# Patient Record
Sex: MALE | Race: WHITE | NOT HISPANIC OR LATINO | Employment: FULL TIME | URBAN - NONMETROPOLITAN AREA
[De-identification: names, ages, dates, MRNs, and addresses within clinical notes are randomized per-mention and may not be internally consistent; named-entity substitution may affect disease eponyms.]

---

## 2015-08-12 LAB
EJECTION FRACTION: 65

## 2017-01-11 ENCOUNTER — HOSPITAL ENCOUNTER (OUTPATIENT)
Dept: RESPIRATORY THERAPY | Facility: OTHER | Age: 57
End: 2017-01-11

## 2017-01-11 ENCOUNTER — AMBULATORY - GICH (OUTPATIENT)
Dept: LAB | Facility: OTHER | Age: 57
End: 2017-01-11

## 2017-01-11 DIAGNOSIS — C81.90 HODGKIN LYMPHOMA (H): ICD-10-CM

## 2017-01-11 LAB
A/G RATIO - HISTORICAL: 1.3 (ref 1–2)
ABSOLUTE BASOPHILS - HISTORICAL: 0.1 THOU/CU MM
ABSOLUTE EOSINOPHILS - HISTORICAL: 0.2 THOU/CU MM
ABSOLUTE LYMPHOCYTES - HISTORICAL: 1.4 THOU/CU MM (ref 0.9–2.9)
ABSOLUTE MONOCYTES - HISTORICAL: 0.4 THOU/CU MM
ABSOLUTE NEUTROPHILS - HISTORICAL: 4.2 THOU/CU MM (ref 1.7–7)
ALBUMIN SERPL-MCNC: 3.8 G/DL (ref 3.5–5.7)
ALP SERPL-CCNC: 46 IU/L (ref 34–104)
ALT (SGPT) - HISTORICAL: 21 IU/L (ref 7–52)
ANION GAP - HISTORICAL: 5 (ref 5–18)
AST SERPL-CCNC: 22 IU/L (ref 13–39)
BASOPHILS # BLD AUTO: 1.9 %
BILIRUB SERPL-MCNC: 0.5 MG/DL (ref 0.3–1)
BUN SERPL-MCNC: 17 MG/DL (ref 7–25)
BUN/CREAT RATIO - HISTORICAL: 18
CALCIUM SERPL-MCNC: 9.3 MG/DL (ref 8.6–10.3)
CHLORIDE SERPLBLD-SCNC: 105 MMOL/L (ref 98–107)
CO2 SERPL-SCNC: 26 MMOL/L (ref 21–31)
CREAT SERPL-MCNC: 0.94 MG/DL (ref 0.7–1.3)
EOSINOPHIL NFR BLD AUTO: 2.6 %
ERYTHROCYTE [DISTWIDTH] IN BLOOD BY AUTOMATED COUNT: 13.2 % (ref 11.5–15.5)
ERYTHROCYTE [SEDIMENTATION RATE] IN BLOOD: 8 MM/HR
GFR IF NOT AFRICAN AMERICAN - HISTORICAL: >60 ML/MIN/1.73M2
GLOBULIN - HISTORICAL: 2.9 G/DL (ref 2–3.7)
GLUCOSE SERPL-MCNC: 99 MG/DL (ref 70–105)
HCT VFR BLD AUTO: 46.3 % (ref 37–53)
HEMOGLOBIN: 15.4 G/DL (ref 13.5–17.5)
LDH SERPL-CCNC: 115 IU/L (ref 140–271)
LYMPHOCYTES NFR BLD AUTO: 21.9 % (ref 20–44)
MCH RBC QN AUTO: 32.6 PG (ref 26–34)
MCHC RBC AUTO-ENTMCNC: 33.3 G/DL (ref 32–36)
MCV RBC AUTO: 98 FL (ref 80–100)
MONOCYTES NFR BLD AUTO: 6.7 %
NEUTROPHILS NFR BLD AUTO: 66.9 % (ref 42–72)
PLATELET # BLD AUTO: 213 THOU/CU MM (ref 140–440)
PMV BLD: 7.1 FL (ref 6.5–11)
POTASSIUM SERPL-SCNC: 4.2 MMOL/L (ref 3.5–5.1)
PROT SERPL-MCNC: 6.7 G/DL (ref 6.4–8.9)
RED BLOOD COUNT - HISTORICAL: 4.73 MIL/CU MM (ref 4.3–5.9)
SODIUM SERPL-SCNC: 136 MMOL/L (ref 133–143)
WHITE BLOOD COUNT - HISTORICAL: 6.2 THOU/CU MM (ref 4.5–11)

## 2017-01-16 ENCOUNTER — COMMUNICATION - GICH (OUTPATIENT)
Dept: INTERNAL MEDICINE | Facility: OTHER | Age: 57
End: 2017-01-16

## 2017-01-16 DIAGNOSIS — F33.0 MAJOR DEPRESSIVE DISORDER, RECURRENT, MILD (H): ICD-10-CM

## 2017-01-16 DIAGNOSIS — C81.90 HODGKIN LYMPHOMA (H): ICD-10-CM

## 2017-01-18 ENCOUNTER — TRANSFERRED RECORDS (OUTPATIENT)
Dept: HEALTH INFORMATION MANAGEMENT | Facility: HOSPITAL | Age: 57
End: 2017-01-18

## 2017-01-18 ENCOUNTER — HISTORY (OUTPATIENT)
Dept: ONCOLOGY | Facility: OTHER | Age: 57
End: 2017-01-18

## 2017-01-18 ENCOUNTER — OFFICE VISIT - GICH (OUTPATIENT)
Dept: ONCOLOGY | Facility: OTHER | Age: 57
End: 2017-01-18

## 2017-01-18 DIAGNOSIS — C81.90 HODGKIN LYMPHOMA (H): ICD-10-CM

## 2017-01-25 ENCOUNTER — AMBULATORY - GICH (OUTPATIENT)
Dept: SCHEDULING | Facility: OTHER | Age: 57
End: 2017-01-25

## 2017-01-25 ENCOUNTER — OFFICE VISIT - GICH (OUTPATIENT)
Dept: PULMONOLOGY | Facility: OTHER | Age: 57
End: 2017-01-25

## 2017-01-25 DIAGNOSIS — R06.09 OTHER FORMS OF DYSPNEA: ICD-10-CM

## 2017-01-25 DIAGNOSIS — J98.4 OTHER DISORDERS OF LUNG (CODE): ICD-10-CM

## 2017-01-25 DIAGNOSIS — T45.1X5A ADVERSE EFFECT OF ANTINEOPLASTIC OR IMMUNOSUPPRESSIVE DRUG: ICD-10-CM

## 2017-01-31 ENCOUNTER — COMMUNICATION - GICH (OUTPATIENT)
Dept: FAMILY MEDICINE | Facility: OTHER | Age: 57
End: 2017-01-31

## 2017-01-31 DIAGNOSIS — E78.00 PURE HYPERCHOLESTEROLEMIA: ICD-10-CM

## 2017-02-15 ENCOUNTER — HISTORY (OUTPATIENT)
Dept: INTERNAL MEDICINE | Facility: OTHER | Age: 57
End: 2017-02-15

## 2017-02-15 ENCOUNTER — OFFICE VISIT - GICH (OUTPATIENT)
Dept: INTERNAL MEDICINE | Facility: OTHER | Age: 57
End: 2017-02-15

## 2017-02-15 DIAGNOSIS — G62.2 POLYNEUROPATHY DUE TO OTHER TOXIC AGENTS (H): ICD-10-CM

## 2017-02-15 DIAGNOSIS — J98.4 OTHER DISORDERS OF LUNG (CODE): ICD-10-CM

## 2017-02-15 DIAGNOSIS — G47.00 INSOMNIA: ICD-10-CM

## 2017-02-15 DIAGNOSIS — F33.0 MAJOR DEPRESSIVE DISORDER, RECURRENT, MILD (H): ICD-10-CM

## 2017-02-15 DIAGNOSIS — E78.5 HYPERLIPIDEMIA: ICD-10-CM

## 2017-02-15 DIAGNOSIS — K29.70 GASTRITIS WITHOUT BLEEDING: ICD-10-CM

## 2017-02-15 DIAGNOSIS — Z00.00 ENCOUNTER FOR GENERAL ADULT MEDICAL EXAMINATION WITHOUT ABNORMAL FINDINGS: ICD-10-CM

## 2017-02-15 DIAGNOSIS — T45.1X5A ADVERSE EFFECT OF ANTINEOPLASTIC OR IMMUNOSUPPRESSIVE DRUG: ICD-10-CM

## 2017-02-15 DIAGNOSIS — Z80.42 FAMILY HISTORY OF MALIGNANT NEOPLASM OF PROSTATE: ICD-10-CM

## 2017-02-15 DIAGNOSIS — C81.90 HODGKIN LYMPHOMA (H): ICD-10-CM

## 2017-02-15 LAB
CHOL/HDL RATIO - HISTORICAL: 6.33
CHOLESTEROL TOTAL: 190 MG/DL
HDLC SERPL-MCNC: 30 MG/DL (ref 23–92)
LDL COMMENT - HISTORICAL: ABNORMAL
NON-HDL CHOLESTEROL - HISTORICAL: 160 MG/DL
PATIENT STATUS - HISTORICAL: ABNORMAL
PSA TOTAL (DIAGNOSTIC) - HISTORICAL: 1.33 NG/ML
T4 FREE SERPL-MCNC: 0.49 NG/DL (ref 0.58–1.64)
TRIGL SERPL-MCNC: 405 MG/DL
TSH - HISTORICAL: 6.53 UIU/ML (ref 0.34–5.6)
VIT B12 SERPL-MCNC: 330 PG/ML (ref 180–914)

## 2017-04-11 ENCOUNTER — HOSPITAL ENCOUNTER (OUTPATIENT)
Dept: LAB | Facility: OTHER | Age: 57
End: 2017-04-11
Attending: INTERNAL MEDICINE | Admitting: INTERNAL MEDICINE

## 2017-04-11 DIAGNOSIS — C81.90 HODGKIN LYMPHOMA (H): ICD-10-CM

## 2017-04-11 LAB
A/G RATIO - HISTORICAL: 1.2 (ref 1–2)
ABSOLUTE BASOPHILS - HISTORICAL: 0.1 THOU/CU MM
ABSOLUTE EOSINOPHILS - HISTORICAL: 0.2 THOU/CU MM
ABSOLUTE LYMPHOCYTES - HISTORICAL: 2 THOU/CU MM (ref 0.9–2.9)
ABSOLUTE MONOCYTES - HISTORICAL: 0.4 THOU/CU MM
ABSOLUTE NEUTROPHILS - HISTORICAL: 3.9 THOU/CU MM (ref 1.7–7)
ALBUMIN SERPL-MCNC: 3.7 G/DL (ref 3.5–5.7)
ALP SERPL-CCNC: 45 IU/L (ref 34–104)
ALT (SGPT) - HISTORICAL: 22 IU/L (ref 7–52)
ANION GAP - HISTORICAL: 9 (ref 5–18)
AST SERPL-CCNC: 23 IU/L (ref 13–39)
BASOPHILS # BLD AUTO: 1.4 %
BILIRUB SERPL-MCNC: 0.6 MG/DL (ref 0.3–1)
BUN SERPL-MCNC: 16 MG/DL (ref 7–25)
BUN/CREAT RATIO - HISTORICAL: 17
CALCIUM SERPL-MCNC: 9 MG/DL (ref 8.6–10.3)
CHLORIDE SERPLBLD-SCNC: 103 MMOL/L (ref 98–107)
CO2 SERPL-SCNC: 26 MMOL/L (ref 21–31)
CREAT SERPL-MCNC: 0.93 MG/DL (ref 0.7–1.3)
EOSINOPHIL NFR BLD AUTO: 3 %
ERYTHROCYTE [DISTWIDTH] IN BLOOD BY AUTOMATED COUNT: 12.5 % (ref 11.5–15.5)
ERYTHROCYTE [SEDIMENTATION RATE] IN BLOOD: 6 MM/HR
GFR IF NOT AFRICAN AMERICAN - HISTORICAL: >60 ML/MIN/1.73M2
GLOBULIN - HISTORICAL: 3.2 G/DL (ref 2–3.7)
GLUCOSE SERPL-MCNC: 80 MG/DL (ref 70–105)
HCT VFR BLD AUTO: 42.7 % (ref 37–53)
HEMOGLOBIN: 14.7 G/DL (ref 13.5–17.5)
LDH SERPL-CCNC: 120 IU/L (ref 140–271)
LYMPHOCYTES NFR BLD AUTO: 29.9 % (ref 20–44)
MCH RBC QN AUTO: 32.3 PG (ref 26–34)
MCHC RBC AUTO-ENTMCNC: 34.3 G/DL (ref 32–36)
MCV RBC AUTO: 94 FL (ref 80–100)
MONOCYTES NFR BLD AUTO: 5.9 %
NEUTROPHILS NFR BLD AUTO: 59.8 % (ref 42–72)
PLATELET # BLD AUTO: 214 THOU/CU MM (ref 140–440)
PMV BLD: 7.9 FL (ref 6.5–11)
POTASSIUM SERPL-SCNC: 3.8 MMOL/L (ref 3.5–5.1)
PROT SERPL-MCNC: 6.9 G/DL (ref 6.4–8.9)
RED BLOOD COUNT - HISTORICAL: 4.55 MIL/CU MM (ref 4.3–5.9)
SODIUM SERPL-SCNC: 138 MMOL/L (ref 133–143)
WHITE BLOOD COUNT - HISTORICAL: 6.5 THOU/CU MM (ref 4.5–11)

## 2017-04-12 ENCOUNTER — AMBULATORY - GICH (OUTPATIENT)
Dept: LAB | Facility: OTHER | Age: 57
End: 2017-04-12

## 2017-04-12 ENCOUNTER — HOSPITAL ENCOUNTER (OUTPATIENT)
Dept: RADIOLOGY | Facility: OTHER | Age: 57
End: 2017-04-12
Attending: INTERNAL MEDICINE

## 2017-04-12 DIAGNOSIS — C81.90 HODGKIN LYMPHOMA (H): ICD-10-CM

## 2017-04-19 ENCOUNTER — OFFICE VISIT - GICH (OUTPATIENT)
Dept: ONCOLOGY | Facility: OTHER | Age: 57
End: 2017-04-19

## 2017-04-19 ENCOUNTER — AMBULATORY - GICH (OUTPATIENT)
Dept: SCHEDULING | Facility: OTHER | Age: 57
End: 2017-04-19

## 2017-04-19 ENCOUNTER — TRANSFERRED RECORDS (OUTPATIENT)
Dept: HEALTH INFORMATION MANAGEMENT | Facility: HOSPITAL | Age: 57
End: 2017-04-19

## 2017-04-19 ENCOUNTER — OFFICE VISIT - GICH (OUTPATIENT)
Dept: PULMONOLOGY | Facility: OTHER | Age: 57
End: 2017-04-19

## 2017-04-19 ENCOUNTER — HISTORY (OUTPATIENT)
Dept: ONCOLOGY | Facility: OTHER | Age: 57
End: 2017-04-19

## 2017-04-19 DIAGNOSIS — R06.09 OTHER FORMS OF DYSPNEA: ICD-10-CM

## 2017-04-19 DIAGNOSIS — T45.1X5A ADVERSE EFFECT OF ANTINEOPLASTIC OR IMMUNOSUPPRESSIVE DRUG: ICD-10-CM

## 2017-04-19 DIAGNOSIS — C81.90 HODGKIN LYMPHOMA (H): ICD-10-CM

## 2017-04-19 DIAGNOSIS — J98.4 OTHER DISORDERS OF LUNG (CODE): ICD-10-CM

## 2017-05-15 ENCOUNTER — COMMUNICATION - GICH (OUTPATIENT)
Dept: INTERNAL MEDICINE | Facility: OTHER | Age: 57
End: 2017-05-15

## 2017-05-15 DIAGNOSIS — F33.0 MAJOR DEPRESSIVE DISORDER, RECURRENT, MILD (H): ICD-10-CM

## 2017-05-22 ENCOUNTER — AMBULATORY - GICH (OUTPATIENT)
Dept: INTERNAL MEDICINE | Facility: OTHER | Age: 57
End: 2017-05-22

## 2017-05-22 DIAGNOSIS — E78.00 PURE HYPERCHOLESTEROLEMIA: ICD-10-CM

## 2017-05-23 ENCOUNTER — COMMUNICATION - GICH (OUTPATIENT)
Dept: INTERNAL MEDICINE | Facility: OTHER | Age: 57
End: 2017-05-23

## 2017-06-20 ENCOUNTER — COMMUNICATION - GICH (OUTPATIENT)
Dept: INTERNAL MEDICINE | Facility: OTHER | Age: 57
End: 2017-06-20

## 2017-06-20 DIAGNOSIS — F33.0 MAJOR DEPRESSIVE DISORDER, RECURRENT, MILD (H): ICD-10-CM

## 2017-06-20 DIAGNOSIS — C81.90 HODGKIN LYMPHOMA (H): ICD-10-CM

## 2017-07-06 ENCOUNTER — HOSPITAL ENCOUNTER (OUTPATIENT)
Dept: RESPIRATORY THERAPY | Facility: OTHER | Age: 57
End: 2017-07-06
Attending: INTERNAL MEDICINE

## 2017-07-06 ENCOUNTER — AMBULATORY - GICH (OUTPATIENT)
Dept: LAB | Facility: OTHER | Age: 57
End: 2017-07-06

## 2017-07-06 ENCOUNTER — TRANSFERRED RECORDS (OUTPATIENT)
Dept: HEALTH INFORMATION MANAGEMENT | Facility: CLINIC | Age: 57
End: 2017-07-06

## 2017-07-06 ENCOUNTER — MEDICAL CORRESPONDENCE (OUTPATIENT)
Facility: CLINIC | Age: 57
End: 2017-07-06
Payer: COMMERCIAL

## 2017-07-06 DIAGNOSIS — C81.90 HODGKIN LYMPHOMA (H): ICD-10-CM

## 2017-07-06 DIAGNOSIS — J98.4 OTHER DISORDERS OF LUNG (CODE): ICD-10-CM

## 2017-07-06 DIAGNOSIS — T45.1X5A ADVERSE EFFECT OF ANTINEOPLASTIC OR IMMUNOSUPPRESSIVE DRUG: ICD-10-CM

## 2017-07-06 LAB
A/G RATIO - HISTORICAL: 1.3 (ref 1–2)
ABSOLUTE BASOPHILS - HISTORICAL: 0 THOU/CU MM
ABSOLUTE EOSINOPHILS - HISTORICAL: 0.3 THOU/CU MM
ABSOLUTE IMMATURE GRANULOCYTES(METAS,MYELOS,PROS) - HISTORICAL: 0 THOU/CU MM
ABSOLUTE LYMPHOCYTES - HISTORICAL: 1.6 THOU/CU MM (ref 0.9–2.9)
ABSOLUTE MONOCYTES - HISTORICAL: 0.5 THOU/CU MM
ABSOLUTE NEUTROPHILS - HISTORICAL: 4.5 THOU/CU MM (ref 1.7–7)
ALBUMIN SERPL-MCNC: 4 G/DL (ref 3.5–5.7)
ALP SERPL-CCNC: 56 IU/L (ref 34–104)
ALT (SGPT) - HISTORICAL: 21 IU/L (ref 7–52)
ANION GAP - HISTORICAL: 9 (ref 5–18)
AST SERPL-CCNC: 22 IU/L (ref 13–39)
BASOPHILS # BLD AUTO: 0.6 %
BILIRUB SERPL-MCNC: 0.7 MG/DL (ref 0.3–1)
BUN SERPL-MCNC: 17 MG/DL (ref 7–25)
BUN/CREAT RATIO - HISTORICAL: 17
CALCIUM SERPL-MCNC: 9.7 MG/DL (ref 8.6–10.3)
CHLORIDE SERPLBLD-SCNC: 103 MMOL/L (ref 98–107)
CO2 SERPL-SCNC: 27 MMOL/L (ref 21–31)
CREAT SERPL-MCNC: 1.03 MG/DL (ref 0.7–1.3)
EOSINOPHIL NFR BLD AUTO: 3.9 %
ERYTHROCYTE [DISTWIDTH] IN BLOOD BY AUTOMATED COUNT: 13.1 % (ref 11.5–15.5)
ERYTHROCYTE [SEDIMENTATION RATE] IN BLOOD: 9 MM/HR
GFR IF NOT AFRICAN AMERICAN - HISTORICAL: >60 ML/MIN/1.73M2
GLOBULIN - HISTORICAL: 3.2 G/DL (ref 2–3.7)
GLUCOSE SERPL-MCNC: 81 MG/DL (ref 70–105)
HCT VFR BLD AUTO: 43.4 % (ref 37–53)
HEMOGLOBIN: 15.1 G/DL (ref 13.5–17.5)
IMMATURE GRANULOCYTES(METAS,MYELOS,PROS) - HISTORICAL: 0.3 %
LDH SERPL-CCNC: 157 IU/L (ref 140–271)
LYMPHOCYTES NFR BLD AUTO: 23.2 % (ref 20–44)
MCH RBC QN AUTO: 32.4 PG (ref 26–34)
MCHC RBC AUTO-ENTMCNC: 34.8 G/DL (ref 32–36)
MCV RBC AUTO: 93 FL (ref 80–100)
MONOCYTES NFR BLD AUTO: 6.7 %
NEUTROPHILS NFR BLD AUTO: 65.3 % (ref 42–72)
PLATELET # BLD AUTO: 213 THOU/CU MM (ref 140–440)
PMV BLD: 10 FL (ref 6.5–11)
POTASSIUM SERPL-SCNC: 4 MMOL/L (ref 3.5–5.1)
PROT SERPL-MCNC: 7.2 G/DL (ref 6.4–8.9)
RED BLOOD COUNT - HISTORICAL: 4.66 MIL/CU MM (ref 4.3–5.9)
SODIUM SERPL-SCNC: 139 MMOL/L (ref 133–143)
WHITE BLOOD COUNT - HISTORICAL: 6.9 THOU/CU MM (ref 4.5–11)

## 2017-07-06 PROCEDURE — 93306 TTE W/DOPPLER COMPLETE: CPT | Mod: 26 | Performed by: INTERNAL MEDICINE

## 2017-07-13 ENCOUNTER — HOSPITAL ENCOUNTER (OUTPATIENT)
Dept: RADIOLOGY | Facility: OTHER | Age: 57
End: 2017-07-13
Attending: INTERNAL MEDICINE

## 2017-07-13 DIAGNOSIS — C81.90 HODGKIN LYMPHOMA (H): ICD-10-CM

## 2017-07-20 ENCOUNTER — OFFICE VISIT - GICH (OUTPATIENT)
Dept: ONCOLOGY | Facility: OTHER | Age: 57
End: 2017-07-20

## 2017-07-20 ENCOUNTER — TRANSFERRED RECORDS (OUTPATIENT)
Dept: HEALTH INFORMATION MANAGEMENT | Facility: HOSPITAL | Age: 57
End: 2017-07-20

## 2017-07-20 ENCOUNTER — HISTORY (OUTPATIENT)
Dept: ONCOLOGY | Facility: OTHER | Age: 57
End: 2017-07-20

## 2017-07-20 DIAGNOSIS — R93.1 ABNORMAL FINDINGS ON DIAGNOSTIC IMAGING OF HEART AND CORONARY CIRCULATION: ICD-10-CM

## 2017-07-20 DIAGNOSIS — C81.90 HODGKIN LYMPHOMA (H): ICD-10-CM

## 2017-08-03 ENCOUNTER — OFFICE VISIT - GICH (OUTPATIENT)
Dept: CARDIOLOGY | Facility: OTHER | Age: 57
End: 2017-08-03

## 2017-08-03 ENCOUNTER — HISTORY (OUTPATIENT)
Dept: CARDIOLOGY | Facility: OTHER | Age: 57
End: 2017-08-03

## 2017-08-03 DIAGNOSIS — E78.2 MIXED HYPERLIPIDEMIA: ICD-10-CM

## 2017-08-03 DIAGNOSIS — C81.90 HODGKIN LYMPHOMA (H): ICD-10-CM

## 2017-08-03 DIAGNOSIS — T45.1X5A ADVERSE EFFECT OF ANTINEOPLASTIC OR IMMUNOSUPPRESSIVE DRUG: ICD-10-CM

## 2017-08-03 DIAGNOSIS — R93.1 ABNORMAL FINDINGS ON DIAGNOSTIC IMAGING OF HEART AND CORONARY CIRCULATION: ICD-10-CM

## 2017-08-03 DIAGNOSIS — I51.9 HEART DISEASE: ICD-10-CM

## 2017-08-03 DIAGNOSIS — J98.4 OTHER DISORDERS OF LUNG (CODE): ICD-10-CM

## 2017-08-03 DIAGNOSIS — Z82.49 FAMILY HISTORY OF ISCHEMIC HEART DISEASE AND OTHER DISEASES OF THE CIRCULATORY SYSTEM: ICD-10-CM

## 2017-08-03 ASSESSMENT — PATIENT HEALTH QUESTIONNAIRE - PHQ9: SUM OF ALL RESPONSES TO PHQ QUESTIONS 1-9: 0

## 2017-08-09 ENCOUNTER — COMMUNICATION - GICH (OUTPATIENT)
Dept: INTERNAL MEDICINE | Facility: OTHER | Age: 57
End: 2017-08-09

## 2017-08-09 DIAGNOSIS — F33.0 MAJOR DEPRESSIVE DISORDER, RECURRENT, MILD (H): ICD-10-CM

## 2017-08-09 DIAGNOSIS — C81.90 HODGKIN LYMPHOMA (H): ICD-10-CM

## 2017-08-25 ENCOUNTER — COMMUNICATION - GICH (OUTPATIENT)
Dept: RADIOLOGY | Facility: OTHER | Age: 57
End: 2017-08-25

## 2017-08-28 ENCOUNTER — HOSPITAL ENCOUNTER (OUTPATIENT)
Dept: RADIOLOGY | Facility: OTHER | Age: 57
End: 2017-08-28
Attending: NURSE PRACTITIONER

## 2017-08-28 DIAGNOSIS — Z82.49 FAMILY HISTORY OF ISCHEMIC HEART DISEASE AND OTHER DISEASES OF THE CIRCULATORY SYSTEM: ICD-10-CM

## 2017-08-28 DIAGNOSIS — I51.9 HEART DISEASE: ICD-10-CM

## 2017-08-28 DIAGNOSIS — R93.1 ABNORMAL FINDINGS ON DIAGNOSTIC IMAGING OF HEART AND CORONARY CIRCULATION: ICD-10-CM

## 2017-08-31 ENCOUNTER — AMBULATORY - GICH (OUTPATIENT)
Dept: LAB | Facility: OTHER | Age: 57
End: 2017-08-31

## 2017-08-31 DIAGNOSIS — C81.90 HODGKIN LYMPHOMA (H): ICD-10-CM

## 2017-08-31 LAB
A/G RATIO - HISTORICAL: 1.4 (ref 1–2)
ABSOLUTE BASOPHILS - HISTORICAL: 0 THOU/CU MM
ABSOLUTE EOSINOPHILS - HISTORICAL: 0.1 THOU/CU MM
ABSOLUTE IMMATURE GRANULOCYTES(METAS,MYELOS,PROS) - HISTORICAL: 0 THOU/CU MM
ABSOLUTE LYMPHOCYTES - HISTORICAL: 1.4 THOU/CU MM (ref 0.9–2.9)
ABSOLUTE MONOCYTES - HISTORICAL: 0.5 THOU/CU MM
ABSOLUTE NEUTROPHILS - HISTORICAL: 3.7 THOU/CU MM (ref 1.7–7)
ALBUMIN SERPL-MCNC: 3.9 G/DL (ref 3.5–5.7)
ALP SERPL-CCNC: 59 IU/L (ref 34–104)
ALT (SGPT) - HISTORICAL: 21 IU/L (ref 7–52)
ANION GAP - HISTORICAL: 7 (ref 5–18)
AST SERPL-CCNC: 22 IU/L (ref 13–39)
BASOPHILS # BLD AUTO: 0.7 %
BILIRUB SERPL-MCNC: 0.7 MG/DL (ref 0.3–1)
BUN SERPL-MCNC: 15 MG/DL (ref 7–25)
BUN/CREAT RATIO - HISTORICAL: 15
CALCIUM SERPL-MCNC: 9.1 MG/DL (ref 8.6–10.3)
CHLORIDE SERPLBLD-SCNC: 100 MMOL/L (ref 98–107)
CO2 SERPL-SCNC: 26 MMOL/L (ref 21–31)
CREAT SERPL-MCNC: 0.97 MG/DL (ref 0.7–1.3)
EOSINOPHIL NFR BLD AUTO: 2.4 %
ERYTHROCYTE [DISTWIDTH] IN BLOOD BY AUTOMATED COUNT: 13.1 % (ref 11.5–15.5)
ERYTHROCYTE [SEDIMENTATION RATE] IN BLOOD: 6 MM/HR
GFR IF NOT AFRICAN AMERICAN - HISTORICAL: >60 ML/MIN/1.73M2
GLOBULIN - HISTORICAL: 2.7 G/DL (ref 2–3.7)
GLUCOSE SERPL-MCNC: 93 MG/DL (ref 70–105)
HCT VFR BLD AUTO: 43.5 % (ref 37–53)
HEMOGLOBIN: 14.9 G/DL (ref 13.5–17.5)
IMMATURE GRANULOCYTES(METAS,MYELOS,PROS) - HISTORICAL: 0.2 %
LDH SERPL-CCNC: 129 IU/L (ref 140–271)
LYMPHOCYTES NFR BLD AUTO: 24.8 % (ref 20–44)
MCH RBC QN AUTO: 32 PG (ref 26–34)
MCHC RBC AUTO-ENTMCNC: 34.3 G/DL (ref 32–36)
MCV RBC AUTO: 93 FL (ref 80–100)
MONOCYTES NFR BLD AUTO: 8.3 %
NEUTROPHILS NFR BLD AUTO: 63.6 % (ref 42–72)
PLATELET # BLD AUTO: 177 THOU/CU MM (ref 140–440)
PMV BLD: 9.8 FL (ref 6.5–11)
POTASSIUM SERPL-SCNC: 4.1 MMOL/L (ref 3.5–5.1)
PROT SERPL-MCNC: 6.6 G/DL (ref 6.4–8.9)
RED BLOOD COUNT - HISTORICAL: 4.66 MIL/CU MM (ref 4.3–5.9)
SODIUM SERPL-SCNC: 133 MMOL/L (ref 133–143)
WHITE BLOOD COUNT - HISTORICAL: 5.8 THOU/CU MM (ref 4.5–11)

## 2017-09-06 ENCOUNTER — AMBULATORY - GICH (OUTPATIENT)
Dept: CARDIOLOGY | Facility: OTHER | Age: 57
End: 2017-09-06

## 2017-09-06 DIAGNOSIS — E78.00 PURE HYPERCHOLESTEROLEMIA: ICD-10-CM

## 2017-09-06 DIAGNOSIS — I51.9 HEART DISEASE: ICD-10-CM

## 2017-10-02 ENCOUNTER — COMMUNICATION - GICH (OUTPATIENT)
Dept: ONCOLOGY | Facility: OTHER | Age: 57
End: 2017-10-02

## 2017-10-02 DIAGNOSIS — C81.90 HODGKIN LYMPHOMA (H): ICD-10-CM

## 2017-10-12 ENCOUNTER — AMBULATORY - GICH (OUTPATIENT)
Dept: LAB | Facility: OTHER | Age: 57
End: 2017-10-12

## 2017-10-12 DIAGNOSIS — C81.90 HODGKIN LYMPHOMA (H): ICD-10-CM

## 2017-10-12 LAB
A/G RATIO - HISTORICAL: 1.3 (ref 1–2)
ABSOLUTE BASOPHILS - HISTORICAL: 0 THOU/CU MM
ABSOLUTE EOSINOPHILS - HISTORICAL: 0.2 THOU/CU MM
ABSOLUTE IMMATURE GRANULOCYTES(METAS,MYELOS,PROS) - HISTORICAL: 0 THOU/CU MM
ABSOLUTE LYMPHOCYTES - HISTORICAL: 1.5 THOU/CU MM (ref 0.9–2.9)
ABSOLUTE MONOCYTES - HISTORICAL: 0.5 THOU/CU MM
ABSOLUTE NEUTROPHILS - HISTORICAL: 3.8 THOU/CU MM (ref 1.7–7)
ALBUMIN SERPL-MCNC: 3.9 G/DL (ref 3.5–5.7)
ALP SERPL-CCNC: 49 IU/L (ref 34–104)
ALT (SGPT) - HISTORICAL: 20 IU/L (ref 7–52)
ANION GAP - HISTORICAL: 6 (ref 5–18)
AST SERPL-CCNC: 25 IU/L (ref 13–39)
BASOPHILS # BLD AUTO: 0.7 %
BILIRUB SERPL-MCNC: 0.7 MG/DL (ref 0.3–1)
BUN SERPL-MCNC: 20 MG/DL (ref 7–25)
BUN/CREAT RATIO - HISTORICAL: 22
CALCIUM SERPL-MCNC: 9.5 MG/DL (ref 8.6–10.3)
CHLORIDE SERPLBLD-SCNC: 102 MMOL/L (ref 98–107)
CO2 SERPL-SCNC: 26 MMOL/L (ref 21–31)
CREAT SERPL-MCNC: 0.92 MG/DL (ref 0.7–1.3)
EOSINOPHIL NFR BLD AUTO: 2.5 %
ERYTHROCYTE [DISTWIDTH] IN BLOOD BY AUTOMATED COUNT: 13.2 % (ref 11.5–15.5)
ERYTHROCYTE [SEDIMENTATION RATE] IN BLOOD: 6 MM/HR
GFR IF NOT AFRICAN AMERICAN - HISTORICAL: >60 ML/MIN/1.73M2
GLOBULIN - HISTORICAL: 3 G/DL (ref 2–3.7)
GLUCOSE SERPL-MCNC: 108 MG/DL (ref 70–105)
HCT VFR BLD AUTO: 43.6 % (ref 37–53)
HEMOGLOBIN: 14.8 G/DL (ref 13.5–17.5)
IMMATURE GRANULOCYTES(METAS,MYELOS,PROS) - HISTORICAL: 0.3 %
LDH SERPL-CCNC: 187 IU/L (ref 140–271)
LYMPHOCYTES NFR BLD AUTO: 24.6 % (ref 20–44)
MCH RBC QN AUTO: 31.6 PG (ref 26–34)
MCHC RBC AUTO-ENTMCNC: 33.9 G/DL (ref 32–36)
MCV RBC AUTO: 93 FL (ref 80–100)
MONOCYTES NFR BLD AUTO: 7.7 %
NEUTROPHILS NFR BLD AUTO: 64.2 % (ref 42–72)
PLATELET # BLD AUTO: 174 THOU/CU MM (ref 140–440)
PMV BLD: 9.8 FL (ref 6.5–11)
POTASSIUM SERPL-SCNC: 4.4 MMOL/L (ref 3.5–5.1)
PROT SERPL-MCNC: 6.9 G/DL (ref 6.4–8.9)
RED BLOOD COUNT - HISTORICAL: 4.69 MIL/CU MM (ref 4.3–5.9)
SODIUM SERPL-SCNC: 134 MMOL/L (ref 133–143)
WHITE BLOOD COUNT - HISTORICAL: 5.9 THOU/CU MM (ref 4.5–11)

## 2017-10-18 ENCOUNTER — AMBULATORY - GICH (OUTPATIENT)
Dept: SCHEDULING | Facility: OTHER | Age: 57
End: 2017-10-18

## 2017-10-18 ENCOUNTER — HISTORY (OUTPATIENT)
Dept: ONCOLOGY | Facility: OTHER | Age: 57
End: 2017-10-18

## 2017-10-18 ENCOUNTER — OFFICE VISIT - GICH (OUTPATIENT)
Dept: ONCOLOGY | Facility: OTHER | Age: 57
End: 2017-10-18

## 2017-10-18 ENCOUNTER — TRANSFERRED RECORDS (OUTPATIENT)
Dept: HEALTH INFORMATION MANAGEMENT | Facility: HOSPITAL | Age: 57
End: 2017-10-18

## 2017-10-18 DIAGNOSIS — C81.90 HODGKIN LYMPHOMA (H): ICD-10-CM

## 2017-10-25 ENCOUNTER — COMMUNICATION - GICH (OUTPATIENT)
Dept: INTERNAL MEDICINE | Facility: OTHER | Age: 57
End: 2017-10-25

## 2017-10-25 DIAGNOSIS — C81.90 HODGKIN LYMPHOMA (H): ICD-10-CM

## 2017-10-25 DIAGNOSIS — F33.0 MAJOR DEPRESSIVE DISORDER, RECURRENT, MILD (H): ICD-10-CM

## 2017-10-27 ENCOUNTER — COMMUNICATION - GICH (OUTPATIENT)
Dept: INTERNAL MEDICINE | Facility: OTHER | Age: 57
End: 2017-10-27

## 2017-11-09 ENCOUNTER — AMBULATORY - GICH (OUTPATIENT)
Dept: FAMILY MEDICINE | Facility: OTHER | Age: 57
End: 2017-11-09

## 2017-11-09 DIAGNOSIS — Z23 ENCOUNTER FOR IMMUNIZATION: ICD-10-CM

## 2017-11-15 ENCOUNTER — OFFICE VISIT - GICH (OUTPATIENT)
Dept: PULMONOLOGY | Facility: OTHER | Age: 57
End: 2017-11-15

## 2017-11-15 DIAGNOSIS — T45.1X5A ADVERSE EFFECT OF ANTINEOPLASTIC OR IMMUNOSUPPRESSIVE DRUG: ICD-10-CM

## 2017-11-15 DIAGNOSIS — R06.09 OTHER FORMS OF DYSPNEA: ICD-10-CM

## 2017-11-15 DIAGNOSIS — J98.4 OTHER DISORDERS OF LUNG (CODE): ICD-10-CM

## 2017-12-27 NOTE — PROGRESS NOTES
Patient Information     Patient Name MRN Sex Daniel Veras [id number] Male 1960      Progress Notes signed by Kia Leung MD at 2017  1:16 PM      Author:  Kia Leung MD Service:  (none) Author Type:  Physician     Filed:  2017  1:16 PM Encounter Date:  2017 Status:  Signed     :  Kia Leung MD (Physician)            -  DATE OF SERVICE:  2017    HEMATOLOGY-ONCOLOGY CLINIC NOTE    Mr. Boyd returns for followup of stage IV Hodgkin's lymphoma, classical type. He originally presented with a right neck mass over a period of two months. This worsened and he was subsequently seen by Dr. Radha Kim of general surgery, who noted that patient had right supraclavicular adenopathy. Biopsy of the lymph node came back consistent with classical Hodgkin's lymphoma. He was subsequently seen by Dr. Jarad Mosqueda on 2015, who recommended a PET scan for accurate staging. This was performed on 2015. The findings were that there was hypermetabolic lymphadenopathy both above and below the diaphragm suspicious for lymphoma. There was hypermetabolic activity in the spleen suspicious for metastatic disease. There was multiple foci of abnormal uptake in the spine and pelvis, all suspicious for lymphoma involvement. There was a right middle lobe lung nodule that was nonspecific. Dr. Mosqueda felt the patient had stage IV Hodgkin's lymphoma and recommended ABVD x6 cycles. An echocardiogram was performed to assess his cardiac function on 2015. The findings were that the patient had normal left ventricular size. Left ventricular ejection fraction was 65%. The patient was started on ABVD, and after three cycles he had a repeat PET scan on 2015. The findings were there was marked improvement in the patient's lymphadenopathy in the lower neck, axillary chest, abdomen, pelvis and groin area. The focal areas of activity were markedly improved in the  cervicolumbar spine and pelvic area. The patient subsequently saw Dr. Mosqueda on 02/10/2016. His assessment was the patient had completed six cycles of ABVD. A repeat PET scan was performed on 02/24/2016. The findings were there was no abnormal uptake to suggest metastatic disease and no evidence of metastatic disease. The previously seen lymphadenopathy, splenic activity and bone lesions had all resolved. The patient had problems with dysphagia as well as being admitted to the hospital with neutropenic fever and a right lower lobe pneumonia, and was treated with IV antibiotics. He did see Dr. Molina for an EGD, which revealed a hiatal hernia and a Schatzki's ring. Biopsies were all negative. The patient's dysphagia subsequently improved. When we saw the patient on 06/08/2016, he had no evidence of B symptoms and wanted to assess disease status by obtaining a sed rate, which was normal. We also obtained an echocardiogram as well as pulmonary function tests. The echocardiogram revealed no change. His ejection fraction was 56%. Pulmonary functions did reveal decreased DLCO, 52% of predicted being reported. The patient did have some shortness of breath. When we saw him on 07/14/2016, we elected to restage him with a PET scan. This was done on 10/05/2016, which revealed no suspicious hypermetabolic osseous lesions identified, no evidence of residual or recurrent lymphoma. He also had been seen by Dr. Cm London in pulmonary for evaluation of possible bleomycin lung toxicity. His impression was that the patient had dyspnea on exertion. He felt his symptoms had been stable, but not progressive. He did have prechemotherapy pulmonary function tests for comparison. He felt that patient certainly could have pulmonary fibrosis from bleomycin as well as other forms of bleomycin toxicity, including hypersensitivity pneumonitis. The plan was to obtain a CT of the chest with high-resolution cuts and also repeat his pulmonary  function tests. The patient had a high-resolution CT chest performed on 10/03/2016. The findings were there was new mild subpleural fibrotic change within the anterior right upper lobe which may be related to chemotherapy. The patient did see Dr. Cm London on 10/12/2016. His impression was that review of pulmonary function tests revealed stability in his lung volumes. DLCO revealed improved spirometry. He felt his dyspnea on exertion was stable, not progressive. He reviewed his CT scan of the chest, which revealed the patient had subpleural interstitial changes on the right consistent with bleomycin lung toxicity. The plan was to follow him with serial pulmonary function tests to ensure stability otherwise. When we saw him on 01/18/2017, the plan was to repeat a PET/CT in three months. This was performed on 04/12/2017. The findings were that the patient had a stable CT of the chest with tiny right lung nodules. There was unchanged fibrosis anteriorly in the right lung. There was no lymphadenopathy. The patient had also seen Dr. Cm London. His assessment was the patient had bleomycin lung toxicity. The patient had dyspnea on exertion felt to be secondary to bleomycin lung toxicity. He felt his pulmonary functions that were done in January did show mild negative variability in the test and plan was to see him and review the CT of the chest. The patient did see Dr. Cm London on 04/19/2017, and his assessment was that patient had bleomycin lung toxicity. He felt his CT chest had shown overall stability with subpleural interstitial changes on the right consistent with bleomycin lung toxicity. The plan was to see him six months. When we saw him on 04/19/2017, we felt that we wanted to restage him, given the fact he had stage IV disease, with a PET scan. This was obtained on 07/14/2017. The findings were that there was no evidence of residual or recurrent lymphoma. There were minimal subpleural fibrotic changes  seen. We also obtained an echocardiogram, which was read as borderline left ventricular reduced function with a calculated left ventricular ejection fraction of 49%. His prior echocardiogram showed an ejection fraction of 56%.     The patient states he gets occasional dyspnea on exertion and may have noted some occasional ankle edema, but denies any orthopnea, chest pain, abdominal pain, fevers, night sweats or weight loss. Overall, he is doing well. He plans to followup with Dr. 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     PHYSICAL EXAMINATION  GENERAL: He is a middle-aged white male in no acute distress.   VITAL SIGNS: Blood pressure 102/72, pulse 64, temperature 97.9.   HEENT: Significant for some temporal wasting.   NECK: Supple.   LUNGS: A few crackles bilaterally.   HEART: Regular rate and rhythm with questionable 2/6 systolic murmur heard best at the left sternal border versus S3.   ABDOMEN: Soft, nontender.   LYMPHATICS: No cervical, supraclavicular, axillary or inguinal nodes.   EXTREMITIES: No edema.   NEUROLOGIC: Nonfocal.     LABORATORIES  CBC: White count 6.9, hemoglobin 15.1, hematocrit 43.4, platelet count 213,000. Sed rate is 9. . LFTs are normal.     IMPRESSION  Stage classical type Hodgkin's lymphoma presenting with right supraclavicular lymphadenopathy and biopsy consistent with classical Hodgkin's lymphoma that was involving the spine and pelvis, as well as lymphadenopathy below the diaphragm as well as above the diaphragm. This was responsive to six cycles of ABVD. Course was complicated by neutropenic fever. Echocardiogram revealed normal cardiac function. Pulmonary function tests revealed depressed DLCO. PET scan was negative for metastatic disease. CT chest revealed stable fibrosis in the right lung. The patient has been followed by Dr. Cm London. The patient has had a normal sed rate, normal LDH, normal PET scan with no evidence of progression, but now with  decreased left ventricular ejection fraction, question secondary to Adriamycin-induced cardiotoxicity.    We would like to have this evaluated by a cardiologist. We referred the patient to Dr. Collier of cardiology for evaluation. Otherwise, will see the patient in three months and obtain CBC, CMP, LDH and sed rate, and perform an exam.     40 minutes were spent, greater than half the time spent in counseling and coordination of care.        MD ROLAND RUANO/ramirez   D:  2017 17:52:37  T:  2017 11:25:34  Voice Job ID:  [id number]  Text Job ID:  [id number]  cc:ANAT VENEGAS MD, PRIMARY PHYSICIAN  MD MERRITT CONTRERAS, [facility] [place], [place]NAME:  SAM AGUIRRE  MR#:  [id number]  :  1960  DATE:  2017  LOCATION:  [facility]  ROOM:    TYPE:  [facility] NOTEPage 1 of 1

## 2017-12-27 NOTE — PROGRESS NOTES
Patient Information     Patient Name MRN Sex Daniel Veras [id number] Male 1960      Progress Notes signed by Kia Leung MD at 10/19/2017  7:09 PM      Author:  Kia Leung MD Service:  (none) Author Type:  Physician     Filed:  10/19/2017  7:09 PM Encounter Date:  10/18/2017 Status:  Signed     :  Kia Leung MD (Physician)            DATE OF SERVICE:  10/18/2017    HEMATOLOGY/ONCOLOGY CLINIC NOTE     Mr. Boyd returns for followup of stage IV Hodgkin's lymphoma, classical type.  He originally presented with right neck mass over a period of 2 months.  This was subsequently seen by Dr. Radha Kim of general surgery, who noted that patient had right-sided supraclavicular adenopathy.  The biopsy of the lymph node came back consistent with classical Hodgkin's lymphoma.  He was subsequently seen by Dr. Jarad Mosqueda on 2015, who recommended PET scan for accurate staging.  This was performed on 2015.  The findings were that there was hypermetabolic lymphadenopathy, both above and below the diaphragm, suspicious for lymphoma.  There was hypermetabolic activity in the spleen suspicious for metastatic disease.  There was multiple foci with abnormal uptake in the spine, pelvis, all suspicious for lymphoma as well and there was a right middle lobe lung nodule that was nonspecific.  Dr. Mosqueda felt the patient had stage IV Hodgkin's lymphoma and recommended ABVD x6 cycles.  An echocardiogram was performed to assess his cardiac function on 2015.  The findings were that patient had normal left ventricular size.  Left ventricular ejection fraction was 65%.  Patient was started on ABVD and after 3 cycles, he had a repeat PET scan on 2015.  Findings were that there was marked improvement in the patient's lymphadenopathy in the lower neck, axilla, chest, abdomen, pelvis and groin area.  The focal areas of activity were markedly improved over the  cervicolumbar spine and pelvic area.  Patient subsequently saw Dr. Mosqueda on 02/10/2016.  The assessment was that the patient had completed 6 cycles of ABVD.  Repeat PET scan was performed 02/24/2016.  The findings were there was no abnormal update to suggest metastatic disease, no evidence of metastatic disease.  The previously seen lymphadenopathy  bone lesions had all resolved.  Patient had problems with dysphagia as well.  Was being admitted to the hospital to be treated for right lower lobe pneumonia.  He was treated with IV antibiotics.  He did see Dr. Molina for an EGD, which revealed hiatal hernia, Schatzki's ring.  Biopsies were all negative.  Patient's dysphagia subsequently improved.  When we saw the patient on 06/08/2016, he had no evidence of disease clinically and wanted to assess disease status.  We obtained a sed rate, which was normal.  We also obtained an echocardiogram as well as pulmonary function test.  Echocardiogram revealed no changes.  Ejection fraction was 56%.  Pulmonary functions did reveal decreased DLCO, 52% of predicted being reported.  Patient did have some shortness of breath when we saw him on 07/14/2016.  We elected to restage him with a PET scan.  This was done on 10/05/2016, which revealed no suspicious hypermetabolic osseous lesions identified.  No evidence of residual recurrent lymphoma.  He had also been seen by Dr. Cm London in pulmonary for evaluation of possible bleomycin lung toxicity.  Unfortunately, the patient had dyspnea on exertion.  He felt his symptoms had been stable, but not progressing.  He did have pre-chemotherapy pulmonary function tests for comparison.  He felt the patient certainly could have pulmonary fibrosis of bleomycin as well as other forms of bleomycin toxicity including hypersensitivity pneumonitis.  Plan was to obtain a CT chest with high resolution cuts and also repeat his pulmonary function test.  The patient had a high resolution CT chest  performed on 10/03/2016 and findings were there was new mild subpleural fibrotic change within the anterior right upper lobe, which may be related to chemotherapy.  The patient saw Dr. Cm London 10/12/2016.  His impression was that the pulmonary function tests revealed stability in his lung volumes.  DLCO revealed improved spirometry.  He felt his dyspnea on exertion was stable, not progressive.  He reviewed his CT scan of the chest, which revealed the patient had some pleural interstitial changes on the right consistent with bleomycin lung toxicity.  The plan was to follow him with serial pulmonary function tests to ensure stability otherwise.  When we saw him on 01/18/2017, the plan was to repeat a PET CT in 3 months.   This was performed on 04/12/2017.  The findings were that patient had a stable CT of the chest with tiny right lung nodules.  There was unchanged fibrosis anteriorly on the right lung.  There was no lymphadenopathy.  Patient had also seen Dr. Cm London.  His assessment was that patient had bleomycin lung toxicity.  Patient had dyspnea on exertion felt to be secondary to bleomycin lung toxicity.  He felt his pulmonary functions that were done in January, did show mild negative variability of the test.  The plan was to see him and review CT chest.  The patient did see Dr. Cm London on 04/19/2017.  His assessment was that patient had bleomycin lung toxicity.  He felt his CT chest had shown overall stability.  There were still pleural interstitial changes on the right consistent with bleomycin lung toxicity.  Plan was to see him in 6 months.  When we saw him 04/19/2017, we felt we wanted to restage him and given the fact he had stage IV disease with the PET scan, this was obtained on 07/14/2017.  The findings were there was no evidence of residual recurrent lymphoma.  There was minimal subpleural fibrotic changes.  We also obtained an echocardiogram, which was read as borderline left  ventricular reduced function with a calculated left ventricular ejection fraction of 49%.  His prior echocardiogram showed an ejection fraction of 56%.  Patient had been seen, per the patient, with Dr. Collier of cardiology and the patient was seen by Ann Arreaga, his nurse practitioner on 08/03/2017.  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  It was recommended the patient start lisinopril 2.5 mg daily as ACE inhibitor therapy.  She also ordered CT of the cardiac coronary arteries, which was done 08/28 and the findings were that there was total calcium score 126.1, which is in the 78th percentile for subjects of the same age, gender, race and ethnicity.  There was prominent ramus intermediate artery with moderate focal proximal stenosis, multifocal mild narrowing of the proximal and mid LAD.  There was focal scarring in the anterolateral right lobe, small hiatal hernia.  Patient is scheduled to have an echo in November.  Otherwise, he is doing reasonably well.  He still gets dyspnea on exertion.  He plans to see Dr. Cm London in November for followup.  Otherwise, there were no complaints of shortness of breath, chest pain, abdominal pain, fevers, night sweats, weight loss.  He has had occasional sweat over the past 2-3 days he says, but this is not on a daily basis.  No weight loss.     PHYSICAL EXAMINATION:  GENERAL:  He is a middle-age white male in no acute distress.    VITAL SIGNS:  Blood pressure 104/78, pulse 64, respirations 16, temperature 96.9.   HEENT:  Atraumatic, normocephalic.  Oropharynx nonerythematous.    NECK:  Supple.    LUNGS:  Clear to auscultation and percussion.   HEART:  Regular rhythm.  S1, S2 is normal.    ABDOMEN:  Soft.  Normoactive bowel sounds.  No masses or tenderness.  LYMPHATICS:  No cervical, supraclavicular, axillary or inguinal nodes.   EXTREMITIES:  Without edema.   NEUROLOGICAL:   Nonfocal.      LABORATORY DATA:  Laboratories reveal normal sed rate of 6.  LDH is normal at 187.  BUN 20, creatinine 0.92.  LFTs are normal.      IMPRESSION:  Stage IV classical type Hodgkin's lymphoma presenting with right cervical radicular lymphadenopathy and biopsy consistent with classical Hodgkin's lymphoma that was involved the spine and pelvis as well as lymphadenopathy of the diaphragm as well as above the diaphragm.  This was responsive to 6 cycles of ABVD.  Course complicated by neutropenic fever.  Echocardiogram revealed normal cardiac function.  Pulmonary function test revealed depressed DLCO.  PET scan was negative for metastatic disease.  CT chest revealed stable fibrosis in the right lung.  Patient had been followed by Dr. Cm Lau.  Patient had a normal sed rate, normal LDH, normal PET scan with no evidence of progression, but now was found to have decreased left ventricular ejection fraction, likely Adriamycin induced.  Patient is currently on an ACE inhibitor as per Zion Dozier.  The plan is to continue surveillance.  We will see the patient in 3 months and repeat staging studies including CT neck, chest, abdomen and pelvis.  Otherwise, follow up with Dr. Cm Lau and Zion Dozier, nurse practitioner with echo and depressed ejection fraction as well as with Dr. Lau regarding bleomycin fibrosis.      40 minutes was spent with the patient, greater than half the time spent in counseling and coordination of care.         BRENDA WILD MD ACP/nn  D:10/18/2017 16:51:46  T:10/18/2017 21:33:12  VJID: [id number]  TJID: [id number]    cc:CM LAU MD, ZION DOZIER NP, ANAT VENEGAS MD

## 2017-12-27 NOTE — PROGRESS NOTES
Patient Information     Patient Name MRN Sex Daniel Veras [id number] Male 1960      Progress Notes by Kia Leung MD at 2017  2:30 PM     Author:  Kia Leung MD Service:  (none) Author Type:  Physician     Filed:  2017  7:00 PM Encounter Date:  2017 Status:  Signed     :  Kia Leung MD (Physician)            This note has been dictated. The encounter number is [phone].

## 2017-12-27 NOTE — PROGRESS NOTES
Patient Information     Patient Name MRN Sex Daniel Veras [id number] Male 1960      Progress Notes by Kia Leung MD at 10/18/2017  3:30 PM     Author:  Kia Leung MD Service:  (none) Author Type:  Physician     Filed:  10/18/2017  5:28 PM Encounter Date:  10/18/2017 Status:  Signed     :  Kia Leung MD (Physician)            This note has been dictated. The encounter number is [phone].

## 2017-12-27 NOTE — PROGRESS NOTES
Patient Information     Patient Name MRN Sex Daniel Veras [id number] Male 1960      Progress Notes by Cm London MD at 11/15/2017  9:30 AM     Author:  Cm London MD Service:  (none) Author Type:  Physician     Filed:  11/15/2017  2:56 PM Encounter Date:  11/15/2017 Status:  Signed     :  Cm London MD (Physician)            HPI:   This is a [facility] Pulmonary Medicine outreach note. The patient is a 57-year-old male who returned alone today in follow-up for bleomycin lung toxicity. He was last seen on 2017. He has a history of Hodgkin's lymphoma treated with ABVD therapy which includes bleomycin. He returns today without any complaints. He denies any respiratory limitations. He denies any coughing or wheezing. He does admit that he does not exercise on a regular basis but plans to start doing so soon. He does have some occasional ankle edema.  Testing obtain since his last visit includes:  2017 pulmonary function test that showed an FVC of 4.73 L or 77% predicted, FEV1/FVC of 72%, FEV1 of 3.39 L or 73% predicted. FEF 25-75% was 58% predicted. Lung volumes showed a TLC of 7.40 L or 88% predicted, RV was 2.73 L or 105% predicted, RV/TLC was 115% predicted. DLCO uncorrected for hemoglobin was 50 2% predicted and dL/VA was 65% predicted. Flow volume loop was normal appearing.  10/12/2017 ESR was 6  Previous tests include:  2017 chest CT that was compared with his previous chest CT done on 10/3/2016. There is no change in his right lung anterior area of fibrosis. Areas of scarring are present in the right lower lobe and are unchanged as well. 2-3 mm pulmonary nodule in the right upper lobe is unchanged. No new findings are seen. Radiology reports multiple calcified granulomas in the spleen as well as granulomas seen in the lungs.  2017 pulmonary function test that showed a reduced FEF 25-75% consistent with obstruction in small peripheral airways. It also  showed reduced DLCO. His FVC was 4.55 L or 74% predicted, FEV1/FVC was 72%, FEV1 was 3.26 L or 70% predicted. There was a 2% improvement in the FVC in response to bronchodilators increasing up to 4.68 L or 76% predicted. Lung volumes showed a TLC of 6.86 L or 81% predicted, RV of 2.23 L or 86% predicted, and RV/TLC of 101% predicted. Diffusion capacity was 63% and dL/VA was 63%.  10/3/2016 chest CT which is personally reviewed and shows some right anterior upper lobe subpleural fibrotic changes as well as some bronchiectatic changes in the right mid lung. No infiltrates, nodules, masses, or adenopathy is seen.  10/3/2016 pulmonary function test done at [facility] showed an FVC of 4.76 L or 78% predicted, FEV1/FVC of 73%, FEV1 of 3.48 L or 75% predicted. There were no changes with bronchodilators. FEF 25-75% was 64% predicted. Lung volumes showed a TLC of 7.16 L or 85% predicted, RV of 2.47 L or 96% predicted, and RV/TLC of 107% predicted. Diffusion capacity uncorrected for hemoglobin was 24.68 mL/minute/mmHg or 59% predicted. DL/VA was 74% predicted. Flow volume loop appeared normal.  10/3/2016 6 minutes walk study showed a beginning saturation 96% but desaturations on a 91% with exertion. Recovery saturation was 97%. Although there was significant desaturations with exertion, he did not require supplemental oxygen with exertion.  In review, the patient was referred by Dr. Hayden for diminished DLCO in the setting of bleomycin therapy for stage IV Hodgkin's lymphoma. He underwent 6 cycles of ABVD therapy with marked improvement. His treatment was complicated by dysphagia and a right lower lobe pneumonia. As part of his follow-up, a pulmonary function test was obtained which showed a reduced DLCO. With questioning at that time, he did report some shortness of breath when climbing fields.  Patient's past pulmonary history includes recurrent pneumonias requiring antibiotics on a nearly yearly basis. Is a workup for  this, he actually had a chest CT by his primary care physician, Dr. Morrison, which did show some scarring in the right mid lung. He is a lifetime nonsmoker but has had exposure to secondhand smoke from his father. He denies any childhood asthma or allergies. He does think that dust bothersome occasionally. He has had no industrial exposures. He works on a golf course. He does added [place] but otherwise denies any travel to the [facility]. He has a cat at home that does sleep in bed with him but is had no exposures to birds or hot tubs. 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 Family history is negative for any lung disorders.  Outside tests include:  6/30/2016 pulmonary function test which showed a reduced DLCO. Spirometry showed an FVC of 4.30 L or 70% predicted, FEV1/FVC was 72%, FEV1 was 3.12 L or 66% predicted. There was a 6% improvement in the FEV1 in response to bronchodilators increasing up to 3.33 L or 71% predicted. FEF 25-75% was 54% predicted. Lung volumes showed a TLC of 7.22 L or 85% predicted, RV of 2.73 L or 106% predicted, RV/TLC of 118% predicted. DLCO corrected for hemoglobin was 22.55 mL/minute/mmHg or 53% predicted. DL/VA was 73% predicted. Flow volume loop showed evidence of airflow obstruction and lung restriction. No prior test are available for comparison.  6/30/2016 echocardiogram showed an LVEF of 56% with a mild increase in left ventricular size. RV size was normal with normal function. Trace mitral regurgitation was seen.  2/16/2016 chest x-ray reports right lower lobe infiltrate unchanged from previous chest x-ray on 2/12/2016.  2/24/2016 PET scan documented airspace opacities in the right lower lobe with a small right pleural effusion. They reported low-grade associated hypermetabolic activity. The lungs were otherwise clear. Calcified mediastinal lymph nodes were present. No other uptake was seen to suggest metastatic disease.  7/20/2015 chest CT reports multiple enlarged lymph  nodes in the neck, axillary region, hilum, mediastinum, upper abdomen and the retroperitoneum and portal region. Multiple masses in the spleen were noted.  7/7/2016 CMP showed sodium of 133 otherwise unremarkable, ESR was 8. CBC was normal with a hemoglobin of 14.6 and a normal differential.    Current Medications   Taking    Sertraline HCl 100 MG Tablet 1 tablet Orally Once a day    Simvastatin 10 MG Tablet 1 tablet in the evening Orally Once a day    Lorazepam 1 MG Tablet 1 tablet at bedtime as needed Orally Once a day    Aspirin 81 MG Tablet Chewable 1 tablet Orally Once a day    Multivitamins Capsule Orally    Prilosec(Omeprazole) 20 MG Capsule Delayed Release 2 capsules Orally Once a day    Fish Oil 1000 MG Capsule 1 capsule Orally Once a day    Tylenol Extra Strength(APAP) 500 MG Tablet 2 tablets as needed Orally every 6 hrs    Lisinopril 2.5 MG Tablet 1 tablet Orally Once a day    Medication List reviewed and reconciled with the patient      Past Medical History   Depression, mild.     Insomnia, chronic.     Bak pain.     Prostate cancer.     Hodgkin lymphoma.     Tremor.     Peripheral neuropathy.     Dysphagia.     Gastritis.     Schatzki's ring, non-obstructing.       Family History   Father: prostate cancer, diagnosed with Heart Disease, Cancer   Mother: breast cancer, diagnosed with Cancer     Social History   Tobacco Use:   Smoking History: never smoker. Smokeless Tobacco Does Not Chew . Second Hand Smoking Exposure : No.      Allergies   N.K.D.A.     Review of Systems   Pulmonary:   GENERAL Normal. SKIN Normal. HEENT Normal. NECK .. CV Normal. PULM Normal. GI Normal.  Normal. EXT Normal. ENDO Normal. HEME Normal. LYMPH Normal. NEURO Normal. PSYCH Normal.             Vital Signs   Ht 77, Ht cm 195.58, Wt 167, Wt-kg 75.75, BMI 19.80, BSA 2.07, /60, BP Location right arm, HR 58, Pain scale 0, Oxygen sat % 1005.     Examination   General Examination:  GENERAL APPEARANCE: alert and oriented,  comfortable.   HEAD: Normocephalic and atraumatic.   EYES: pupils, equal, round, reactive to light and accomodation (PERRLA), sclera clear.   FACE: Facial muscles symmetric.   ORAL CAVITY: No erythema or exudate. Mallampati 1.   NECK/THYROID: Supple, without adenopathy.   RESPIRATORY: Clear to percussion bilaterally. No wheezes, rhonchi, or crackles.   EXTREMITIES: No cyanosis, clubbing, but trace edema noted.   CARDIOVASCULAR: regular rate and rhythm, normal S1S2, no murmurs, gallops, or rubs..   LYMPH NODES: No cervical, supraclavicular adenopathy.   SKIN: normal, no rash or skin lesions.   NEUROLOGICAL: Moves all extremities and can ambulate without difficulty..        Assessments     1. Bleomycin toxicity - T45.1X1A (Primary)     2. Dyspnea on exertion - R06.09     Treatment   1. Bleomycin toxicity   Notes: Bleomycin lung toxicity. The patient has had dyspnea with exertion felt to be secondary to bleomycin lung toxicity. He has shown improvement. He has a history of Hodgkin's lymphoma and was treated with ABVD therapy which includes bleomycin. His 4/12/2017 chest CT that has shown overall stability in his subpleural interstitial changes on the right consistent with bleomycin lung toxicity. Given the time since he has completed chemotherapy combined with his CT changes, bleomycin associated subacute progressive pulmonary fibrosis is most likely. I am following him with pulmonary function test every 6 months. It is reassuring that he has shown stability in his total lung capacity and DLCO. He does have a chest CT scheduled for December. I will plan to see him back in 6 months with repeat pulmonary function testing.      2. Dyspnea on exertion   Notes: Resolved.      3. Others   Notes: CC: Dr. Hayden, Dr. Rudy Morrison.      Follow Up   6 Months

## 2017-12-27 NOTE — PROGRESS NOTES
Patient Information     Patient Name MRN Sex Daniel Veras [id number] Male 1960      Progress Notes by Siomara Ruvalcaba NP at 8/3/2017  2:45 PM     Author:  Siomara Ruvalcaba NP Service:  (none) Author Type:  PHYS- Nurse Practitioner     Filed:  2017  2:05 PM Encounter Date:  8/3/2017 Status:  Signed     :  Siomara Ruvalcaba NP (PHYS- Nurse Practitioner)            [facility] HEART CARE   CARDIOLOGY CONSULT    Daniel Boyd  [id number] [facility] [id number]    Rudy Morrison MD    Chief Complaint     Patient presents with       Consult      decreased cardiac ejection fraction         HPI:  Daniel Boyd is a 56 year old male who presents for cardiology consult decreased cardiac ejection fraction noted on most recent echocardiogram on .     Mr. Boyd has a history of stage IV Hodgkin's lymphoma, classical type. He originally presented with a rapid growing right neck mass. Additionally noted right supraclavicular adenopathy lead to a biopsy of the lymph node which came back consistent with classical Hodgkin's lymphoma. He was initially followed by Dr. Jarad Mosqueda and as of recent Dr. Leung. For chemotherapy he has had ABVD x6 cycles. Denies any radiation treatment.     Prior to starting ABVD, an echocardiogram was performed to assess his cardiac function on 2015. The findings were that the patient had normal left ventricular size. Left ventricular ejection fraction was 65%. A second echocardiogram was obtained on 16 which revealed no significant change. His ejection fraction was 56%. While on ABVD he did develop shortness of breath and has since been following Dr. 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     Most recent echocardiogram was completed on 17, which was read as borderline left ventricular reduced function with a calculated left ventricular  ejection fraction of 49%. His prior echocardiogram showed an ejection fraction of 56%, concern for adriamycin toxicity.     He admits that he has been short of breath with bleomycin pulmonary toxicity and has been following Pulmonology for this. Some PINO. He did participate in a challenging bike ride for Lymphoma and did this without much complication. Denies any chest pain or pressure, no chest discomfort with activity. Has had some mild edema in his lower extremities at times. No orthopnea or diaphoresis. Overall, states that he has been doing good and feeling well.     He has no past history of heart disease or CAD. His risk factors for CAD include hyperlipidemia and positive family history. His father and brother both had CAD and resulting 4V CABG. Mr. Aguirre denies any personal history of HTN, diabetes or PAD. He has never used tobacco and only occasional consumes ETOH.     He is currently on a daily 81 mg ASA, Fish Oil Supp, Prilosec and low dose Zocor, Zoloft and Ativan.       IMAGING RESULTS:   Echocardiogram 17:  This shows a reduced left ventricular function with a calculated LVEF of 49%. Left ventricular size and wall thickness is normal.  Global right ventricular function is normal.  Pulmonary artery systolic pressure is normal.  No pericardial effusion present.    ECHOCARDIOGRAM         SAM AGUIRRE  MRN:    [id number]         Accession#:   [id number]  :    1960 55 years Study Date:   2016 2:14:09 PM  Gender: M                  BP:           104/70 mmHg  Height: 182.88 cm          BSA:          1.98 m   Weight: 76.66 kg           Tech:         [facility]                             Referring MD: BRENDA WILD  Site:             [facility]  Reading Location: [facility]         Procedure: 2D, Color Doppler and Spectral Doppler.      Indication for study: HODGKIN LYMPHOMA, EDEMA  Cardiac Rhythm: Sinus bradycardia.Study quality: Good.      Final Impressions:   1. Mildly  increased left ventricular size, normal wall thickness, normal global systolic function, calculated EF of 56 %.   2. Right ventricular cavity size is normal, global systolic RV function is normal.   3. The aortic valve is trileaflet, no stenosis and trivial regurgitation.   4. The mitral valve is normal, trace mitral regurgitation.     Comparison  Compared to prior exam of 11/20/2015, there has been no significant change.     Chamber Sizes and Function  Mildly increased left ventricular size, normal wall thickness, normal global systolic function, calculated EF of 56 %. Left atrial size is normal. Right ventricular cavity size is normal, global systolic RV function is normal. RV wall thickness is normal. The right atrium is normal. Right atrial volume index is 23 ml/m . Right atrial area is 15 cm . The pulmonary artery is of normal size and origin. The sinus of Valsalva is normal sized. The ascending aorta is normal sized.     Valves, RV Pressures and Diastolic Function     The aortic valve is trileaflet, no stenosis and trivial regurgitation. The mitral valve is normal in structure, trace mitral regurgitation. Normal diastolic function. The tricuspid valve is normal in structure. Tricuspid regurgitation is trace. The tricuspid regurgitant velocity is 2.5 m/s, the estimated right ventricular systolic pressure is 25 mmHg plus right atrial pressure. The pulmonic valve is normal. Trace pulmonic regurgitation is present on color flow.      Masses, Effusion, Shunts  There is no pericardial effusion. The inferior vena cava is normal sized, respiratory size variation greater than 50%. No left to right shunting was detected by limited color flow Doppler interrogation of the interatrial septum.     MEASUREMENTS AND CALCULATIONS      2-D Measurements and LV Function:                  Normal                             Normal  LVID (d) 6.0 cm (<5.8)    Planimetered EF 56 %     (>50%)  LVID (s) 4.8 cm           LV FS% (2D)      19 %     (>25%)  IVS (d)  0.9 cm (0.7-1.1) LVOT diameter   2.5 cm   (1.5-2.6)  LVPW (d) 0.9 cm (0.7-1.1) HR              57 bpm  Ao Sinus 3.2 cm (2.4-3.7) LA Vol index    24 ml/m2 (<34)  Asc Ao   3.2 cm           RA Vol index    23 ml/m2  LA       3.6 cm (1.9-4.0) RA area         15 cm    (<22)                            RV Max 4C (d)   3.3 cm   (3.5-4.2)     Diastology:  Mitral          Tissue Doppler          Pulmonary veins  E Peak 0.6 m/s  e', Septum     0.06 m/s Pulm s          0.6 m/s  A Peak 0.7 m/s  e', Lateral    0.10 m/s Pulm d          0.5 m/s  E/A    0.9      E/e' Average   7.6      Pulm s/d ratio  1.3  DT     388 msec  IVRT   102 msec     Aortic Valve:                      Normal                    Severe  Vmax       1.1 m/s  (<2.0) LIV (V)   4.32 cm  (<1.0)  VTI        0.25 m          LIV (I)   4.08 cm  (<1.0)  LVOT V max 1.0 m/s         Max PG    5 mmHg  LVOT VTI   0.22 m          Mean PG   3 mmHg   (>40)  SV         103 ml          Dim Index 0.86     (<0.25)  SV index   52 ml/m      Mitral Valve:                    Severe  MVA      2.0 cm   (<1.0)  MV P 1/2 112 msec (>220)     Tricuspid Valve and estimated PA pressures:  TR Vmax 2.5 m/s TAPSE 2.5 cm  TR maxG 25 mmHg   Pulmonic Valve:  PV Vmax  0.8 m/s  PV meanG 2 mmHg  PV VTI   0.17 m  PV AT    76 msec         Electronically signed by Natanael Gomez MD on 2016 3:24:00 PM.    ECHOCARDIOGRAM         SAM AGUIRRE  MRN:    [id number]         Accession#:   [id number]  :    1960 55 years Study Date:   2015 10:23:24 AM  Gender: M                  BP:           117/66 mmHg  Height: 195.58 cm          BSA:          2.08 m   Weight: 76.20 kg           Tech:         [facility]                             Referring MD: VENITA COTE  Site:             [facility]  Reading Location: [facility]         Procedure: 2D, Color Doppler and Spectral Doppler.      Indication for study: HODGKIN LYMPHOMA  Cardiac Rhythm: Irregular.Study  quality: Good.      Final Impressions:   1. Upper limit of normal left ventricular size, normal wall thickness, normal global systolic function, calculated EF of 57 %.   2. Right ventricular cavity size is normal, global systolic RV function is normal.   3. Mildly enlarged left atrium.   4. No significant valve disease detected.   5. Compared to prior exam images and report of 8-12-15, there has been no significant change.     Chamber Sizes and Function  Mildly increased left ventricular size, normal wall thickness, normal global systolic function, calculated EF of 57 %. Left atrial size is mildly enlarged. Left atrial pressure is normal. Right ventricular cavity size is normal, global systolic RV function is normal. RV wall thickness is normal. The right atrium is normal. Right atrial volume index is 18 ml/m . Right atrial area is 14 cm . The pulmonary artery is of normal size and origin. The sinus of Valsalva is normal sized. The ascending aorta is normal sized. The aortic arch is normal.     Valves, RV Pressures and Diastolic Function     The aortic valve is tricuspid, no stenosis and trivial regurgitation. The mitral valve is normal in structure, no mitral regurgitation. No mitral annular calcification seen. Indeterminate pattern of LV diastolic filling. The tricuspid valve is normal in structure. Tricuspid regurgitation is trace. The tricuspid regurgitant velocity is 1.9 m/s, the estimated right ventricular systolic pressure is 14 mmHg plus right atrial pressure. There is normal estimated pulmonary pressure by tricuspid regurgitation velocity and right atrial pressure. The pulmonic valve is normal. Trace pulmonic regurgitation is present on color flow.      Masses, Effusion, Shunts  There is no pericardial effusion. The inferior vena cava is normal sized, respiratory size variation greater than 50%. No left to right shunting was detected by limited color flow Doppler interrogation of the interatrial  septum.     Comparison  Compared to prior exam images and report of 8-12-15, there has been no significant change.     MEASUREMENTS AND CALCULATIONS      2-D Measurements and LV Function:                  Normal                             Normal  LVID (d) 5.8 cm (4.2-5.8) Planimetered EF 57 %     (>50%)  LVID (s) 4.8 cm           LV FS% (2D)     16 %     (>25%)  IVS (d)  0.9 cm (0.7-1.1) LVOT diameter   2.4 cm   (1.5-2.6)  LVPW (d) 0.8 cm (0.7-1.1) HR              78 bpm  Ao Sinus 3.2 cm (2.4-3.7) LA Vol index    22 ml/m2 (<34)  Asc Ao   3.0 cm           RA Vol index    18 ml/m2  LA       3.2 cm (1.9-4.0) RA area         14 cm    (<22)                            RV Max 4C (d)   3.0 cm   (3.5-4.2)     Diastology:  Mitral          Tissue Doppler          Pulmonary veins  E Peak 0.7 m/s  e', Septum     0.11 m/s Pulm s          0.8 m/s  DT     277 msec e', Lateral    0.14 m/s Pulm d          0.6 m/s  IVRT   86 msec  E/e' Average   5.4      Pulm s/d ratio  1.5     Aortic Valve:                      Normal                    Severe  Vmax       1.1 m/s  (<2.0) LIV (V)   3.78 cm  (<1.0)  VTI        0.19 m          LIV (I)   4.25 cm  (<1.0)  LVOT V max 0.9 m/s         Max PG    5 mmHg  LVOT VTI   0.18 m          Mean PG   2 mmHg   (>40)  SV         81 ml           Dim Index 0.97     (<0.25)  SV index   39 ml/m      Mitral Valve:                   Severe  MVA      2.7 cm  (<1.0)  MV P 1/2 80 msec (>220)     Tricuspid Valve and estimated PA pressures:  TR Vmax 1.9 m/s TAPSE 2.1 cm  TR maxG 14 mmHg   Pulmonic Valve:  PV Vmax  0.8 m/s  PV meanG 1 mmHg  PV VTI   0.16 m  PV AT    123 msec         Electronically signed by Ollie Wiseman MD on 11/20/2015 4:54:55 PM.    PAST MEDICAL HISTORY:  Past Medical History:     Diagnosis  Date     Bleomycin lung toxicity      Chronic arthralgias of knees and hips     myalgias, fatigue, unknown etiology      Hodgkin lymphoma (HC) 8/25/2015    Stage 4, s/p 6 cycles ABVD      Hyperlipidemia       Tremor        FAMILY HISTORY:  Family History       Problem   Relation Age of Onset     Cancer-prostate  Father      Also had a CABG and  during open heart surgery, .         Cancer-breast  Mother      Good Health  Sister      Good Health  Sister      Cancer-prostate  Brother      Age 47.        Heart Disease  Brother        PAST SURGICAL HISTORY:  Past Surgical History:      Procedure  Laterality Date     APPENDECTOMY       Bilateral inguinal herniorrhaphy  3/21/02    w/ Surgipro mesh       COLONOSCOPY SCREENING  12/30/10    Normal.  No polyps seen.  Next due .       CYSTOSCOPY       ESOPHAGOGASTRODUODENOSCOPY  16    EGD       WI BIOPSY/EXCISION LYMPH NODE(S)  2015            WI INSRT KATERYNA CV ACS DEVW PORT OVER 5 YRS  2015            WI RMV KATERYNA CV ACCESS DEV W PORT OR PUMP  3/18/2016            WI SUBTALAR ATHROEREISIS  2003     left side, Dr. Schwartz.       S/P RIGHT HAND SURGERY Right 10/12/2015       SOCIAL HISTORY:  Social History     Social History        Marital status:       Spouse name: N/A     Number of children:  N/A     Years of education:  N/A     Social History Main Topics         Smoking status:   Never Smoker     Smokeless tobacco:   Never Used     Alcohol use   4.2 - 7.2 oz/week     7 - 12 Standard drinks or equivalent per week        Comment: occasional beer      Drug use:   No     Sexual activity:   Not Asked     Other Topics   Concern      Service  No     Blood Transfusions  No     Caffeine Concern  No     Occupational Exposure  No     Hobby Hazards  No     Sleep Concern  Yes     insomnia; takes ativan      Stress Concern  No     Weight Concern  Yes     30# since nov; chemo related?      Special Diet  No     Back Care  No     Exercise  No     Bike Helmet  No     Seat Belt  Yes     Self-Exams  No     Social History Narrative     Daughter Velvet BD 89.    Son Nick BD 92    Spouse Candis BD 59     .  He is a golf pro, also drives  "school bus.  Wife is Candis, nurse at [employer].                         CURRENT MEDICATIONS:  Current Outpatient Prescriptions on File Prior to Visit       Medication  Sig Dispense Refill     acetaminophen (TYLENOL EXTRA STRGTH) 500 mg tablet Take 1,000 mg by mouth every 6 hours if needed. Max acetaminophen dose: 4000mg in 24 hrs.   Indications: FEVER       aspirin (ADULT LOW DOSE ASPIRIN) 81 mg enteric coated tablet Take 1 tablet by mouth once daily with a meal.  0     LORazepam (ATIVAN) 1 mg tablet TAKE 1 TABLET BY MOUTH ONCE DAILY AT BEDTIME 30 tablet 1     MULTIVIT &MINERALS/FERROUS FUM (MULTI VITAMIN ORAL) Take 1 tablet by mouth every morning.       omega-3 fatty acids-vitamin E (FISH OIL) 1,000 mg cap Take 1 capsule by mouth every morning.       omeprazole (PRILOSEC) 20 mg Delayed-Release capsule Take 1 capsule by mouth once daily before a meal.  0     sertraline (ZOLOFT) 100 mg tablet Take 1 tablet by mouth once daily. 90 tablet 2     No current facility-administered medications on file prior to visit.        ALLERGIES:  No Known Allergies      ROS:  CONSTITUTIONAL:  No weight loss, fever, chills, weakness or fatigue.  CARDIOVASCULAR:  No chest pain, chest pressure or chest discomfort. No palpitations and no regular lower extremity edema.  RESPIRATORY: Has had shortness of breath and some mild dyspnea upon exertion, cough or sputum production. 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  NEUROLOGICAL:  No headache, lightheadedness, dizziness, syncope, ataxia or weakness.  ENDOCRINOLOGIC:  No reports of sweating, cold or heat intolerance. No polyuria or polydipsia.      PHYSICAL EXAM:  /78  Pulse 64  Ht 1.94 m (6' 4.38\")  Wt 74.8 kg (165 lb)  BMI 19.89 kg/m2  GENERAL: The patient is a well-developed, well-nourished, in no apparent distress. Alert and oriented x3.  HEENT: Head is normocephalic and atraumatic. Eyes are symmetrical with normal visual tracking. Nares appeared normal " without nasal drainage. Mucous membranes are moist.   NECK: Supple.   HEART: Regular rate and rhythm, S1S2 present without murmur, rub or gallop.  LUNGS: Respirations regular and unlabored. Clear to auscultation.  GI: Abdomen is flat.  EXTREMITIES: No peripheral edema present.   NEUROLOGIC: Alert and oriented X3. No focal neurologic deficits.   SKIN: No jaundice. No rashes or visible skin lesions present.      LAB RESULTS:  Orders Only on 07/06/2017        Component  Date Value Ref Range Status     SODIUM 07/06/2017 139  133 - 143 mmol/L Final     POTASSIUM 07/06/2017 4.0  3.5 - 5.1 mmol/L Final     CHLORIDE 07/06/2017 103  98 - 107 mmol/L Final     CO2,TOTAL 07/06/2017 27  21 - 31 mmol/L Final     ANION GAP 07/06/2017 9  5 - 18                 Final     GLUCOSE 07/06/2017 81  70 - 105 mg/dL Final     CALCIUM 07/06/2017 9.7  8.6 - 10.3 mg/dL Final     BUN 07/06/2017 17  7 - 25 mg/dL Final     CREATININE 07/06/2017 1.03  0.70 - 1.30 mg/dL Final     BUN/CREAT RATIO           07/06/2017 17                  Final     GFR if  07/06/2017 >60  >60 ml/min/1.73m2 Final     GFR if not  07/06/2017 >60  >60 ml/min/1.73m2 Final     ALBUMIN 07/06/2017 4.0  3.5 - 5.7 g/dL Final     PROTEIN,TOTAL 07/06/2017 7.2  6.4 - 8.9 g/dL Final     GLOBULIN                  07/06/2017 3.2  2.0 - 3.7 g/dL Final     A/G RATIO 07/06/2017 1.3  1.0 - 2.0                 Final     BILIRUBIN,TOTAL 07/06/2017 0.7  0.3 - 1.0 mg/dL Final     ALK PHOSPHATASE 07/06/2017 56  34 - 104 IU/L Final     ALT (SGPT) 07/06/2017 21  7 - 52 IU/L Final     AST (SGOT) 07/06/2017 22  13 - 39 IU/L Final     LD,TOTAL 07/06/2017 157  140 - 271 IU/L Final     SEDIMENTATION RATE        07/06/2017 9  <21 mm/hr Final     WHITE BLOOD COUNT         07/06/2017 6.9  4.5 - 11.0 thou/cu mm Final     RED BLOOD COUNT           07/06/2017 4.66  4.30 - 5.90 mil/cu mm Final     HEMOGLOBIN                07/06/2017 15.1  13.5 - 17.5 g/dL Final      HEMATOCRIT                07/06/2017 43.4  37.0 - 53.0 % Final     MCV                       07/06/2017 93  80 - 100 fL Final     MCH                       07/06/2017 32.4  26.0 - 34.0 pg Final     MCHC                      07/06/2017 34.8  32.0 - 36.0 g/dL Final     RDW                       07/06/2017 13.1  11.5 - 15.5 % Final     PLATELET COUNT            07/06/2017 213  140 - 440 thou/cu mm Final     MPV                       07/06/2017 10.0  6.5 - 11.0 fL Final     NEUTROPHILS               07/06/2017 65.3  42.0 - 72.0 % Final     LYMPHOCYTES               07/06/2017 23.2  20.0 - 44.0 % Final     MONOCYTES                 07/06/2017 6.7  <12.0 % Final     EOSINOPHILS               07/06/2017 3.9  <8.0 % Final     BASOPHILS                 07/06/2017 0.6  <3.0 % Final     IMMATURE GRANULOCYTES(METAS,MYELOS* 07/06/2017 0.3  % Final     ABSOLUTE NEUTROPHILS      07/06/2017 4.5  1.7 - 7.0 thou/cu mm Final     ABSOLUTE LYMPHOCYTES      07/06/2017 1.6  0.9 - 2.9 thou/cu mm Final     ABSOLUTE MONOCYTES        07/06/2017 0.5  <0.9 thou/cu mm Final     ABSOLUTE EOSINOPHILS      07/06/2017 0.3  <0.5 thou/cu mm Final     ABSOLUTE BASOPHILS        07/06/2017 0.0  <0.3 thou/cu mm Final     ABSOLUTE IMMATURE GRANULOCYTES(MET* 07/06/2017 0.0  <=0.3 thou/cu mm Final   Hospital Outpatient Visit on 04/11/2017        Component  Date Value Ref Range Status     SODIUM 04/11/2017 138  133 - 143 mmol/L Final     POTASSIUM 04/11/2017 3.8  3.5 - 5.1 mmol/L Final     CHLORIDE 04/11/2017 103  98 - 107 mmol/L Final     CO2,TOTAL 04/11/2017 26  21 - 31 mmol/L Final     ANION GAP 04/11/2017 9  5 - 18                 Final     GLUCOSE 04/11/2017 80  70 - 105 mg/dL Final     CALCIUM 04/11/2017 9.0  8.6 - 10.3 mg/dL Final     BUN 04/11/2017 16  7 - 25 mg/dL Final     CREATININE 04/11/2017 0.93  0.70 - 1.30 mg/dL Final     BUN/CREAT RATIO           04/11/2017 17                  Final     GFR if  04/11/2017 >60  >60  ml/min/1.73m2 Final     GFR if not  04/11/2017 >60  >60 ml/min/1.73m2 Final     ALBUMIN 04/11/2017 3.7  3.5 - 5.7 g/dL Final     PROTEIN,TOTAL 04/11/2017 6.9  6.4 - 8.9 g/dL Final     GLOBULIN                  04/11/2017 3.2  2.0 - 3.7 g/dL Final     A/G RATIO 04/11/2017 1.2  1.0 - 2.0                 Final     BILIRUBIN,TOTAL 04/11/2017 0.6  0.3 - 1.0 mg/dL Final     ALK PHOSPHATASE 04/11/2017 45  34 - 104 IU/L Final     ALT (SGPT) 04/11/2017 22  7 - 52 IU/L Final     AST (SGOT) 04/11/2017 23  13 - 39 IU/L Final     LD,TOTAL 04/11/2017 120* 140 - 271 IU/L Final     SEDIMENTATION RATE        04/11/2017 6  <21 mm/hr Final     WHITE BLOOD COUNT         04/11/2017 6.5  4.5 - 11.0 thou/cu mm Final     RED BLOOD COUNT           04/11/2017 4.55  4.30 - 5.90 mil/cu mm Final     HEMOGLOBIN                04/11/2017 14.7  13.5 - 17.5 g/dL Final     HEMATOCRIT                04/11/2017 42.7  37.0 - 53.0 % Final     MCV                       04/11/2017 94  80 - 100 fL Final     MCH                       04/11/2017 32.3  26.0 - 34.0 pg Final     MCHC                      04/11/2017 34.3  32.0 - 36.0 g/dL Final     RDW                       04/11/2017 12.5  11.5 - 15.5 % Final     PLATELET COUNT            04/11/2017 214  140 - 440 thou/cu mm Final     MPV                       04/11/2017 7.9  6.5 - 11.0 fL Final     NEUTROPHILS               04/11/2017 59.8  42.0 - 72.0 % Final     LYMPHOCYTES               04/11/2017 29.9  20.0 - 44.0 % Final     MONOCYTES                 04/11/2017 5.9  <12.0 % Final     EOSINOPHILS               04/11/2017 3.0  <8.0 % Final     BASOPHILS                 04/11/2017 1.4  <3.0 % Final     ABSOLUTE NEUTROPHILS      04/11/2017 3.9  1.7 - 7.0 thou/cu mm Final     ABSOLUTE LYMPHOCYTES      04/11/2017 2.0  0.9 - 2.9 thou/cu mm Final     ABSOLUTE MONOCYTES        04/11/2017 0.4  <0.9 thou/cu mm Final     ABSOLUTE EOSINOPHILS      04/11/2017 0.2  <0.5 thou/cu mm Final     ABSOLUTE  BASOPHILS        04/11/2017 0.1  <0.3 thou/cu mm Final   Office Visit on 02/15/2017        Component  Date Value Ref Range Status     CHOLESTEROL,TOTAL 02/15/2017 190  <200 mg/dL Final     TRIGLYCERIDES 02/15/2017 405* <150 mg/dL Final     HDL CHOLESTEROL 02/15/2017 30  23 - 92 mg/dL Final     NON-HDL CHOLESTEROL 02/15/2017 160* <145 mg/dl Final     CHOL/HDL RATIO            02/15/2017 6.33* <4.50                 Final     LDL CHOLESTEROL 02/15/2017    Final     PATIENT STATUS            02/15/2017 NON-FASTING                  Final     PSA TOTAL (DIAGNOSTIC) 02/15/2017 1.327  <=3.100 ng/mL Final     VITAMIN B12 02/15/2017 330  180 - 914 pg/mL Final     TSH 02/15/2017 6.53* 0.34 - 5.60 uIU/mL Final     T4,FREE 02/15/2017 0.49* 0.58 - 1.64 ng/dL Final         ASSESSMENT:  Daniel Boyd presents cariology consult due to concern for Adriamycin cardiotoxicity with noted reduced LVEF at 49% seen on echocardiogram performed on 7/14/17. Previously normal left ventricular function at 56% one year prior. He has a history of classical Hodgkin's lymphoma that was involving the spine and pelvis, as well as lymphadenopathy below the diaphragm as well as above the diaphragm. This was responsive to six cycles of ABVD. He has no cardiac history of CAD. His risk factors for CAD include hyperlipidemia and family history of early CAD. He has been short of breath with increased PINO, which has also been complicated by possible Bleomycin lung toxicity for which he has followed with pulmonology. Has not had any chest pain, tightness or pressure.     PLAN:  1. Decreased cardiac ejection fraction  Left ventricular function reduced to 49% with left ventricular size and wall thickness is normal. This is a change from one year ago, with reported EF of 56%. He has had some mild intermittent edema and shortness of breath with some PINO. Suspect likely related to Adriamycin as it has known potential for increased risk cardiotoxic effects. Given his  family history of early CAD and personal history of hyperlipidemia we must rule out any obstructing CAD which may also result in reduced LVEF. There was no wall motion abnormalities or LVH seen on most recent echo. Patient has had SOB and some PINO, which is complicated by pulmonary effects to Bleomycin, although could also represent anginal equivalent. Therefore, patient will have coronary CTA for further evaluation. With given reduced EF, it is recommended he start Lisinopril 2.5 mg daily, as ACE inhibitor therapy has proven to promote symptomatic improvement, reduced hospitalization, and enhanced survival in patients with HFrEF and those with depressed LV systolic function. A repeat echocardiogram has also been ordered to reassess LV function in one year.     - lisinopril (PRINIVIL; ZESTRIL) 2.5 mg tablet; Take 1 tablet by mouth once daily.  Dispense: 90 tablet; Refill: 3  - CT CARDIAC CORONARY ARTERIES DUAL READ; Future  - ECHO COMPLETE WO CONTRAST; Future    2. Left ventricular dysfunction with reduced left ventricular function  See above.     - lisinopril (PRINIVIL; ZESTRIL) 2.5 mg tablet; Take 1 tablet by mouth once daily.  Dispense: 90 tablet; Refill: 3  - CT CARDIAC CORONARY ARTERIES DUAL READ; Future  - ECHO COMPLETE WO CONTRAST; Future  - BASIC METABOLIC PANEL; Future    3. Family history of heart disease  - CT CARDIAC CORONARY ARTERIES DUAL READ; Future    4. Mixed hyperlipidemia  Reviewed recent lipid panel with total cholesterol 190, elevated triglycerides and low HDL. With given family history CAD and hyperlipidemia, it is recommended Mr. Boyd begin high intesity statin- Liptor 10 mg daily and discontinue Zocor.     - atorvastatin (LIPITOR) 10 mg tablet; Take 1 tablet by mouth at bedtime.  Dispense: 90 tablet; Refill: 3    5. Hodgkin lymphoma, unspecified Hodgkin lymphoma type, unspecified body region (HC)  Continued oncology management by Dr. Leung    6. Bleomycin lung toxicity  Continued  management by pulmonologist Dr. DAVID London.      Plan of care reviewed by cardiologist Dr. Collier. We would like to see Mr. Boyd for follow-up in 6 months, certainly sooner if needed. We will keep him informed of his cardiac evaluation results.       Thank you for allowing me to participate in the care of your patient. Please do not hesitate to contact me if you have any questions.     WILLIE Gilbert, LASHAY  Cardiology

## 2017-12-28 NOTE — TELEPHONE ENCOUNTER
Patient Information     Patient Name MRN Sex Daniel Veras [id number] Male 1960      Telephone Encounter by Neena Camargo LPN at 2017  9:46 AM     Author:  Neena Camargo LPN Service:  (none) Author Type:  NURS- Licensed Practical Nurse     Filed:  2017  9:46 AM Encounter Date:  2017 Status:  Signed     :  Neena Camargo LPN (NURS- Licensed Practical Nurse)            Rx faxed to pharmacy.  Neena Camargo LPN.........2017   9:46 AM

## 2017-12-28 NOTE — PROGRESS NOTES
Patient Information     Patient Name MRN Sex Daniel Veras [id number] Male 1960      Progress Notes by Cecily Radford at 2017  1:20 PM     Author:  Cecily Radford Service:  (none) Author Type:  Other Clinical Staff     Filed:  2017  1:20 PM Date of Service:  2017  1:20 PM Status:  Signed     :  Cecily Radford (Other Clinical Staff)            1.  Has the patient had a previous reaction to IV contrast? No    2.  Does the patient have kidney disease? No    3.  Is the patient on dialysis? No    If YES to any of these questions, exam will be reviewed with a Radiologist before administering contrast.

## 2017-12-28 NOTE — PROGRESS NOTES
Patient Information     Patient Name MRN Sex Daniel Veras [id number] Male 1960      Progress Notes by Cecily Radford at 2017  1:20 PM     Author:  Cecily Radford Service:  (none) Author Type:  Other Clinical Staff     Filed:  2017  1:20 PM Date of Service:  2017  1:20 PM Status:  Signed     :  Cecily Radford (Other Clinical Staff)            IV Contrast- Discharge Instructions After Your CT Scan      The IV contrast you received today will be filtered from your bloodstream by your kidneys during the next 24 hours and pass from the body in urine.  You will not be aware of this process and your urine will not change in color.  To help this process you should drink at least 4 additional glasses of water or juice today.  This reduces stress on your kidneys.    Most contrast reactions are immediate.  Should you develop symptoms of concern after discharge, contact the department at the number below.  After hours you should contact your personal physician.  If you develop breathing distress or wheezing, call 911.

## 2017-12-28 NOTE — PROGRESS NOTES
Patient Information     Patient Name MRN Sex Daniel Veras [id number] Male 1960      Progress Notes by Dejah Fatima RN at 2017  1:03 PM     Author:  Dejah Fatima RN Service:  (none) Author Type:  NURS- Registered Nurse     Filed:  2017  1:03 PM Date of Service:  2017  1:03 PM Status:  Signed     :  Dejah Fatima RN (NURS- Registered Nurse)            Falls Risk Criteria:    Age 65 and older or under age 4        Sensory deficits    Poor vision    Use of ambulatory aides    Impaired judgment    Unable to walk independently    Meets High Risk criteria for falls:  no

## 2017-12-28 NOTE — TELEPHONE ENCOUNTER
Patient Information     Patient Name MRN Sex Daniel Veras [id number] Male 1960      Telephone Encounter by Cecily Hernandez at 10/2/2017  1:23 PM     Author:  Cecily Hernandez Service:  (none) Author Type:  (none)     Filed:  10/2/2017  1:23 PM Encounter Date:  10/2/2017 Status:  Signed     :  Cecily Hernandez            This patient is coming for [facility] lab on 10/12, please place order  Thank you

## 2017-12-28 NOTE — TELEPHONE ENCOUNTER
Patient Information     Patient Name MRN Sex Daniel Veras [id number] Male 1960      Telephone Encounter by Lupe Lau RN at 8/10/2017 10:12 AM     Author:  Lupe Lau RN Service:  (none) Author Type:  NURS- Registered Nurse     Filed:  8/10/2017 10:20 AM Encounter Date:  2017 Status:  Signed     :  Lupe Lau RN (NURS- Registered Nurse)            LORazepam (ATIVAN) 1 mg tablet  TAKE 1 TABLET BY MOUTH ONCE DAILY AT BEDTIME       Disp: 30 tablet Refills:     Class: eRx Start: 2017    For: Hodgkin lymphoma (HC), Major depressive disorder, recurrent episode, mild (HC)  Documented:4 years ago  Last refill:2017  [facility] [place] [facility] [address] [phone]  Last visit with KWASI MCCAULEY was on: No past appointments listed with this provider  PCP:  Rudy Morrison MD      Unable to complete prescription refill per RN Medication Refill Policy.................... LUPE LAU RN ....................  8/10/2017   10:16 AM

## 2017-12-28 NOTE — TELEPHONE ENCOUNTER
Patient Information     Patient Name MRN Sex Daniel Veras [id number] Male 1960      Telephone Encounter by Delilah Benoit RN at 10/27/2017  1:50 PM     Author:  Delilah Benoit RN Service:  (none) Author Type:  NURS- Registered Nurse     Filed:  10/27/2017  2:03 PM Encounter Date:  10/25/2017 Status:  Signed     :  Delilah Benoit RN (NURS- Registered Nurse)            This is a Refill request from: [facility]  Medication: LORazepam (ATIVAN) 1 mg tablet  Prescription #:[id number]    Qty:30 tablet   Ref:2  Start:8/10/2017   End:              Route:Oral                  ADOLFO:No   Class:Print    Sig:TAKE 1 TABLET BY MOUTH ONCE DAILY AT BEDTIME    Quantity requested: 30  Last fill date: 8-10-17 for #30 X 2 refills  Last visit with RUDY VENEGAS was on: 02/15/2017 in [facility]  PCP:  Rudy Venegas MD  Controlled Substance Agreement:  13  Diagnosis r/t this medication request: Hodgkins Lymphoma/ Depression, mild, recurrent     Unable to complete prescription refill per RN Medication Refill Policy.................... Delilah Benoit RN ....................  10/27/2017   1:58 PM

## 2017-12-28 NOTE — TELEPHONE ENCOUNTER
Patient Information     Patient Name MRN Sex Daniel Veras [id number] Male 1960      Telephone Encounter by Kerri Michelle at 10/2/2017  1:55 PM     Author:  Kerri Michelle Service:  (none) Author Type:  NURS- Registered Nurse     Filed:  10/2/2017  1:55 PM Encounter Date:  10/2/2017 Status:  Signed     :  Kerri Michelle (NURS- Registered Nurse)            Labs ordered per provider and sent to be co-signed by provider. Kerri Michelle RN 10/2/2017  1:55 PM

## 2017-12-28 NOTE — TELEPHONE ENCOUNTER
Patient Information     Patient Name MRN Sex Daniel Veras [id number] Male 1960      Telephone Encounter by Suzie Nolen RN at 2017 11:11 AM     Author:  Suzie Nolen RN Service:  (none) Author Type:  NURS- Registered Nurse     Filed:  2017 11:12 AM Encounter Date:  2017 Status:  Signed     :  Suzie Nolen RN (NURS- Registered Nurse)            Left message re: CCTA on 2017 with instructions and call back number for questions and concerns

## 2017-12-28 NOTE — TELEPHONE ENCOUNTER
Patient Information     Patient Name MRN Sex Daniel Veras [id number] Male 1960      Telephone Encounter by Cindy Nixon LPN at 2017  9:37 AM     Author:  Cindy Nixon LPN Service:  (none) Author Type:  NURS- Licensed Practical Nurse     Filed:  2017  9:39 AM Encounter Date:  2017 Status:  Signed     :  Cindy Nixon LPN (NURS- Licensed Practical Nurse)            This was for a refill request on his simvastatin.   Cindy Nixon LPN......2017  9:39 AM

## 2017-12-28 NOTE — PROGRESS NOTES
Patient Information     Patient Name MRN Sex Daniel Veras [id number] Male 1960      Progress Notes by Dejah Fatima RN at 2017  2:00 PM     Author:  Dejah Fatima RN Service:  (none) Author Type:  NURS- Registered Nurse     Filed:  2017  2:25 PM Date of Service:  2017  2:00 PM Status:  Signed     :  Dejah Fatima RN (NURS- Registered Nurse)            Discharge Note    Data:  Daniel Boyd has been discharged home at 1400 via ambulatory accompanied by Family.      Action:  Written discharge/follow-up instructions were provided to patient. Prescriptions : None.  Belongings sent with patient. Medications from home sent with patient/family: Not Applicable  Equipment none .     Response:  Patient verbalized understanding of discharge instructions, reason for discharge, and necessary follow-up appointments.

## 2017-12-28 NOTE — TELEPHONE ENCOUNTER
Patient Information     Patient Name MRN Sex Daniel Veras [id number] Male 1960      Telephone Encounter by Lupe Lau RN at 2017  7:41 AM     Author:  Lupe Lau RN Service:  (none) Author Type:  NURS- Registered Nurse     Filed:  2017  7:43 AM Encounter Date:  2017 Status:  Signed     :  Lupe Lau RN (NURS- Registered Nurse)            LORazepam (ATIVAN) 1 mg tablet  TAKE 1 TABLET BY MOUTH ONCE DAILY AT BEDTIME       Disp: 30 tablet Refills:     Class: eRx Start: 2017    For: Hodgkin lymphoma (HC), Major depressive disorder, recurrent episode, mild (HC)  Documented:4 years ago  Last refill:2017  [facility] [place] [place] - [address] [phone]    Last visit with RUDY VENEGAS was on: 02/15/2017 in [facility]  PCP:  Rudy Venegas MD     Unable to complete prescription refill per RN Medication Refill Policy.................... LUPE LAU RN ....................  2017   7:42 AM

## 2017-12-29 ENCOUNTER — HOSPITAL ENCOUNTER (OUTPATIENT)
Dept: RADIOLOGY | Facility: OTHER | Age: 57
End: 2017-12-29
Attending: INTERNAL MEDICINE

## 2017-12-29 ENCOUNTER — AMBULATORY - GICH (OUTPATIENT)
Dept: LAB | Facility: OTHER | Age: 57
End: 2017-12-29

## 2017-12-29 DIAGNOSIS — C81.90 HODGKIN LYMPHOMA (H): ICD-10-CM

## 2017-12-29 LAB
A/G RATIO - HISTORICAL: 1.6 (ref 1–2)
ABSOLUTE BASOPHILS - HISTORICAL: 0 THOU/CU MM
ABSOLUTE EOSINOPHILS - HISTORICAL: 0.1 THOU/CU MM
ABSOLUTE IMMATURE GRANULOCYTES(METAS,MYELOS,PROS) - HISTORICAL: 0 THOU/CU MM
ABSOLUTE LYMPHOCYTES - HISTORICAL: 1.5 THOU/CU MM (ref 0.9–2.9)
ABSOLUTE MONOCYTES - HISTORICAL: 0.4 THOU/CU MM
ABSOLUTE NEUTROPHILS - HISTORICAL: 4.7 THOU/CU MM (ref 1.7–7)
ALBUMIN SERPL-MCNC: 4.6 G/DL (ref 3.5–5.7)
ALP SERPL-CCNC: 47 IU/L (ref 34–104)
ALT (SGPT) - HISTORICAL: 24 IU/L (ref 7–52)
ANION GAP - HISTORICAL: 9 (ref 5–18)
AST SERPL-CCNC: 25 IU/L (ref 13–39)
BASOPHILS # BLD AUTO: 0.4 %
BILIRUB SERPL-MCNC: 1 MG/DL (ref 0.3–1)
BUN SERPL-MCNC: 14 MG/DL (ref 7–25)
BUN/CREAT RATIO - HISTORICAL: 15
CALCIUM SERPL-MCNC: 9.6 MG/DL (ref 8.6–10.3)
CHLORIDE SERPLBLD-SCNC: 102 MMOL/L (ref 98–107)
CO2 SERPL-SCNC: 26 MMOL/L (ref 21–31)
CREAT SERPL-MCNC: 0.95 MG/DL (ref 0.7–1.3)
EOSINOPHIL NFR BLD AUTO: 1.5 %
ERYTHROCYTE [DISTWIDTH] IN BLOOD BY AUTOMATED COUNT: 12.9 % (ref 11.5–15.5)
GFR IF NOT AFRICAN AMERICAN - HISTORICAL: >60 ML/MIN/1.73M2
GLOBULIN - HISTORICAL: 2.8 G/DL (ref 2–3.7)
GLUCOSE SERPL-MCNC: 99 MG/DL (ref 70–105)
HCT VFR BLD AUTO: 42 % (ref 37–53)
HEMOGLOBIN: 14.3 G/DL (ref 13.5–17.5)
IMMATURE GRANULOCYTES(METAS,MYELOS,PROS) - HISTORICAL: 0.1 %
LDH SERPL-CCNC: 137 IU/L (ref 140–271)
LYMPHOCYTES NFR BLD AUTO: 21.9 % (ref 20–44)
MCH RBC QN AUTO: 32.4 PG (ref 26–34)
MCHC RBC AUTO-ENTMCNC: 34 G/DL (ref 32–36)
MCV RBC AUTO: 95 FL (ref 80–100)
MONOCYTES NFR BLD AUTO: 6 %
NEUTROPHILS NFR BLD AUTO: 70.1 % (ref 42–72)
PLATELET # BLD AUTO: 201 THOU/CU MM (ref 140–440)
PMV BLD: 10.2 FL (ref 6.5–11)
POTASSIUM SERPL-SCNC: 3.7 MMOL/L (ref 3.5–5.1)
PROT SERPL-MCNC: 7.4 G/DL (ref 6.4–8.9)
RED BLOOD COUNT - HISTORICAL: 4.42 MIL/CU MM (ref 4.3–5.9)
SODIUM SERPL-SCNC: 137 MMOL/L (ref 133–143)
WHITE BLOOD COUNT - HISTORICAL: 6.7 THOU/CU MM (ref 4.5–11)

## 2017-12-29 NOTE — PATIENT INSTRUCTIONS
Patient Information     Patient Name MRN Sex Daniel Veras [id number] Male 1960      Patient Instructions by Ivana Simmons at 8/3/2017  2:45 PM     Author:  Ivana Simmons Service:  (none) Author Type:  (none)     Filed:  8/3/2017  3:44 PM Encounter Date:  8/3/2017 Status:  Signed     :  Ivana Simmons            1. A Coronary CTA and a yearly echo has been ordered.  Radiology scheduling will contact you to schedule this appointment and explain the process.    2. We will change Zocor to Lipitor.    3. We will start Lisinopril 2.5 mg every day.    4. .Laboratory blood work has been ordered for one month.  You will be notified by phone call when the results are available.    5. Please follow-up with cardiology in 6 months

## 2017-12-30 NOTE — NURSING NOTE
Patient Information     Patient Name MRN Sex Daniel Veras [id number] Male 1960      Nursing Note by Lorri Desir at 10/18/2017  3:30 PM     Author:  Lorri Desir Service:  (none) Author Type:  (none)     Filed:  10/18/2017  5:28 PM Encounter Date:  10/18/2017 Status:  Signed     :  Lorri Desir            Patient  Is here for a follow up  Lorri Desir LPN...................10/18/2017   3:39 PM

## 2017-12-30 NOTE — NURSING NOTE
Patient Information     Patient Name MRN Sex Daniel Veras [id number] Male 1960      Nursing Note by Kerri Michelle at 2017  2:30 PM     Author:  Kerri Michelle Service:  (none) Author Type:  NURS- Registered Nurse     Filed:  2017  3:20 PM Encounter Date:  2017 Status:  Signed     :  Kerri Michelle (NURS- Registered Nurse)            Labs ordered per written order sheet and sent to provider for co-sign. Patient referred to Dr. Collier for decreased ejection fraction. Kerri Michelle RN 2017  3:19 PM

## 2017-12-30 NOTE — NURSING NOTE
Patient Information     Patient Name MRN Sex Daniel Veras [id number] Male 1960      Nursing Note by Ivana Simmons at 8/3/2017  2:45 PM     Author:  Ivana Simmons Service:  (none) Author Type:  (none)     Filed:  8/3/2017  2:57 PM Encounter Date:  8/3/2017 Status:  Signed     :  Ivana Simmons            Patient comes in for a consult on decreased cardiac ejection fraction.  Ivana Simmons LPN ....................  8/3/2017   2:57 PM

## 2017-12-30 NOTE — NURSING NOTE
Patient Information     Patient Name MRN Sex Daniel Veras [id number] Male 1960      Nursing Note by Kerri Michelle at 10/18/2017  3:30 PM     Author:  Kerri Michelle Service:  (none) Author Type:  NURS- Registered Nurse     Filed:  10/18/2017  3:50 PM Encounter Date:  10/18/2017 Status:  Signed     :  Kerri Michelle (NURS- Registered Nurse)            Labs and Ct scans ordered per provider and sent to be co-signed by provider. Kerri Michelle RN 10/18/2017  3:47 PM

## 2017-12-30 NOTE — NURSING NOTE
Patient Information     Patient Name MRN Sex Daniel Veras [id number] Male 1960      Nursing Note by Didi Schaffer at 2017  2:30 PM     Author:  Didi Schaffer Service:  (none) Author Type:  (none)     Filed:  2017  2:36 PM Encounter Date:  2017 Status:  Signed     :  Didi Schaffer            Patient is here today for a follow up from PET, LAB, and ECHO.  Didi Schaffer LPN.......................... 2017  2:34 PM

## 2018-01-02 NOTE — PROGRESS NOTES
Patient Information     Patient Name MRN Sex Daniel Veras [id number] Male 1960      Progress Notes by Rahel Irving RT at 2017  2:33 PM     Author:  Rahel Irving RT Service:  (none) Author Type:  RT- Respiratory Therapist     Filed:  2017  2:34 PM Date of Service:  2017  2:33 PM Status:  Signed     :  Rahel Irving RT (RT- Respiratory Therapist)            Patient set up with CPAP per MD order.  Patient chose AirFit N10.

## 2018-01-03 ENCOUNTER — TRANSFERRED RECORDS (OUTPATIENT)
Dept: HEALTH INFORMATION MANAGEMENT | Facility: HOSPITAL | Age: 58
End: 2018-01-03

## 2018-01-03 ENCOUNTER — OFFICE VISIT - GICH (OUTPATIENT)
Dept: ONCOLOGY | Facility: OTHER | Age: 58
End: 2018-01-03

## 2018-01-03 ENCOUNTER — HISTORY (OUTPATIENT)
Dept: ONCOLOGY | Facility: OTHER | Age: 58
End: 2018-01-03

## 2018-01-03 DIAGNOSIS — C81.90 HODGKIN LYMPHOMA (H): ICD-10-CM

## 2018-01-03 ASSESSMENT — PATIENT HEALTH QUESTIONNAIRE - PHQ9: SUM OF ALL RESPONSES TO PHQ QUESTIONS 1-9: 0

## 2018-01-03 NOTE — PROGRESS NOTES
Patient Information     Patient Name MRN Sex Daniel Veras [id number] Male 1960      Progress Notes by Kia Leung MD at 2017  9:30 AM     Author:  Kia Leung MD Service:  (none) Author Type:  Physician     Filed:  2017  5:29 PM Encounter Date:  2017 Status:  Signed     :  Kia Leung MD (Physician)            This note has been dictated. The encounter number is [phone].

## 2018-01-03 NOTE — TELEPHONE ENCOUNTER
Patient Information     Patient Name MRN Sex Daniel Veras [id number] Male 1960      Telephone Encounter by Lupe Lau RN at 2017 11:39 AM     Author:  Lupe Lau RN Service:  (none) Author Type:  NURS- Registered Nurse     Filed:  2017 11:41 AM Encounter Date:  2017 Status:  Signed     :  Lupe Lau RN (NURS- Registered Nurse)            LORazepam (ATIVAN) 1 mg tablet  TAKE ONE TABLET BY MOUTH ONCE DAILY AT BEDTIME       Disp: 30 tablet Refills:     Class: eRx Start: 2017    For: Hodgkin lymphoma (HC), Major depressive disorder, recurrent episode, mild (HC)  Documented:4 years ago  Last refill: 2017  [facility] [place] [place] - [address]: [phone]    Last visit with RUDY VENEGAS was on: 2016 in [facility]  PCP:  Rudy Venegas MD     Unable to complete prescription refill per RN Medication Refill Policy.................... LUPE LAU RN ....................  2017   11:39 AM

## 2018-01-03 NOTE — PROGRESS NOTES
Patient Information     Patient Name MRN Sex Daniel Veras [id number] Male 1960      Progress Notes signed by Kia Leung MD at 2017  5:31 PM      Author:  Kia Leung MD Service:  (none) Author Type:  Physician     Filed:  2017  5:31 PM Encounter Date:  2017 Status:  Signed     :  Kia Leung MD (Physician)            -  DATE OF SERVICE:  2017    HEMATOLOGY/ONCOLOGY CLINIC NOTE     Mr. Boyd returns for followup of stage IV Hodgkin lymphoma, classical type.     He originally presented with a right neck mass over a period of a few months. This worsened, and subsequently he was seen by Dr. Radha Kim of general surgery, who noted the patient had right supraclavicular adenopathy. Biopsy of the lymph node came back consistent with classical Hodgkin's lymphoma.     The patient was subsequently seen by Dr. Jarad Mosqueda on 2015. He recommended a PET scan for accurate staging. This was performed on 2015. The findings were there was hypermetabolic lymphadenopathy both above and below the diaphragm, suspicious for lymphoma. There was hypermetabolic activity in the spleen, suspicious for metastatic disease. There were multiple foci of abnormal uptake in the spine and pelvis, all suspicious for lymphoma involvement. There was a right middle lobe lung nodule that was nonspecific. Dr. Mosqueda felt the patient had stage IV Hodgkin's lymphoma and recommended ABVD x 6 cycles.     Echocardiogram was performed to assess cardiac function on 2015. The findings were that the patient had normal left ventricular size. Left ventricular ejection fraction was 65%.     The patient started ABVD, and after three cycles had a repeat PET scan on 2015. The findings were that there was marked improvement in the patient's lymphadenopathy in the low neck, axillary chest, abdomen, pelvis and groin area. The focal areas of activity  were markedly improved in the thoracolumbar spine and pelvic area.     The patient subsequently saw Dr. Mosqueda on February 10, 2016. His assessment was the patient had completed 6 cycles of ABVD. A repeat PET scan was performed on February 24, 2016. The findings were that there was no abnormal uptake to suggest metastatic disease, no evidence of metastatic disease. The previously seen lymphadenopathy, splenic activity and bone lesions had all resolved.     The patient had problems with dysphagia as well as being admitted to the hospital with neutropenic fever and right lower lobe pneumonia, was treated with IV antibiotics. He did see Dr. Molina, who performed EGD, which revealed a hiatal hernia and Schatzki ring. Biopsies were all negative. The patient's dysphagia subsequently improved.     When we saw the patient on June 8, he had no evidence of B symptoms, and we wanted to assess disease status by obtaining a sedimentation rate, which was normal. We also obtained an echocardiogram as well as pulmonary function tests. The echocardiogram revealed no change. His ejection fraction was 56%. Pulmonary function tests did reveal there was a decreased DLCO, 52% of predicted being reported. The patient did have some shortness of breath.     When we saw him on July 4, 2016, we elected to restage him with a PET scan. This was done on October 5, 2016, which revealed no suspicious hypermetabolic osseous lesions identified. No evidence of residual or recurrent lymphoma.     He also had seen Dr. Cm London of pulmonary for evaluation of possible bleomycin lung toxicity. His impression was that the patient had dyspnea on exertion. He felt his symptoms had been stable, not progressive. He did have a prechemotherapy PFT for comparison. He felt the patient certainly could have pulmonary fibrosis from bleomycin, also other forms of bleomycin toxicity, including hypersensitivity pneumonitis. Organizing pneumonia was less likely.  Other possibilities would include deconditioning due to his cancer. The plan was to obtain a CT chest with high-resolution cuts and also repeat his pulmonary function tests. The patient had a high resolution CT chest performed on October 3. The findings were that there was new mild subpleural fibrotic change within the anterior right upper lobe, which may be related to chemotherapy    The patient did see Dr. Cm London on October 12, 2016, and his impression was that review of his PFTs revealed stability in his lung volumes. DLCO with improvement in his spirometry. He felt his dyspnea on exertion was stable, but not progressive. He reviewed his CT scan of the chest, and he felt that the patient had subpleural interstitial changes on the right consistent with bleomycin and lung toxicity. His plan was to follow him with serial pulmonary function tests to ensure stability. Otherwise, the patient has not had a recent CT chest since his last CT chest done in October of last year. Otherwise, he says he is doing well. He still has some dyspnea on exertion, but it is improved. He said he is recovering from a cold. He has gained some weight. He denies any fevers, night sweats, weight loss, abdominal pain, chest pain with lymphadenopathy. Overall, he is doing well.     PHYSICAL EXAMINATION  GENERAL: He is a middle-aged white male in no acute distress.   VITAL SIGNS: Blood pressure 112/78. Pulse 68. Temperature 97.8.   HEENT: Atraumatic. Normocephalic. Oropharynx is nonerythematous.   NECK: Reveals some shy lymphadenopathy, but no palpable lymph nodes.  LUNGS: Clear to auscultation and percussion.   HEART: Regular rhythm. S1, S2 normal.   ABDOMEN: Soft. Normoactive bowel sounds. No masses or tenderness. No organomegaly  LYMPHATICS: No cervical, supraclavicular, axillary or inguinal nodes.   EXTREMITIES: Trace ankle edema.   NEUROLOGIC: Nonfocal.     LABORATORY DATA  Laboratories reveal:   1. Sedimentation rate of 8.   2.  CBC within normal limits.   3. LFTs are normal.     IMPRESSION  Stage 4 classical type Hodgkin lymphoma presenting with right supraclavicular lymphadenopathy, biopsy consistent with classical Hodgkin's lymphoma. There was involvement of the spine and pelvis, as well as lymphadenopathy below the diaphragm as well as above the diaphragm, with positive response after 6 cycles of ABVD, course complicated by neutropenic fever. Echocardiogram revealed normal cardiac function. Pulmonary function tests revealed depressed DLCO. PET scan in October was negative for metastatic disease. CT of the chest in October revealed possible fibrosis in the right lung. This was confirmed by Dr. 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 He plans to monitor him with PFTs. Otherwise, there is no evidence of disease. Normal sed rate, normal LDH. Normal CBC.      The plan is to assess his fibrosis by obtaining a CT chest in 3 months. Otherwise, we will see the patient in 3 months to obtain a CBC, CMP, LDH, sedimentation rate.     Forty minutes was spent with the patient with greater than half the time spent in counseling and coordination of care.       BRENDA WILD MD    AP/ts   D:  2017 11:16:00  T:  2017 13:04:29  Voice Job ID:  [id number]  Text Job ID:  [id number]  cc:MD ZIA CONTRERAS MD MARC FOWLER, MD, PRIMARY PHYSICIAN         [facility] [place], [place]:  SAM AGUIRRE  MR#:  [id number]  :  1960  DATE:  2017  LOCATION:  [facility]  ROOM:    TYPE:  [facility] NOTEPage 1 of 1

## 2018-01-03 NOTE — NURSING NOTE
Patient Information     Patient Name MRN Sex Daniel Veras [id number] Male 1960      Nursing Note by Jonas Rizo at 2017  9:30 AM     Author:  Jonas Rizo Service:  (none) Author Type:  (none)     Filed:  2017 10:01 AM Encounter Date:  2017 Status:  Signed     :  Jonas Rizo            Patient presents today for a follow up with lab results.He states that he has been doing good.  Jonas Rizo LPN ....................  2017   9:55 AM

## 2018-01-03 NOTE — PROGRESS NOTES
Patient Information     Patient Name MRN Sex Daniel Veras [id number] Male 1960      Progress Notes by Rudy Morrison MD at 2/15/2017  9:30 AM     Author:  Rudy Morrison MD Service:  (none) Author Type:  Physician     Filed:  2/15/2017 10:07 AM Encounter Date:  2/15/2017 Status:  Signed     :  Rudy Morrison MD (Physician)            SUBJECTIVE:    Daniel Boyd is a 56 y.o. male who presents for comprehensive review of their multiple medical problems and review of medications, renewal of medications and update on necessary health maintenance issues.      HPI Comments: This patient is here today for complete evaluation. In most respects he is feeling well. 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 This is being followed regularly with CT scanning as well as spirometry through pulmonary medicine. He has a little bit of a cold currently with a cough but other than that his breathing has been relatively stable. He admits that he has not tried exerting himself too much but he does plan to start an exercise program.    He also has some mild depression as well as chronic insomnia. That is treated and seems to be stable. There is a family history of prostate cancer, he is going to need a recheck on that. He has treated hyperlipidemia and is on statin therapy, he is due for recheck on that. He tolerates the medication without difficulty or concern. He does have chronic reflux disease, he continues on omeprazole on a daily basis for that.    He is due for a tetanus shot. Other health maintenance issues are up-to-date including colonoscopy. I spent time today updating his past medical history, past surgical history, family history and social history.      No Known Allergies,   Current Outpatient Prescriptions     Medication  Sig     acetaminophen (TYLENOL EXTRA STRGTH) 500 mg tablet Take 1,000 mg by mouth every 6 hours if needed. Max acetaminophen dose: 4000mg in 24 hrs.   Indications: FEVER     aspirin  (ADULT LOW DOSE ASPIRIN) 81 mg enteric coated tablet Take 1 tablet by mouth once daily with a meal.     LORazepam (ATIVAN) 1 mg tablet TAKE ONE TABLET BY MOUTH ONCE DAILY AT BEDTIME     MULTIVIT &MINERALS/FERROUS FUM (MULTI VITAMIN ORAL) Take 1 tablet by mouth every morning.     omega-3 fatty acids-vitamin E (FISH OIL) 1,000 mg cap Take 1 capsule by mouth every morning.     omeprazole (PRILOSEC) 20 mg Delayed-Release capsule Take 1 capsule by mouth once daily before a meal.     sertraline (ZOLOFT) 100 mg tablet TAKE ONE TABLET BY MOUTH ONCE DAILY     simvastatin (ZOCOR) 10 mg tablet TAKE ONE TABLET BY MOUTH ONCE DAILY.     No current facility-administered medications for this visit.      Medications have been reviewed by me and are current to the best of my knowledge and ability. ,   Past Medical History      Diagnosis   Date     Bleomycin lung toxicity       Chronic arthralgias of knees and hips       myalgias, fatigue, unknown etiology      Hodgkin lymphoma (HC)  8/25/2015     Stage 4, s/p 6 cycles ABVD      Hyperlipidemia       Tremor     ,   Patient Active Problem List       Diagnosis  Date Noted     Hyperlipidemia  02/15/2017     Bleomycin lung toxicity  02/15/2017     Gastritis  05/27/2016     Schatzki's ring, non-obstructing  05/27/2016     Dysphagia  05/19/2016     Tremor  05/03/2016     Peripheral neuropathy (HCC)  05/03/2016     Hodgkin lymphoma (HC)  08/25/2015     FHx: prostate cancer  09/30/2014     Brother at 40   Father at 60        BACK PAIN  02/15/2012     INSOMNIA, CHRONIC  12/29/2011     DEPRESSION, MILD, RECURRENT  12/06/2010   ,   Past Surgical History       Procedure   Laterality Date     Bilateral inguinal herniorrhaphy   3/21/02     w/ Surgipro mesh       Appendectomy        Pr subtalar athroereisis   Fall 2003      left side, Dr. Schwartz.       Cystoscopy        Colonoscopy screening   12/30/10     Normal.  No polyps seen.  Next due 2020.       Pr biopsy/excision lymph node(s)   7/29/2015              Pr insrt gisele cv acs devw port over 5 yrs   2015             S/p right hand surgery  Right 10/12/2015     Pr rmv gisele cv access dev w port or pump   3/18/2016             Esophagogastroduodenoscopy   16     EGD      and   Social History       Substance Use Topics         Smoking status:   Never Smoker     Smokeless tobacco:   Never Used     Alcohol use   4.2 - 7.2 oz/week     7 - 12 Standard drinks or equivalent per week        Comment: occasional beer      Family Status     Relation  Status     Father  at age 76    Following CABG, prostate cancer      Mother  at age 58    Breast cancer      Sister Alive     Sister Alive     Brother Alive     Social History     Social History        Marital status:       Spouse name: N/A     Number of children:  N/A     Years of education:  N/A     Social History Main Topics         Smoking status:   Never Smoker     Smokeless tobacco:   Never Used     Alcohol use   4.2 - 7.2 oz/week     7 - 12 Standard drinks or equivalent per week        Comment: occasional beer      Drug use:   No     Sexual activity:   Not Asked     Other Topics   Concern      Service  No     Blood Transfusions  No     Caffeine Concern  No     Occupational Exposure  No     Hobby Hazards  No     Sleep Concern  Yes     insomnia; takes ativan      Stress Concern  No     Weight Concern  Yes     30# since nov; chemo related?      Special Diet  No     Back Care  No     Exercise  No     Bike Helmet  No     Seat Belt  Yes     Self-Exams  No     Social History Narrative     Daughter Velvet BD 89.    Son Nick BD 92    Spouse Candis, PEPE 59     .  He is a golf pro, also drives school bus.  Wife is Candis, nurse at [employer].                         REVIEW OF SYSTEMS:  Review of Systems   Constitutional: Negative for chills, diaphoresis, fever, malaise/fatigue and weight loss.   HENT: Negative for congestion, ear pain, nosebleeds, sore throat  "and tinnitus.    Eyes: Negative for blurred vision, double vision, photophobia, pain, discharge and redness.   Respiratory: Negative for cough, hemoptysis, sputum production, shortness of breath and wheezing.    Cardiovascular: Negative for chest pain, palpitations, orthopnea, claudication, leg swelling and PND.   Gastrointestinal: Negative for abdominal pain, blood in stool, constipation, diarrhea, heartburn, nausea and vomiting.   Genitourinary: Negative for dysuria, flank pain and hematuria.   Musculoskeletal: Negative for back pain, joint pain, myalgias and neck pain.   Skin: Negative for itching and rash.   Neurological: Negative for dizziness, tingling, tremors, speech change, loss of consciousness, weakness and headaches.   Psychiatric/Behavioral: Negative for depression, hallucinations, memory loss, substance abuse and suicidal ideas. The patient is not nervous/anxious.        OBJECTIVE:  /70  Pulse 60  Ht 1.943 m (6' 4.5\")  Wt 78.2 kg (172 lb 6.4 oz)  BMI 20.71 kg/m2    EXAM:   Physical Exam   Constitutional: He is oriented to person, place, and time and well-developed, well-nourished, and in no distress. No distress.   HENT:   Head: Normocephalic and atraumatic.   Right Ear: Tympanic membrane and external ear normal.   Left Ear: Tympanic membrane and external ear normal.   Nose: Nose normal.   Mouth/Throat: Oropharynx is clear and moist and mucous membranes are normal. No oropharyngeal exudate.   Eyes: Conjunctivae are normal. Pupils are equal, round, and reactive to light. No scleral icterus.   Neck: Normal range of motion. Neck supple. Normal carotid pulses, no hepatojugular reflux and no JVD present. Carotid bruit is not present. No tracheal deviation and no edema present. No thyroid mass and no thyromegaly present.   Cardiovascular: Normal rate, regular rhythm, normal heart sounds and intact distal pulses.  Exam reveals no gallop and no friction rub.    No murmur heard.  Pulmonary/Chest: Effort " normal and breath sounds normal. No respiratory distress. He has no decreased breath sounds. He has no wheezes. He has no rhonchi. He has no rales. He exhibits no tenderness.   Abdominal: Soft. Bowel sounds are normal. He exhibits no distension and no mass. There is no hepatosplenomegaly. There is no tenderness. There is no rebound and no guarding. Hernia confirmed negative in the right inguinal area and confirmed negative in the left inguinal area.   Genitourinary: Rectum normal, prostate normal, testes/scrotum normal and penis normal.   Musculoskeletal: Normal range of motion. He exhibits no edema or tenderness.   Lymphadenopathy:     He has no cervical adenopathy.   Neurological: He is alert and oriented to person, place, and time. He has normal motor skills. He displays normal reflexes. No cranial nerve deficit. He exhibits normal muscle tone. Gait normal. Coordination normal.   Skin: Skin is warm and dry. No rash noted. He is not diaphoretic. No cyanosis or erythema. No pallor. Nails show no clubbing.   Psychiatric: Mood, memory, affect and judgment normal.   Nursing note and vitals reviewed.      ASSESSMENT/PLAN:    ICD-10-CM    1. DEPRESSION, MILD, RECURRENT F33.0    2. Hyperlipidemia, unspecified hyperlipidemia type E78.5    3. INSOMNIA, CHRONIC G47.00    4. FHx: prostate cancer Z80.42    5. Hodgkin lymphoma, unspecified Hodgkin lymphoma type, unspecified body region C81.90    6. 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 Gastritis, presence of bleeding unspecified, unspecified chronicity, unspecified gastritis type K29.70    8. Bleomycin lung toxicity J98.4      T45.1X5A    9. Health care maintenance Z00.00 TDAP VACCINE IM        Plan:  His exam today is normal. He will continue with his same medications. Lipid panel is pending, I will send him a letter with the results of that and I may want to increase his simvastatin. There is a strong family history of heart disease and he has had some  calcification seen on his coronary arteries on CT scanning. Continue aspirin. Other lab today includes a PSA, recheck on thyroid and B12 levels. Medications will remain the same. Boostrix given today. Assuming all goes well, I will follow-up with him on an annual basis. He will continue with follow-ups through pulmonary and oncology as scheduled and recommended.

## 2018-01-03 NOTE — NURSING NOTE
Patient Information     Patient Name MRN Sex Daniel Vears [id number] Male 1960      Nursing Note by Cindy Nixon LPN at 2/15/2017  9:30 AM     Author:  Cindy Nixon LPN Service:  (none) Author Type:  NURS- Licensed Practical Nurse     Filed:  2/15/2017  9:25 AM Encounter Date:  2/15/2017 Status:  Signed     :  Cindy Nixon LPN (NURS- Licensed Practical Nurse)            The patient is here today to have a yearly check up done.  Cindy Nixon LPN......2/15/2017  9:21 AM

## 2018-01-03 NOTE — TELEPHONE ENCOUNTER
Patient Information     Patient Name MRN Sex Daniel Veras [id number] Male 1960      Telephone Encounter by Otilia Erickson RN at 2017  1:07 PM     Author:  Otilia Erickson RN Service:  (none) Author Type:  NURS- Registered Nurse     Filed:  2017  1:10 PM Encounter Date:  2017 Status:  Signed     :  Otilia Erickson RN (NURS- Registered Nurse)            Statins    Office visit in the past 12 months.    Last visit with ZIA DUMONT was on: 2016 in [facility]  Next visit with ZIA DUMONT is on: No future appointment listed with this provider  Next visit with Family Practice is on: No future appointment listed in this department    Last Lipids:  Chol: 175    5/3/2016  T    5/3/2016  HDL:   33    5/3/2016  LDL:  94    5/3/2016  LDL DIRECT:  No results found in past 5 years    .    Max refills 12 months from last office visit.      Patient is due for medication management appointment. Limited refill provided at this time. [employer] message and letter sent for reminder to patient. Prescription refilled per RN Medication Refill Policy.................... Otilia Erickson RN ....................  2017   1:08 PM

## 2018-01-03 NOTE — NURSING NOTE
Patient Information     Patient Name MRN Sex Daniel Veras [id number] Male 1960      Nursing Note by Jonas Rizo at 2017  9:30 AM     Author:  Jonas Rizo Service:  (none) Author Type:  (none)     Filed:  2017 10:42 AM Encounter Date:  2017 Status:  Signed     :  Jonas Rizo            Labs and CT scan were entered per written order sheet and sent to provider for cosign.  Jonas Rizo LPN ....................  2015   10:04 AM

## 2018-01-03 NOTE — PROGRESS NOTES
Patient Information     Patient Name MRN Sex Daniel Veras [id number] Male 1960      Progress Notes by Cm London MD at 2017  4:00 PM     Author:  Cm London MD Service:  (none) Author Type:  Physician     Filed:  2017  5:47 PM Encounter Date:  2017 Status:  Signed     :  Cm London MD (Physician)            History of Present Illness   HPI:   The patient is a 56-year-old male who returned alone today in follow-up for bleomycin lung toxicity and dyspnea with exertion. He was last seen on 10/12/2016. He has a history of Hodgkin's lymphoma treated with ABVD therapy which includes bleomycin. He returns today without any respiratory complaints. He denies any coughing, wheezing, or shortness of Breath. Although he does admit that he has not been very active this winter season. Nevertheless, he is not limited in any of his activities. He is on no inhalers.  The patient had a pulmonary function test on 2017 that showed a reduced FEF 25-75% consistent with obstruction in small peripheral airways. It also showed reduced DLCO. His FVC was 4.55 L or 74% predicted, FEV1/FVC was 72%, FEV1 was 3.26 L or 70% predicted. There was a 2% improvement in the FVC in response to bronchodilators increasing up to 4.68 L or 76% predicted. Lung volumes showed a TLC of 6.86 L or 81% predicted, RV of 2.23 L or 86% predicted, and RV/TLC of 101% predicted. Diffusion capacity was 63% and dL/VA was 63%.  Previous tests include:  10/3/2016 chest CT which is personally reviewed and shows some right anterior upper lobe subpleural fibrotic changes as well as some bronchiectatic changes in the right mid lung. No infiltrates, nodules, masses, or adenopathy is seen.  10/3/2016 pulmonary function test done at [facility] showed an FVC of 4.76 L or 78% predicted, FEV1/FVC of 73%, FEV1 of 3.48 L or 75% predicted. There were no changes with bronchodilators. FEF 25-75% was 64% predicted. Lung volumes  showed a TLC of 7.16 L or 85% predicted, RV of 2.47 L or 96% predicted, and RV/TLC of 107% predicted. Diffusion capacity uncorrected for hemoglobin was 24.68 mL/minute/mmHg or 59% predicted. DL/VA was 74% predicted. Flow volume loop appeared normal.  10/3/2016 6 minutes walk study showed a beginning saturation 96% but desaturations on a 91% with exertion. Recovery saturation was 97%. Although there was significant desaturations with exertion, he did not require supplemental oxygen with exertion.  In review, the patient was referred by Dr. Hayden for diminished DLCO in the setting of bleomycin therapy for stage IV Hodgkin's lymphoma. He underwent 6 cycles of ABVD therapy with marked improvement. His treatment was complicated by dysphagia and a right lower lobe pneumonia. As part of his follow-up, a pulmonary function test was obtained which showed a reduced DLCO. With questioning at that time, he did report some shortness of breath when climbing fields.  Patient's past pulmonary history includes recurrent pneumonias requiring antibiotics on a nearly yearly basis. Is a workup for this, he actually had a chest CT by his primary care physician, Dr. Morrison, which did show some scarring in the right mid lung. He is a lifetime nonsmoker but has had exposure to secondhand smoke from his father. He denies any childhood asthma or allergies. He does think that dust bothersome occasionally. He has had no industrial exposures. He works on a golf course. He does added [place] but otherwise denies any travel to [place] [facility]. He has a cat at home that does sleep in bed with him but is had no exposures to birds or hot tubs. 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 Family history is negative for any lung disorders.  Outside tests include:  6/30/2016 pulmonary function test which showed a reduced DLCO. Spirometry showed an FVC of 4.30 L or 70% predicted, FEV1/FVC was 72%, FEV1 was 3.12 L or 66% predicted. There was a 6%  improvement in the FEV1 in response to bronchodilators increasing up to 3.33 L or 71% predicted. FEF 25-75% was 54% predicted. Lung volumes showed a TLC of 7.22 L or 85% predicted, RV of 2.73 L or 106% predicted, RV/TLC of 118% predicted. DLCO corrected for hemoglobin was 22.55 mL/minute/mmHg or 53% predicted. DL/VA was 73% predicted. Flow volume loop showed evidence of airflow obstruction and lung restriction. No prior test are available for comparison.  6/30/2016 echocardiogram showed an LVEF of 56% with a mild increase in left ventricular size. RV size was normal with normal function. Trace mitral regurgitation was seen.  2/16/2016 chest x-ray reports right lower lobe infiltrate unchanged from previous chest x-ray on 2/12/2016.  2/24/2016 PET scan documented airspace opacities in the right lower lobe with a small right pleural effusion. They reported low-grade associated hypermetabolic activity. The lungs were otherwise clear. Calcified mediastinal lymph nodes were present. No other uptake was seen to suggest metastatic disease.  7/20/2015 chest CT reports multiple enlarged lymph nodes in the neck, axillary region, hilum, mediastinum, upper abdomen and the retroperitoneum and portal region. Multiple masses in the spleen were noted.  7/7/2016 CMP showed sodium of 133 otherwise unremarkable, ESR was 8. CBC was normal with a hemoglobin of 14.6 and a normal differential.    Current Medications   Taking    Sertraline HCl 100 MG Tablet 1 tablet Once a day    Simvastatin 10 MG Tablet 1 tablet in the evening Once a day    Lorazepam 1 MG Tablet 1 tablet at bedtime as needed Once a day    Aspirin 81 MG Tablet Chewable 1 tablet Once a day    Multivitamins Capsule    Prilosec(Omeprazole) 20 MG Capsule Delayed Release 2 capsules Once a day    Fish Oil 1000 MG Capsule 1 capsule Once a day    Medication List reviewed and reconciled with the patient      Past Medical History   Depression, mild   Insomnia, chronic   Bak pain    Prostate cancer   Hodgkin lymphoma   Tremor   Peripheral neuropathy   Dysphagia   Gastritis   Schatzki's ring, non-obstructing     Family History   Father: prostate cancer, diagnosed with Heart Disease, Cancer   Mother: breast cancer, diagnosed with Cancer     Social History   Tobacco Use:   Smoking History: never smoker. Smokeless Tobacco Does Not Chew . Second Hand Smoking Exposure : No.      Allergies   N.K.D.A.     Review of Systems   General:   General feeling well, denies fevers or chills. CV: Denies chest pain or palpitations. Lungs: See HPI. Abdomen: No changes in bowels.. LE: No edema. Neuro: Denies headaches or syncope. MS: No new joint pains.. Skin: No rashes. : No changes with bladder.             Vital Signs   Ht 77, Ht cm 195.58, Wt 172, Wt-kg 78.02, BMI 20.39, BSA 2.10, BP 94/62, BP Location right arm, HR 58, Pain scale 0, Oxygen sat % 98%.     Examination   General Examination:  GENERAL APPEARANCE: alert and oriented, comfortable.   HEAD: Normocephalic and atraumatic.   EYES: pupils, equal, round, reactive to light and accomodation (PERRLA), sclera clear.   FACE: Facial muscles symmetric.   ORAL CAVITY: No erythema or exudate. Mallampati 1.   NECK/THYROID: Supple, without adenopathy.   RESPIRATORY: Clear to percussion bilaterally. Right basilar and inspiratory crackles. No wheezes or rhonchi.   EXTREMITIES: No cyanosis, clubbing, or edema noted..   CARDIOVASCULAR: regular rate and rhythm, normal S1S2, no murmurs, gallops, or rubs..   LYMPH NODES: No cervical, supraclavicular adenopathy.   SKIN: normal, no rash or skin lesions.   NEUROLOGICAL: Moves all extremities and can ambulate without difficulty..        Assessments     1. Bleomycin toxicity - T45.1X1A (Primary)     2. Dyspnea on exertion - R06.09     Treatment   1. Bleomycin toxicity   Notes: Bleomycin lung toxicity. The patient has had dyspnea with exertion felt to be secondary to bleomycin lung toxicity. He has a history of Hodgkin's  lymphoma and was treated with ABVD therapy which includes bleomycin. He has had a chest CT that has shown subpleural interstitial changes on the right consistent with bleomycin lung toxicity. Given the time since he has completed chemotherapy combined with his CT changes, bleomycin associated subacute progressive pulmonary fibrosis is most likely. I am following him with pulmonary function test every 3 months. His current pulmonary function test does show a mild negative trend but this may be variability in the test. I will plan to repeat his pulmonary function test in 3 months. His oncologist as ordered a chest CT at that time as well which we can correlate with his PFT. He will return to see me at that time. It is reassuring that overall his symptoms have improved.      2. Others   Notes: CC: Dr. Hayden, Dr. Rudy Morrison.      Follow Up   3 Months

## 2018-01-04 ENCOUNTER — COMMUNICATION - GICH (OUTPATIENT)
Dept: CARDIOLOGY | Facility: OTHER | Age: 58
End: 2018-01-04

## 2018-01-04 NOTE — PROGRESS NOTES
Patient Information     Patient Name MRN Sex Daniel Veras [id number] Male 1960      Progress Notes by Cecily Radford at 2017  9:21 AM     Author:  Cecily Radford Service:  (none) Author Type:  Other Clinical Staff     Filed:  2017  9:21 AM Date of Service:  2017  9:21 AM Status:  Signed     :  Cecily Radford (Other Clinical Staff)            Falls Risk Criteria:    Age 65 and older or under age 4        Sensory deficits    Poor vision    Use of ambulatory aides    Impaired judgment    Unable to walk independently    Meets High Risk criteria for falls:  no

## 2018-01-04 NOTE — NURSING NOTE
Patient Information     Patient Name MRN Sex Daniel Veras [id number] Male 1960      Nursing Note by Mariam Moncada at 2017 11:00 AM     Author:  Mariam Moncada Service:  (none) Author Type:  (none)     Filed:  2017 10:56 AM Encounter Date:  2017 Status:  Signed     :  Mariam Moncada            Patient is here to follow up on his Hodgkin Lymphoma.  Mariam Moncada LPN .......2017 10:55 AM

## 2018-01-04 NOTE — PROGRESS NOTES
Patient Information     Patient Name MRN Sex Daniel Veras [id number] Male 1960      Progress Notes by Cm London MD at 2017  4:00 PM     Author:  Cm London MD Service:  (none) Author Type:  Physician     Filed:  2017  4:49 PM Encounter Date:  2017 Status:  Signed     :  Cm London MD (Physician)            HPI:   This is a [facility] Pulmonary Medicine outreach note. The patient is a 56-year-old male who returns with his wife today in follow-up for bleomycin lung toxicity. He was last seen on 2017. He has a history of Hodgkin's lymphoma treated with ABVD therapy which includes bleomycin. He returns today without complaints. He is doing well from a respiratory perspective. He has become active again and is not limited in any of his activities by his breathing. He denies any coughing, wheezing, or shortness of breath.  The patient had a chest CT done on 2017 that is personally reviewed and compared with his previous chest CT done on 10/3/2016. There is no change in his right lung anterior area of fibrosis. Areas of scarring are present in the right lower lobe and are unchanged as well. 2-3 mm pulmonary nodule in the right upper lobe is unchanged. No new findings are seen. Radiology reports multiple calcified granulomas in the spleen as well as granulomas seen in the lungs.  Previous tests include:  2017 pulmonary function test that showed a reduced FEF 25-75% consistent with obstruction in small peripheral airways. It also showed reduced DLCO. His FVC was 4.55 L or 74% predicted, FEV1/FVC was 72%, FEV1 was 3.26 L or 70% predicted. There was a 2% improvement in the FVC in response to bronchodilators increasing up to 4.68 L or 76% predicted. Lung volumes showed a TLC of 6.86 L or 81% predicted, RV of 2.23 L or 86% predicted, and RV/TLC of 101% predicted. Diffusion capacity was 63% and dL/VA was 63%.  10/3/2016 chest CT which is personally reviewed  and shows some right anterior upper lobe subpleural fibrotic changes as well as some bronchiectatic changes in the right mid lung. No infiltrates, nodules, masses, or adenopathy is seen.  10/3/2016 pulmonary function test done at [facility] showed an FVC of 4.76 L or 78% predicted, FEV1/FVC of 73%, FEV1 of 3.48 L or 75% predicted. There were no changes with bronchodilators. FEF 25-75% was 64% predicted. Lung volumes showed a TLC of 7.16 L or 85% predicted, RV of 2.47 L or 96% predicted, and RV/TLC of 107% predicted. Diffusion capacity uncorrected for hemoglobin was 24.68 mL/minute/mmHg or 59% predicted. DL/VA was 74% predicted. Flow volume loop appeared normal.  10/3/2016 6 minutes walk study showed a beginning saturation 96% but desaturations on a 91% with exertion. Recovery saturation was 97%. Although there was significant desaturations with exertion, he did not require supplemental oxygen with exertion.  In review, the patient was referred by Dr. Hayden for diminished DLCO in the setting of bleomycin therapy for stage IV Hodgkin's lymphoma. He underwent 6 cycles of ABVD therapy with marked improvement. His treatment was complicated by dysphagia and a right lower lobe pneumonia. As part of his follow-up, a pulmonary function test was obtained which showed a reduced DLCO. With questioning at that time, he did report some shortness of breath when climbing fields.  Patient's past pulmonary history includes recurrent pneumonias requiring antibiotics on a nearly yearly basis. Is a workup for this, he actually had a chest CT by his primary care physician, Dr. Morrison, which did show some scarring in the right mid lung. He is a lifetime nonsmoker but has had exposure to secondhand smoke from his father. He denies any childhood asthma or allergies. He does think that dust bothersome occasionally. He has had no industrial exposures. He works on a golf course. He does added [place] but otherwise denies any travel to the  [facility]. He has a cat at home that does sleep in bed with him but is had no exposures to birds or hot tubs. 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 Family history is negative for any lung disorders.  Outside tests include:  6/30/2016 pulmonary function test which showed a reduced DLCO. Spirometry showed an FVC of 4.30 L or 70% predicted, FEV1/FVC was 72%, FEV1 was 3.12 L or 66% predicted. There was a 6% improvement in the FEV1 in response to bronchodilators increasing up to 3.33 L or 71% predicted. FEF 25-75% was 54% predicted. Lung volumes showed a TLC of 7.22 L or 85% predicted, RV of 2.73 L or 106% predicted, RV/TLC of 118% predicted. DLCO corrected for hemoglobin was 22.55 mL/minute/mmHg or 53% predicted. DL/VA was 73% predicted. Flow volume loop showed evidence of airflow obstruction and lung restriction. No prior test are available for comparison.  6/30/2016 echocardiogram showed an LVEF of 56% with a mild increase in left ventricular size. RV size was normal with normal function. Trace mitral regurgitation was seen.  2/16/2016 chest x-ray reports right lower lobe infiltrate unchanged from previous chest x-ray on 2/12/2016.  2/24/2016 PET scan documented airspace opacities in the right lower lobe with a small right pleural effusion. They reported low-grade associated hypermetabolic activity. The lungs were otherwise clear. Calcified mediastinal lymph nodes were present. No other uptake was seen to suggest metastatic disease.  7/20/2015 chest CT reports multiple enlarged lymph nodes in the neck, axillary region, hilum, mediastinum, upper abdomen and the retroperitoneum and portal region. Multiple masses in the spleen were noted.  7/7/2016 CMP showed sodium of 133 otherwise unremarkable, ESR was 8. CBC was normal with a hemoglobin of 14.6 and a normal differential.    Current Medications   Taking    Sertraline HCl 100 MG Tablet 1 tablet Once a day    Simvastatin 10 MG Tablet 1 tablet in the evening  Once a day    Lorazepam 1 MG Tablet 1 tablet at bedtime as needed Once a day    Aspirin 81 MG Tablet Chewable 1 tablet Once a day    Multivitamins Capsule    Prilosec(Omeprazole) 20 MG Capsule Delayed Release 2 capsules Once a day    Fish Oil 1000 MG Capsule 1 capsule Once a day    Medication List reviewed and reconciled with the patient      Past Medical History   Depression, mild   Insomnia, chronic   Bak pain   Prostate cancer   Hodgkin lymphoma   Tremor   Peripheral neuropathy   Dysphagia   Gastritis   Schatzki's ring, non-obstructing     Family History   Father: prostate cancer, diagnosed with Heart Disease, Cancer   Mother: breast cancer, diagnosed with Cancer     Social History   Tobacco Use:   Smoking History: never smoker. Smokeless Tobacco Does Not Chew . Second Hand Smoking Exposure : No.      Allergies   N.K.D.A.     Review of Systems   Pulmonary:   GENERAL Normal. SKIN Normal. HEENT Normal. NECK .. CV Normal. PULM Normal. GI Complains of , abdominal pain.  Normal. EXT Complains of , joint or muscle pain, cold extremities. ENDO Normal. HEME Normal. LYMPH Normal. NEURO Complains of , tremor. PSYCH Normal.             Vital Signs   Ht 77, Ht cm 195.58, Wt 168, Wt-kg 76.2, BMI 19.92, BSA 2.08, /72, BP Location right arm, HR 68, Pain scale 0, Oxygen sat % 98%.     Examination   General Examination:  GENERAL APPEARANCE: alert and oriented, comfortable.   HEAD: Normocephalic and atraumatic.   EYES: pupils, equal, round, reactive to light and accomodation (PERRLA), sclera clear.   FACE: Facial muscles symmetric.   ORAL CAVITY: No erythema or exudate. Mallampati 1.   NECK/THYROID: Supple, without adenopathy.   RESPIRATORY: Clear to percussion bilaterally. No wheezes, rhonchi, or crackles.   EXTREMITIES: No cyanosis, clubbing, or edema noted..   CARDIOVASCULAR: regular rate and rhythm, normal S1S2, no murmurs, gallops, or rubs..   LYMPH NODES: No cervical, supraclavicular adenopathy.   SKIN: normal, no  rash or skin lesions.   NEUROLOGICAL: Moves all extremities and can ambulate without difficulty..        Assessments     1. Bleomycin toxicity - T45.1X1A (Primary)     2. Dyspnea on exertion - R06.09     Treatment   1. Bleomycin toxicity   Notes: Bleomycin lung toxicity. The patient has had dyspnea with exertion felt to be secondary to bleomycin lung toxicity. He has a history of Hodgkin's lymphoma and was treated with ABVD therapy which includes bleomycin. His 4/12/2017 chest CT that has shown overall stability in his subpleural interstitial changes on the right consistent with bleomycin lung toxicity. Given the time since he has completed chemotherapy combined with his CT changes, bleomycin associated subacute progressive pulmonary fibrosis is most likely. I am following him with pulmonary function test every 3 months. He was supposed to have a pulmonary function test prior to the visit which was not done. However, given the stability in his CT findings, I will delay that for an additional 3 months as a 6 month follow-up. His most recent pulmonary function test did show a mild negative trend but this may be variability in the test. I will plan to repeat his pulmonary function test in 3 months as a 6 month follow-up. His oncologist as ordered a PET scan at that time as well. He will return to see me at that time. It is reassuring that overall he is asymptomatic.      2. Dyspnea on exertion   Notes: Resolved.      3. Others   Notes: CC: Dr. Hayden, Dr. Rudy Morrison.      Follow Up   3 Months

## 2018-01-04 NOTE — PROGRESS NOTES
Patient Information     Patient Name MRN Sex Daniel Veras [id number] Male 1960      Progress Notes by Cecily Radford at 2017  9:20 AM     Author:  Cecily Radford Service:  (none) Author Type:  Other Clinical Staff     Filed:  2017  9:20 AM Date of Service:  2017  9:20 AM Status:  Signed     :  Cecily Radford (Other Clinical Staff)            IV Contrast- Discharge Instructions After Your CT Scan      The IV contrast you received today will be filtered from your bloodstream by your kidneys during the next 24 hours and pass from the body in urine.  You will not be aware of this process and your urine will not change in color.  To help this process you should drink at least 4 additional glasses of water or juice today.  This reduces stress on your kidneys.    Most contrast reactions are immediate.  Should you develop symptoms of concern after discharge, contact the department at the number below.  After hours you should contact your personal physician.  If you develop breathing distress or wheezing, call [phone].

## 2018-01-04 NOTE — PROGRESS NOTES
Patient Information     Patient Name MRN Sex Daniel Veras [id number] Male 1960      Progress Notes signed by Kia Leung MD at 2017  4:25 PM      Author:  Kia Leung MD Service:  (none) Author Type:  Physician     Filed:  2017  4:25 PM Encounter Date:  2017 Status:  Signed     :  Kia Leung MD (Physician)            -  DATE OF SERVICE:  2017    HEMATOLOGY/ONCOLOGY CLINIC NOTE     Mr. Boyd returns for followup of stage IV Hodgkin lymphoma, classical type. He originally presented with a right neck mass over a period of a few months. This worsened, and was subsequently seen by Dr. Radha Kim of general surgery noted, who noted the patient had a right supraclavicular adenopathy, biopsy of the lymph node came back consistent with classical Hodgkin's lymphoma. He was subsequently seen by Dr. Jarad Mosqueda on 2015 who recommended a PET scan for accurate staging. This was performed on 2015. The findings were there was hypermetabolic lymphadenopathy both above and below the diaphragms, suspicious for lymphoma. There was hypermetabolic activity in the spleen suspicious for metastatic disease. There were multiple foci of abnormal uptake in the spine and pelvis, all suspicious for lymphoma involvement. There was a right middle lobe lung nodule that was nonspecific. Dr. Mosqueda felt the patient had stage IV Hodgkin's lymphoma and recommended ABVD x6 cycles. An echocardiogram was performed to assess his cardiac function on 2015. The findings were that the patient had a normal left ventricular size. Left ventricular ejection fraction was 65%. The patient started ABVD and after 3 cycles, he had a repeat PET scan on 2015. The findings were that there was marked improvement in the patient's lymphadenopathy in the lower neck, axillary chest, abdomen, pelvis, and groin area. The focal areas of activity were  markedly improved in his thoracolumbar spine and pelvic area. The patient subsequently saw Dr. Mosqueda on February 10, 2016. His assessment was the patient had completed 6 cycles of ABVD. A repeat PET scan was performed on February 24, 2016. The findings were that there was no abnormal uptake to suggest metastatic disease. No evidence of metastatic disease. The perviously seen lymphadenopathy,  splenic activity and bone lesions had all resolved. The patient had problems with dysphagia as well as being admitted to the hospital with neutropenic fever and a right lower lobe pneumonia, and was treated with IV antibiotics. He did see Dr. Molina for an EGD, which revealed a hiatal hernia and Schatzki ring. Biopsies were all negative. The patient's dysphagia subsequently improved. When we saw the patient on June 8, 2016 he had no evidence of B symptoms and wanted to assess disease status by obtaining a sedimentation rate, which was normal. We also obtained an echocardiogram as well as pulmonary function tests. The echocardiogram revealed no change and his ejection fraction was 56%. Pulmonary function tests did reveal a decreased DLCO, 52% of predicted being reported. The patient did have some shortness of breath. When we saw him on July 4, 2016 we elected to restage him with a PET scan. This was done on October 5, 2016, which revealed no suspicious hypermetabolic osseous lesions identified and no evidence of residual or recurrent lymphoma. He also had seen Dr. Cm London of pulmonary for evaluation of possible bleomycin lung toxicity. His impression was that the patient had dyspnea on exertion. He felt his symptoms had stable, not progressive. He did have prechemotherapy PFT for comparison. He felt the patient certainly could have pulmonary fibrosis from bleomycin, as well as other forms of bleomycin toxicity including hypersensitivity pneumonitis. Organizing pneumonia was less likely. Other possibilities included  deconditioning due to his cancer. The plan was to obtain a CT of the chest with high-resolution cuts and also repeat his pulmonary function tests. The patient had a high resolution CT chest performed on October 3, 2016. The findings were there was new mild subpleural fibrotic change within the anterior right upper lobe, which may be related to chemotherapy. Patient did see Dr. Cm London October 12, 2016. His impression was that review of PFTs revealed stability in his lung volumes. DLCO was improved spirometry. He felt his dyspnea on exertion was stable but not progressive. He reviewed his CT scan of the chest which revealed the patient had subpleural interstitial changes on the right consistent with bleomycin lung toxicity. The plan is to follow him with serial pulmonary function tests to ensure stability otherwise, the patient had not had a recent CT of the chest since his last CT chest done in October of last year. When we saw him last on January 18, 2017, the plan was to repeat a PET CT scan in 3 months. This was performed on April 12, 2017. The findings were stable CT of the chest with a tiny right lung nodule that was unchanged. Fibrosis anteriorly in the right lung was unchanged. There  was no lymphadenopathy. The patient also had seen Dr. Cm London in January and his assessment was the patient had bleomycin lung toxicity. The patient had dyspnea on exertion felt to be secondary to bleomycin lung toxicity. He felt his pulmonary function tests that were done in January did show mild negative  variability in the test, and plans were for him to see him today and review CT of the chest. Also, the plan was to repeat his pulmonary function tests. Otherwise, the patient continues to state he is doing fairly well. The patient still continues to run. He does not have any significant increase in dyspnea on exertion, no abdominal pain, chest pain, shortness of breath, fevers, night sweats, weight loss. Although  he is doing well, he gets occasional ankle edema he says, but otherwise no new complaints.     PHYSICAL EXAMINATION  GENERAL: Middle-aged, white male in no acute distress.   VITAL SIGNS: Blood pressure 110/68, pulse 60, temperature 96.1.   HEENT: Atraumatic, normocephalic. Oropharynx nonerythematous.   NECK: Supple.   LUNGS: Faint inspiratory crackles.   HEART: Regular rhythm. S1 is normal.   ABDOMEN: Soft. Normoactive bowel sounds. No masses.   LYMPHATICS: No cervical, supraclavicular or axillary or inguinal nodes.   EXTREMITIES: Trace ankle edema.   NEUROLOGIC: Nonfocal.     LABORATORY DATA  Laboratories reveal a CBC that is within normal limits. . Sed Rate is 6. LFTs are normal. BUN and creatinine is normal.      IMPRESSION  Stage IV classical type Hodgkin lymphoma presenting with right supraclavicular lymphadenopathy and biopsy consistent with classical Hodgkin lymphoma that was involving the spine and pelvis as well as lymphadenopathy below the diaphragm as well as above the diaphragm that was  responsive to 6 cycles of ABVD. Course was complicated by neutropenic fever. Echocardiogram revealed normal cardiac function and PFTs revealed depressed DLCO. PET scan in October was negative for metastatic disease. CT chest revealed stable fibrosis in the right lung. The patient follows up with Dr. Cm London concerning this. The patient otherwise has a normal sedimentation rate, normal LDH, and no evidence of progression, but nonetheless, given the fact that he has stage IV disease, we elected to restage him in 3 months with a PET scan. Obtain CBC, CMP, LDH, sedimentation rate and echocardiogram to assess cardiac function post Adriamycin.     40 minutes spent with the patient, greater than half that time spent in counseling and coordination of cares.      MD ROLAND RUANO/iggy   D:  04/19/2017 11:35:05  T:  04/19/2017 15:48:24  Voice Job ID:  [id number]  Text Job ID:  [id number]  cc:ANAT VENEGAS MD,  PRIMARY PHYSICIAN  CARLOS LAU MD         [facility] [place], [place]:  SAM AGUIRRE  MR#:  [id number]  :  1960  DATE:  2017  LOCATION:  [facility]  ROOM:    TYPE:  [facility] NOTEPage 1 of 1

## 2018-01-04 NOTE — PROGRESS NOTES
Patient Information     Patient Name MRN Sex Daniel Veras [id number] Male 1960      Progress Notes by Kia Leung MD at 2017 11:00 AM     Author:  Kia Leung MD Service:  (none) Author Type:  Physician     Filed:  2017  5:53 PM Encounter Date:  2017 Status:  Signed     :  Kia Leung MD (Physician)            This note has been dictated. The encounter number is [phone].

## 2018-01-04 NOTE — NURSING NOTE
Patient Information     Patient Name MRN Sex Daniel Veras [id number] Male 1960      Nursing Note by Kerri Michelle at 2017 11:00 AM     Author:  Kerri Michelle Service:  (none) Author Type:  NURS- Registered Nurse     Filed:  2017 11:26 AM Encounter Date:  2017 Status:  Signed     :  Kerri Michelle (NURS- Registered Nurse)            Labs, echo, and PET scan ordered per written order sheet and sent to provider for co-sign. Kerri Michelle RN 2017  11:26 AM

## 2018-01-04 NOTE — PROGRESS NOTES
Patient Information     Patient Name MRN Sex Daniel Veras [id number] Male 1960      Progress Notes by Cecily Radford at 2017  9:20 AM     Author:  Cecily Radford Service:  (none) Author Type:  Other Clinical Staff     Filed:  2017  9:21 AM Date of Service:  2017  9:20 AM Status:  Signed     :  Cecily Radford (Other Clinical Staff)            1.  Has the patient had a previous reaction to IV contrast? No    2.  Does the patient have kidney disease? No    3.  Is the patient on dialysis? No    If YES to any of these questions, exam will be reviewed with a Radiologist before administering contrast.

## 2018-01-05 NOTE — TELEPHONE ENCOUNTER
Patient Information     Patient Name MRN Sex Daniel Veras [id number] Male 1960      Telephone Encounter by Otilia Erickson RN at 5/15/2017  3:15 PM     Author:  Otilia Erickson RN Service:  (none) Author Type:  NURS- Registered Nurse     Filed:  5/15/2017  3:16 PM Encounter Date:  5/15/2017 Status:  Signed     :  Otilia Erickson RN (NURS- Registered Nurse)            Depression-in adults 18 and over  [facility]    Office visit in the past 12 months or as indicated in chart.  Should have clinic visit 1-2 months after initial prescription.    Last visit with ANAT VENEGAS was on: 02/15/2017 in [facility]  Next visit with ANAT VENEGAS is on: No future appointment listed with this provider  Next visit with Internal Medicine is on: No future appointment listed in this department    Max refills 12 months from last office visit or per providers notes.    PHQ Depression Screening 2017 2/15/2017   Date of PHQ exam (doc flow) 2017 2/15/2017   1. Lack of interest/pleasure 0 - Not at all 0 - Not at all   2. Feeling down/depressed 0 - Not at all 0 - Not at all   PHQ-2 TOTAL SCORE 0 0   3. Trouble sleeping - -   4. Decreased energy - -   5. Appetite change - -   6. Feelings of failure - -   7. Trouble concentrating - -   8. Activity level - -   9. Hurting yourself - -   PHQ-9 TOTAL SCORE - -   PHQ-9 Severity Level - -   Functional Impairment - -     Prescription refilled per RN Medication Refill Policy.................... Otilia Erickson RN ....................  5/15/2017   3:16 PM

## 2018-01-05 NOTE — TELEPHONE ENCOUNTER
Patient Information     Patient Name MRN Sex Daniel Veras [id number] Male 1960      Telephone Encounter by Cindy Nixon LPN at 2017  8:27 AM     Author:  Cindy Nixon LPN Service:  (none) Author Type:  NURS- Licensed Practical Nurse     Filed:  2017  8:27 AM Encounter Date:  2017 Status:  Signed     :  Cindy Nixon LPN (NURS- Licensed Practical Nurse)            Left a message for the patient to call back.  Cindy Nixon LPN......2017  8:27 AM

## 2018-01-05 NOTE — TELEPHONE ENCOUNTER
Patient Information     Patient Name MRN Sex Daniel Veras [id number] Male 1960      Telephone Encounter by Cindy Nixon LPN at 2017  3:54 PM     Author:  Cindy Nixon LPN Service:  (none) Author Type:  NURS- Licensed Practical Nurse     Filed:  2017  3:56 PM Encounter Date:  2017 Status:  Signed     :  Cindy Nixon LPN (NURS- Licensed Practical Nurse)            Contacted the patient and he states he needs a refill on his simvastatin 20mg once daily at .    Cindy Nixon LPN......2017  3:56 PM

## 2018-01-22 ENCOUNTER — COMMUNICATION - GICH (OUTPATIENT)
Dept: INTERNAL MEDICINE | Facility: OTHER | Age: 58
End: 2018-01-22

## 2018-01-22 DIAGNOSIS — F33.0 MAJOR DEPRESSIVE DISORDER, RECURRENT, MILD (H): ICD-10-CM

## 2018-01-26 VITALS
HEART RATE: 68 BPM | HEART RATE: 58 BPM | SYSTOLIC BLOOD PRESSURE: 94 MMHG | DIASTOLIC BLOOD PRESSURE: 78 MMHG | WEIGHT: 167 LBS | BODY MASS INDEX: 20.07 KG/M2 | SYSTOLIC BLOOD PRESSURE: 110 MMHG | BODY MASS INDEX: 20.58 KG/M2 | HEIGHT: 76 IN | DIASTOLIC BLOOD PRESSURE: 60 MMHG | OXYGEN SATURATION: 100 % | DIASTOLIC BLOOD PRESSURE: 68 MMHG | SYSTOLIC BLOOD PRESSURE: 112 MMHG | OXYGEN SATURATION: 98 % | TEMPERATURE: 97.8 F | WEIGHT: 173.9 LBS | WEIGHT: 169 LBS | DIASTOLIC BLOOD PRESSURE: 62 MMHG | WEIGHT: 172 LBS | HEART RATE: 60 BPM | HEIGHT: 76 IN | HEART RATE: 58 BPM | BODY MASS INDEX: 20.67 KG/M2 | TEMPERATURE: 96.1 F | BODY MASS INDEX: 21.18 KG/M2 | SYSTOLIC BLOOD PRESSURE: 112 MMHG

## 2018-01-26 VITALS
BODY MASS INDEX: 20.36 KG/M2 | HEIGHT: 77 IN | SYSTOLIC BLOOD PRESSURE: 104 MMHG | HEART RATE: 60 BPM | WEIGHT: 172.4 LBS | DIASTOLIC BLOOD PRESSURE: 70 MMHG

## 2018-01-26 VITALS
BODY MASS INDEX: 20.19 KG/M2 | WEIGHT: 165.2 LBS | DIASTOLIC BLOOD PRESSURE: 72 MMHG | TEMPERATURE: 97.9 F | WEIGHT: 168 LBS | DIASTOLIC BLOOD PRESSURE: 72 MMHG | TEMPERATURE: 96.9 F | DIASTOLIC BLOOD PRESSURE: 78 MMHG | SYSTOLIC BLOOD PRESSURE: 104 MMHG | BODY MASS INDEX: 19.97 KG/M2 | WEIGHT: 164 LBS | HEART RATE: 64 BPM | HEIGHT: 76 IN | HEART RATE: 68 BPM | RESPIRATION RATE: 16 BRPM | SYSTOLIC BLOOD PRESSURE: 102 MMHG | HEIGHT: 76 IN | BODY MASS INDEX: 20.12 KG/M2 | HEART RATE: 64 BPM | OXYGEN SATURATION: 98 % | SYSTOLIC BLOOD PRESSURE: 112 MMHG

## 2018-01-26 VITALS
HEIGHT: 76 IN | HEART RATE: 64 BPM | SYSTOLIC BLOOD PRESSURE: 110 MMHG | BODY MASS INDEX: 20.09 KG/M2 | DIASTOLIC BLOOD PRESSURE: 78 MMHG | WEIGHT: 165 LBS

## 2018-01-29 ASSESSMENT — PATIENT HEALTH QUESTIONNAIRE - PHQ9: SUM OF ALL RESPONSES TO PHQ QUESTIONS 1-9: 0

## 2018-02-09 VITALS
BODY MASS INDEX: 20.53 KG/M2 | DIASTOLIC BLOOD PRESSURE: 72 MMHG | TEMPERATURE: 96.3 F | WEIGHT: 168.6 LBS | SYSTOLIC BLOOD PRESSURE: 98 MMHG | HEART RATE: 60 BPM | HEIGHT: 76 IN

## 2018-02-09 DIAGNOSIS — C85.90 NHL (NON-HODGKIN'S LYMPHOMA) (H): Primary | ICD-10-CM

## 2018-02-10 ASSESSMENT — PATIENT HEALTH QUESTIONNAIRE - PHQ9: SUM OF ALL RESPONSES TO PHQ QUESTIONS 1-9: 0

## 2018-02-12 NOTE — PROGRESS NOTES
Patient Information     Patient Name MRN Sex Daniel Veras [id number] Male 1960      Progress Notes by Cecily Radford at 2017 11:22 AM     Author:  Cecily Radford Service:  (none) Author Type:  Other Clinical Staff     Filed:  2017 11:22 AM Date of Service:  2017 11:22 AM Status:  Signed     :  Cecily Radford (Other Clinical Staff)            Falls Risk Criteria:    Age 65 and older or under age 4        Sensory deficits    Poor vision    Use of ambulatory aides    Impaired judgment    Unable to walk independently    Meets High Risk criteria for falls:  no

## 2018-02-12 NOTE — PROGRESS NOTES
Patient Information     Patient Name MRN Sex Daniel Veras [id number] Male 1960      Progress Notes by Cecily Radford at 2017 11:22 AM     Author:  Cecily Radford Service:  (none) Author Type:  Other Clinical Staff     Filed:  2017 11:22 AM Date of Service:  2017 11:22 AM Status:  Signed     :  Cecily Radford (Other Clinical Staff)            IV Contrast- Discharge Instructions After Your CT Scan      The IV contrast you received today will be filtered from your bloodstream by your kidneys during the next 24 hours and pass from the body in urine.  You will not be aware of this process and your urine will not change in color.  To help this process you should drink at least 4 additional glasses of water or juice today.  This reduces stress on your kidneys.    Most contrast reactions are immediate.  Should you develop symptoms of concern after discharge, contact the department at the number below.  After hours you should contact your personal physician.  If you develop breathing distress or wheezing, call 911.

## 2018-02-13 NOTE — PROGRESS NOTES
Patient Information     Patient Name MRN Sex Daniel Veras [id number] Male 1960      Progress Notes by Cecily Radford at 2017 11:22 AM     Author:  Cecily Radford Service:  (none) Author Type:  Other Clinical Staff     Filed:  2017 11:23 AM Date of Service:  2017 11:22 AM Status:  Signed     :  Cecily Radford (Other Clinical Staff)                       1.  Do you have dizziness or vertigo?    no                    2.  Do you need help standing or walking?   no                 3.  Have you fallen within the last 6 months?    no           4.  Has the patient been fasting?      yes       If any risks are marked Yes, the following interventions are utilized:    Do not leave patient unattended     Assist patient in the dressing room and bathroom    Have ambulatory aides available throughout procedure    Involve patient s family if available

## 2018-02-13 NOTE — NURSING NOTE
Patient Information     Patient Name MRN Daniel Pereyra [id number] Male 1960      Nursing Note by Pratibha Vanessa at 1/3/2018  9:00 AM     Author:  Pratibha Vanessa Service:  (none) Author Type:  (none)     Filed:  1/3/2018 10:42 AM Encounter Date:  1/3/2018 Status:  Signed     :  Pratibha Vanessa            Patient presents for a follow up of Hodgkins lymphoma.  No current complaints or side effects.   Pratibha Vanessa CMA (AAMA).....................1/3/2018  9:45 AM

## 2018-02-13 NOTE — TELEPHONE ENCOUNTER
Patient Information     Patient Name MRN Sex Daniel Veras [id number] Male 1960      Telephone Encounter by Karishma Sanchez RN at 2018  1:30 PM     Author:  Karishma Sanchez RN Service:  (none) Author Type:  NURS- Registered Nurse     Filed:  2018  1:31 PM Encounter Date:  2018 Status:  Signed     :  Karishma Sanchez RN (NURS- Registered Nurse)            Depression-in adults 18 and over  SSRI    Office visit in the past 12 months or as indicated in chart.  Should have clinic visit 1-2 months after initial prescription.    Last visit with ANAT VENEGAS was on: 02/15/2017 in [facility]  Next visit with ANAT VENEGAS is on: No future appointment listed with this provider  Next visit with Internal Medicine is on: No future appointment listed in this department    Max refills 12 months from last office visit or per providers notes.    Due for exam in Feb.  Limited refill per protocol and letter mailed.  KARISHMA SANCHEZ RN ........   2018    1:30 PM

## 2018-02-13 NOTE — PROGRESS NOTES
Patient Information     Patient Name MRN Sex Daniel Veras [id number] Male 1960      Progress Notes signed by Kia Leung MD at 1/3/2018  5:44 PM      Author:  Kia Leung MD Service:  (none) Author Type:  Physician     Filed:  1/3/2018  5:44 PM Encounter Date:  1/3/2018 Status:  Signed     :  Kia Leung MD (Physician)            DATE OF SERVICE:  2018    Mr. Boyd returns for followup of stage IV Hodgkin's lymphoma, classical type.  He originally presented with right neck mass over a period of two months.  This was subsequently seen by Dr. Radha Kim of general surgery who noted that the patient had a right-sided supraclavicular adenopathy.  The biopsy of the lymph node came back consistent with classical Hodgkin's lymphoma.  He was subsequently seen by Dr. Jarad Mosqueda on 2015 who recommended PET scan for accurate staging; this was performed on 2015.  The findings were that there was a hypermetabolic lymphadenopathy both above and below the diaphragm suspicious for lymphoma.  There was hypermetabolic activity in the spleen suspicious for metastatic disease.  There were multiple foci with abnormal uptake in the spine suspicious for metastatic disease.  There were also right middle lobe lung nodules, nonspecific; Dr. Mosqueda felt that the patient had stage IV Hodgkin's lymphoma and recommended ABVD x6 cycles.    An echocardiogram was performed to assess his cardiac function on 2015.  The findings were that the patient had normal left ventricular size and left ventricular ejection fraction was 65%.  The patient was started on ABVD and after 3 cycles he had a repeat PET scan on 2015.  The findings were that there was marked improvement in the patient's lymphadenopathy of the lower neck, axilla, chest, abdomen, pelvis and groin area.  Focal areas of activity were markedly improved over the cervical, lumbar spine and pelvic area.    The  patient subsequently saw Dr. Mosqueda on 02/10/2016 and the assessment was that the patient had completed 6 cycles of ABVD.  Repeat PET scan was performed on 02/24/2016.  The findings were that there was no abnormal uptake to suggest metastatic disease.  No evidence of metastatic disease, previously seen lymphadenopathy.  Bone lesions had all resolved.  The patient had problems with dysphagia as well.  He was admitted to the hospital for a right lower lobe pneumonia and was treated with IV antibiotics.  He did see Dr. Cook for an EGD which revealed hiatal hernia, Schatzki's rings.  Biopsies were all negative.  The patient's dysphagia was subsequently improved.      We saw the patient on 06/08/2016.  He had no evidence of disease clinically.  We wanted to assess disease status.  We obtained a sedimentation rate, which was normal.  We also obtained an echocardiogram as well as a pulmonary function test.  Echocardiogram revealed no change.  Ejection fraction was 56%.  Pulmonary functions did reveal decreased DLCO at 52% of predicted being reported.  The patient did have some shortness of breath when we saw him on 07/14/2016.  We elected to restage him with a PET scan and this was done on 10/05/2016, which revealed no suspicious hypermetabolic osseous lesions identified.  No evidence of recurrent lymphoma.    He also had been seen by Dr. Cm London in pulmonary for evaluation of possible bleomycin lung toxicity.  Unfortunately, the patient had dyspnea on exertion.  He felt his symptoms had been stable, but not progressive.  He did have prechemotherapy pulmonary function test for comparison.  He felt the patient certainly could have pulmonary fibrosis of bleomycin as well as other forms of bleomycin toxicity, including hypersensitivity pneumonitis.  He also obtained CT chest with high resolution cuts and also repeated his pulmonary function test.  The patient had a high resolution CT chest performed on 10/03/2016.   The findings were that there were new mild subpleural fibrotic changes within the anterior right upper lobe, which may be related to chemotherapy.      The patient saw Dr. Cm London on 10/12/2016.  His impression was that the pulmonary function test reveals stability.  His lung volumes and DLCO revealed improved spirometry.  He felt his dyspnea on exertion was stable, but not progressive.  He reviewed the CT scan of the chest which revealed that the patient had subpleural interstitial changes on the right consistent with bleomycin lung toxicity.  The plan was to follow him with serial pulmonary function tests to ensure stability.  Otherwise, when we saw him on 01/18/2017, the plan was to repeat a PET/CT in 3 months; this was performed on 04/12/2017.  The findings were that the patient has a stable CT chest with tiny right lung nodules.  There was unchanged fibrosis in the right lung.  There was no lymphadenopathy.    The patient has also seen Dr. Cm London whose assessment is that the patient had bleomycin lung toxicity.  The patient had dyspnea on exertion felt to be secondary to bleomycin lung toxicity.  It was felt his pulmonary functions that were done in January did show negative variability of the test.  The plan was to see him and review CT chest.  The patient did see Dr. Cm London on 04/19/2017.  His assessment was that the patient had bleomycin lung toxicity.  He felt his CT chest had shown overall stability, though there was still pleural interstitial change on the right consistent with bleomycin lung toxicity.    When we saw him on 04/19/2017, we wanted to restage him given the fact that he had stage IV disease with a PET scan.  This was obtained on 07/14/2017.  The findings were that there was no evidence of residual recurrent lymphoma.  There were minimal subpleural fibrotic changes.  We also obtained an echocardiogram, which was read as borderline left ventricular reduced function with  calculated left ventricular ejection fraction of 49%.  His prior echocardiogram revealed an ejection fraction of 56%.  The patient had been seen per the patient by Dr. Collier of cardiology and the patient was seen by Siomara Ruvalcaba, his nurse practitioner, on 08/03/2017.  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      It was recommended the patient start lisinopril 2.5 mg daily and ACE inhibitor therapy.  She also ordered CT of the coronary arteries, which was done on 08/28/2017, and the findings were that there was a total calcium score of more than 26.1, which is in the 78th percentile for subjects of the same age, gender, race and ethnicity.  There is a prominent ramus intermediate artery with moderate focal proximal stenosis, multifocal mild narrowing of the proximal mid LAD, focal scarring anterolateral right lobe, small hiatal hernia.  The patient was scheduled to have an echo in November.  Otherwise, he was doing reasonably well.    He did see Dr. Cm London in November followup and his assessment was that the patient had stable bleomycin lung with stable DLCO and he would continue to monitor every 6 months.  The patient had staging studies as well on 12/29/2017.  CT chest was negative.  There was no new lymph node enlargement.  CT abdomen and pelvis was essentially negative.  There was no lymph node enlargement or acute inflammatory change.  There was a tiny lesion in the dome of the liver felt to be benign, but too small to accurately characterize, but probably benign.  The patient otherwise was doing well.  He denies any fevers, night sweats, weight loss.  He says the shortness of breath is improved overall.  The plan was to follow up with Siomara Ruvalcaba of cardiology with a repeat echo.  Otherwise, no other complaints of shortness of breath, chest pain, abdominal pain, fevers, night sweats or weight  loss.    PHYSICAL EXAMINATION:  GENERAL:  He is a middle-aged, white male in no acute distress.  VITAL SIGNS:  Blood pressure 98/72, pulse 60, temperature 96.3.  HEENT:  Atraumatic, normocephalic.  Oropharynx is not erythematous.  NECK:  Supple.  LUNGS:  Clear to auscultation and percussion.  HEART:  Regular rhythm.  S1, S2 normal.  ABDOMEN:  Soft.  Normoactive bowel sounds.  No masses, tenderness.  LYMPHATICS:  No cervical, supraclavicular or axillary nodes.  EXTREMITIES:  No edema.    NEUROLOGIC:  Nonfocal.    LABORATORY DATA:  Reveal CBC is within normal limits.  , BUN 14, creatinine 0.95.  LFTs are normal.    IMPRESSION:  Stage IV classical type Hodgkin's lymphoma presenting with right cervical and supraclavicular lymphadenopathy with biopsy consistent with classical Hodgkin's lymphoma, which involved the spine, pelvis as well as lymphadenopathy above and below the diaphragm.  He responded to 6 cycles of ABVD, course complicated with neutropenic fever.  Echocardiogram revealed normal cardiac function.  Pulmonary function test revealed depressed DLCO.  PET scan was negative for metastatic disease.  CT chest reveals stable fibrosis in the right lung.  The patient has been followed by Dr. Cm London.  The patient had a normal sed rate, normal LDH, normal PET scan, no evidence of progression, but most recently had been found to have a decreased left ventricular ejection fraction, currently on ACE inhibitors per Siomara Da Silva, nurse practitioner.  Repeat staging studies are essentially negative.  There is a questionable liver lesion.  Otherwise, no evidence of disease.  The plan is to continue surveillance.  We will see the patient back in four months to repeat CT chest, abdomen and pelvis.  The patient is clinically without evidence of disease.    Forty minutes were spent in patient care with half that time spent on counseling and coordination of care.      BRENDA WILD,  MD ESPINO/samantha  D:01/03/2018 12:31:52  T:01/03/2018 13:25:55  VJID: [id number]  TJID: [id number]    cc:CARLOS LAU MD, ANAT VENEGAS MD, COY ALCANTAR NP

## 2018-02-23 DIAGNOSIS — F51.04 CHRONIC INSOMNIA: Primary | ICD-10-CM

## 2018-02-23 PROBLEM — E78.5 HYPERLIPIDEMIA: Status: ACTIVE | Noted: 2017-02-15

## 2018-02-23 PROBLEM — J98.4 BLEOMYCIN LUNG TOXICITY: Status: ACTIVE | Noted: 2017-02-15

## 2018-02-23 PROBLEM — T45.1X5A BLEOMYCIN LUNG TOXICITY: Status: ACTIVE | Noted: 2017-02-15

## 2018-02-23 RX ORDER — LISINOPRIL 2.5 MG/1
2.5 TABLET ORAL
COMMUNITY
Start: 2017-08-03 | End: 2018-05-10

## 2018-02-23 RX ORDER — ATORVASTATIN CALCIUM 10 MG/1
10 TABLET, FILM COATED ORAL
COMMUNITY
Start: 2017-08-03 | End: 2018-05-10

## 2018-02-23 RX ORDER — SERTRALINE HYDROCHLORIDE 100 MG/1
100 TABLET, FILM COATED ORAL
COMMUNITY
Start: 2017-05-15 | End: 2018-04-17

## 2018-02-23 RX ORDER — ACETAMINOPHEN 500 MG
1000 TABLET ORAL
COMMUNITY
End: 2018-05-10

## 2018-02-23 RX ORDER — LORAZEPAM 1 MG/1
1 TABLET ORAL AT BEDTIME
Qty: 30 TABLET | Refills: 3 | Status: SHIPPED | OUTPATIENT
Start: 2018-02-23 | End: 2018-04-16

## 2018-02-23 RX ORDER — LORAZEPAM 1 MG/1
1 TABLET ORAL
COMMUNITY
Start: 2017-11-06 | End: 2018-02-23

## 2018-02-23 RX ORDER — ASPIRIN 81 MG/1
81 TABLET ORAL
COMMUNITY
Start: 2016-07-14 | End: 2018-05-10

## 2018-02-23 NOTE — TELEPHONE ENCOUNTER
Routing refill request to provider for review/approval because:  Drug not on the [facility] refill protocol   Lupe London RN on 2/23/2018 at 8:36 AM

## 2018-03-13 ENCOUNTER — DOCUMENTATION ONLY (OUTPATIENT)
Dept: FAMILY MEDICINE | Facility: OTHER | Age: 58
End: 2018-03-13

## 2018-03-13 RX ORDER — CHLORAL HYDRATE 500 MG
1 CAPSULE ORAL
COMMUNITY
End: 2018-05-10

## 2018-04-16 DIAGNOSIS — F51.04 CHRONIC INSOMNIA: ICD-10-CM

## 2018-04-16 NOTE — LETTER
April 20, 2018      Daniel Boyd  [address]  [facility] [address]        Dear Daniel,       This letter is to remind you that you are due for your annual exam and medication management appointment with Rudy Morrison. Your last comprehensive visit was more than 12 months ago.    Additional prescription refills require you to complete this appointment.    Please call the clinic at [phone] to schedule your appointment.    If you should require additional refills before your scheduled appointment, please contact your pharmacy and we will refill your medication until the date of your appointment.    If you are no longer seeing Rudy Morrison for primary care, please call to let us know. Doing so will remove you from our call/contact list.      Thank you for choosing [facility] and [facility] for your health care needs.    Sincerely,    Refill JADE  [facility]

## 2018-04-17 DIAGNOSIS — F33.0 MAJOR DEPRESSIVE DISORDER, RECURRENT EPISODE, MILD (H): Primary | ICD-10-CM

## 2018-04-20 ENCOUNTER — TELEPHONE (OUTPATIENT)
Dept: ONCOLOGY | Facility: OTHER | Age: 58
End: 2018-04-20

## 2018-04-20 DIAGNOSIS — C81.90 HODGKIN LYMPHOMA, UNSPECIFIED HODGKIN LYMPHOMA TYPE, UNSPECIFIED BODY REGION (H): Primary | ICD-10-CM

## 2018-04-20 NOTE — TELEPHONE ENCOUNTER
zoloft 100 mg daily      Last Written Prescription Date:  1/22/18  Last Fill Quantity: 90,   # refills: 0  Last Office Visit: 2/15/17  Future Office visit:    Next 5 appointments (look out 90 days)     May 02, 2018 11:30 AM CDT   Return Visit with Kia Leung MD   [facility]    [address]  [facility] [address]   [phone]                   Routing refill request to provider for review/approval because:  Patient failed protocol as he has not been seen in over 1 year by PCP, has been seen in clinic. To Rudy Morrison for consideration Karishma Sanchez RN on 4/20/2018 at 3:22 PM

## 2018-04-20 NOTE — TELEPHONE ENCOUNTER
[facility] pharmacy and pt request a Rx refill for lorazepam.  No protocol completed.  Pt's last exam with PCP Dr. SUBHASH Morrison was on 2-5-17.  Patient is due for medication management appointment. Letter sent for reminder to patient. Will route to PCP for review and consideration of refill.  Unable to complete prescription refill per RN Medication Refill Policy.................... Delilah Pérez ....................  4/20/2018   7:33 AM

## 2018-04-22 RX ORDER — LORAZEPAM 1 MG/1
TABLET ORAL
Qty: 30 TABLET | Refills: 3 | Status: SHIPPED | OUTPATIENT
Start: 2018-04-22 | End: 2018-05-10

## 2018-04-22 RX ORDER — SERTRALINE HYDROCHLORIDE 100 MG/1
TABLET, FILM COATED ORAL
Qty: 90 TABLET | Refills: 1 | Status: SHIPPED | OUTPATIENT
Start: 2018-04-22 | End: 2018-12-10

## 2018-04-26 ENCOUNTER — HOSPITAL ENCOUNTER (OUTPATIENT)
Dept: CT IMAGING | Facility: OTHER | Age: 58
End: 2018-04-26
Attending: INTERNAL MEDICINE
Payer: COMMERCIAL

## 2018-04-26 ENCOUNTER — HOSPITAL ENCOUNTER (OUTPATIENT)
Dept: RESPIRATORY THERAPY | Facility: OTHER | Age: 58
End: 2018-04-26
Attending: INTERNAL MEDICINE
Payer: COMMERCIAL

## 2018-04-26 ENCOUNTER — HOSPITAL ENCOUNTER (OUTPATIENT)
Dept: CT IMAGING | Facility: OTHER | Age: 58
Discharge: HOME | End: 2018-04-26
Attending: INTERNAL MEDICINE | Admitting: INTERNAL MEDICINE
Payer: COMMERCIAL

## 2018-04-26 DIAGNOSIS — C81.90 HODGKIN LYMPHOMA, UNSPECIFIED HODGKIN LYMPHOMA TYPE, UNSPECIFIED BODY REGION (H): ICD-10-CM

## 2018-04-26 DIAGNOSIS — T45.1X5A BLEOMYCIN LUNG TOXICITY: Primary | ICD-10-CM

## 2018-04-26 DIAGNOSIS — J98.4 BLEOMYCIN LUNG TOXICITY: Primary | ICD-10-CM

## 2018-04-26 LAB
ALBUMIN SERPL-MCNC: 4.2 G/DL (ref 3.5–5.7)
ALP SERPL-CCNC: 57 U/L (ref 34–104)
ALT SERPL W P-5'-P-CCNC: 22 U/L (ref 7–52)
ANION GAP SERPL CALCULATED.3IONS-SCNC: 8 MMOL/L (ref 3–14)
AST SERPL W P-5'-P-CCNC: 22 U/L (ref 13–39)
BASOPHILS # BLD AUTO: 0 10E9/L (ref 0–0.2)
BASOPHILS NFR BLD AUTO: 0.4 %
BILIRUB SERPL-MCNC: 0.9 MG/DL (ref 0.3–1)
BUN SERPL-MCNC: 16 MG/DL (ref 7–25)
CALCIUM SERPL-MCNC: 9.8 MG/DL (ref 8.6–10.3)
CHLORIDE SERPL-SCNC: 105 MMOL/L (ref 98–107)
CO2 SERPL-SCNC: 23 MMOL/L (ref 21–31)
CREAT SERPL-MCNC: 0.91 MG/DL (ref 0.7–1.3)
DIFFERENTIAL METHOD BLD: NORMAL
DLCOCOR-%PRED-PRE: 74 %
DLCOCOR-PRE: 23.69 ML/MIN/MMHG
DLCOUNC-%PRED-PRE: 75 %
DLCOUNC-PRE: 23.95 ML/MIN/MMHG
DLCOUNC-PRED: 31.68 ML/MIN/MMHG
EOSINOPHIL # BLD AUTO: 0.2 10E9/L (ref 0–0.7)
EOSINOPHIL NFR BLD AUTO: 3.3 %
ERV-%PRED-PRE: 84 %
ERV-PRE: 1.82 L
ERV-PRED: 2.14 L
ERYTHROCYTE [DISTWIDTH] IN BLOOD BY AUTOMATED COUNT: 12.9 % (ref 10–15)
EXPTIME-PRE: 7.63 SEC
FEF2575-%PRED-PRE: 71 %
FEF2575-PRE: 2.58 L/SEC
FEF2575-PRED: 3.61 L/SEC
FEFMAX-%PRED-PRE: 72 %
FEFMAX-PRE: 7.96 L/SEC
FEFMAX-PRED: 10.98 L/SEC
FEV1-%PRED-PRE: 74 %
FEV1-PRE: 3.34 L
FEV1FEV6-PRE: 77 %
FEV1FEV6-PRED: 79 %
FEV1FVC-PRE: 75 %
FEV1FVC-PRED: 76 %
FEV1SVC-PRE: 74 %
FEV1SVC-PRED: 72 %
FIFMAX-PRE: 6.38 L/SEC
FRCPLETH-%PRED-PRE: 99 %
FRCPLETH-PRE: 3.97 L
FRCPLETH-PRED: 4.01 L
FVC-%PRED-PRE: 75 %
FVC-PRE: 4.48 L
FVC-PRED: 5.91 L
GAW-%PRED-PRE: 104 %
GAW-PRE: 1.08 L/S/CMH2O
GAW-PRED: 1.03 L/S/CMH2O
GFR SERPL CREATININE-BSD FRML MDRD: 86 ML/MIN/1.7M2
GLUCOSE SERPL-MCNC: 111 MG/DL (ref 70–105)
HCT VFR BLD AUTO: 43.1 % (ref 40–53)
HGB BLD-MCNC: 15 G/DL (ref 13.3–17.7)
IC-%PRED-PRE: 65 %
IC-PRE: 2.7 L
IC-PRED: 4.11 L
IMM GRANULOCYTES # BLD: 0 10E9/L (ref 0–0.4)
IMM GRANULOCYTES NFR BLD: 0.3 %
LDH SERPL L TO P-CCNC: 125 U/L (ref 140–271)
LYMPHOCYTES # BLD AUTO: 1.5 10E9/L (ref 0.8–5.3)
LYMPHOCYTES NFR BLD AUTO: 22.2 %
MCH RBC QN AUTO: 31.9 PG (ref 26.5–33)
MCHC RBC AUTO-ENTMCNC: 34.8 G/DL (ref 31.5–36.5)
MCV RBC AUTO: 92 FL (ref 78–100)
MONOCYTES # BLD AUTO: 0.4 10E9/L (ref 0–1.3)
MONOCYTES NFR BLD AUTO: 5.8 %
MVV-%PRED-PRE: 58 %
MVV-PRE: 98 L/MIN
MVV-PRED: 166 L/MIN
NEUTROPHILS # BLD AUTO: 4.6 10E9/L (ref 1.6–8.3)
NEUTROPHILS NFR BLD AUTO: 68 %
PLATELET # BLD AUTO: 223 10E9/L (ref 150–450)
POTASSIUM SERPL-SCNC: 4.2 MMOL/L (ref 3.5–5.1)
PROT SERPL-MCNC: 7.5 G/DL (ref 6.4–8.9)
RBC # BLD AUTO: 4.7 10E12/L (ref 4.4–5.9)
RVPLETH-%PRED-PRE: 82 %
RVPLETH-PRE: 2.16 L
RVPLETH-PRED: 2.62 L
SGAW-%PRED-PRE: 329 %
SGAW-PRE: 0.26 1/CMH2O*S
SGAW-PRED: 0.08 1/CMH2O*S
SODIUM SERPL-SCNC: 136 MMOL/L (ref 134–144)
SRAW-%PRED-PRE: 85 %
SRAW-PRE: 4.06 CMH2O*S
SRAW-PRED: 4.76 CMH2O*S
TLCPLETH-%PRED-PRE: 78 %
TLCPLETH-PRE: 6.67 L
TLCPLETH-PRED: 8.55 L
VA-%PRED-PRE: 76 %
VA-PRE: 6.29 L
VC-%PRED-PRE: 72 %
VC-PRE: 4.51 L
VC-PRED: 6.25 L
WBC # BLD AUTO: 6.7 10E9/L (ref 4–11)

## 2018-04-26 PROCEDURE — 40000809 ZZH STATISTIC NO DOCUMENTATION TO SUPPORT CHARGE

## 2018-04-26 PROCEDURE — 70491 CT SOFT TISSUE NECK W/DYE: CPT

## 2018-04-26 PROCEDURE — 94729 DIFFUSING CAPACITY: CPT | Mod: 26 | Performed by: INTERNAL MEDICINE

## 2018-04-26 PROCEDURE — 74177 CT ABD & PELVIS W/CONTRAST: CPT

## 2018-04-26 PROCEDURE — 85025 COMPLETE CBC W/AUTO DIFF WBC: CPT | Performed by: INTERNAL MEDICINE

## 2018-04-26 PROCEDURE — 25000125 ZZHC RX 250: Performed by: INTERNAL MEDICINE

## 2018-04-26 PROCEDURE — 71260 CT THORAX DX C+: CPT

## 2018-04-26 PROCEDURE — 94726 PLETHYSMOGRAPHY LUNG VOLUMES: CPT | Mod: 26 | Performed by: INTERNAL MEDICINE

## 2018-04-26 PROCEDURE — 94010 BREATHING CAPACITY TEST: CPT | Mod: 26 | Performed by: INTERNAL MEDICINE

## 2018-04-26 PROCEDURE — 94729 DIFFUSING CAPACITY: CPT

## 2018-04-26 PROCEDURE — 40000275 ZZH STATISTIC RCP TIME EA 10 MIN

## 2018-04-26 PROCEDURE — 36415 COLL VENOUS BLD VENIPUNCTURE: CPT | Performed by: INTERNAL MEDICINE

## 2018-04-26 PROCEDURE — 94726 PLETHYSMOGRAPHY LUNG VOLUMES: CPT

## 2018-04-26 PROCEDURE — 94200 LUNG FUNCTION TEST (MBC/MVV): CPT

## 2018-04-26 PROCEDURE — 94010 BREATHING CAPACITY TEST: CPT

## 2018-04-26 PROCEDURE — 80053 COMPREHEN METABOLIC PANEL: CPT | Performed by: INTERNAL MEDICINE

## 2018-04-26 PROCEDURE — 83615 LACTATE (LD) (LDH) ENZYME: CPT | Performed by: INTERNAL MEDICINE

## 2018-04-26 RX ADMIN — IOHEXOL 100 ML: 350 INJECTION, SOLUTION INTRAVENOUS at 10:31

## 2018-05-02 ENCOUNTER — ONCOLOGY VISIT (OUTPATIENT)
Dept: ONCOLOGY | Facility: OTHER | Age: 58
End: 2018-05-02
Attending: INTERNAL MEDICINE
Payer: COMMERCIAL

## 2018-05-02 VITALS
HEIGHT: 76 IN | BODY MASS INDEX: 21.16 KG/M2 | SYSTOLIC BLOOD PRESSURE: 120 MMHG | DIASTOLIC BLOOD PRESSURE: 82 MMHG | TEMPERATURE: 95.3 F | HEART RATE: 55 BPM | WEIGHT: 173.8 LBS | OXYGEN SATURATION: 96 %

## 2018-05-02 DIAGNOSIS — C81.90 HODGKIN LYMPHOMA, UNSPECIFIED HODGKIN LYMPHOMA TYPE, UNSPECIFIED BODY REGION (H): Primary | ICD-10-CM

## 2018-05-02 PROCEDURE — 99215 OFFICE O/P EST HI 40 MIN: CPT | Performed by: INTERNAL MEDICINE

## 2018-05-02 ASSESSMENT — PAIN SCALES - GENERAL: PAINLEVEL: NO PAIN (0)

## 2018-05-02 NOTE — NURSING NOTE
Lab and Ct scans ordered per verbal read back order from provider and sent to provider to co-sign.

## 2018-05-02 NOTE — PROGRESS NOTES
Visit Date:   05/02/2018      HISTORY OF PRESENT ILLNESS:  Mr. Boyd returns for followup of stage IV Hodgkin lymphoma, classical type.  He originally presented with right neck mass over a period of 2 months.  He was subsequently seen by Dr. Radha Kim of General Surgery who noted the patient had right-sided supraclavicular adenopathy.  The biopsy of the lymph node came back consistent with classical Hodgkin lymphoma.  He was subsequently seen by Dr. Jarad Mosqueda on 08/05/2015, who recommended a PET scan for accurate staging.  This was performed on 08/13/2015.  The findings were that there was a hypermetabolic lymphadenopathy both above and below the diaphragm, suspicious for lymphoma.  There was hypermetabolic activity in the spleen suspicious for metastatic disease.  There were multiple foci with abnormal uptake in the spine suspicious for metastatic disease.  There was also a right middle lobe lung nodule that was nonspecific.  Dr. Mosqueda felt the patient had stage IV Hodgkin lymphoma and recommended ABVD x 6 cycles.  An echocardiogram was performed to assess his cardiac function on 08/12/2015.  The findings were that the patient had normal left ventricular size and uptake, and left ventricular ejection fraction was 65%.  The patient was started on ABVD and after 3 cycles he had a repeat PET scan on 11/28/2015.  The findings were that there was marked improvement in the patient's lymphadenopathy over the lower neck, axilla, chest, abdomen, pelvis and groin area.  Focal areas of activity were markedly improved over the cervical, lumbar spine and pelvic area.  The patient subsequently was seen by Dr. Mosqueda on 02/10/2016 and assessment was the patient had completed 6 cycles of ABVD.  Repeat PET scan was performed on 02/24/2016.  The findings were that there was no abnormal uptake to suggest metastatic disease and no evidence of metastatic disease.  Previously seen lymphadenopathy and bone lesions had  all resolved.        The patient has had problems with dysphagia as well.  He was admitted to the hospital for right lower lobe pneumonia, was treated with IV antibiotics, did see Dr. Cook for an EGD which revealed hiatal hernia and Schatzki's rings.  Biopsies were all negative.  The patient's dysphagia subsequently improved.  I saw the patient on 06/08/2016, and he had no evidence of disease clinically.  We wanted to assess disease status.  We obtained a sed rate which was normal.  We also obtained an echocardiogram as well as pulmonary function tests.  Echocardiogram revealed no change.  Ejection fraction was 56%.  Pulmonary functions did reveal decreased DLCO of 50% predicted being reported.  The patient did have some shortness of breath when we saw him on 07/14/2016, and we elected to restage him with a PET scan.  This was done on 10/05/2016, which revealed no suspicious hypermetabolic osseous lesions identified, no evidence of recurrent lymphoma.        He also had been seen by Dr. 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  He felt his symptoms had been stable, but not progressive.  He did have a prechemotherapy pulmonary function test for comparison and he felt the patient certainly could have pulmonary fibrosis due to bleomycin as well as other forms of bleomycin toxicity or hypersensitivity pneumonitis.  He also obtained CT chest with high resolution cuts and also repeated his pulmonary function test.  The patient had a high-resolution CT chest performed 10/03/2016, and findings were there was new mild subpleural fibrotic changes within the anterior right upper lobe which may be related to chemotherapy.  The patient saw Dr. Cm London on 10/12/2016.  His impression of the pulmonary function tests revealed stability of his lung volumes and DLCO revealed improved spirometry.  He felt his dyspnea on exertion was stable, not  progressive.  He reviewed the CT scan of the chest which revealed that the patient had subpleural interstitial changes on the right consistent with bleomycin lung toxicity.  The plan was to follow him with serial pulmonary function tests to ensure stability.        Otherwise, when we saw him on 01/18/2017, the plan was to repeat PET CT in 3 months.  This was performed on 04/12/2017, and the findings were the patient had stable CT chest with tiny right lung nodules that were unchanged fibrosis in the right lung.  There was no lymphadenopathy.  The patient also had seen Dr. Cm London of Pulmonary whose assessment was the patient had bleomycin lung toxicity.  The patient had dyspnea on exertion, felt to be secondary to bleomycin lung toxicity.  The patient did see Dr. Cm London on 04/09/2017.  His assessment was the patient had bleomycin lung toxicity and felt his CT chest had shown overall stability, although there were still pleural interstitial changes on the right consistent bleomycin lung toxicity.        When we saw him on 04/19/2017, we wanted to restage him given the fact he has stage IV disease with a PET scan.  This was obtained on 07/14/2017, and the findings were there was no evidence of residual recurrent lymphoma.  There were minimal subpleural fibrotic changes.  We also did an echocardiogram which was read as borderline left ventricular reduced fraction which the calculated left ventricular ejection fraction of 49%.  His prior echocardiogram revealed ejection fraction of 56%.  The patient was seen by Dr. Collier from Cardiology and the patient was also seen by Siomara Ruvalcaba, his nurse practitioner, on 08/03/2017.  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  They recommended the patient start lisinopril 2.5 mg daily and ACE inhibitor therapy.  She also ordered CT of the coronary arteries which was done on  08/28/2017.  The findings were there was a total calcium score of 26.1, which is the 98th percentile for subjects of the same age, gender, race and ethnicity.  There was prominent ramus intermediate artery with prominent moderate focal proximal stenosis, multifocal mild narrowing, proximal mid-LAD focal scarring anterolateral, right small hiatal hernia.  The patient was scheduled to have an echo in November.  Otherwise, he was doing reasonably well.        He did see Dr. Cm London in November for followup.  Her assessment was the patient had stable bleomycin lung, stable DLCO and would continue to monitor every 6 months.  The patient had staging studies as well on 12/29/2017.  CT chest was negative.  There was no new lymph node enlargement.  CT of the abdomen and pelvis was essentially negative.  The patient otherwise had another set of scans done on 04/26/2018, including CT neck, chest, abdomen and pelvis.  These were all negative.  His LDH has remained normal.  He said recently he has had a pulmonary function test which will be evaluated by Dr. Cm London.  He says he has been having a cough over the past 2 weeks which was mildly productive of yellowish sputum which has improved over the recent few days.  He says he has been somewhat fatigued due to his daughter's wedding, which has taken a lot of energy out of him in terms of preparation and otherwise no complaints of fevers, night sweats, weight loss, abdominal pain, chest pain, headaches.      PHYSICAL EXAMINATION:   GENERAL:  He is a middle-aged white male in no acute distress.   VITAL SIGNS:  Reveal blood pressure 120/82, pulse 55, temp 95.3.   HEENT:  Atraumatic, normocephalic.  Oropharynx nonerythematous.   NECK:  Supple.   LUNGS:  Clear to auscultation and percussion.   HEART:  Regular rate and rhythm, S1, S2 normal.   ABDOMEN:  Soft, normoactive bowel sounds.  No mass.   LYMPHATICS:  No cervical, supraclavicular, axillary or inguinal nodes.    EXTREMITIES:  Without edema.   NEUROLOGIC:  Nonfocal.      LABORATORY DATA:  Reveal CBC is within normal limits.  .  LFTs are normal.      IMPRESSION:  Stage IV classical Hodgkin lymphoma presenting with right cervical, supraclavicular lymphadenopathy, biopsy consistent with classical Hodgkin lymphoma involving the spine, pelvis, as well as lymphadenopathy above and below the diaphragm.  He responded to 6 cycles of ABVD, course complicated with neutropenic fever.  Echocardiogram revealed normal cardiac function.  Pulmonary function test revealed a depressed DLCO.  PET scan was negative for metastatic disease.  Recent echo did reveal decreased left ventricular ejection fraction.  The patient was placed on ACE inhibitor by Siomara Ruvalcaba, nurse practitioner.  Otherwise, the patient has no evidence of disease on recent imaging.  He remains in remission.  Normal LDH.        PLAN:  Is to continue surveillance.  We will see the patient in 4 months.  Obtain CBC, CMP, LDH, sed rate and also obtain CT neck, chest, abdomen, pelvis.      40 minutes were spent with the patient, greater than half the time spent in counseling and coordinating care.         BRENDA WILD MD             D: 2018   T: 2018   MT: KENNEDY      Name:     SAM AGUIRRE   MRN:      [id number]        Account:      [id number]   :      1960           Visit Date:   2018      Document: [id number]       cc: Cm RUVALCABA APRN, CNP       Rudy Morrison MD

## 2018-05-02 NOTE — NURSING NOTE
Patient presents for a follow up regarding lymphoma.  Complains of a lingering cough for the past 2 weeks.  No other concerns.  Pratibha Vanessa CMA ([facility])................ 5/2/2018 11:53 AM

## 2018-05-02 NOTE — MR AVS SNAPSHOT
"              After Visit Summary   2018    Daniel Boyd    MRN: [id number]           Patient Information     Date Of Birth          1960        Visit Information        Provider Department      2018 11:30 AM Kia Leung MD [facility]        Today's Diagnoses     Hodgkin lymphoma, unspecified Hodgkin lymphoma type, unspecified body region (H)    -  1       Follow-ups after your visit        Who to contact     If you have questions or need follow up information about today's clinic visit or your schedule please contact [facility] directly at [phone].  Normal or non-critical lab and imaging results will be communicated to you by [employer], letter or phone within 4 business days after the clinic has received the results. If you do not hear from us within 7 days, please contact the clinic through [employer] or phone. If you have a critical or abnormal lab result, we will notify you by phone as soon as possible.  Submit refill requests through [employer] or call your pharmacy and they will forward the refill request to us. Please allow 3 business days for your refill to be completed.          Additional Information About Your Visit        MyChart Information     [employer] lets you send messages to your doctor, view your test results, renew your prescriptions, schedule appointments and more. To sign up, go to [email or web address] . Click on \"Log in\" on the left side of the screen, which will take you to the Welcome page. Then click on \"Sign up Now\" on the right side of the page.     You will be asked to enter the access code listed below, as well as some personal information. Please follow the directions to create your username and password.     Your access code is: [email or web address]  Expires: 2018 10:12 AM     Your access code will  in 90 days. If you need help or a new code, please call your [facility] clinic or [phone].        Care EveryWhere ID  " "   This is your Care EveryWhere ID. This could be used by other organizations to access your [place] medical records  [id number]        Your Vitals Were     Pulse Temperature Height Pulse Oximetry BMI (Body Mass Index)       55 95.3  F (35.2  C) (Tympanic) 1.94 m (6' 4.38\") 96% 20.95 kg/m2        Blood Pressure from Last 3 Encounters:   05/15/18 102/68   05/10/18 102/70   05/02/18 120/82    Weight from Last 3 Encounters:   05/15/18 76.7 kg (169 lb)   05/10/18 78.7 kg (173 lb 6.4 oz)   05/02/18 78.8 kg (173 lb 12.8 oz)               Primary Care Provider Office Phone # Fax #    Rudy Morrison [phone] [address] [facility] [address]        Equal Access to Services     [facility] : Hadii linda gonzalez hadasho Soraleigh, waaxda luqadaha, qaybta kaalmada adeegyada, fransisco david . So [facility] [phone].    ATENCIÓN: Si eduardola espbal, tiene a deal disposición servicios gratuitos de asistencia lingüística. Neal al [phone].    We comply with applicable federal civil rights laws and [place] laws. We do not discriminate on the basis of race, color, national origin, age, disability, sex, sexual orientation, or gender identity.            Thank you!     Thank you for choosing [facility] AND [facility]  for your care. Our goal is always to provide you with excellent care. Hearing back from our patients is one way we can continue to improve our services. Please take a few minutes to complete the written survey that you may receive in the mail after your visit with us. Thank you!             Your Updated Medication List - Protect others around you: Learn how to safely use, store and throw away your medicines at www.disposemymeds.org.          This list is accurate as of 5/2/18 11:59 PM.  Always use your most recent med list.                   Brand Name Dispense Instructions for use Diagnosis    acetaminophen 500 MG tablet    TYLENOL     Take 1,000 mg by " mouth        MULTIVITAMIN & MINERAL PO      Take 1 tablet by mouth every morning        sertraline 100 MG tablet    ZOLOFT    90 tablet    Take 1 tablet by mouth once daily.    Major depressive disorder, recurrent episode, mild (H)

## 2018-05-10 ENCOUNTER — OFFICE VISIT (OUTPATIENT)
Dept: INTERNAL MEDICINE | Facility: OTHER | Age: 58
End: 2018-05-10
Attending: INTERNAL MEDICINE
Payer: COMMERCIAL

## 2018-05-10 VITALS
HEART RATE: 68 BPM | BODY MASS INDEX: 20.47 KG/M2 | WEIGHT: 173.4 LBS | DIASTOLIC BLOOD PRESSURE: 70 MMHG | HEIGHT: 77 IN | SYSTOLIC BLOOD PRESSURE: 102 MMHG

## 2018-05-10 DIAGNOSIS — F51.04 CHRONIC INSOMNIA: ICD-10-CM

## 2018-05-10 DIAGNOSIS — Z80.42 FHX: PROSTATE CANCER: ICD-10-CM

## 2018-05-10 DIAGNOSIS — I42.7 CARDIOMYOPATHY DUE TO CHEMOTHERAPY (H): ICD-10-CM

## 2018-05-10 DIAGNOSIS — G62.2 POLYNEUROPATHY DUE TO OTHER TOXIC AGENTS (H): ICD-10-CM

## 2018-05-10 DIAGNOSIS — C81.90 HODGKIN LYMPHOMA, UNSPECIFIED HODGKIN LYMPHOMA TYPE, UNSPECIFIED BODY REGION (H): ICD-10-CM

## 2018-05-10 DIAGNOSIS — T45.1X5A CARDIOMYOPATHY DUE TO CHEMOTHERAPY (H): ICD-10-CM

## 2018-05-10 DIAGNOSIS — K22.2 SCHATZKI'S RING: ICD-10-CM

## 2018-05-10 DIAGNOSIS — R25.1 TREMOR: ICD-10-CM

## 2018-05-10 DIAGNOSIS — I25.10 CORONARY ARTERY DISEASE INVOLVING NATIVE CORONARY ARTERY OF NATIVE HEART WITHOUT ANGINA PECTORIS: ICD-10-CM

## 2018-05-10 DIAGNOSIS — E78.5 HYPERLIPIDEMIA, UNSPECIFIED HYPERLIPIDEMIA TYPE: Primary | ICD-10-CM

## 2018-05-10 DIAGNOSIS — T45.1X5A BLEOMYCIN LUNG TOXICITY: ICD-10-CM

## 2018-05-10 DIAGNOSIS — J98.4 BLEOMYCIN LUNG TOXICITY: ICD-10-CM

## 2018-05-10 DIAGNOSIS — F33.0 MAJOR DEPRESSIVE DISORDER, RECURRENT EPISODE, MILD (H): ICD-10-CM

## 2018-05-10 PROCEDURE — 99396 PREV VISIT EST AGE 40-64: CPT | Performed by: INTERNAL MEDICINE

## 2018-05-10 RX ORDER — LISINOPRIL 2.5 MG/1
2.5 TABLET ORAL DAILY
Qty: 30 TABLET | COMMUNITY
Start: 2018-05-10 | End: 2018-07-02

## 2018-05-10 RX ORDER — ATORVASTATIN CALCIUM 40 MG/1
40 TABLET, FILM COATED ORAL DAILY
Qty: 90 TABLET | Refills: 3 | Status: SHIPPED | OUTPATIENT
Start: 2018-05-10 | End: 2019-04-08

## 2018-05-10 RX ORDER — LORAZEPAM 1 MG/1
1 TABLET ORAL AT BEDTIME
Qty: 90 TABLET | Refills: 1 | Status: SHIPPED | OUTPATIENT
Start: 2018-05-10 | End: 2018-11-06

## 2018-05-10 RX ORDER — CHLORAL HYDRATE 500 MG
1 CAPSULE ORAL DAILY
COMMUNITY
Start: 2018-05-10

## 2018-05-10 RX ORDER — ATORVASTATIN CALCIUM 10 MG/1
10 TABLET, FILM COATED ORAL DAILY
Qty: 30 TABLET | COMMUNITY
Start: 2018-05-10 | End: 2018-05-10

## 2018-05-10 RX ORDER — ASPIRIN 81 MG/1
81 TABLET ORAL DAILY
COMMUNITY
Start: 2018-05-10

## 2018-05-10 ASSESSMENT — ENCOUNTER SYMPTOMS
BRUISES/BLEEDS EASILY: 0
FREQUENCY: 0
HEMATURIA: 0
DYSURIA: 0
SINUS PAIN: 0
SINUS PRESSURE: 0
BACK PAIN: 0
AGITATION: 0
WHEEZING: 0
WOUND: 0
VOMITING: 0
SPEECH DIFFICULTY: 0
JOINT SWELLING: 0
DIAPHORESIS: 0
FLANK PAIN: 0
NAUSEA: 0
UNEXPECTED WEIGHT CHANGE: 0
NECK STIFFNESS: 0
CHEST TIGHTNESS: 0
ADENOPATHY: 0
CONFUSION: 0
ABDOMINAL DISTENTION: 0
NECK PAIN: 0
SEIZURES: 0
SORE THROAT: 0
FATIGUE: 0
DIARRHEA: 0
RHINORRHEA: 0
CONSTIPATION: 0
TREMORS: 0
HEADACHES: 0
WEAKNESS: 0
HALLUCINATIONS: 0
TROUBLE SWALLOWING: 0
BLOOD IN STOOL: 0
FEVER: 0
PALPITATIONS: 0
NERVOUS/ANXIOUS: 0
VOICE CHANGE: 0
COUGH: 0
EYE REDNESS: 0
CHILLS: 0
APPETITE CHANGE: 0
DIZZINESS: 0
DIFFICULTY URINATING: 0
SLEEP DISTURBANCE: 0
NUMBNESS: 0
SHORTNESS OF BREATH: 0
LIGHT-HEADEDNESS: 0
ABDOMINAL PAIN: 0
EYE PAIN: 0

## 2018-05-10 NOTE — PROGRESS NOTES
Chief Complaint:  This patient is here for a comprehensive review of their multiplemedical problems and review of medications, renewal of medications and update on necessary health maintenance issues.      HPI: This patient is here today for complete evaluation.  He has a history of non-Hodgkin's lymphoma and is status post chemotherapy with ABVD in 2015.  This resulted in remission of the lymphoma but also gave him some bleomycin lung toxicity, Adriamycin induced cardiomyopathy and peripheral neuropathy.  He does see cardiology and pulmonary on an occasional basis.  His DLCO is minimally reduced.  His ejection fraction is minimally reduced.  He was found to have nonobstructive coronary artery disease on a recent CTA.  He is on low-dose statin therapy.    Patient also has a history of chronic insomnia for which he uses lorazepam with good results.  He has a mild tremor especially of his left hand.  He has a strong family history of prostate cancer in his father and in her brother.  He has a history of depression which is stable on treatment with sertraline.    Medications are reconciled.  Past medical history, past surgical history, family history and social histories are reviewed and updated today.  Immunizations are up-to-date, he could consider getting a Pneumovax when he is 59.  Also consider ShingRx vaccine.    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  No polyps seen.  Next due 2020.     CYSTOSCOPY      No Comments Provided      ESOPHAGOSCOPY, GASTROSCOPY, DUODENOSCOPY (EGD), COMBINED      15,EGD     FOOT SURGERY Left     SUBTALAR ATHROEREISIS, left side, Dr. Schwartz.     HERNIA REPAIR Bilateral     Surgipro mesh     LYMPH NODE BIOPSY         Current Outpatient Prescriptions   Medication Sig Dispense Refill     aspirin 81 MG EC tablet Take 1 tablet (81 mg) by mouth daily       atorvastatin (LIPITOR) 40 MG tablet Take 1 tablet (40 mg) by mouth daily 90 tablet 3     fish oil-omega-3 fatty acids 1000 MG capsule Take 1 capsule by mouth daily       lisinopril (PRINIVIL/ZESTRIL) 2.5 MG tablet Take 1 tablet (2.5 mg) by mouth daily 30 tablet      LORazepam (ATIVAN) 1 MG tablet Take 1 tablet (1 mg) by mouth At Bedtime 90 tablet 1     Multiple Vitamins-Minerals (MULTIVITAMIN & MINERAL PO) Take 1 tablet by mouth every morning       omeprazole (PRILOSEC) 20 MG CR capsule Take 1 capsule (20 mg) by mouth daily 30 capsule      sertraline (ZOLOFT) 100 MG tablet Take 1 tablet by mouth once daily. 90 tablet 1     [DISCONTINUED] atorvastatin (LIPITOR) 10 MG tablet Take 10 mg by mouth       [DISCONTINUED] atorvastatin (LIPITOR) 10 MG tablet Take 1 tablet (10 mg) by mouth daily 30 tablet      [DISCONTINUED] lisinopril (PRINIVIL/ZESTRIL) 2.5 MG tablet Take 2.5 mg by mouth         No Known Allergies    Family History   Problem Relation Age of Onset     Prostate Cancer Father      Also had a CABG and  during open heart surgery, .     Breast Cancer Mother      Family History Negative Sister      Good Health     Family History Negative Sister      Good Health     Prostate Cancer Brother      Cancer-prostate,Age 47.     HEART DISEASE Brother        Social History     Social History     Marital status:      Spouse name: N/A     Number of children: N/A     Years of education: N/A     Occupational History     Not on file.     Social History Main Topics     Smoking status: Never Smoker     Smokeless tobacco: Never Used     Alcohol use 4.2  oz/week      Comment: 1 beer per day     Drug use: No     Sexual activity: Not on file     Other Topics Concern     Not on file     Social History Narrative    Daughter Velvet BD 8/25/89.  Son Nick BD 4/05/92  Spouse Candis, BD 11/26/59   .  He is a golf pro, also drives school bus.  Wife is Candis, nurse at [employer].      Consent signed for Candis       Review of Systems   Constitutional: Negative for appetite change, chills, diaphoresis, fatigue, fever and unexpected weight change.   HENT: Negative for ear pain, rhinorrhea, sinus pain, sinus pressure, sore throat, trouble swallowing and voice change.    Eyes: Negative for pain, redness and visual disturbance.   Respiratory: Negative for cough, chest tightness, shortness of breath and wheezing.    Cardiovascular: Negative for chest pain, palpitations and leg swelling.   Gastrointestinal: Negative for abdominal distention, abdominal pain, blood in stool, constipation, diarrhea, nausea and vomiting.   Endocrine: Negative for cold intolerance and heat intolerance.   Genitourinary: Negative for difficulty urinating, dysuria, flank pain, frequency and hematuria.   Musculoskeletal: Negative for back pain, joint swelling, neck pain and neck stiffness.   Skin: Negative for rash and wound.   Allergic/Immunologic: Negative for immunocompromised state.   Neurological: Negative for dizziness, tremors, seizures, syncope, speech difficulty, weakness, light-headedness, numbness and headaches.   Hematological: Negative for adenopathy. Does not bruise/bleed easily.   Psychiatric/Behavioral: Negative for agitation, behavioral problems, confusion, hallucinations and sleep disturbance. The patient is not nervous/anxious.        Physical Exam   Constitutional: He is oriented to person, place, and time. He appears well-developed and well-nourished. No distress.   HENT:   Head: Normocephalic.   Right Ear: External ear normal.   Left Ear: External ear normal.    Nose: Nose normal.   Mouth/Throat: Oropharynx is clear and moist.   Eyes: Conjunctivae are normal. Pupils are equal, round, and reactive to light.   Neck: Normal range of motion. Neck supple. Normal carotid pulses and no JVD present. Carotid bruit is not present. No tracheal deviation present. No thyromegaly present.   Cardiovascular: Normal rate and regular rhythm.  Exam reveals no gallop and no friction rub.    No murmur heard.  Pulmonary/Chest: Effort normal and breath sounds normal. No respiratory distress. He has no wheezes. He has no rales.   Abdominal: Soft. Bowel sounds are normal. He exhibits no distension and no mass. There is no tenderness. There is no rebound and no guarding. No hernia.   Genitourinary: Rectum normal, prostate normal and penis normal.   Musculoskeletal: Normal range of motion. He exhibits no edema.   Lymphadenopathy:     He has no cervical adenopathy.   Neurological: He is alert and oriented to person, place, and time. He has normal reflexes. No cranial nerve deficit. He exhibits normal muscle tone. Coordination normal.   Skin: Skin is warm and dry. No rash noted. He is not diaphoretic.   Psychiatric: He has a normal mood and affect. His behavior is normal.   Nursing note and vitals reviewed.      Assessment:      ICD-10-CM    1. Hyperlipidemia, unspecified hyperlipidemia type E78.5    2. 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 Schatzki's ring K22.2    4. 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 Hodgkin lymphoma, unspecified Hodgkin lymphoma type, unspecified body region (H) C81.90    6. FHx: prostate cancer Z80.42 Prostate Specific Antigen GH   7. Major depressive disorder, recurrent episode, mild (H) F33.0    8. Chronic insomnia F51.04 LORazepam (ATIVAN) 1 MG tablet   9. 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 Coronary artery disease involving native coronary artery of native heart without angina pectoris I25.10 atorvastatin (LIPITOR)  40 MG tablet     Lipid Panel     AST   11. Tremor R25.1         Plan: His exam today is normal so that is reassuring.  For his coronary artery disease, I think that more aggressive lipid-lowering therapy is indicated.  He is put on Lipitor 40 mg daily.  Warned of side effects.  In 1 month he will have a fasting lipid, AST and PSA and I will send him a letter with the results.  All other medications will continue without change.  He will continue with regular oncology follow-up visits and testing as recommended.  He will continue with any pulmonary or cardiology follow-up visits as recommended as well.  Time he should consider ShingRx vaccine and he should certainly have a Pneumovax by the time he is 59.

## 2018-05-10 NOTE — MR AVS SNAPSHOT
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)        Hodgkin lymphoma, unspecified Hodgkin lymphoma type, unspecified body region (H)        FHx: prostate cancer        Major depressive disorder, recurrent episode, mild (H)        Chronic insomnia        Cardiomyopathy due to chemotherapy (H)        Coronary artery disease involving native coronary artery of native heart without angina pectoris        Tremor           Follow-ups after your visit        Future tests that were ordered for you today     Open Future Orders        Priority Expected Expires Ordered    Lipid Panel Routine 6/11/2018 5/10/2019 5/10/2018    AST Routine 6/11/2018 5/10/2019 5/10/2018    Prostate Specific Antigen GH Routine 6/11/2018 5/10/2019 5/10/2018            Who to contact     If you have questions or need follow up information about today's clinic visit or your schedule please contact [facility] directly at [phone].  Normal or non-critical lab and imaging results will be communicated to you by MyChart, letter or phone within 4 business days after the clinic has received the results. If you do not hear from us within 7 days, please contact the clinic through MyChart or phone. If you have a critical or abnormal lab result, we will notify you by phone as soon as possible.  Submit refill requests through Triage or call your pharmacy and they will forward the refill request to us. Please allow 3 business days for your refill to be completed.          Additional Information About Your Visit        MyChart  "Information     [email or web address] lets you send messages to your doctor, view your test results, renew your prescriptions, schedule appointments and more. To sign up, go to [email or web address] . Click on \"Log in\" on the left side of the screen, which will take you to the Welcome page. Then click on \"Sign up Now\" on the right side of the page.     You will be asked to enter the access code listed below, as well as some personal information. Please follow the directions to create your username and password.     Your access code is: [email or web address]  Expires: 2018 10:12 AM     Your access code will  in 90 days. If you need help or a new code, please call your [place] clinic or [phone].        Care EveryWhere ID     This is your Care EveryWhere ID. This could be used by other organizations to access your [facility] medical records  [id number]        Your Vitals Were     Pulse Height BMI (Body Mass Index)             68 6' 5\" (1.956 m) 20.56 kg/m2          Blood Pressure from Last 3 Encounters:   05/10/18 102/70   18 120/82   18 98/72    Weight from Last 3 Encounters:   05/10/18 173 lb 6.4 oz (78.7 kg)   18 173 lb 12.8 oz (78.8 kg)   18 168 lb 9.6 oz (76.5 kg)                 Today's Medication Changes          These changes are accurate as of 5/10/18 10:12 AM.  If you have any questions, ask your nurse or doctor.               These medicines have changed or have updated prescriptions.        Dose/Directions    aspirin 81 MG EC tablet   This may have changed:  when to take this   Changed by:  Rudy Morrison MD        Dose:  81 mg   Take 1 tablet (81 mg) by mouth daily   Refills:  0       atorvastatin 40 MG tablet   Commonly known as:  LIPITOR   This may have changed:    - medication strength  - how much to take  - when to take this   Used for:  Coronary artery disease involving native coronary artery of native heart without angina pectoris   Changed by:  Rudy Morrison MD        Dose:  40 " mg   Take 1 tablet (40 mg) by mouth daily   Quantity:  90 tablet   Refills:  3       fish oil-omega-3 fatty acids 1000 MG capsule   This may have changed:  when to take this   Changed by:  Rudy Morrison MD        Dose:  1 capsule   Take 1 capsule by mouth daily   Refills:  0       lisinopril 2.5 MG tablet   Commonly known as:  PRINIVIL/Zestril   This may have changed:  when to take this   Changed by:  Rudy Morrison MD        Dose:  2.5 mg   Take 1 tablet (2.5 mg) by mouth daily   Quantity:  30 tablet   Refills:  0       LORazepam 1 MG tablet   Commonly known as:  ATIVAN   This may have changed:  See the new instructions.   Used for:  Chronic insomnia   Changed by:  Rudy Morrison MD        Dose:  1 mg   Take 1 tablet (1 mg) by mouth At Bedtime   Quantity:  90 tablet   Refills:  1       omeprazole 20 MG CR capsule   Commonly known as:  priLOSEC   This may have changed:  when to take this   Changed by:  Rudy Morrison MD        Dose:  20 mg   Take 1 capsule (20 mg) by mouth daily   Quantity:  30 capsule   Refills:  0         Stop taking these medicines if you haven't already. Please contact your care team if you have questions.     acetaminophen 500 MG tablet   Commonly known as:  TYLENOL   Stopped by:  Rudy Morrison MD                Where to get your medicines      These medications were sent to [facility], - [place], [place] - [address]   [address] , [place] [address]     Phone:  [phone]     atorvastatin 40 MG tablet         Some of these will need a paper prescription and others can be bought over the counter.  Ask your nurse if you have questions.     Bring a paper prescription for each of these medications     LORazepam 1 MG tablet                Primary Care Provider Office Phone # Fax #    Rudy Morrison [phone]       [address] [facility] [address]        Equal Access to Services     JEROME RILEY AH: Maria Del Rosario gonzalez  ortega Golden, waanneda lushanonadaha, qaybta kamegan vargas, fransisco alessandrain hayaan segunorlando mary alicevasquez laomegakarime jenny. So [facility] [phone].    ATENCIÓN: Si habla misael, tiene a deal disposición servicios gratuitos de asistencia lingüística. Neal al [phone].    We comply with applicable federal civil rights laws and [place] laws. We do not discriminate on the basis of race, color, national origin, age, disability, sex, sexual orientation, or gender identity.            Thank you!     Thank you for choosing [facility] AND [facility]  for your care. Our goal is always to provide you with excellent care. Hearing back from our patients is one way we can continue to improve our services. Please take a few minutes to complete the written survey that you may receive in the mail after your visit with us. Thank you!             Your Updated Medication List - Protect others around you: Learn how to safely use, store and throw away your medicines at www.disposemymeds.org.          This list is accurate as of 5/10/18 10:12 AM.  Always use your most recent med list.                   Brand Name Dispense Instructions for use Diagnosis    aspirin 81 MG EC tablet      Take 1 tablet (81 mg) by mouth daily        atorvastatin 40 MG tablet    LIPITOR    90 tablet    Take 1 tablet (40 mg) by mouth daily    Coronary artery disease involving native coronary artery of native heart without angina pectoris       fish oil-omega-3 fatty acids 1000 MG capsule      Take 1 capsule by mouth daily        lisinopril 2.5 MG tablet    PRINIVIL/Zestril    30 tablet    Take 1 tablet (2.5 mg) by mouth daily        LORazepam 1 MG tablet    ATIVAN    90 tablet    Take 1 tablet (1 mg) by mouth At Bedtime    Chronic insomnia       MULTIVITAMIN & MINERAL PO      Take 1 tablet by mouth every morning        omeprazole 20 MG CR capsule    priLOSEC    30 capsule    Take 1 capsule (20 mg) by mouth daily        sertraline 100 MG tablet    ZOLOFT    90 tablet     Take 1 tablet by mouth once daily.    Major depressive disorder, recurrent episode, mild (H)

## 2018-05-11 ASSESSMENT — PATIENT HEALTH QUESTIONNAIRE - PHQ9: SUM OF ALL RESPONSES TO PHQ QUESTIONS 1-9: 0

## 2018-05-15 ENCOUNTER — OFFICE VISIT (OUTPATIENT)
Dept: PULMONOLOGY | Facility: OTHER | Age: 58
End: 2018-05-15
Attending: INTERNAL MEDICINE
Payer: COMMERCIAL

## 2018-05-15 VITALS
SYSTOLIC BLOOD PRESSURE: 102 MMHG | RESPIRATION RATE: 16 BRPM | HEIGHT: 77 IN | WEIGHT: 169 LBS | TEMPERATURE: 96.9 F | OXYGEN SATURATION: 98 % | HEART RATE: 60 BPM | BODY MASS INDEX: 19.96 KG/M2 | DIASTOLIC BLOOD PRESSURE: 68 MMHG

## 2018-05-15 DIAGNOSIS — J98.4 BLEOMYCIN LUNG TOXICITY: Primary | ICD-10-CM

## 2018-05-15 DIAGNOSIS — R06.09 DYSPNEA ON EXERTION: ICD-10-CM

## 2018-05-15 DIAGNOSIS — R05.9 COUGH: ICD-10-CM

## 2018-05-15 DIAGNOSIS — T45.1X5A BLEOMYCIN LUNG TOXICITY: Primary | ICD-10-CM

## 2018-05-15 PROCEDURE — G0463 HOSPITAL OUTPT CLINIC VISIT: HCPCS

## 2018-05-15 ASSESSMENT — PAIN SCALES - GENERAL: PAINLEVEL: NO PAIN (0)

## 2018-05-15 NOTE — MR AVS SNAPSHOT
"              After Visit Summary   5/15/2018    Daniel Boyd    MRN: [id number]           Patient Information     Date Of Birth          1960        Visit Information        Provider Department      5/15/2018 11:00 AM Cm London MD [facility]        Today's Diagnoses     Bleomycin lung toxicity    -  1    Dyspnea on exertion        Cough           Follow-ups after your visit        Follow-up notes from your care team     Return in about 1 year (around 5/15/2019).      Who to contact     If you have questions or need follow up information about today's clinic visit or your schedule please contact [facility] directly at [phone].  Normal or non-critical lab and imaging results will be communicated to you by [employer], letter or phone within 4 business days after the clinic has received the results. If you do not hear from us within 7 days, please contact the clinic through [employer] or phone. If you have a critical or abnormal lab result, we will notify you by phone as soon as possible.  Submit refill requests through [employer] or call your pharmacy and they will forward the refill request to us. Please allow 3 business days for your refill to be completed.          Additional Information About Your Visit        MyChart Information     [employer] lets you send messages to your doctor, view your test results, renew your prescriptions, schedule appointments and more. To sign up, go to [email or web address] . Click on \"Log in\" on the left side of the screen, which will take you to the Welcome page. Then click on \"Sign up Now\" on the right side of the page.     You will be asked to enter the access code listed below, as well as some personal information. Please follow the directions to create your username and password.     Your access code is: [email or web address]  Expires: 2018 10:12 AM     Your access code will  in 90 days. If you need help or a new code, please " "call your [facility] clinic or [phone].        Care EveryWhere ID     This is your Care EveryWhere ID. This could be used by other organizations to access your [facility] medical records  [id number]        Your Vitals Were     Pulse Temperature Respirations Height Pulse Oximetry BMI (Body Mass Index)    60 96.9  F (36.1  C) (Tympanic) 16 6' 5\" (1.956 m) 98% 20.04 kg/m2       Blood Pressure from Last 3 Encounters:   05/15/18 102/68   05/10/18 102/70   05/02/18 120/82    Weight from Last 3 Encounters:   05/15/18 169 lb (76.7 kg)   05/10/18 173 lb 6.4 oz (78.7 kg)   05/02/18 173 lb 12.8 oz (78.8 kg)               Primary Care Provider Office Phone # Fax #    Rudy Morrison [phone]       [address] [facility] [address]        Equal Access to Services     [facility]: Hadii aad ku hadasho Soomaali, waaxda luqadaha, qaybta kaalmada adeegyada, waxay alessandrain haydanis david . So [facility] [phone].    ATENCIÓN: Si habla español, tiene a deal disposición servicios gratuitos de asistencia lingüística. [facility] [phone].    We comply with applicable federal civil rights laws and [place] laws. We do not discriminate on the basis of race, color, national origin, age, disability, sex, sexual orientation, or gender identity.            Thank you!     Thank you for choosing [facility] AND [facility]  for your care. Our goal is always to provide you with excellent care. Hearing back from our patients is one way we can continue to improve our services. Please take a few minutes to complete the written survey that you may receive in the mail after your visit with us. Thank you!             Your Updated Medication List - Protect others around you: Learn how to safely use, store and throw away your medicines at www.disposemymeds.org.          This list is accurate as of 5/15/18 11:59 PM.  Always use your most recent med list.                   Brand Name Dispense Instructions " for use Diagnosis    aspirin 81 MG EC tablet      Take 1 tablet (81 mg) by mouth daily        atorvastatin 40 MG tablet    LIPITOR    90 tablet    Take 1 tablet (40 mg) by mouth daily    Coronary artery disease involving native coronary artery of native heart without angina pectoris       fish oil-omega-3 fatty acids 1000 MG capsule      Take 1 capsule by mouth daily        lisinopril 2.5 MG tablet    PRINIVIL/Zestril    30 tablet    Take 1 tablet (2.5 mg) by mouth daily        LORazepam 1 MG tablet    ATIVAN    90 tablet    Take 1 tablet (1 mg) by mouth At Bedtime    Chronic insomnia       MULTIVITAMIN & MINERAL PO      Take 1 tablet by mouth every morning        omeprazole 20 MG CR capsule    priLOSEC    30 capsule    Take 1 capsule (20 mg) by mouth daily        sertraline 100 MG tablet    ZOLOFT    90 tablet    Take 1 tablet by mouth once daily.    Major depressive disorder, recurrent episode, mild (H)

## 2018-05-15 NOTE — NURSING NOTE
Patient presents to the clinic for 6 month follow up on blastomycin lung toxicity.  Jaylene ROJAS CMA.......5/15/2018..11:07 AM

## 2018-05-17 NOTE — PROGRESS NOTES
HPI:   This is a [facility] Pulmonary Medicine outreach note. The patient is a 57-year-old male who returned alone today in follow up for bleomycin lung toxicity. He was last seen on 11/15/2017. He has a history of Hodgkin's lymphoma treated with ABVD therapy which includes bleomycin. He returns today reporting a cough that is been present for the last few months. He does report an upper respiratory infection during this time. His cough is productive of white or yellow phlegm. He does have some associated throat clearing. He has chronic dyspnea with exertion that is unchanged. He is not limited in his activities. He also has a history of reflux improved with a proton pump inhibitor.   Tests obtain since his last visit include:  4/26/2018 pulmonary function test that showed mild restrictive lung disease. FVC was 4.48 L or 75% predicted, FEV1/FVC was 75%, FEV1 was 3.34 L or 74% predicted. FEF 25-75% was 71% predicted. Lung volumes showed a TLC of 6.67 L or 78% predicted. RV was 2.16 L or 82% predicted. DLCO was 75% predicted.  4/26/2018 chest CT was personally reviewed and compared with his 4/12/2017 chest CT. Right lower lobe bronchiectasis was seen with subtle interstitial thickening. There is a approximately 3 mm right middle lobe pulmonary nodule. All of these findings are unchanged compared to April 2017 and no new findings are seen.  Previous tests include:  7/6/2017 pulmonary function test that showed an FVC of 4.73 L or 77% predicted, FEV1/FVC of 72%, FEV1 of 3.39 L or 73% predicted. FEF 25-75% was 58% predicted. Lung volumes showed a TLC of 7.40 L or 88% predicted, RV was 2.73 L or 105% predicted, RV/TLC was 115% predicted. DLCO uncorrected for hemoglobin was 50 2% predicted and dL/VA was 65% predicted. Flow volume loop was normal appearing.  10/12/2017 ESR was 6  4/12/2017 chest CT that was compared with his previous chest CT done on 10/3/2016. There is no change in his right lung anterior area of fibrosis.  Areas of scarring are present in the right lower lobe and are unchanged as well. 2-3 mm pulmonary nodule in the right upper lobe is unchanged. No new findings are seen. Radiology reports multiple calcified granulomas in the spleen as well as granulomas seen in the lungs.  1/11/2017 pulmonary function test that showed a reduced FEF 25-75% consistent with obstruction in small peripheral airways. It also showed reduced DLCO. His FVC was 4.55 L or 74% predicted, FEV1/FVC was 72%, FEV1 was 3.26 L or 70% predicted. There was a 2% improvement in the FVC in response to bronchodilators increasing up to 4.68 L or 76% predicted. Lung volumes showed a TLC of 6.86 L or 81% predicted, RV of 2.23 L or 86% predicted, and RV/TLC of 101% predicted. Diffusion capacity was 63% and dL/VA was 63%.  10/3/2016 chest CT which is personally reviewed and shows some right anterior upper lobe subpleural fibrotic changes as well as some bronchiectatic changes in the right mid lung. No infiltrates, nodules, masses, or adenopathy is seen.  10/3/2016 pulmonary function test done at [facility] showed an FVC of 4.76 L or 78% predicted, FEV1/FVC of 73%, FEV1 of 3.48 L or 75% predicted. There were no changes with bronchodilators. FEF 25-75% was 64% predicted. Lung volumes showed a TLC of 7.16 L or 85% predicted, RV of 2.47 L or 96% predicted, and RV/TLC of 107% predicted. Diffusion capacity uncorrected for hemoglobin was 24.68 mL/minute/mmHg or 59% predicted. DL/VA was 74% predicted. Flow volume loop appeared normal.  10/3/2016 6 minutes walk study showed a beginning saturation 96% but desaturations on a 91% with exertion. Recovery saturation was 97%. Although there was significant desaturations with exertion, he did not require supplemental oxygen with exertion.  In review, the patient was referred by Dr. Hayden for diminished DLCO in the setting of bleomycin therapy for stage IV Hodgkin's lymphoma. He underwent 6 cycles of ABVD therapy with  marked improvement. His treatment was complicated by dysphagia and a right lower lobe pneumonia. As part of his follow-up, a pulmonary function test was obtained which showed a reduced DLCO. With questioning at that time, he did report some shortness of breath when climbing fields.  Patient's past pulmonary history includes recurrent pneumonias requiring antibiotics on a nearly yearly basis. Is a workup for this, he actually had a chest CT by his primary care physician, Dr. Morrison, which did show some scarring in the right mid lung. He is a lifetime nonsmoker but has had exposure to secondhand smoke from his father. He denies any childhood asthma or allergies. He does think that dust bothersome occasionally. He has had no industrial exposures. He works on a golf course. He does added [place] but otherwise denies any travel to the [facility]. He has a cat at home that does sleep in bed with him but is had no exposures to birds or hot tubs. 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 Family history is negative for any lung disorders.  Outside tests include:  6/30/2016 pulmonary function test which showed a reduced DLCO. Spirometry showed an FVC of 4.30 L or 70% predicted, FEV1/FVC was 72%, FEV1 was 3.12 L or 66% predicted. There was a 6% improvement in the FEV1 in response to bronchodilators increasing up to 3.33 L or 71% predicted. FEF 25-75% was 54% predicted. Lung volumes showed a TLC of 7.22 L or 85% predicted, RV of 2.73 L or 106% predicted, RV/TLC of 118% predicted. DLCO corrected for hemoglobin was 22.55 mL/minute/mmHg or 53% predicted. DL/VA was 73% predicted. Flow volume loop showed evidence of airflow obstruction and lung restriction. No prior test are available for comparison.  6/30/2016 echocardiogram showed an LVEF of 56% with a mild increase in left ventricular size. RV size was normal with normal function. Trace mitral regurgitation was seen.  2/16/2016 chest x-ray reports right lower lobe  infiltrate unchanged from previous chest x-ray on 2/12/2016.  2/24/2016 PET scan documented airspace opacities in the right lower lobe with a small right pleural effusion. They reported low-grade associated hypermetabolic activity. The lungs were otherwise clear. Calcified mediastinal lymph nodes were present. No other uptake was seen to suggest metastatic disease.  7/20/2015 chest CT reports multiple enlarged lymph nodes in the neck, axillary region, hilum, mediastinum, upper abdomen and the retroperitoneum and portal region. Multiple masses in the spleen were noted.  7/7/2016 CMP showed sodium of 133 otherwise unremarkable, ESR was 8. CBC was normal with a hemoglobin of 14.6 and a normal differential.    Current Medications   Taking    Sertraline HCl 100 MG Tablet 1 tablet Orally Once a day    Simvastatin 10 MG Tablet 1 tablet in the evening Orally Once a day    Lorazepam 1 MG Tablet 1 tablet at bedtime as needed Orally Once a day    Aspirin 81 MG Tablet Chewable 1 tablet Orally Once a day    Multivitamins Capsule Orally    Prilosec(Omeprazole) 20 MG Capsule Delayed Release 2 capsules Orally Once a day    Fish Oil 1000 MG Capsule 1 capsule Orally Once a day    Tylenol Extra Strength(APAP) 500 MG Tablet 2 tablets as needed Orally every 6 hrs    Lisinopril 2.5 MG Tablet 1 tablet Orally Once a day    Medication List reviewed and reconciled with the patient      Past Medical History   Depression, mild.     Insomnia, chronic.     Bak pain.     Prostate cancer.     Hodgkin lymphoma.     Tremor.     Peripheral neuropathy.     Dysphagia.     Gastritis.     Schatzki's ring, non-obstructing.       Family History   Father: prostate cancer, diagnosed with Heart Disease, Cancer   Mother: breast cancer, diagnosed with Cancer     Social History   Tobacco Use:   Smoking History: never smoker. Smokeless Tobacco Does Not Chew . Second Hand Smoking Exposure : No.      Allergies   N.K.D.A.     Review of Systems   Pulmonary:   GENERAL  Normal. SKIN Normal. HEENT Normal. NECK Normal. CV Normal. PULM cough. GI Normal.  Normal. EXT Normal. ENDO Normal. LYMPH Normal. NEURO Normal. PSYCH Normal.             Vital Signs   Ht 77, Ht cm 195.58, Wt 169, Wt-kg 76.66, BMI 20.04, BSA 2.08, /68, BP Location right arm, HR 60, Pain scale 0, Oxygen sat % 98.     Examination   General Examination:  GENERAL APPEARANCE: alert and oriented, comfortable on room air.   HEAD: Normocephalic and atraumatic.   EYES: pupils, equal, round, reactive to light and accomodation (PERRLA), sclera clear.   FACE: Facial muscles symmetric.   ORAL CAVITY: No erythema or exudate.   NECK/THYROID: Supple, without adenopathy.   RESPIRATORY: Clear to percussion bilaterally. No wheezes, rhonchi, or crackles.   EXTREMITIES: No cyanosis, clubbing, or edema.   CARDIOVASCULAR: regular rate and rhythm, normal S1S2, no murmurs, gallops, or rubs..   LYMPH NODES: No cervical, supraclavicular adenopathy.   SKIN: normal, no rash or skin lesions.   NEUROLOGICAL: Moves all extremities and can ambulate without difficulty..        Assessments     1. Bleomycin toxicity - T45.1X1A (Primary)     2. Dyspnea on exertion - R06.09     3. Cough - R05     Treatment   1. Bleomycin toxicity   Notes: Bleomycin lung toxicity. The patient has had dyspnea with exertion felt to be secondary to bleomycin lung toxicity. He has shown improvement. He has a history of Hodgkin's lymphoma and was treated with ABVD therapy which includes bleomycin. His 4/26/2018 chest CT that has shown overall stability in his subpleural interstitial changes on the right consistent with bleomycin lung toxicity. Given the time since he has completed chemotherapy combined with his CT changes, bleomycin associated subacute progressive pulmonary fibrosis is most likely. I am following him with pulmonary function testing. His 4/26/2018 PFT has shown a decrease in his TLC, but an increase in his DLCO. This was done at the time that he had an  upper respiratory infection. It is reassuring that his chest CT has shown no changes. At this point, I will continue to follow him with pulmonary function testing, but I will change it to yearly. I will see him back in 1 year with a PFT.      2. Dyspnea on exertion   Notes: Stable.      3. Cough   Notes: Cough. The patient is cough is new over the last 2 months. It likely represents allergy driven posterior nasal drainage verses acid reflux. I have recommended over-the-counter antihistamine in addition to a steroid nasal spray. I have also recommended lifestyle changes including going to bed on an empty stomach. If that does not improve his cough, then he can elevate the head of his bed by 6-8 inches. He should also call our office at that point we could try an inhaler. We did talk about using mucus thinning agents such as Mucinex during times of upper respiratory infections given his underlying areas of bronchiectasis.      4. Others   Notes: CC: Dr. Hayden, Dr. Rudy Morrison.      Follow Up   1 Year

## 2018-06-15 DIAGNOSIS — I25.10 CORONARY ARTERY DISEASE INVOLVING NATIVE CORONARY ARTERY OF NATIVE HEART WITHOUT ANGINA PECTORIS: ICD-10-CM

## 2018-06-15 DIAGNOSIS — Z80.42 FHX: PROSTATE CANCER: ICD-10-CM

## 2018-06-15 LAB
AST SERPL W P-5'-P-CCNC: 26 U/L (ref 13–39)
CHOLEST SERPL-MCNC: 129 MG/DL
HDLC SERPL-MCNC: 38 MG/DL (ref 23–92)
LDLC SERPL CALC-MCNC: 67 MG/DL
NONHDLC SERPL-MCNC: 91 MG/DL
PSA SERPL-MCNC: 1.67 NG/ML
TRIGL SERPL-MCNC: 120 MG/DL

## 2018-06-15 PROCEDURE — 84450 TRANSFERASE (AST) (SGOT): CPT | Performed by: INTERNAL MEDICINE

## 2018-06-15 PROCEDURE — 84153 ASSAY OF PSA TOTAL: CPT | Performed by: INTERNAL MEDICINE

## 2018-06-15 PROCEDURE — 36415 COLL VENOUS BLD VENIPUNCTURE: CPT | Performed by: INTERNAL MEDICINE

## 2018-06-15 PROCEDURE — 80061 LIPID PANEL: CPT | Performed by: INTERNAL MEDICINE

## 2018-07-02 DIAGNOSIS — I25.10 CORONARY ARTERY DISEASE INVOLVING NATIVE CORONARY ARTERY OF NATIVE HEART WITHOUT ANGINA PECTORIS: Primary | ICD-10-CM

## 2018-07-02 NOTE — TELEPHONE ENCOUNTER
lipitor      Last Written Prescription Date:  5/10/18  Last Fill Quantity: 90,   # refills: 3  Last Office Visit: 10/18/17      lisinopril      Last Written Prescription Date:  5/10/18  Last Fill Quantity: 30,   # refills: 0  Last Office Visit: 10/18/17

## 2018-07-02 NOTE — TELEPHONE ENCOUNTER
ATORVASTATIN CALCIUM 10 MG TABLET       Last Written Prescription Date:  ***  Last Fill Quantity: ***,   # refills: ***  Last Office Visit: ***  Future Office visit:       Routing refill request to provider for review/approval because:  {RX Non-Protocol:060140}

## 2018-07-03 RX ORDER — LISINOPRIL 2.5 MG/1
TABLET ORAL
Qty: 90 TABLET | Refills: 2 | Status: SHIPPED | OUTPATIENT
Start: 2018-07-03 | End: 2019-03-11

## 2018-07-03 RX ORDER — ATORVASTATIN CALCIUM 10 MG/1
TABLET, FILM COATED ORAL
Qty: 90 TABLET | OUTPATIENT
Start: 2018-07-03

## 2018-07-03 NOTE — TELEPHONE ENCOUNTER
"LOV 05/10/2018, \"His exam today is normal so that is reassuring.  For his coronary artery disease, I think that more aggressive lipid-lowering therapy is indicated.  He is put on Lipitor 40 mg daily.  Warned of side effects.  In 1 month he will have a fasting lipid, AST and PSA and I will send him a letter with the results.  All other medications will continue without change.\"    Lipitor filled 05/10/2018 #90 with 3 refills. Pharmacy confirms refills. Refill not appropriate. Refused.     Lisinopril filled 05/10/2018 #30 with no refills. Prescription approved per [facility] Refill Protocol.  Aminata Castellon RN on 7/3/2018 at 1:44 PM      "

## 2018-07-05 NOTE — TELEPHONE ENCOUNTER
ATORVASTATIN CALCIUM 10 MG TABLET    Last Written Prescription Date:  5/10/18  Last Fill Quantity: 90,   # refills: 3  Last Office Visit: 1/3/18  Future Office visit:

## 2018-07-06 RX ORDER — ATORVASTATIN CALCIUM 10 MG/1
TABLET, FILM COATED ORAL
Qty: 90 TABLET | Refills: 1 | OUTPATIENT
Start: 2018-07-06

## 2018-07-09 NOTE — TELEPHONE ENCOUNTER
lipitor      Last Written Prescription Date:  [place] [address]  Last Fill Quantity: ,   # refills:   Last Office Visit:

## 2018-07-10 RX ORDER — ATORVASTATIN CALCIUM 10 MG/1
TABLET, FILM COATED ORAL
Qty: 90 TABLET | OUTPATIENT
Start: 2018-07-10

## 2018-07-16 ENCOUNTER — TELEPHONE (OUTPATIENT)
Dept: ONCOLOGY | Facility: OTHER | Age: 58
End: 2018-07-16

## 2018-07-16 NOTE — TELEPHONE ENCOUNTER
Wife states that Daniel is having hot/cold sweats and has not felt well. Recent follow up in May was good. Advised that he should be seen by Rudy Morrison MD.  Kerri Michelle RN...........7/16/2018 11:50 AM

## 2018-07-23 NOTE — PROGRESS NOTES
Patient Information     Patient Name  Daniel Boyd MRN  [id number] Sex  Male   1960      Letter by Rudy Morrison MD at      Author:  Rudy Morrison MD Service:  (none) Author Type:  (none)    Filed:   Encounter Date:  2018 Status:  (Other)           Daniel Boyd  [address] [facility] [address]          2018    Dear Mr. Boyd:    A refill of sertraline (ZOLOFT) 100 mg tablet has been called into your pharmacy.    Additional refills require an office visit with Rudy Morrison MD for annual medication review.   Please call the clinic at [phone] to schedule your appointment.    If you should require additional refills before your scheduled appointment, please contact your pharmacy and we will refill your medication until that date.      Thank you,    The Refill Nurse  [facility]

## 2018-07-23 NOTE — PROGRESS NOTES
Patient Information     Patient Name  Daniel Boyd MRN  [id number] Sex  Male   1960      Letter by Avelino Luque MD at      Author:  Avelino Luque MD Service:  (none) Author Type:  (none)    Filed:   Encounter Date:  2017 Status:  (Other)           Daniel Boyd  [address]  [facility] [address]          2017    Dear Mr. Boyd:    This letter is to remind you that you are due for your annual exam with Rudy Morrison MD. Your last comprehensive visit was more than 12 months ago.    A LIMITED refill of simvastatin (ZOCOR) 10 mg tablet has been called into your pharmacy. Additional refills require you to complete this appointment.    Please call the clinic at [phone] to schedule your appointment.    Thank you for choosing [facility] and [facility] for your health care needs.    Sincerely,    Refill RN  [facility]

## 2018-07-23 NOTE — PROGRESS NOTES
Patient Information     Patient Name  Daniel Boyd MRN  [id number] Sex  Male   1960      Letter by Rudy Morrison MD at      Author:  Rudy Morrison MD Service:  (none) Author Type:  (none)    Filed:   Encounter Date:  2/15/2017 Status:  (Other)           Daniel Boyd  [address]  [place] [address]          February 15, 2017    Dear Daniel,    Following are the tests completed during your last clinic visit:    Results for orders placed or performed in visit on 02/15/17      LIPID PANEL      Result  Value Ref Range    CHOLESTEROL,TOTAL 190 <200 mg/dL    TRIGLYCERIDES 405 (H) <150 mg/dL    HDL CHOLESTEROL 30 23 - 92 mg/dL    NON-HDL CHOLESTEROL 160 (H) <145 mg/dl    CHOL/HDL RATIO            6.33 (H) <4.50                    LDL CHOLESTEROL      PATIENT STATUS            NON-FASTING                   PSA TOTAL (DIAGNOSTIC)      Result  Value Ref Range    PSA TOTAL (DIAGNOSTIC) 1.327 <=3.100 ng/mL   VITAMIN B12      Result  Value Ref Range    VITAMIN B12 330 180 - 914 pg/mL   TSH      Result  Value Ref Range    TSH 6.53 (H) 0.34 - 5.60 uIU/mL   T4,FREE      Result  Value Ref Range    T4,FREE 0.49 (L) 0.58 - 1.64 ng/dL         Your blood tests show a couple of things. First of all, your cholesterol has risen and your triglycerides have certainly gotten higher. I think that you should be on a much higher dose of a cholesterol lowering medication given the circumstances as we discussed.    The other finding is that your TSH is high and your free T4 is low. These are thyroid tests and tell us that you are developing an underactive thyroid gland. You should be on thyroid replacement therapy.    What I would like to do is change your cholesterol pill and start you on a thyroid medication. If you are in agreement with this, call and I will send the new prescriptions to your pharmacy. After you have been on them for about 2 months we should recheck your thyroid and your cholesterol  levels.    Sincerely,      Rudy Morrison MD  Internal Medicine  [facility]

## 2018-08-29 ENCOUNTER — HOSPITAL ENCOUNTER (OUTPATIENT)
Dept: CT IMAGING | Facility: OTHER | Age: 58
Discharge: HOME | End: 2018-08-29
Attending: INTERNAL MEDICINE | Admitting: INTERNAL MEDICINE
Payer: COMMERCIAL

## 2018-08-29 ENCOUNTER — HOSPITAL ENCOUNTER (OUTPATIENT)
Dept: CT IMAGING | Facility: OTHER | Age: 58
End: 2018-08-29
Attending: INTERNAL MEDICINE
Payer: COMMERCIAL

## 2018-08-29 DIAGNOSIS — C81.90 HODGKIN LYMPHOMA, UNSPECIFIED HODGKIN LYMPHOMA TYPE, UNSPECIFIED BODY REGION (H): ICD-10-CM

## 2018-08-29 LAB
ALBUMIN SERPL-MCNC: 4.3 G/DL (ref 3.5–5.7)
ALP SERPL-CCNC: 52 U/L (ref 34–104)
ALT SERPL W P-5'-P-CCNC: 31 U/L (ref 7–52)
ANION GAP SERPL CALCULATED.3IONS-SCNC: 5 MMOL/L (ref 3–14)
AST SERPL W P-5'-P-CCNC: 24 U/L (ref 13–39)
BASOPHILS # BLD AUTO: 0 10E9/L (ref 0–0.2)
BASOPHILS NFR BLD AUTO: 0.6 %
BILIRUB SERPL-MCNC: 0.9 MG/DL (ref 0.3–1)
BUN SERPL-MCNC: 20 MG/DL (ref 7–25)
CALCIUM SERPL-MCNC: 9.7 MG/DL (ref 8.6–10.3)
CHLORIDE SERPL-SCNC: 102 MMOL/L (ref 98–107)
CO2 SERPL-SCNC: 28 MMOL/L (ref 21–31)
CREAT SERPL-MCNC: 1.04 MG/DL (ref 0.7–1.3)
DIFFERENTIAL METHOD BLD: NORMAL
EOSINOPHIL # BLD AUTO: 0.1 10E9/L (ref 0–0.7)
EOSINOPHIL NFR BLD AUTO: 1.8 %
ERYTHROCYTE [DISTWIDTH] IN BLOOD BY AUTOMATED COUNT: 13 % (ref 10–15)
ERYTHROCYTE [SEDIMENTATION RATE] IN BLOOD BY WESTERGREN METHOD: 6 MM/H (ref 1–10)
GFR SERPL CREATININE-BSD FRML MDRD: 73 ML/MIN/1.7M2
GLUCOSE SERPL-MCNC: 89 MG/DL (ref 70–105)
HCT VFR BLD AUTO: 43.5 % (ref 40–53)
HGB BLD-MCNC: 14.8 G/DL (ref 13.3–17.7)
IMM GRANULOCYTES # BLD: 0 10E9/L (ref 0–0.4)
IMM GRANULOCYTES NFR BLD: 0.3 %
LDH SERPL L TO P-CCNC: 123 U/L (ref 140–271)
LYMPHOCYTES # BLD AUTO: 1.7 10E9/L (ref 0.8–5.3)
LYMPHOCYTES NFR BLD AUTO: 24.5 %
MCH RBC QN AUTO: 32.1 PG (ref 26.5–33)
MCHC RBC AUTO-ENTMCNC: 34 G/DL (ref 31.5–36.5)
MCV RBC AUTO: 94 FL (ref 78–100)
MONOCYTES # BLD AUTO: 0.4 10E9/L (ref 0–1.3)
MONOCYTES NFR BLD AUTO: 5.1 %
NEUTROPHILS # BLD AUTO: 4.6 10E9/L (ref 1.6–8.3)
NEUTROPHILS NFR BLD AUTO: 67.7 %
PLATELET # BLD AUTO: 197 10E9/L (ref 150–450)
POTASSIUM SERPL-SCNC: 3.9 MMOL/L (ref 3.5–5.1)
PROT SERPL-MCNC: 7.4 G/DL (ref 6.4–8.9)
RBC # BLD AUTO: 4.61 10E12/L (ref 4.4–5.9)
SODIUM SERPL-SCNC: 135 MMOL/L (ref 134–144)
WBC # BLD AUTO: 6.8 10E9/L (ref 4–11)

## 2018-08-29 PROCEDURE — 70491 CT SOFT TISSUE NECK W/DYE: CPT

## 2018-08-29 PROCEDURE — 83615 LACTATE (LD) (LDH) ENZYME: CPT | Performed by: INTERNAL MEDICINE

## 2018-08-29 PROCEDURE — 71260 CT THORAX DX C+: CPT

## 2018-08-29 PROCEDURE — 85025 COMPLETE CBC W/AUTO DIFF WBC: CPT | Performed by: INTERNAL MEDICINE

## 2018-08-29 PROCEDURE — 80053 COMPREHEN METABOLIC PANEL: CPT | Performed by: INTERNAL MEDICINE

## 2018-08-29 PROCEDURE — 25000125 ZZHC RX 250: Performed by: INTERNAL MEDICINE

## 2018-08-29 PROCEDURE — 36415 COLL VENOUS BLD VENIPUNCTURE: CPT | Performed by: INTERNAL MEDICINE

## 2018-08-29 PROCEDURE — 85652 RBC SED RATE AUTOMATED: CPT | Performed by: INTERNAL MEDICINE

## 2018-08-29 PROCEDURE — 74177 CT ABD & PELVIS W/CONTRAST: CPT

## 2018-08-29 RX ADMIN — IOHEXOL 100 ML: 350 INJECTION, SOLUTION INTRAVENOUS at 15:54

## 2018-09-04 ENCOUNTER — ONCOLOGY VISIT (OUTPATIENT)
Dept: ONCOLOGY | Facility: OTHER | Age: 58
End: 2018-09-04
Attending: NURSE PRACTITIONER
Payer: COMMERCIAL

## 2018-09-04 VITALS
HEIGHT: 77 IN | SYSTOLIC BLOOD PRESSURE: 110 MMHG | TEMPERATURE: 98.7 F | HEART RATE: 71 BPM | WEIGHT: 166 LBS | DIASTOLIC BLOOD PRESSURE: 70 MMHG | BODY MASS INDEX: 19.6 KG/M2

## 2018-09-04 DIAGNOSIS — C81.90 HODGKIN LYMPHOMA, UNSPECIFIED HODGKIN LYMPHOMA TYPE, UNSPECIFIED BODY REGION (H): Primary | ICD-10-CM

## 2018-09-04 PROCEDURE — 99214 OFFICE O/P EST MOD 30 MIN: CPT | Performed by: NURSE PRACTITIONER

## 2018-09-04 ASSESSMENT — PAIN SCALES - GENERAL: PAINLEVEL: NO PAIN (0)

## 2018-09-04 NOTE — MR AVS SNAPSHOT
"              After Visit Summary   9/4/2018    Daniel Boyd    MRN: [id number]           Patient Information     Date Of Birth          1960        Visit Information        Provider Department      9/4/2018 9:45 AM Ashwini Linares APRN CNP [facility]        Today's Diagnoses     Hodgkin lymphoma, unspecified Hodgkin lymphoma type, unspecified body region (H)    -  1       Follow-ups after your visit        Who to contact     If you have questions or need follow up information about today's clinic visit or your schedule please contact [facility] directly at [phone].  Normal or non-critical lab and imaging results will be communicated to you by MyChart, letter or phone within 4 business days after the clinic has received the results. If you do not hear from us within 7 days, please contact the clinic through MyChart or phone. If you have a critical or abnormal lab result, we will notify you by phone as soon as possible.  Submit refill requests through [employer] or call your pharmacy and they will forward the refill request to us. Please allow 3 business days for your refill to be completed.          Additional Information About Your Visit        Care EveryWhere ID     This is your Care EveryWhere ID. This could be used by other organizations to access your [facility] medical records  [id number]        Your Vitals Were     Pulse Temperature Height BMI (Body Mass Index)          71 98.7  F (37.1  C) (Oral) 1.956 m (6' 5.01\") 19.68 kg/m2         Blood Pressure from Last 3 Encounters:   09/04/18 110/70   05/15/18 102/68   05/10/18 102/70    Weight from Last 3 Encounters:   09/04/18 75.3 kg (166 lb)   05/15/18 76.7 kg (169 lb)   05/10/18 78.7 kg (173 lb 6.4 oz)              Today, you had the following     No orders found for display       Primary Care Provider Office Phone # Fax #    Rudy Morrison [phone] [address]   " [facility] [address]        Equal Access to Services     [facility] : Hadii aad ku hadlorenzaharman Lynraleigh, waanneda edelmiramaryha, qadomta kajazmínfransisco barr. So [facility] [phone].    ATENCIÓN: Si habla español, tiene a deal disposición servicios gratuitos de asistencia lingüística. Llame al [phone].    We comply with applicable federal civil rights laws and [place] laws. We do not discriminate on the basis of race, color, national origin, age, disability, sex, sexual orientation, or gender identity.            Thank you!     Thank you for choosing [facility] AND [facility]  for your care. Our goal is always to provide you with excellent care. Hearing back from our patients is one way we can continue to improve our services. Please take a few minutes to complete the written survey that you may receive in the mail after your visit with us. Thank you!             Your Updated Medication List - Protect others around you: Learn how to safely use, store and throw away your medicines at www.disposemymeds.org.          This list is accurate as of 9/4/18  2:21 PM.  Always use your most recent med list.                   Brand Name Dispense Instructions for use Diagnosis    aspirin 81 MG EC tablet      Take 1 tablet (81 mg) by mouth daily        atorvastatin 40 MG tablet    LIPITOR    90 tablet    Take 1 tablet (40 mg) by mouth daily    Coronary artery disease involving native coronary artery of native heart without angina pectoris       fish oil-omega-3 fatty acids 1000 MG capsule      Take 1 capsule by mouth daily        lisinopril 2.5 MG tablet    PRINIVIL/Zestril    90 tablet    TAKE 1 TABLET BY MOUTH ONCE DAILY    Coronary artery disease involving native coronary artery of native heart without angina pectoris       LORazepam 1 MG tablet    ATIVAN    90 tablet    Take 1 tablet (1 mg) by mouth At Bedtime    Chronic insomnia       MULTIVITAMIN & MINERAL PO      Take 1 tablet by mouth  every morning        omeprazole 20 MG CR capsule    priLOSEC    30 capsule    Take 1 capsule (20 mg) by mouth daily        sertraline 100 MG tablet    ZOLOFT    90 tablet    Take 1 tablet by mouth once daily.    Major depressive disorder, recurrent episode, mild (H)

## 2018-09-04 NOTE — NURSING NOTE
Lab ordered per verbal read back order from provider and sent to provider to co-sign.  Kerri Michelle RN...........9/4/2018 2:53 PM

## 2018-09-04 NOTE — PROGRESS NOTES
Oncology Follow-up Visit:  September 4, 2018  Diagnosis:Hodgkin lymphoma    History Of Present Illness:   Mr. Boyd returns for followup of stage IV Hodgkin lymphoma, classical type.  He originally presented with right neck mass over a period of 2 months.  He was subsequently seen by Dr. Radha Kim of General Surgery who noted the patient had right-sided supraclavicular adenopathy.  The biopsy of the lymph node came back consistent with classical Hodgkin lymphoma.  He was subsequently seen by Dr. Jarad Mosqueda on 08/05/2015, who recommended a PET scan for accurate staging.  This was performed on 08/13/2015.  The findings were that there was a hypermetabolic lymphadenopathy both above and below the diaphragm, suspicious for lymphoma.  There was hypermetabolic activity in the spleen suspicious for metastatic disease.  There were multiple foci with abnormal uptake in the spine suspicious for metastatic disease.  There was also a right middle lobe lung nodule that was nonspecific.  Dr. Mosqueda felt the patient had stage IV Hodgkin lymphoma and recommended ABVD x 6 cycles.  An echocardiogram was performed to assess his cardiac function on 08/12/2015.  The findings were that the patient had normal left ventricular size and uptake, and left ventricular ejection fraction was 65%.  The patient was started on ABVD and after 3 cycles he had a repeat PET scan on 11/28/2015.  The findings were that there was marked improvement in the patient's lymphadenopathy over the lower neck, axilla, chest, abdomen, pelvis and groin area.  Focal areas of activity were markedly improved over the cervical, lumbar spine and pelvic area.  The patient subsequently was seen by Dr. Mosqueda on 02/10/2016 and assessment was the patient had completed 6 cycles of ABVD.  Repeat PET scan was performed on 02/24/2016.  The findings were that there was no abnormal uptake to suggest metastatic disease and no evidence of metastatic disease.   Previously seen lymphadenopathy and bone lesions had all resolved.         The patient has had problems with dysphagia as well.  He was admitted to the hospital for right lower lobe pneumonia, was treated with IV antibiotics, did see Dr. Cook for an EGD which revealed hiatal hernia and Schatzki's rings.  Biopsies were all negative.  The patient's dysphagia subsequently improved.  I saw the patient on 06/08/2016, and he had no evidence of disease clinically.  We wanted to assess disease status.  We obtained a sed rate which was normal.  We also obtained an echocardiogram as well as pulmonary function tests.  Echocardiogram revealed no change.  Ejection fraction was 56%.  Pulmonary functions did reveal decreased DLCO of 50% predicted being reported.  The patient did have some shortness of breath when we saw him on 07/14/2016, and we elected to restage him with a PET scan.  This was done on 10/05/2016, which revealed no suspicious hypermetabolic osseous lesions identified, no evidence of recurrent lymphoma.         He also had been seen by Dr. 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  He felt his symptoms had been stable, but not progressive.  He did have a prechemotherapy pulmonary function test for comparison and he felt the patient certainly could have pulmonary fibrosis due to bleomycin as well as other forms of bleomycin toxicity or hypersensitivity pneumonitis.  He also obtained CT chest with high resolution cuts and also repeated his pulmonary function test.  The patient had a high-resolution CT chest performed 10/03/2016, and findings were there was new mild subpleural fibrotic changes within the anterior right upper lobe which may be related to chemotherapy.  The patient saw Dr. Cm London on 10/12/2016.  His impression of the pulmonary function tests revealed stability of his lung volumes and DLCO revealed improved  spirometry.  He felt his dyspnea on exertion was stable, not progressive.  He reviewed the CT scan of the chest which revealed that the patient had subpleural interstitial changes on the right consistent with bleomycin lung toxicity.  The plan was to follow him with serial pulmonary function tests to ensure stability.           When patient was seen on 04/19/2017, it was felt patient should be restaged with a PET.  This was obtained on 07/14/2017, and the findings were there was no evidence of residual recurrent lymphoma. There were minimal subpleural fibrotic changes.  We also did an echocardiogram which was read as borderline left ventricular reduced fraction which the calculated left ventricular ejection fraction of 49%.  His prior echocardiogram revealed ejection fraction of 56%.  The patient was seen by Dr. Collier from Cardiology and the patient was also seen by Siomara Ruvalcaba, his nurse practitioner, on 08/03/2017.  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  They recommended the patient start lisinopril 2.5 mg daily and ACE inhibitor therapy.     He did see Dr. Cm London most recently in May 2018 for followup.  His assessment was the patient had stable bleomycin lung, stable DLCO and would continue to monitor PFTs on a yearly basis.   The patient had staging studies as well on 12/29/2017. CT chest was negative.  There was no new lymph node enlargement.  CT of the abdomen and pelvis was essentially negative.  The patient otherwise had another set of scans done on 04/26/2018, including CT neck, chest, abdomen and pelvis which were stable. He did have CT scans done on 8/29/18 showing no evidence of recurrent or metastatic disease. LDH is stable. Patient continues to have some neuropathy in his feet. He is otherwise doing well and offers no new complaints.     Review Of Systems:  Review Of Systems  Eyes/Ears/Nose/Throat:  "denies vision or hearing changes  Respiratory: No shortness of breath, dyspnea on exertion, cough, or hemoptysis  Cardiovascular: denies chest pain or palpitations  Gastrointestinal: denies abdominal pain, diarrhea, constipation  Genitourinary: denies dysuria or hematuria  Musculoskeletal: denies new bone pain or muscle weakness  Neurologic: reports ongoing neuropathy, denies headaches, dizziness  Hematologic/Lymphatic/Immunologic: denies fevers, chills, night sweats        Nursing Notes:   Kerri Michelle RN  9/4/2018 10:15 AM  Signed  Patient present for follow up of lymphoma.  Kerri Michelle RN...........9/4/2018 10:15 AM  Chief Complaint   Patient presents with     RECHECK     lymphoma       Initial /70  Pulse 71  Temp 98.7  F (37.1  C) (Oral)  Ht 1.956 m (6' 5.01\")  Wt 75.3 kg (166 lb)  BMI 19.68 kg/m2 Estimated body mass index is 19.68 kg/(m^2) as calculated from the following:    Height as of this encounter: 1.956 m (6' 5.01\").    Weight as of this encounter: 75.3 kg (166 lb).  Medication Reconciliation: complete    Kerri Michelle RN        Past medical, social, surgical, and family histories reviewed.    Allergies:  Allergies as of 09/04/2018     (No Known Allergies)       Current Medications:  Current Outpatient Prescriptions   Medication Sig Dispense Refill     aspirin 81 MG EC tablet Take 1 tablet (81 mg) by mouth daily       atorvastatin (LIPITOR) 40 MG tablet Take 1 tablet (40 mg) by mouth daily 90 tablet 3     fish oil-omega-3 fatty acids 1000 MG capsule Take 1 capsule by mouth daily       lisinopril (PRINIVIL/ZESTRIL) 2.5 MG tablet TAKE 1 TABLET BY MOUTH ONCE DAILY 90 tablet 2     LORazepam (ATIVAN) 1 MG tablet Take 1 tablet (1 mg) by mouth At Bedtime 90 tablet 1     Multiple Vitamins-Minerals (MULTIVITAMIN & MINERAL PO) Take 1 tablet by mouth every morning       omeprazole (PRILOSEC) 20 MG CR capsule Take 1 capsule (20 mg) by mouth daily 30 capsule      sertraline (ZOLOFT) 100 MG tablet " "Take 1 tablet by mouth once daily. 90 tablet 1        Physical Exam:  /70  Pulse 71  Temp 98.7  F (37.1  C) (Oral)  Ht 1.956 m (6' 5.01\")  Wt 75.3 kg (166 lb)  BMI 19.68 kg/m2    GENERAL APPEARANCE: 57 year old male, alert and no distress     HENT: Mouth without ulcers or lesions     NECK: no adenopathy, no asymmetry or masses     LYMPHATICS: No cervical, supraclavicular, axillary or inguinal lymphadenopathy     RESP: lungs clear to auscultation - no rales, rhonchi or wheezes     CARDIOVASCULAR: regular rates and rhythm, normal S1 S2, no murmur.     ABDOMEN:  soft, nontender, no HSM or masses and bowel sounds normal     MUSCULOSKELETAL: extremities normal- no gross deformities noted, No edema b/l LE.     SKIN: no suspicious lesions or rashes     PSYCHIATRIC: mentation appears normal and affect normal    LABORATORY DATA  CBC is within normal limits. LFTs are normal. LDH is 123. Sed rate is 6.     Laboratory/Imaging Studies  Orders Only on 08/29/2018   Component Date Value Ref Range Status     WBC 08/29/2018 6.8  4.0 - 11.0 10e9/L Final     RBC Count 08/29/2018 4.61  4.4 - 5.9 10e12/L Final     Hemoglobin 08/29/2018 14.8  13.3 - 17.7 g/dL Final     Hematocrit 08/29/2018 43.5  40.0 - 53.0 % Final     MCV 08/29/2018 94  78 - 100 fl Final     MCH 08/29/2018 32.1  26.5 - 33.0 pg Final     MCHC 08/29/2018 34.0  31.5 - 36.5 g/dL Final     RDW 08/29/2018 13.0  10.0 - 15.0 % Final     Platelet Count 08/29/2018 197  150 - 450 10e9/L Final     Diff Method 08/29/2018 Automated Method   Final     % Neutrophils 08/29/2018 67.7  % Final     % Lymphocytes 08/29/2018 24.5  % Final     % Monocytes 08/29/2018 5.1  % Final     % Eosinophils 08/29/2018 1.8  % Final     % Basophils 08/29/2018 0.6  % Final     % Immature Granulocytes 08/29/2018 0.3  % Final     Absolute Neutrophil 08/29/2018 4.6  1.6 - 8.3 10e9/L Final     Absolute Lymphocytes 08/29/2018 1.7  0.8 - 5.3 10e9/L Final     Absolute Monocytes 08/29/2018 0.4  0.0 - " 1.3 10e9/L Final     Absolute Eosinophils 08/29/2018 0.1  0.0 - 0.7 10e9/L Final     Absolute Basophils 08/29/2018 0.0  0.0 - 0.2 10e9/L Final     Abs Immature Granulocytes 08/29/2018 0.0  0 - 0.4 10e9/L Final     Sodium 08/29/2018 135  134 - 144 mmol/L Final     Potassium 08/29/2018 3.9  3.5 - 5.1 mmol/L Final     Chloride 08/29/2018 102  98 - 107 mmol/L Final     Carbon Dioxide 08/29/2018 28  21 - 31 mmol/L Final     Anion Gap 08/29/2018 5  3 - 14 mmol/L Final     Glucose 08/29/2018 89  70 - 105 mg/dL Final     Urea Nitrogen 08/29/2018 20  7 - 25 mg/dL Final     Creatinine 08/29/2018 1.04  0.70 - 1.30 mg/dL Final     GFR Estimate 08/29/2018 73  >60 mL/min/1.7m2 Final     GFR Estimate If Black 08/29/2018 89  >60 mL/min/1.7m2 Final     Calcium 08/29/2018 9.7  8.6 - 10.3 mg/dL Final     Bilirubin Total 08/29/2018 0.9  0.3 - 1.0 mg/dL Final     Albumin 08/29/2018 4.3  3.5 - 5.7 g/dL Final     Protein Total 08/29/2018 7.4  6.4 - 8.9 g/dL Final     Alkaline Phosphatase 08/29/2018 52  34 - 104 U/L Final     ALT 08/29/2018 31  7 - 52 U/L Final     AST 08/29/2018 24  13 - 39 U/L Final     Lactate Dehydrogenase 08/29/2018 123* 140 - 271 U/L Final     Sed Rate 08/29/2018 6  1 - 10 mm/h Final        ASSESSMENT/PLAN:   Stage IV classical Hodgkin lymphoma presenting with right cervical, supraclavicular lymphadenopathy, biopsy consistent with classical Hodgkin lymphoma involving the spine, pelvis, as well as lymphadenopathy above and below the diaphragm.  He responded to 6 cycles of ABVD, course complicated with neutropenic fever. Pulmonary function test revealed a depressed DLCO, patient is followed by Dr Cm London of pulmonology. PET scan was negative for metastatic disease.  Recent echo did reveal decreased left ventricular ejection fraction.  The patient was placed on ACE inhibitor by Siomara Ruvalcaba, nurse practitioner.  Otherwise, the patient has no evidence of disease on recent imaging from 8/28/18.  Lab work remains  stable. Will talk to Dr Leung regarding his plan for ongoing imaging. We will plan to see patient back in 4 months with a CBC, CMP, LDH and sed rate.     Twenty five minutes spent with patient with greater than 50% of that time spent counseling patient regarding disease process, interpretation of lab and imaging, discussing ongoing surveillance and coordination of care

## 2018-09-04 NOTE — NURSING NOTE
"Patient present for follow up of lymphoma.  Kerri Michelle RN...........9/4/2018 10:15 AM  Chief Complaint   Patient presents with     RECHECK     lymphoma       Initial /70  Pulse 71  Temp 98.7  F (37.1  C) (Oral)  Ht 1.956 m (6' 5.01\")  Wt 75.3 kg (166 lb)  BMI 19.68 kg/m2 Estimated body mass index is 19.68 kg/(m^2) as calculated from the following:    Height as of this encounter: 1.956 m (6' 5.01\").    Weight as of this encounter: 75.3 kg (166 lb).  Medication Reconciliation: complete    Kerri Michelle RN      "

## 2018-11-06 DIAGNOSIS — F51.04 CHRONIC INSOMNIA: ICD-10-CM

## 2018-11-09 RX ORDER — LORAZEPAM 1 MG/1
1 TABLET ORAL AT BEDTIME
Qty: 90 TABLET | Refills: 1 | Status: SHIPPED | OUTPATIENT
Start: 2018-11-09 | End: 2019-05-02

## 2018-12-10 DIAGNOSIS — F33.0 MAJOR DEPRESSIVE DISORDER, RECURRENT EPISODE, MILD (H): ICD-10-CM

## 2018-12-12 RX ORDER — SERTRALINE HYDROCHLORIDE 100 MG/1
TABLET, FILM COATED ORAL
Qty: 90 TABLET | Refills: 3 | Status: SHIPPED | OUTPATIENT
Start: 2018-12-12 | End: 2019-12-16

## 2018-12-12 NOTE — TELEPHONE ENCOUNTER
Routing refill request to provider for review/approval because:   PHQ-9 score less than 5 in past 6 months    Recent (6 mo) or future (30 days) visit within the authorizing provider's specialty     LOV: 5/10/18  Lupe London RN on 12/12/2018 at 9:41 AM

## 2019-01-02 DIAGNOSIS — C81.90 HODGKIN LYMPHOMA, UNSPECIFIED HODGKIN LYMPHOMA TYPE, UNSPECIFIED BODY REGION (H): ICD-10-CM

## 2019-01-02 LAB
ALBUMIN SERPL-MCNC: 4.2 G/DL (ref 3.5–5.7)
ALP SERPL-CCNC: 72 U/L (ref 34–104)
ALT SERPL W P-5'-P-CCNC: 24 U/L (ref 7–52)
ANION GAP SERPL CALCULATED.3IONS-SCNC: 5 MMOL/L (ref 3–14)
AST SERPL W P-5'-P-CCNC: 21 U/L (ref 13–39)
BASOPHILS # BLD AUTO: 0 10E9/L (ref 0–0.2)
BASOPHILS NFR BLD AUTO: 0.5 %
BILIRUB SERPL-MCNC: 0.8 MG/DL (ref 0.3–1)
BUN SERPL-MCNC: 18 MG/DL (ref 7–25)
CALCIUM SERPL-MCNC: 9.7 MG/DL (ref 8.6–10.3)
CHLORIDE SERPL-SCNC: 103 MMOL/L (ref 98–107)
CO2 SERPL-SCNC: 29 MMOL/L (ref 21–31)
CREAT SERPL-MCNC: 0.96 MG/DL (ref 0.7–1.3)
DIFFERENTIAL METHOD BLD: NORMAL
EOSINOPHIL # BLD AUTO: 0.1 10E9/L (ref 0–0.7)
EOSINOPHIL NFR BLD AUTO: 1.9 %
ERYTHROCYTE [DISTWIDTH] IN BLOOD BY AUTOMATED COUNT: 12.7 % (ref 10–15)
GFR SERPL CREATININE-BSD FRML MDRD: 80 ML/MIN/{1.73_M2}
GLUCOSE SERPL-MCNC: 114 MG/DL (ref 70–105)
HCT VFR BLD AUTO: 43.5 % (ref 40–53)
HGB BLD-MCNC: 14.5 G/DL (ref 13.3–17.7)
IMM GRANULOCYTES # BLD: 0 10E9/L (ref 0–0.4)
IMM GRANULOCYTES NFR BLD: 0.2 %
LDH SERPL L TO P-CCNC: 132 U/L (ref 140–271)
LYMPHOCYTES # BLD AUTO: 1.2 10E9/L (ref 0.8–5.3)
LYMPHOCYTES NFR BLD AUTO: 19 %
MCH RBC QN AUTO: 31.9 PG (ref 26.5–33)
MCHC RBC AUTO-ENTMCNC: 33.3 G/DL (ref 31.5–36.5)
MCV RBC AUTO: 96 FL (ref 78–100)
MONOCYTES # BLD AUTO: 0.4 10E9/L (ref 0–1.3)
MONOCYTES NFR BLD AUTO: 6 %
NEUTROPHILS # BLD AUTO: 4.6 10E9/L (ref 1.6–8.3)
NEUTROPHILS NFR BLD AUTO: 72.4 %
PLATELET # BLD AUTO: 193 10E9/L (ref 150–450)
POTASSIUM SERPL-SCNC: 4.1 MMOL/L (ref 3.5–5.1)
PROT SERPL-MCNC: 7.3 G/DL (ref 6.4–8.9)
RBC # BLD AUTO: 4.54 10E12/L (ref 4.4–5.9)
SODIUM SERPL-SCNC: 137 MMOL/L (ref 134–144)
WBC # BLD AUTO: 6.3 10E9/L (ref 4–11)

## 2019-01-02 PROCEDURE — 80053 COMPREHEN METABOLIC PANEL: CPT | Performed by: NURSE PRACTITIONER

## 2019-01-02 PROCEDURE — 85025 COMPLETE CBC W/AUTO DIFF WBC: CPT | Performed by: NURSE PRACTITIONER

## 2019-01-02 PROCEDURE — 36415 COLL VENOUS BLD VENIPUNCTURE: CPT | Performed by: NURSE PRACTITIONER

## 2019-01-02 PROCEDURE — 83615 LACTATE (LD) (LDH) ENZYME: CPT | Performed by: NURSE PRACTITIONER

## 2019-01-14 ENCOUNTER — ONCOLOGY VISIT (OUTPATIENT)
Dept: ONCOLOGY | Facility: OTHER | Age: 59
End: 2019-01-14
Attending: NURSE PRACTITIONER
Payer: COMMERCIAL

## 2019-01-14 VITALS
HEIGHT: 77 IN | TEMPERATURE: 96.3 F | BODY MASS INDEX: 20.78 KG/M2 | SYSTOLIC BLOOD PRESSURE: 118 MMHG | DIASTOLIC BLOOD PRESSURE: 68 MMHG | WEIGHT: 176 LBS | HEART RATE: 75 BPM

## 2019-01-14 DIAGNOSIS — C81.90 HODGKIN LYMPHOMA, UNSPECIFIED HODGKIN LYMPHOMA TYPE, UNSPECIFIED BODY REGION (H): Primary | ICD-10-CM

## 2019-01-14 PROCEDURE — 99214 OFFICE O/P EST MOD 30 MIN: CPT | Performed by: NURSE PRACTITIONER

## 2019-01-14 ASSESSMENT — MIFFLIN-ST. JEOR: SCORE: 1730.83

## 2019-01-14 ASSESSMENT — PAIN SCALES - GENERAL: PAINLEVEL: NO PAIN (0)

## 2019-01-14 NOTE — NURSING NOTE
"Patient present for follow up of lymphoma.  Kerri Michelle RN...........1/14/2019 8:33 AM  Chief Complaint   Patient presents with     RECHECK     lymphoma       Initial /68   Pulse 75   Temp 96.3  F (35.7  C)   Ht 1.956 m (6' 5.01\")   Wt 79.8 kg (176 lb)   BMI 20.87 kg/m   Estimated body mass index is 20.87 kg/m  as calculated from the following:    Height as of this encounter: 1.956 m (6' 5.01\").    Weight as of this encounter: 79.8 kg (176 lb).  Medication Reconciliation: complete    Kerri Michelle RN     "

## 2019-01-14 NOTE — PROGRESS NOTES
Oncology Follow-up Visit:  January 14, 2019  Diagnosis:Lymphoma    History Of Present Illness:  Patient presents for followup of stage IV Hodgkin lymphoma, classical type.  He originally presented with right neck mass over a period of 2 months.  He was subsequently seen by Dr. Radha Kim of General Surgery who noted the patient had right-sided supraclavicular adenopathy.  The biopsy of the lymph node came back consistent with classical Hodgkin lymphoma.  He was subsequently seen by Dr. Jarad Mosqueda on 08/05/2015, who recommended a PET scan for accurate staging.  This was performed on 08/13/2015.  The findings were that there was a hypermetabolic lymphadenopathy both above and below the diaphragm, suspicious for lymphoma.  There was hypermetabolic activity in the spleen suspicious for metastatic disease.  There were multiple foci with abnormal uptake in the spine suspicious for metastatic disease.  There was also a right middle lobe lung nodule that was nonspecific.  Dr. Mosqueda felt the patient had stage IV Hodgkin lymphoma and recommended ABVD x 6 cycles.  An echocardiogram was performed to assess his cardiac function on 08/12/2015.  The findings were that the patient had normal left ventricular size and uptake, and left ventricular ejection fraction was 65%.  The patient was started on ABVD and after 3 cycles he had a repeat PET scan on 11/28/2015.  The findings were that there was marked improvement in the patient's lymphadenopathy over the lower neck, axilla, chest, abdomen, pelvis and groin area.  Focal areas of activity were markedly improved over the cervical, lumbar spine and pelvic area.  The patient subsequently was seen by Dr. Mosqueda on 02/10/2016 and assessment was the patient had completed 6 cycles of ABVD.  Repeat PET scan was performed on 02/24/2016.  The findings were that there was no abnormal uptake to suggest metastatic disease and no evidence of metastatic disease.  Previously seen  lymphadenopathy and bone lesions had all resolved.         The patient has had problems with dysphagia.  He did see Dr. Cook for an EGD which revealed hiatal hernia and Schatzki's rings.  Biopsies were all negative.  The patient's dysphagia subsequently improved. When patient was seen on 06/08/2016, and he had no evidence of disease clinically. We obtained an echocardiogram as well as pulmonary function tests.  Echocardiogram revealed no change.  Ejection fraction was 56%.  Pulmonary functions did reveal decreased DLCO of 50% predicted being reported.  The patient did have some shortness of breath when we saw him on 07/14/2016, and we elected to restage him with a PET scan.  This was done on 10/05/2016, which revealed no suspicious hypermetabolic osseous lesions identified, no evidence of recurrent lymphoma.     Patient was seen by Dr. 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  He felt his symptoms had been stable, but not progressive.  He did have a prechemotherapy pulmonary function test for comparison and he felt the patient certainly could have pulmonary fibrosis due to bleomycin as well as other forms of bleomycin toxicity or hypersensitivity pneumonitis. The patient had a high-resolution CT chest performed 10/03/2016, and findings were there was new mild subpleural fibrotic changes within the anterior right upper lobe which may be related to chemotherapy. The plan was to follow him with serial pulmonary function tests to ensure stability.     PET scan was obtained on 07/14/2017, and the findings were there was no evidence of residual recurrent lymphoma. There were minimal subpleural fibrotic changes.  We also did an echocardiogram which was read as borderline left ventricular reduced fraction which the calculated left ventricular ejection fraction of 49%.  His prior echocardiogram revealed ejection fraction of 56%.  The patient  "was seen by Dr. Collier from Cardiology and the patient was also seen by Siomara Ruvalcaba, his nurse practitioner, on 08/03/2017.  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  They recommended the patient start lisinopril 2.5 mg daily and ACE inhibitor therapy.     He did see Dr. Cm London in May 2018 for followup.  His assessment was the patient had stable bleomycin lung, stable DLCO and would continue to monitor PFTs on a yearly basis. He will see Dr London again in May.  He did have CT scans done on 8/29/18 showing no evidence of recurrent or metastatic disease. It was felt that patient could have CT scans done every 6 months for a total of 5 years per Dr Leung. He will be due for imaging next month. He is otherwise doing well. He feels he does have some shortness of breath with over exertion but reports that this is stable. He denies any recent illness, fevers, night sweats. He reports that he does feel for lymphadenopathy and denies any new lumps. His labs remain stable.     Review Of Systems:  Review Of Systems  Eyes/Ears/Nose/Throat: denies new vision or hearing changes  Respiratory: reports stable shortness of breath with exertion, denies cough or hemoptysis  Cardiovascular: denies chest pain or palpitaitons  Gastrointestinal: denies abdominal pain, diarrhea or constipation  Genitourinary: denies dysuria or hematuria  Musculoskeletal: denies new bone pain or muscle weakness  Neurologic: reports ongoing neuropathy in feet, no headaches  Hematologic/Lymphatic/Immunologic: denies fevers, chills, night sweats      Nursing Notes:   Kerri Michelle RN  1/14/2019  8:34 AM  Signed  Patient present for follow up of lymphoma.  Kerri Michelle RN...........1/14/2019 8:33 AM  Chief Complaint   Patient presents with     RECHECK     lymphoma       Initial /68   Pulse 75   Temp 96.3  F (35.7  C)   Ht 1.956 m (6' 5.01\")   Wt " "79.8 kg (176 lb)   BMI 20.87 kg/m    Estimated body mass index is 20.87 kg/m  as calculated from the following:    Height as of this encounter: 1.956 m (6' 5.01\").    Weight as of this encounter: 79.8 kg (176 lb).  Medication Reconciliation: complete    Kerri Michelle RN       Past medical, social, surgical, and family histories reviewed.    Allergies:  Allergies as of 01/14/2019     (No Known Allergies)       Current Medications:  Current Outpatient Medications   Medication Sig Dispense Refill     aspirin 81 MG EC tablet Take 1 tablet (81 mg) by mouth daily       atorvastatin (LIPITOR) 40 MG tablet Take 1 tablet (40 mg) by mouth daily 90 tablet 3     fish oil-omega-3 fatty acids 1000 MG capsule Take 1 capsule by mouth daily       lisinopril (PRINIVIL/ZESTRIL) 2.5 MG tablet TAKE 1 TABLET BY MOUTH ONCE DAILY 90 tablet 2     LORazepam (ATIVAN) 1 MG tablet Take 1 tablet (1 mg) by mouth At Bedtime 90 tablet 1     Multiple Vitamins-Minerals (MULTIVITAMIN & MINERAL PO) Take 1 tablet by mouth every morning       omeprazole (PRILOSEC) 20 MG CR capsule Take 1 capsule (20 mg) by mouth daily 30 capsule      sertraline (ZOLOFT) 100 MG tablet Take 1 tablet by mouth once daily. 90 tablet 3        Physical Exam:  /68   Pulse 75   Temp 96.3  F (35.7  C)   Ht 1.956 m (6' 5.01\")   Wt 79.8 kg (176 lb)   BMI 20.87 kg/m      GENERAL APPEARANCE: 58 year old male, alert and no distress     NECK: no adenopathy, no asymmetry or masses     LYMPHATICS: No cervical, supraclavicular, axillary lymphadenopathy     RESP: lungs clear to auscultation - no rales, rhonchi or wheezes     CARDIOVASCULAR: regular rates and rhythm, normal S1 S2, no murmur.     ABDOMEN:  soft, nontender, no HSM or masses and bowel sounds normal     MUSCULOSKELETAL: extremities normal- no gross deformities noted, No edema b/l LE.     SKIN: no suspicious lesions or rashes     PSYCHIATRIC: mentation appears normal and affect normal    Laboratory/Imaging " Studies  Orders Only on 01/02/2019   Component Date Value Ref Range Status     Lactate Dehydrogenase 01/02/2019 132* 140 - 271 U/L Final     Sodium 01/02/2019 137  134 - 144 mmol/L Final     Potassium 01/02/2019 4.1  3.5 - 5.1 mmol/L Final     Chloride 01/02/2019 103  98 - 107 mmol/L Final     Carbon Dioxide 01/02/2019 29  21 - 31 mmol/L Final     Anion Gap 01/02/2019 5  3 - 14 mmol/L Final     Glucose 01/02/2019 114* 70 - 105 mg/dL Final     Urea Nitrogen 01/02/2019 18  7 - 25 mg/dL Final     Creatinine 01/02/2019 0.96  0.70 - 1.30 mg/dL Final     GFR Estimate 01/02/2019 80  >60 mL/min/[1.73_m2] Final     GFR Estimate If Black 01/02/2019 >90  >60 mL/min/[1.73_m2] Final     Calcium 01/02/2019 9.7  8.6 - 10.3 mg/dL Final     Bilirubin Total 01/02/2019 0.8  0.3 - 1.0 mg/dL Final     Albumin 01/02/2019 4.2  3.5 - 5.7 g/dL Final     Protein Total 01/02/2019 7.3  6.4 - 8.9 g/dL Final     Alkaline Phosphatase 01/02/2019 72  34 - 104 U/L Final     ALT 01/02/2019 24  7 - 52 U/L Final     AST 01/02/2019 21  13 - 39 U/L Final     WBC 01/02/2019 6.3  4.0 - 11.0 10e9/L Final     RBC Count 01/02/2019 4.54  4.4 - 5.9 10e12/L Final     Hemoglobin 01/02/2019 14.5  13.3 - 17.7 g/dL Final     Hematocrit 01/02/2019 43.5  40.0 - 53.0 % Final     MCV 01/02/2019 96  78 - 100 fl Final     MCH 01/02/2019 31.9  26.5 - 33.0 pg Final     MCHC 01/02/2019 33.3  31.5 - 36.5 g/dL Final     RDW 01/02/2019 12.7  10.0 - 15.0 % Final     Platelet Count 01/02/2019 193  150 - 450 10e9/L Final     Diff Method 01/02/2019 Automated Method   Final     % Neutrophils 01/02/2019 72.4  % Final     % Lymphocytes 01/02/2019 19.0  % Final     % Monocytes 01/02/2019 6.0  % Final     % Eosinophils 01/02/2019 1.9  % Final     % Basophils 01/02/2019 0.5  % Final     % Immature Granulocytes 01/02/2019 0.2  % Final     Absolute Neutrophil 01/02/2019 4.6  1.6 - 8.3 10e9/L Final     Absolute Lymphocytes 01/02/2019 1.2  0.8 - 5.3 10e9/L Final     Absolute Monocytes  01/02/2019 0.4  0.0 - 1.3 10e9/L Final     Absolute Eosinophils 01/02/2019 0.1  0.0 - 0.7 10e9/L Final     Absolute Basophils 01/02/2019 0.0  0.0 - 0.2 10e9/L Final     Abs Immature Granulocytes 01/02/2019 0.0  0 - 0.4 10e9/L Final        ASSESSMENT/PLAN:  Stage IV classical Hodgkin lymphoma presenting with right cervical, supraclavicular lymphadenopathy, biopsy consistent with classical Hodgkin lymphoma involving the spine, pelvis, as well as lymphadenopathy above and below the diaphragm.  He responded to 6 cycles of ABVD, course complicated with neutropenic fever. Pulmonary function test revealed a depressed DLCO, patient is followed by Dr Cm London of pulmonology. Last PET scan was negative for metastatic disease. Labs remain stable. It was felt that patient could have CT scans done every 6 months for a total of 5 years per Dr Leung. He will be due for imaging next month.  Patient will be set up for CT neck, chest, abdomen and pelvis now and will call with results.  Otherwise we will plan to see patient back in 6 months with CBC, CMP, LDH and CT scans    Twenty five minutes spent with patient with greater than 50% of that time spent counseling patient regarding disease process, interpretation of labs, discussing plan for surveillance and coordination of care

## 2019-01-24 ENCOUNTER — HOSPITAL ENCOUNTER (OUTPATIENT)
Dept: CT IMAGING | Facility: OTHER | Age: 59
End: 2019-01-24
Attending: NURSE PRACTITIONER
Payer: COMMERCIAL

## 2019-01-24 ENCOUNTER — HOSPITAL ENCOUNTER (OUTPATIENT)
Dept: CT IMAGING | Facility: OTHER | Age: 59
Discharge: HOME | End: 2019-01-24
Attending: NURSE PRACTITIONER | Admitting: NURSE PRACTITIONER
Payer: COMMERCIAL

## 2019-01-24 DIAGNOSIS — C81.90 HODGKIN LYMPHOMA, UNSPECIFIED HODGKIN LYMPHOMA TYPE, UNSPECIFIED BODY REGION (H): ICD-10-CM

## 2019-01-24 PROCEDURE — 71260 CT THORAX DX C+: CPT

## 2019-01-24 PROCEDURE — 74177 CT ABD & PELVIS W/CONTRAST: CPT

## 2019-01-24 PROCEDURE — 70491 CT SOFT TISSUE NECK W/DYE: CPT

## 2019-01-24 PROCEDURE — 25500064 ZZH RX 255 OP 636: Performed by: NURSE PRACTITIONER

## 2019-01-24 RX ADMIN — IOHEXOL 100 ML: 350 INJECTION, SOLUTION INTRAVENOUS at 11:09

## 2019-01-29 ENCOUNTER — TELEPHONE (OUTPATIENT)
Dept: ONCOLOGY | Facility: OTHER | Age: 59
End: 2019-01-29

## 2019-01-29 NOTE — TELEPHONE ENCOUNTER
Patient called with results of Ct scans per Ashwini Linares NP.  Kerri Michelle RN...........1/29/2019 8:34 AM

## 2019-02-04 DIAGNOSIS — F51.04 CHRONIC INSOMNIA: ICD-10-CM

## 2019-02-05 RX ORDER — LORAZEPAM 1 MG/1
TABLET ORAL
Qty: 90 TABLET | OUTPATIENT
Start: 2019-02-05

## 2019-03-11 DIAGNOSIS — I25.10 CORONARY ARTERY DISEASE INVOLVING NATIVE CORONARY ARTERY OF NATIVE HEART WITHOUT ANGINA PECTORIS: ICD-10-CM

## 2019-03-12 RX ORDER — LISINOPRIL 2.5 MG/1
TABLET ORAL
Qty: 90 TABLET | Refills: 2 | Status: SHIPPED | OUTPATIENT
Start: 2019-03-12 | End: 2020-01-22

## 2019-03-12 NOTE — TELEPHONE ENCOUNTER
Refill request for: Lisinopril 2.5 mg tabs   From: [facility]   Rx written date: 07/03/2018 #90 with 2 refills  LOV: 08/03/2017 with [facility]  Next appt: none noted in cardiology  Protocol:   ACE Inhibitors (Including Combos) Protocol Failed   Recent (12 mo) or future (30 days) visit within the authorizing provider's specialty       Pt has not been seen in Cardiology in over a year. Call to pt who requests Rx be reviewed by Dr. Morrison instead of cardiology. Routing to Dr. Morrison for review. Aminata Castellon, RN on 3/12/2019 at 10:40 AM

## 2019-04-08 DIAGNOSIS — I25.10 CORONARY ARTERY DISEASE INVOLVING NATIVE CORONARY ARTERY OF NATIVE HEART WITHOUT ANGINA PECTORIS: ICD-10-CM

## 2019-04-11 RX ORDER — ATORVASTATIN CALCIUM 40 MG/1
TABLET, FILM COATED ORAL
Qty: 90 TABLET | Refills: 0 | Status: SHIPPED | OUTPATIENT
Start: 2019-04-11 | End: 2019-07-03

## 2019-04-11 NOTE — TELEPHONE ENCOUNTER
"Requested Prescriptions   Pending Prescriptions Disp Refills     atorvastatin (LIPITOR) 40 MG tablet [Pharmacy Med Name: ATORVASTATIN CALCIUM 40 MG TABLET] 90 tablet 3     Sig: TAKE 1 TABLET BY MOUTH ONCE DAILY       Statins Protocol Passed - 4/8/2019  7:48 AM        Passed - LDL on file in past 12 months     Recent Labs   Lab Test 06/15/18  1112   LDL 67             Passed - No abnormal creatine kinase in past 12 months     No lab results found.             Passed - Recent (12 mo) or future (30 days) visit within the authorizing provider's specialty     Patient had office visit in the last 12 months or has a visit in the next 30 days with authorizing provider or within the authorizing provider's specialty.  See \"Patient Info\" tab in inbasket, or \"Choose Columns\" in Meds & Orders section of the refill encounter.              Passed - Medication is active on med list        Passed - Patient is age 18 or older        Prescription approved per [facility] Refill Protocol.    "

## 2019-05-02 DIAGNOSIS — F51.04 CHRONIC INSOMNIA: ICD-10-CM

## 2019-05-03 NOTE — TELEPHONE ENCOUNTER
Routing refill request to provider for review/approval because:  Drug not on the [facility] refill protocol     LOV; 5/10/18  Called patient and he states has enough Lorazepam to get through until Monday when Dr. Morrison returns.    Lupe London RN on 5/3/2019 at 4:12 PM

## 2019-05-06 RX ORDER — LORAZEPAM 1 MG/1
TABLET ORAL
Qty: 90 TABLET | Refills: 1 | Status: SHIPPED | OUTPATIENT
Start: 2019-05-06 | End: 2019-10-28

## 2019-06-25 ENCOUNTER — OFFICE VISIT (OUTPATIENT)
Dept: PULMONOLOGY | Facility: OTHER | Age: 59
End: 2019-06-25
Attending: INTERNAL MEDICINE
Payer: COMMERCIAL

## 2019-06-25 VITALS
DIASTOLIC BLOOD PRESSURE: 68 MMHG | OXYGEN SATURATION: 97 % | SYSTOLIC BLOOD PRESSURE: 112 MMHG | RESPIRATION RATE: 16 BRPM

## 2019-06-25 DIAGNOSIS — R06.09 DYSPNEA ON EXERTION: ICD-10-CM

## 2019-06-25 DIAGNOSIS — J98.4 BLEOMYCIN LUNG TOXICITY: Primary | ICD-10-CM

## 2019-06-25 DIAGNOSIS — T45.1X5A BLEOMYCIN LUNG TOXICITY: Primary | ICD-10-CM

## 2019-06-25 DIAGNOSIS — R05.9 COUGH: ICD-10-CM

## 2019-06-25 PROCEDURE — G0463 HOSPITAL OUTPT CLINIC VISIT: HCPCS

## 2019-06-25 ASSESSMENT — PAIN SCALES - GENERAL: PAINLEVEL: NO PAIN (0)

## 2019-06-25 NOTE — NURSING NOTE
Chief Complaint   Patient presents with     RECHECK     1 year follow up Blastomycin lung toxicity      Patient presents to the clinic today for a 1 year follow up for blastomycin lung toxicity   Medication Reconciliation: completed   Spring Watts LPN  6/25/2019 11:36 AM

## 2019-06-26 NOTE — PROGRESS NOTES
HPI:  This is a [facility] Pulmonary Medicine outreach note. The patient is a 57-year-old male who returned alone today in follow up for bleomycin lung toxicity. He was last seen on 5/15/2018. He has a history of Hodgkin's lymphoma treated with ABVD therapy which includes bleomycin.  He presents today stating that overall he has been doing well but did developed an upper respiratory infection over the last 3 weeks with increased cough productive of white phlegm.  He has had associated wheezing but denies any changes in shortness of breath.  He has been on Claritin as well as an over-the-counter cold medicine with only minimal improvement.  Otherwise, he has done well.  He has had no ER or urgent care visits for his breathing.  He is on no respiratory medications for his breathing.    Tests obtain since his last visit include:    1/24/2019 CT chest, abdomen, pelvis is read by radiology as the presence of calcified mediastinal and hilar lymph nodes, mildly prominent left supraclavicular nodes and a small sliding hiatal hernia.  Central airways were patent with focal bronchiectasis and bronchial impaction within the anterior right lower lobe is chronic and unchanged.  The CT chest is personally reviewed and compared with his previous chest CT done 8/29/2018.  Compared to that test, he has had an improvement in a left lower lobe infiltrate that was seen previously.    Previous tests include:           4/26/2018 pulmonary function test that showed mild restrictive lung disease. FVC was 4.48 L or 75% predicted, FEV1/FVC was 75%, FEV1 was 3.34 L or 74% predicted. FEF 25-75% was 71% predicted. Lung volumes showed a TLC of 6.67 L or 78% predicted. RV was 2.16 L or 82% predicted. DLCO was 75% predicted.         4/26/2018 chest CT was personally reviewed and compared with his 4/12/2017 chest CT. Right lower lobe bronchiectasis was seen with subtle interstitial thickening. There is a approximately 3 mm right middle lobe  pulmonary nodule. All of these findings are unchanged compared to April 2017 and no new findings are seen.                7/6/2017 pulmonary function test that showed an FVC of 4.73 L or 77% predicted, FEV1/FVC of 72%, FEV1 of 3.39 L or 73% predicted. FEF 25-75% was 58% predicted. Lung volumes showed a TLC of 7.40 L or 88% predicted, RV was 2.73 L or 105% predicted, RV/TLC was 115% predicted. DLCO uncorrected for hemoglobin was 50 2% predicted and dL/VA was 65% predicted. Flow volume loop was normal appearing.         10/12/2017 ESR was 6         4/12/2017 chest CT that was compared with his previous chest CT done on 10/3/2016. There is no change in his right lung anterior area of fibrosis. Areas of scarring are present in the right lower lobe and are unchanged as well. 2-3 mm pulmonary nodule in the right upper lobe is unchanged. No new findings are seen. Radiology reports multiple calcified granulomas in the spleen as well as granulomas seen in the lungs.         1/11/2017 pulmonary function test that showed a reduced FEF 25-75% consistent with obstruction in small peripheral airways. It also showed reduced DLCO. His FVC was 4.55 L or 74% predicted, FEV1/FVC was 72%, FEV1 was 3.26 L or 70% predicted. There was a 2% improvement in the FVC in response to bronchodilators increasing up to 4.68 L or 76% predicted. Lung volumes showed a TLC of 6.86 L or 81% predicted, RV of 2.23 L or 86% predicted, and RV/TLC of 101% predicted. Diffusion capacity was 63% and dL/VA was 63%.         10/3/2016 chest CT which is personally reviewed and shows some right anterior upper lobe subpleural fibrotic changes as well as some bronchiectatic changes in the right mid lung. No infiltrates, nodules, masses, or adenopathy is seen.         10/3/2016 pulmonary function test done at [facility] showed an FVC of 4.76 L or 78% predicted, FEV1/FVC of 73%, FEV1 of 3.48 L or 75% predicted. There were no changes with bronchodilators. FEF 25-75%  was 64% predicted. Lung volumes showed a TLC of 7.16 L or 85% predicted, RV of 2.47 L or 96% predicted, and RV/TLC of 107% predicted. Diffusion capacity uncorrected for hemoglobin was 24.68 mL/minute/mmHg or 59% predicted. DL/VA was 74% predicted. Flow volume loop appeared normal.         10/3/2016 6 minutes walk study showed a beginning saturation 96% but desaturations on a 91% with exertion. Recovery saturation was 97%. Although there was significant desaturations with exertion, he did not require supplemental oxygen with exertion.         In review, the patient was referred by Dr. Hayden for diminished DLCO in the setting of bleomycin therapy for stage IV Hodgkin's lymphoma. He underwent 6 cycles of ABVD therapy with marked improvement. His treatment was complicated by dysphagia and a right lower lobe pneumonia. As part of his follow-up, a pulmonary function test was obtained which showed a reduced DLCO. With questioning at that time, he did report some shortness of breath when climbing fields.         Patient's past pulmonary history includes recurrent pneumonias requiring antibiotics on a nearly yearly basis. Is a workup for this, he actually had a chest CT by his primary care physician, Dr. Morrison, which did show some scarring in the right mid lung. He is a lifetime nonsmoker but has had exposure to secondhand smoke from his father. He denies any childhood asthma or allergies. He does think that dust bothersome occasionally. He has had no industrial exposures. He works on a golf course. He does added [place] but otherwise denies any travel to the [facility]. He has a cat at home that does sleep in bed with him but is had no exposures to birds or hot tubs. 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 Family history is negative for any lung disorders.         Outside tests include:         6/30/2016 pulmonary function test which showed a reduced DLCO. Spirometry showed an FVC of 4.30 L or 70% predicted,  FEV1/FVC was 72%, FEV1 was 3.12 L or 66% predicted. There was a 6% improvement in the FEV1 in response to bronchodilators increasing up to 3.33 L or 71% predicted. FEF 25-75% was 54% predicted. Lung volumes showed a TLC of 7.22 L or 85% predicted, RV of 2.73 L or 106% predicted, RV/TLC of 118% predicted. DLCO corrected for hemoglobin was 22.55 mL/minute/mmHg or 53% predicted. DL/VA was 73% predicted. Flow volume loop showed evidence of airflow obstruction and lung restriction. No prior test are available for comparison.         6/30/2016 echocardiogram showed an LVEF of 56% with a mild increase in left ventricular size. RV size was normal with normal function. Trace mitral regurgitation was seen.         2/16/2016 chest x-ray reports right lower lobe infiltrate unchanged from previous chest x-ray on 2/12/2016.         2/24/2016 PET scan documented airspace opacities in the right lower lobe with a small right pleural effusion. They reported low-grade associated hypermetabolic activity. The lungs were otherwise clear. Calcified mediastinal lymph nodes were present. No other uptake was seen to suggest metastatic disease.         7/20/2015 chest CT reports multiple enlarged lymph nodes in the neck, axillary region, hilum, mediastinum, upper abdomen and the retroperitoneum and portal region. Multiple masses in the spleen were noted.         7/7/2016 CMP showed sodium of 133 otherwise unremarkable, ESR was 8. CBC was normal with a hemoglobin of 14.6 and a normal differential.       Home Medications    acetaminophen 500 mg tablet 500 mg PO Q6H PRN 06/25/19 [History Confirmed 06/25/19]  aspirin 81 mg tablet,delayed release 81 mg PO DAILY 06/25/19 [History Confirmed 06/25/19]  azithromycin 250 mg tablet See Rx Instructions PO .COMPLEX #6 tab 06/25/19 [Rx Confirmed 06/25/19]  lisinopril 2.5 mg tablet 2.5 mg PO DAILY 06/25/19 [History Confirmed 06/25/19]  lorazepam 1 mg tablet 1 mg PO BEDTIME PRN 06/25/19 [History Confirmed  06/25/19]  multivitamin tablet 1 tab PO DAILY 06/25/19 [History Confirmed 06/25/19]  omega 3-dha-epa-fish oil 1,000 mg (120 mg-180 mg) capsule 1 cap PO DAILY 06/25/19 [History Confirmed 06/25/19]  omeprazole 20 mg capsule,delayed release 20 mg PO DAILY 06/25/19 [History Confirmed 06/25/19]  prednisone 20 mg tablet 40 mg PO DAILY 10 Days #15 tab 06/25/19 [Rx Confirmed 06/25/19]  sertraline 100 mg tablet 100 mg PO DAILY 06/25/19 [History Confirmed 06/25/19]  simvastatin 10 mg tablet 10 mg PO BEDTIME 06/25/19 [History Confirmed 06/25/19]    Allergies    No Known Allergies Allergy (Verified 06/25/19 13:37)    PFSH  PFSH  PFSH:     Medical History    Diagnosis unknown (Acute)  Depression, mild  Insomnia, chronic  Les pain  Prostate cancer  Hodgkin lymphoma  Tremor  Peripheral neuropathy  Dysphagia  Gastritis  Schatzki's ring, non-obstructing      Family History  Father  Prostate cancer       diagnosed with cancer  Heart disease  Mother  Breast cancer       diagnosed with cancer      Social History  Smoking Status:  Never smoker   second hand exposure:  No        ROS  Constitutional:   Denies chills, daytime sleepiness, difficulty sleeping, fatigue, fever(s), headache(s), night sweats, snoring, weight gain or weight loss    Eyes:   Denies change in vision    ENT:   Denies vertigo, headache(s), hearing problems or snoring    Cardiovascular:   Denies chest pain, lightheadedness or dyspnea    Respiratory:   Reports cough and excessive phlegm production;   Denies hemoptysis, dyspnea, snoring or wheezing    Gastrointestinal:   Denies constipation, diarrhea, nausea or vomiting    Urinary:   Denies dysuria    Musculoskeletal:   Reports stiffness;   Denies myalgias, arthralgias, muscle weakness or swelling    Integumentary:   Denies skin changes, change in hair, change in nails or rash    Neurological:   Denies lightheadedness, vertigo, headache(s), memory loss or seizures    Psychiatric:   Denies anxiety, depression or memory  loss    Endocrine:   Denies fatigue or thyroid disease    Allergic/Immunologic:   Denies dyspnea, wheezing, neck lymphadenopathy or rash      Vital Signs     06/26/19  09:08     BP    112/68     Blood Pressure Location    Right Arm     Position    Sitting     Respiration    16     Pulse Oximetry (%)    97     Pain Scale (0-10)    0       Exam  General/Constitutional:   cooperative, comfortable and alert   Head:   normocephalic and atraumatic   Eyes:   sclerae normal and PERRL   ENT:   face symmetric;   no erythema and no edema   Neck:   supple;   no lymphadenopathy   Respiratory:   normal respiratory effort, symmetric chest movement, clear to auscultation bilaterally and percussion normal;   no crackles, no rhonchi and no wheezes   Cardiovascular:   regular rate, regular rhythm, S1 normal and S2 normal;   no gallop, no murmur and no rub   Extremities:   no clubbing, no cyanosis and no edema   Integumentary:   skin warm and dry skin;   no rashes noted   Neurological:   moves all extremities     A&P  Assessment & Plan  (1) Bleomycin lung toxicity:         Code(s):  J98.4 - Other disorders of lung; T45.1X5A - Adverse effect of antineoplastic and immunosuppressive drugs, initial encounter        Bleomycin lung toxicity. The patient has had dyspnea with exertion felt to be secondary to bleomycin lung toxicity. He has shown improvement. He has a history of Hodgkin's lymphoma and was treated with ABVD therapy which includes bleomycin.  His 1/24/2019 chest CT that has shown overall stability in his subpleural interstitial changes on the right consistent with bleomycin lung toxicity. Given the time since he has completed chemotherapy combined with his CT changes, bleomycin associated subacute progressive pulmonary fibrosis is most likely. I am following him with pulmonary function testing. His 4/26/2018 PFT has shown a decrease in his TLC, but an increase in his DLCO. This was done at the time that he had an upper  respiratory infection. It is reassuring that his chest CT has shown no changes. At this point, I will continue to follow him with pulmonary function testing on a yearly basis.  He is due for one now.  I will order it and he will be contacted with results.  (2) Dyspnea on exertion:         Code(s):  R06.09 - Other forms of dyspnea        Stable.  (3) Cough:         Code(s):  R05 - Cough        Cough.  Patient's cough may be due to an acquired upper respiratory infection.  I will send in a prescription for azithromycin and prednisone.  He should start the Zithromax, but wait to start the prednisone for at least 2-3 days.  He may not need it but may start it if he is not improving.  Departure  Follow Up:      1 Year  Other Medications:        New:    azithromycin take 500 mg today (day 1), then 250 mg for 4 days (days 2-5) PO 6 tabs 0RF        prednisone     Take 2 tabs daily x 5 days, then 1 tablet daily x 5 days. 40 mg (2 x 20

## 2019-07-03 DIAGNOSIS — I25.10 CORONARY ARTERY DISEASE INVOLVING NATIVE CORONARY ARTERY OF NATIVE HEART WITHOUT ANGINA PECTORIS: ICD-10-CM

## 2019-07-03 NOTE — LETTER
July 8, 2019      Daniel Boyd  [id number]  [place] [address]        Dear Mr. Boyd,     Your pharmacy has requested a refill of atorvastatin.  This medication request is being addressed.     According to our records, you are overdue for annual medication management and labs with Dr. Rudy Morrison. Your last physical and labs were on 5/10/2018.      Your health is very important to us. Please contact our scheduling line at [phone] to set up this appointment at your earliest convenience, and before your next medication refills are needed.     Thank you for choosing [facility] for your health care needs.     Sincerely,        The Refill Nurse  [facility]

## 2019-07-08 RX ORDER — ATORVASTATIN CALCIUM 40 MG/1
TABLET, FILM COATED ORAL
Qty: 90 TABLET | Refills: 0 | Status: SHIPPED | OUTPATIENT
Start: 2019-07-08 | End: 2019-10-28

## 2019-07-08 NOTE — TELEPHONE ENCOUNTER
Prescription approved per [facility] Refill Protocol.  Patient is now due for annual medication management and labs. Reminder letter sent, and chloé refill given.  Delilah Benoit RN on 7/8/2019 at 3:03 PM

## 2019-07-10 ENCOUNTER — HOSPITAL ENCOUNTER (OUTPATIENT)
Dept: CT IMAGING | Facility: OTHER | Age: 59
End: 2019-07-10
Attending: NURSE PRACTITIONER
Payer: COMMERCIAL

## 2019-07-10 ENCOUNTER — HOSPITAL ENCOUNTER (OUTPATIENT)
Dept: CT IMAGING | Facility: OTHER | Age: 59
Discharge: HOME | End: 2019-07-10
Attending: NURSE PRACTITIONER | Admitting: NURSE PRACTITIONER
Payer: COMMERCIAL

## 2019-07-10 DIAGNOSIS — C81.90 HODGKIN LYMPHOMA, UNSPECIFIED HODGKIN LYMPHOMA TYPE, UNSPECIFIED BODY REGION (H): ICD-10-CM

## 2019-07-10 DIAGNOSIS — T45.1X5A BLEOMYCIN LUNG TOXICITY: ICD-10-CM

## 2019-07-10 DIAGNOSIS — J98.4 BLEOMYCIN LUNG TOXICITY: ICD-10-CM

## 2019-07-10 LAB
ALBUMIN SERPL-MCNC: 4.4 G/DL (ref 3.5–5.7)
ALP SERPL-CCNC: 59 U/L (ref 34–104)
ALT SERPL W P-5'-P-CCNC: 18 U/L (ref 7–52)
ANION GAP SERPL CALCULATED.3IONS-SCNC: 5 MMOL/L (ref 3–14)
AST SERPL W P-5'-P-CCNC: 20 U/L (ref 13–39)
BASOPHILS # BLD AUTO: 0 10E9/L (ref 0–0.2)
BASOPHILS NFR BLD AUTO: 0.5 %
BILIRUB SERPL-MCNC: 0.8 MG/DL (ref 0.3–1)
BUN SERPL-MCNC: 14 MG/DL (ref 7–25)
CALCIUM SERPL-MCNC: 9.7 MG/DL (ref 8.6–10.3)
CHLORIDE SERPL-SCNC: 102 MMOL/L (ref 98–107)
CO2 SERPL-SCNC: 28 MMOL/L (ref 21–31)
CREAT SERPL-MCNC: 0.93 MG/DL (ref 0.7–1.3)
DIFFERENTIAL METHOD BLD: NORMAL
EOSINOPHIL # BLD AUTO: 0.2 10E9/L (ref 0–0.7)
EOSINOPHIL NFR BLD AUTO: 2.7 %
ERYTHROCYTE [DISTWIDTH] IN BLOOD BY AUTOMATED COUNT: 12.9 % (ref 10–15)
GFR SERPL CREATININE-BSD FRML MDRD: 83 ML/MIN/{1.73_M2}
GLUCOSE SERPL-MCNC: 102 MG/DL (ref 70–105)
HCT VFR BLD AUTO: 43.9 % (ref 40–53)
HGB BLD-MCNC: 14.8 G/DL (ref 13.3–17.7)
IMM GRANULOCYTES # BLD: 0 10E9/L (ref 0–0.4)
IMM GRANULOCYTES NFR BLD: 0.4 %
LDH SERPL L TO P-CCNC: 134 U/L (ref 140–271)
LYMPHOCYTES # BLD AUTO: 1.4 10E9/L (ref 0.8–5.3)
LYMPHOCYTES NFR BLD AUTO: 25.4 %
MCH RBC QN AUTO: 32 PG (ref 26.5–33)
MCHC RBC AUTO-ENTMCNC: 33.7 G/DL (ref 31.5–36.5)
MCV RBC AUTO: 95 FL (ref 78–100)
MONOCYTES # BLD AUTO: 0.3 10E9/L (ref 0–1.3)
MONOCYTES NFR BLD AUTO: 5.4 %
NEUTROPHILS # BLD AUTO: 3.7 10E9/L (ref 1.6–8.3)
NEUTROPHILS NFR BLD AUTO: 65.6 %
PLATELET # BLD AUTO: 206 10E9/L (ref 150–450)
POTASSIUM SERPL-SCNC: 4 MMOL/L (ref 3.5–5.1)
PROT SERPL-MCNC: 7.4 G/DL (ref 6.4–8.9)
RBC # BLD AUTO: 4.62 10E12/L (ref 4.4–5.9)
SODIUM SERPL-SCNC: 135 MMOL/L (ref 134–144)
WBC # BLD AUTO: 5.6 10E9/L (ref 4–11)

## 2019-07-10 PROCEDURE — 74177 CT ABD & PELVIS W/CONTRAST: CPT

## 2019-07-10 PROCEDURE — 80053 COMPREHEN METABOLIC PANEL: CPT | Performed by: NURSE PRACTITIONER

## 2019-07-10 PROCEDURE — 36415 COLL VENOUS BLD VENIPUNCTURE: CPT | Performed by: NURSE PRACTITIONER

## 2019-07-10 PROCEDURE — 70491 CT SOFT TISSUE NECK W/DYE: CPT

## 2019-07-10 PROCEDURE — 85025 COMPLETE CBC W/AUTO DIFF WBC: CPT | Performed by: NURSE PRACTITIONER

## 2019-07-10 PROCEDURE — 83615 LACTATE (LD) (LDH) ENZYME: CPT | Performed by: NURSE PRACTITIONER

## 2019-07-10 PROCEDURE — 71260 CT THORAX DX C+: CPT

## 2019-07-10 PROCEDURE — 25500064 ZZH RX 255 OP 636: Performed by: NURSE PRACTITIONER

## 2019-07-10 RX ADMIN — IOHEXOL 100 ML: 350 INJECTION, SOLUTION INTRAVENOUS at 14:36

## 2019-07-31 ENCOUNTER — ONCOLOGY VISIT (OUTPATIENT)
Dept: ONCOLOGY | Facility: OTHER | Age: 59
End: 2019-07-31
Attending: INTERNAL MEDICINE
Payer: COMMERCIAL

## 2019-07-31 VITALS
RESPIRATION RATE: 20 BRPM | HEART RATE: 62 BPM | DIASTOLIC BLOOD PRESSURE: 70 MMHG | TEMPERATURE: 98.3 F | BODY MASS INDEX: 19.69 KG/M2 | SYSTOLIC BLOOD PRESSURE: 120 MMHG | HEIGHT: 77 IN | WEIGHT: 166.8 LBS | OXYGEN SATURATION: 98 %

## 2019-07-31 DIAGNOSIS — C81.90 HODGKIN LYMPHOMA, UNSPECIFIED HODGKIN LYMPHOMA TYPE, UNSPECIFIED BODY REGION (H): Primary | ICD-10-CM

## 2019-07-31 LAB — ERYTHROCYTE [SEDIMENTATION RATE] IN BLOOD BY WESTERGREN METHOD: 4 MM/H (ref 1–10)

## 2019-07-31 PROCEDURE — 99215 OFFICE O/P EST HI 40 MIN: CPT | Performed by: INTERNAL MEDICINE

## 2019-07-31 PROCEDURE — 85652 RBC SED RATE AUTOMATED: CPT | Mod: ZL | Performed by: INTERNAL MEDICINE

## 2019-07-31 PROCEDURE — 36415 COLL VENOUS BLD VENIPUNCTURE: CPT | Mod: ZL | Performed by: INTERNAL MEDICINE

## 2019-07-31 ASSESSMENT — MIFFLIN-ST. JEOR: SCORE: 1689.1

## 2019-07-31 ASSESSMENT — PAIN SCALES - GENERAL: PAINLEVEL: NO PAIN (0)

## 2019-07-31 NOTE — NURSING NOTE
"Chief Complaint   Patient presents with     RECHECK     6 month follow up      Patient presents to the clinic today for 6 month follow up.    Haley Marquez LPN on 7/31/2019 at 2:04 PM      Initial /70 (BP Location: Right arm, Patient Position: Sitting, Cuff Size: Adult Regular)   Pulse 62   Temp 98.3  F (36.8  C) (Oral)   Resp 20   Ht 1.956 m (6' 5.01\")   Wt 75.7 kg (166 lb 12.8 oz)   SpO2 98%   BMI 19.78 kg/m   Estimated body mass index is 19.78 kg/m  as calculated from the following:    Height as of this encounter: 1.956 m (6' 5.01\").    Weight as of this encounter: 75.7 kg (166 lb 12.8 oz).  Medication Reconciliation: complete    Haley Marquez LPN  "

## 2019-07-31 NOTE — PROGRESS NOTES
Visit Date:   07/31/2019      HISTORY OF PRESENT ILLNESS:  Mr. Boyd returns for followup of stage IV Hodgkin's lymphoma, classical type.  He recently presented with right neck mass over a period of 2 months.  He was subsequently seen by Dr. Radha Kim of General Surgery who noted the patient had right-sided supraclavicular adenopathy.  The biopsy of the lymph node came back consistent with classical Hodgkin's lymphoma.  He was subsequently seen by Dr. Jarad Mosqueda on 08/05/2015, who recommended a PET scan for accurate staging, which was performed on 08/13/2015.  The findings were that there was hypermetabolic lymphadenopathy both above and below the diaphragm, suspicious for lymphoma.  There was hypermetabolic activity in the spleen suspicious for metastatic disease.  There were multiple foci with abnormal uptake in the spine suspicious for metastatic disease.  There was also a right middle lobe lung nodule that was nonspecific.  Dr. Mosqueda felt the patient had stage IV Hodgkin's lymphoma and recommended ABVD x6 cycles.  An echocardiogram was performed to assess cardiac function on 08/12/2015.  The findings were the patient had normal left ventricular size and uptake and left ventricular ejection fraction was 65%.  The patient was started on ABVD and after 3 cycles and had a repeat PET scan on 11/28/2015.  The findings were that there was marked improvement in the patient's lymphadenopathy over the lower neck, axilla, chest, abdomen and pelvis and groin area.  Focal areas of activity were markedly improved over the cervical and lumbar spine and pelvic area.  The patient subsequently was seen by Dr. Mosqueda on 02/10/2016.  His assessment was the patient had completed 6 cycles of ABVD.  Repeat PET scan was performed on 02/24/2016.  The findings were that there was no abnormal uptake to suggest metastatic disease and no evidence of metastatic disease.  Previously seen lymphadenopathy and bone lesions were  all resolved.  The patient had problems with dysphagia while he was admitted to the hospital for right lower lobe pneumonia and was treated with IV antibiotics.  He did see Dr. Molina for EGD, which revealed hiatal hernia and Schatzki rings.  Biopsies were all negative.  The patient's dysphagia subsequently improved.  The patient did have an echocardiogram, pulmonary function tests in 06/2016.  Ejection fraction was 56%.  Pulmonary functions did reveal decreased DLCO of 50% predicted being reported.  The patient did have some shortness of breath and when we saw him on 07/14/2016, we elected to restage him with a PET scan.  This was done on 10/05/2016, which revealed no evidence of metastatic disease.  He also had been seen by Dr. Cm London of Pulmonary for evaluation of possible bleomycin lung toxicity, for which the patient dyspnea on exertion.  He felt the symptoms had been stable, but not progressive.  He did have a prechemotherapy pulmonary function test for comparison.  He felt the patient certainly could have pulmonary fibrosis due to bleomycin, as well as other forms of bleomycin toxicity or hypersensitivity pneumonitis.  He also obtained CT chest with high-resolution cuts and also repeated his pulmonary function tests.  The patient had a high-resolution CT chest performed on 10/03/2016.  Findings were there were new mild subpleural fibrotic changes within the anterior right upper lobe, which may be related to chemotherapy.  The patient saw Dr. Cm London on 10/12/2016.  His impression of the pulmonary function tests revealed stability of his lung volumes and DLCO revealed improved spirometry.  He felt his dyspnea on exertion was stable, not progressive.  He reviewed the CT scan of the chest, which revealed the patient had subpleural interstitial changes on the right, consistent with bleomycin lung toxicity.  Plan was to follow him with serial pulmonary function tests to ensure stability.  The patient  had a repeat PET scan on 04/12/2017.  The findings were that the patient had stable tiny right lung nodules that were unchanged, with fibrosis in the right lung.  There was no lymphadenopathy.  The patient had also seen Dr. Cm London in Pulmonary, whose assessment was the patient had bleomycin lung toxicity.  The patient had dyspnea on exertion he felt to be secondary to bleomycin lung toxicity.  The patient did see Dr. Cm London on 04/09/2017.  His assessment was the patient had bleomycin lung toxicity and felt that his CT chest had shown overall stability, although there were still pleural interstitial changes on the right consistent with bleomycin lung toxicity.  When we saw him on 04/19/2019, we wanted to restage him given the fact that he had stage IV disease with a PET scan.  This was obtained on 07/14/2017 and the findings were there was no evidence of residual recurrent lymphoma.  There were minimal subpleural fibrotic changes.  We also did an echocardiogram, which was read as borderline left ventricular ejection fraction with a calculated left ventricular ejection fraction of 49%.  His prior echocardiogram revealed ejection fraction of 56%.  The patient was seen by Dr. 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  They recommended the patient start lisinopril 2.5 mg daily and ACE inhibitor therapy.  She also ordered CT of the coronary arteries, which was done on 08/28/2017.  The findings were there was a total calcium score of 26.1, which is the 98th percentile of subjects with the same age, gender, race and ethnicity.  There was a prominent ramus intermediate artery with prominent moderate focal proximal stenosis, multifocal mild narrowing, proximal mid-LAD focal scarring, anterolateral right small hiatal hernia.  The patient was scheduled to have  an echo in November.  He did see Dr. Cm London in November for followup.  His assessment was the patient had stable bleomycin lung, stable DLCO and he would continue to monitor every 6 months.  The patient had staging studies as well as 12/29/2017.  CT chest, abdomen and pelvis were all negative.  These were repeated again on 04/26/2019.  CT neck, chest, abdomen and pelvis again were all negative.  His LDH remained normal.  Otherwise, the patient had seen Ashwini Linares, nurse practitioner on 01/14/2019.  The patient did have CT scans on 08/29/2018, which were negative.  He had seen Dr. Cm London who felt the patient had stable bleomycin lung, stable DLCO and the plan was to monitor PFTs on a yearly basis.  The plan is to have PFTs done this week.  He did have repeat scans again done on 07/10 of this year.  CT neck was negative.  CT chest, abdomen and pelvis was negative.  The patient's last echocardiogram was in January of last year.  Ejection fraction was 65%.  Otherwise, he offers no other complaints of shortness of breath, chest pain, abdominal pain, fevers, night sweats, weight loss.  He did not have a sed rate done.  He said he is performing aerobic exercise, including bicycling as well as golfing and lawn mowing without any difficulty.      PHYSICAL EXAMINATION:   GENERAL:  He is a well-developed, well-nourished middle-aged white male in no acute distress.   VITAL SIGNS:  Blood pressure 120/70, pulse 62, respirations 20, temperature 98.3.   HEENT:  Atraumatic, normocephalic.  Oropharynx nonerythematous.   NECK:  Supple.   LUNGS:  Clear to auscultation and percussion.   HEART:  Regular rhythm, S1, S2 normal.   ABDOMEN:  Soft, normoactive bowel sounds.  No mass, nontender.   LYMPHATICS:  No cervical, supraclavicular, axillary or inguinal nodes.   EXTREMITIES:  No edema.   NEUROLOGIC:  Nonfocal.      LABORATORY DATA:  Reveal CBC within normal limits.  BUN is 14, creatinine 0.93.  LFTs are normal.  LDH  is normal.      IMPRESSION:  Stage IV classical Hodgkin lymphoma presenting with right supraclavicular lymphadenopathy, biopsy consistent with classical Hodgkin lymphoma involving the spine, pelvis, as well as lymphadenopathy above and below the diaphragm.  He responded to 6 cycles of ABVD.  Course complicated with neutropenic fever.  Echocardiogram revealed normal cardiac function.  Pulmonary function tests revealed a depressed DLCO.  PET scan was negative for metastatic disease.  Recent echo did reveal decreased left ventricular ejection fraction.  The patient was placed on ACE inhibitor per Siomara Ruvalcaba, nurse practitioner.  Otherwise, the patient has no evidence of disease.  He will continue to follow up with Dr. Cm London concerning bleomycin lung, as well as see us in 3 months, obtain CBC, CMP, LDH, sed rate.  We will await today's sed rate resolved.  The patient will be due for scans in 6 months.  We would also like to add an echocardiogram.      Forty minutes was spent with the patient, greater than half the time spent in counseling and coordination of care.         BRENDA WILD MD             D: 2019   T: 2019   MT: DOUGLAS      Name:     SAM AGUIRRE   MRN:      [id number]        Account:      [id number]   :      1960           Visit Date:   2019      Document: [id number]       cc: Cm RUVALCABA APRN, CNP       Rudy Morrison MD

## 2019-08-07 ENCOUNTER — HOSPITAL ENCOUNTER (OUTPATIENT)
Dept: RESPIRATORY THERAPY | Facility: OTHER | Age: 59
Discharge: HOME | End: 2019-08-07
Attending: INTERNAL MEDICINE | Admitting: INTERNAL MEDICINE
Payer: COMMERCIAL

## 2019-08-07 PROCEDURE — 94729 DIFFUSING CAPACITY: CPT | Mod: 26 | Performed by: INTERNAL MEDICINE

## 2019-08-07 PROCEDURE — 94726 PLETHYSMOGRAPHY LUNG VOLUMES: CPT | Mod: 26 | Performed by: INTERNAL MEDICINE

## 2019-08-07 PROCEDURE — 94010 BREATHING CAPACITY TEST: CPT | Mod: 26 | Performed by: INTERNAL MEDICINE

## 2019-08-07 PROCEDURE — 40000275 ZZH STATISTIC RCP TIME EA 10 MIN

## 2019-08-07 PROCEDURE — 94729 DIFFUSING CAPACITY: CPT

## 2019-08-07 PROCEDURE — 94010 BREATHING CAPACITY TEST: CPT

## 2019-08-07 PROCEDURE — 94726 PLETHYSMOGRAPHY LUNG VOLUMES: CPT

## 2019-09-29 ENCOUNTER — HEALTH MAINTENANCE LETTER (OUTPATIENT)
Age: 59
End: 2019-09-29

## 2019-10-22 ENCOUNTER — OFFICE VISIT (OUTPATIENT)
Dept: PEDIATRICS | Facility: OTHER | Age: 59
End: 2019-10-22
Attending: INTERNAL MEDICINE
Payer: COMMERCIAL

## 2019-10-22 VITALS
OXYGEN SATURATION: 94 % | HEIGHT: 77 IN | BODY MASS INDEX: 19.87 KG/M2 | WEIGHT: 168.3 LBS | TEMPERATURE: 97.8 F | RESPIRATION RATE: 14 BRPM | DIASTOLIC BLOOD PRESSURE: 74 MMHG | HEART RATE: 68 BPM | SYSTOLIC BLOOD PRESSURE: 120 MMHG

## 2019-10-22 DIAGNOSIS — H10.13 ALLERGIC CONJUNCTIVITIS, BILATERAL: Primary | ICD-10-CM

## 2019-10-22 DIAGNOSIS — C81.90 HODGKIN LYMPHOMA, UNSPECIFIED HODGKIN LYMPHOMA TYPE, UNSPECIFIED BODY REGION (H): ICD-10-CM

## 2019-10-22 LAB
ALBUMIN SERPL-MCNC: 4.5 G/DL (ref 3.5–5.7)
ALP SERPL-CCNC: 51 U/L (ref 34–104)
ALT SERPL W P-5'-P-CCNC: 22 U/L (ref 7–52)
ANION GAP SERPL CALCULATED.3IONS-SCNC: 7 MMOL/L (ref 3–14)
AST SERPL W P-5'-P-CCNC: 20 U/L (ref 13–39)
BASOPHILS # BLD AUTO: 0 10E9/L (ref 0–0.2)
BASOPHILS NFR BLD AUTO: 0.4 %
BILIRUB SERPL-MCNC: 0.9 MG/DL (ref 0.3–1)
BUN SERPL-MCNC: 20 MG/DL (ref 7–25)
CALCIUM SERPL-MCNC: 9.8 MG/DL (ref 8.6–10.3)
CHLORIDE SERPL-SCNC: 103 MMOL/L (ref 98–107)
CO2 SERPL-SCNC: 26 MMOL/L (ref 21–31)
CREAT SERPL-MCNC: 0.99 MG/DL (ref 0.7–1.3)
DIFFERENTIAL METHOD BLD: NORMAL
EOSINOPHIL # BLD AUTO: 0 10E9/L (ref 0–0.7)
EOSINOPHIL NFR BLD AUTO: 0.6 %
ERYTHROCYTE [DISTWIDTH] IN BLOOD BY AUTOMATED COUNT: 12.6 % (ref 10–15)
ERYTHROCYTE [SEDIMENTATION RATE] IN BLOOD BY WESTERGREN METHOD: 3 MM/H (ref 1–10)
GFR SERPL CREATININE-BSD FRML MDRD: 77 ML/MIN/{1.73_M2}
GLUCOSE SERPL-MCNC: 133 MG/DL (ref 70–105)
HCT VFR BLD AUTO: 46.4 % (ref 40–53)
HGB BLD-MCNC: 15.2 G/DL (ref 13.3–17.7)
IMM GRANULOCYTES # BLD: 0 10E9/L (ref 0–0.4)
IMM GRANULOCYTES NFR BLD: 0.1 %
LDH SERPL L TO P-CCNC: 136 U/L (ref 140–271)
LYMPHOCYTES # BLD AUTO: 1.3 10E9/L (ref 0.8–5.3)
LYMPHOCYTES NFR BLD AUTO: 18.8 %
MCH RBC QN AUTO: 31.4 PG (ref 26.5–33)
MCHC RBC AUTO-ENTMCNC: 32.8 G/DL (ref 31.5–36.5)
MCV RBC AUTO: 96 FL (ref 78–100)
MONOCYTES # BLD AUTO: 0.3 10E9/L (ref 0–1.3)
MONOCYTES NFR BLD AUTO: 4.7 %
NEUTROPHILS # BLD AUTO: 5.2 10E9/L (ref 1.6–8.3)
NEUTROPHILS NFR BLD AUTO: 75.4 %
PLATELET # BLD AUTO: 209 10E9/L (ref 150–450)
POTASSIUM SERPL-SCNC: 4.3 MMOL/L (ref 3.5–5.1)
PROT SERPL-MCNC: 7.3 G/DL (ref 6.4–8.9)
RBC # BLD AUTO: 4.84 10E12/L (ref 4.4–5.9)
SODIUM SERPL-SCNC: 136 MMOL/L (ref 134–144)
WBC # BLD AUTO: 6.9 10E9/L (ref 4–11)

## 2019-10-22 PROCEDURE — 36415 COLL VENOUS BLD VENIPUNCTURE: CPT | Mod: ZL | Performed by: INTERNAL MEDICINE

## 2019-10-22 PROCEDURE — 90471 IMMUNIZATION ADMIN: CPT | Performed by: INTERNAL MEDICINE

## 2019-10-22 PROCEDURE — 99213 OFFICE O/P EST LOW 20 MIN: CPT | Mod: 25 | Performed by: INTERNAL MEDICINE

## 2019-10-22 PROCEDURE — 85025 COMPLETE CBC W/AUTO DIFF WBC: CPT | Mod: ZL | Performed by: INTERNAL MEDICINE

## 2019-10-22 PROCEDURE — 83615 LACTATE (LD) (LDH) ENZYME: CPT | Mod: ZL | Performed by: INTERNAL MEDICINE

## 2019-10-22 PROCEDURE — 90686 IIV4 VACC NO PRSV 0.5 ML IM: CPT | Performed by: INTERNAL MEDICINE

## 2019-10-22 PROCEDURE — 80053 COMPREHEN METABOLIC PANEL: CPT | Mod: ZL | Performed by: INTERNAL MEDICINE

## 2019-10-22 PROCEDURE — 85652 RBC SED RATE AUTOMATED: CPT | Mod: ZL | Performed by: INTERNAL MEDICINE

## 2019-10-22 RX ORDER — POLYMYXIN B SULFATE AND TRIMETHOPRIM 1; 10000 MG/ML; [USP'U]/ML
1-2 SOLUTION OPHTHALMIC EVERY 4 HOURS
Qty: 10 ML | Refills: 0 | Status: SHIPPED | OUTPATIENT
Start: 2019-10-22 | End: 2020-06-09

## 2019-10-22 ASSESSMENT — ANXIETY QUESTIONNAIRES
6. BECOMING EASILY ANNOYED OR IRRITABLE: NOT AT ALL
GAD7 TOTAL SCORE: 0
7. FEELING AFRAID AS IF SOMETHING AWFUL MIGHT HAPPEN: NOT AT ALL
1. FEELING NERVOUS, ANXIOUS, OR ON EDGE: NOT AT ALL
3. WORRYING TOO MUCH ABOUT DIFFERENT THINGS: NOT AT ALL
IF YOU CHECKED OFF ANY PROBLEMS ON THIS QUESTIONNAIRE, HOW DIFFICULT HAVE THESE PROBLEMS MADE IT FOR YOU TO DO YOUR WORK, TAKE CARE OF THINGS AT HOME, OR GET ALONG WITH OTHER PEOPLE: NOT DIFFICULT AT ALL
5. BEING SO RESTLESS THAT IT IS HARD TO SIT STILL: NOT AT ALL
2. NOT BEING ABLE TO STOP OR CONTROL WORRYING: NOT AT ALL

## 2019-10-22 ASSESSMENT — PAIN SCALES - GENERAL: PAINLEVEL: NO PAIN (0)

## 2019-10-22 ASSESSMENT — PATIENT HEALTH QUESTIONNAIRE - PHQ9
SUM OF ALL RESPONSES TO PHQ QUESTIONS 1-9: 0
5. POOR APPETITE OR OVEREATING: NOT AT ALL

## 2019-10-22 ASSESSMENT — MIFFLIN-ST. JEOR: SCORE: 1690.78

## 2019-10-22 NOTE — PROGRESS NOTES
Subjective  Daniel Boyd is a 59 year old male who presents for eye problem.  He developed a problem with his left eye 7 to 10 days ago.  He had [place] and mulching and he thought maybe he got something in the eye.  His wife is an emergency room nurse and she flushed the eye but it really did not change anything.  He is been using moisturizing eyedrops which help briefly.  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  He is had cold symptoms for about a month.  He is had an off-and-on scratchy throat.  He feels a little blurry vision on the left eye.  No double vision.  No sneezing.  No itching of ears.  No heartburn.  No fever.  No body aches.  He wears glasses not contacts.  His eye doctor is the [facility].  He has not been seen there for about 2 years.  He has a personal history of Hodgkin lymphoma status post chemotherapy.  He is in remission.    Problem List/PMH: reviewed in EMR, and made relevant updates today.  Medications: reviewed in EMR, and made relevant updates today.  Allergies: reviewed in EMR, and made relevant updates today.    Social Hx:  Social History     Tobacco Use     Smoking status: Never Smoker     Smokeless tobacco: Never Used   Substance Use Topics     Alcohol use: Yes     Alcohol/week: 7.0 standard drinks     Comment: 1 beer per day     Drug use: No     Social History     Patient does not qualify to have social determinant information on file (likely too young).   Social History Narrative    Daughter Velvet BD 89.  Son Nick BD 92  Spouse Candis, BD 59   .  He is a golf pro, also drives school bus.  Wife is Candis, nurse at [employer].      Consent signed for Candis     I reviewed social history and made relevant updates today.    Family Hx:   Family History   Problem Relation Age of Onset     Prostate Cancer Father         Also had a CABG and  during open heart surgery, .     Breast Cancer  "Mother      Family History Negative Sister         Good Health     Family History Negative Sister         Good Health     Prostate Cancer Brother         Cancer-prostate,Age 47.     Heart Disease Brother        Objective  Vitals: reviewed in EMR.  /74 (BP Location: Right arm, Patient Position: Sitting, Cuff Size: Adult Large)   Pulse 68   Temp 97.8  F (36.6  C) (Tympanic)   Resp 14   Ht 1.956 m (6' 5\")   Wt 76.3 kg (168 lb 4.8 oz)   SpO2 94%   BMI 19.96 kg/m      Gen: Pleasant male, NAD.  HEENT: MMM.  Tympanic membranes are normal bilaterally.  No OP erythema.  Conjunctival erythema is present on the left.  Erythema is present on the inner aspect of the lower eyelids bilaterally.  Nasal turbinates are pink.  Neck: Supple  Pulm: Breathing easily  Neuro: Pupils are equal round and reactive to light.  Extraocular movements are intact.  Skin: No concerning lesions.  Psychiatric: Normal affect and insight. Does not appear anxious or depressed.    Assessment    ICD-10-CM    1. Allergic conjunctivitis, bilateral H10.13 trimethoprim-polymyxin b (POLYTRIM) 46098-0.1 UNIT/ML-% ophthalmic solution     Orders Placed This Encounter   Procedures     GH-IMM- FLU VAC PRESRV FREE QUAD SPLIT VIR > 6 MONTHS IM       Differential diagnosis includes allergic conjunctivitis, viral conjunctivitis, contact conjunctivitis, hordeolum, foreign body, corneal scratch, glaucoma, others.  We discussed this differential at length today.  I recommend topical antibiotics and return to eye doctor or an exam and rule out glaucoma.    Plan   -- Expected clinical course discussed   -- Medications and their side effects discussed  Patient Instructions    -- Start loratadine 10 mg daily   -- Okay to also use diphenhydramine 25-50 mg 3x/day as needed   -- Antibiotic drops   -- See the eye doctor   -- Return if worse      Return if symptoms worsen or fail to improve.    Signed, Siva Liu MD, FAAP, FACP  Internal Medicine & Pediatrics    "

## 2019-10-22 NOTE — PATIENT INSTRUCTIONS
-- Start loratadine 10 mg daily   -- Okay to also use diphenhydramine 25-50 mg 3x/day as needed   -- Antibiotic drops   -- See the eye doctor   -- Return if worse

## 2019-10-22 NOTE — NURSING NOTE
Patient presents to clinic for left eye pain and redness that started about 10 days ago.  Carolann Samano LPN ....................  10/22/2019   10:34 AM      No LMP for male patient.  Medication Reconciliation: complete    Carolann Samano LPN  10/22/2019 10:34 AM

## 2019-10-22 NOTE — NURSING NOTE
Immunization Documentation    Prior to Immunization administration, verified patients identity using patient's name and date of birth. Please see IMMUNIZATIONS  and order for additional information.  Patient / Parent instructed to remain in clinic for 15 minutes and report any adverse reaction to staff immediately.    Was entire vial of medication used? Yes  Vial/Syringe: Syringe    Carolann Samano LPN  10/22/2019   10:53 AM

## 2019-10-23 ASSESSMENT — ANXIETY QUESTIONNAIRES: GAD7 TOTAL SCORE: 0

## 2019-10-28 ENCOUNTER — TELEPHONE (OUTPATIENT)
Dept: INTERNAL MEDICINE | Facility: OTHER | Age: 59
End: 2019-10-28

## 2019-10-28 DIAGNOSIS — F51.04 CHRONIC INSOMNIA: ICD-10-CM

## 2019-10-28 DIAGNOSIS — I25.10 CORONARY ARTERY DISEASE INVOLVING NATIVE CORONARY ARTERY OF NATIVE HEART WITHOUT ANGINA PECTORIS: ICD-10-CM

## 2019-10-28 NOTE — LETTER
November 1, 2019      Daniel Boyd  [id number]  [place] [address]        Dear Daniel,     A refill request was received from your pharmacy for Lipitor and Lorazepam.    Additional refills require an office visit with Dr. Morrison for annual review and labs.    Please call [phone] to schedule appointment.      Sincerely,      Refill Nurse

## 2019-10-29 ENCOUNTER — ONCOLOGY VISIT (OUTPATIENT)
Dept: ONCOLOGY | Facility: OTHER | Age: 59
End: 2019-10-29
Attending: INTERNAL MEDICINE
Payer: COMMERCIAL

## 2019-10-29 VITALS
TEMPERATURE: 97 F | HEART RATE: 68 BPM | HEIGHT: 77 IN | RESPIRATION RATE: 16 BRPM | DIASTOLIC BLOOD PRESSURE: 72 MMHG | SYSTOLIC BLOOD PRESSURE: 122 MMHG | BODY MASS INDEX: 19.96 KG/M2 | WEIGHT: 169 LBS

## 2019-10-29 DIAGNOSIS — C81.90 HODGKIN LYMPHOMA, UNSPECIFIED HODGKIN LYMPHOMA TYPE, UNSPECIFIED BODY REGION (H): Primary | ICD-10-CM

## 2019-10-29 PROCEDURE — 99213 OFFICE O/P EST LOW 20 MIN: CPT | Performed by: NURSE PRACTITIONER

## 2019-10-29 ASSESSMENT — PATIENT HEALTH QUESTIONNAIRE - PHQ9: SUM OF ALL RESPONSES TO PHQ QUESTIONS 1-9: 0

## 2019-10-29 ASSESSMENT — MIFFLIN-ST. JEOR: SCORE: 1694.08

## 2019-10-29 ASSESSMENT — PAIN SCALES - GENERAL: PAINLEVEL: NO PAIN (0)

## 2019-10-29 NOTE — PROGRESS NOTES
Oncology Follow-up Visit:  October 29, 2019  Diagnosis:Lymphoma    History Of Present Illness:  Patient presents for followup of stage IV Hodgkin lymphoma, classical type.  He originally presented with right neck mass over a period of 2 months.  He was subsequently seen by Dr. Radha Kim of General Surgery who noted the patient had right-sided supraclavicular adenopathy.  The biopsy of the lymph node came back consistent with classical Hodgkin lymphoma.  He was subsequently seen by Dr. Jarad Mosqueda on 08/05/2015, who recommended a PET scan for accurate staging.  This was performed on 08/13/2015.  The findings were that there was a hypermetabolic lymphadenopathy both above and below the diaphragm, suspicious for lymphoma.  There was hypermetabolic activity in the spleen suspicious for metastatic disease.  There were multiple foci with abnormal uptake in the spine suspicious for metastatic disease.  There was also a right middle lobe lung nodule that was nonspecific.  Dr. Mosqueda felt the patient had stage IV Hodgkin lymphoma and recommended ABVD x 6 cycles.  An echocardiogram was performed to assess his cardiac function on 08/12/2015.  The findings were that the patient had normal left ventricular size and uptake, and left ventricular ejection fraction was 65%.  The patient was started on ABVD and after 3 cycles he had a repeat PET scan on 11/28/2015.  The findings were that there was marked improvement in the patient's lymphadenopathy over the lower neck, axilla, chest, abdomen, pelvis and groin area.  Focal areas of activity were markedly improved over the cervical, lumbar spine and pelvic area.  The patient subsequently was seen by Dr. Mosqueda on 02/10/2016 and assessment was the patient had completed 6 cycles of ABVD.  Repeat PET scan was performed on 02/24/2016.  The findings were that there was no abnormal uptake to suggest metastatic disease and no evidence of metastatic disease.  Previously seen  lymphadenopathy and bone lesions had all resolved.     The patient has had problems with dysphagia.  He did see Dr. Cook for an EGD which revealed hiatal hernia and Schatzki's rings.  Biopsies were all negative.  The patient's dysphagia subsequently improved. When patient was seen on 06/08/2016, and he had no evidence of disease clinically. We obtained an echocardiogram as well as pulmonary function tests.  Echocardiogram revealed no change.  Ejection fraction was 56%.  Pulmonary functions did reveal decreased DLCO of 50% predicted being reported.  The patient did have some shortness of breath when we saw him on 07/14/2016, and we elected to restage him with a PET scan.  This was done on 10/05/2016, which revealed no suspicious hypermetabolic osseous lesions identified, no evidence of recurrent lymphoma.     Patient was seen by Dr. 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  He felt his symptoms had been stable, but not progressive.  He did have a prechemotherapy pulmonary function test for comparison and he felt the patient certainly could have pulmonary fibrosis due to bleomycin as well as other forms of bleomycin toxicity or hypersensitivity pneumonitis. The patient had a high-resolution CT chest performed 10/03/2016, and findings were there was new mild subpleural fibrotic changes within the anterior right upper lobe which may be related to chemotherapy. The plan was to follow him with serial pulmonary function tests to ensure stability. He follow with pulmonology on a yearly basis.    PET scan was obtained on 07/14/2017, and the findings were there was no evidence of residual recurrent lymphoma. There were minimal subpleural fibrotic changes.  We also did an echocardiogram which was read as borderline left ventricular reduced fraction which the calculated left ventricular ejection fraction of 49%.  His prior echocardiogram  "revealed ejection fraction of 56%.  The patient was seen by Dr. Collier from Cardiology and the patient was also seen by Siomara Ruvalcaba, his nurse practitioner, on 08/03/2017.  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  They recommended the patient start lisinopril 2.5 mg daily and ACE inhibitor therapy.  He did have CT scans done on 8/29/18 showing no evidence of recurrent or metastatic disease. It was felt that patient could have CT scans done every 6 months for a total of 5 years per Dr Leung. Last CT scans were done on 7/10/19 and showed no evidence or disease. Patient has been feeling well. Recent labs are stable. WBC is 6.9, hemoglobin 15.2, platelets are 209,000. Patient denies any shortness of breath, chest pain, or night sweats. He states he is due for a colonoscopy this year and will get this schedules. He otherwise has no new concerns.    Review Of Systems:  Review Of Systems  Eyes/Ears/Nose/Throat: denies new vision or hearing changes  Respiratory: Reports occasional cough. No shortness of breath, dyspnea on exertion, or hemoptysis  Cardiovascular: denies chest pain or palpitaions  Gastrointestinal: denies abdominal pain, diarrhea, constipation  Genitourinary: denies dysuria or hematuria  Musculoskeletal: denies new bone pain or muscle weakness  Neurologic: denies headaches, no dizziness  Hematologic/Lymphatic/Immunologic: denies fevers, chills, night sweats      Nursing Notes:   Jennifer Villaseñor LPN  10/29/2019  9:54 AM  Signed  Chief Complaint   Patient presents with     RECHECK     Lymphoma       Initial /72 (BP Location: Right arm, Patient Position: Sitting, Cuff Size: Adult Regular)   Pulse 68   Temp 97  F (36.1  C) (Tympanic)   Resp 16   Ht 1.956 m (6' 5.01\")   Wt 76.7 kg (169 lb)   BMI 20.04 kg/m    Estimated body mass index is 20.04 kg/m  as calculated from the following:    Height as of this " "encounter: 1.956 m (6' 5.01\").    Weight as of this encounter: 76.7 kg (169 lb).  Medication Reconciliation: complete  Jennifer Villaseñor LPN    Past medical, social, surgical, and family histories reviewed.    Allergies:  Allergies as of 10/29/2019     (No Known Allergies)       Current Medications:  Current Outpatient Medications   Medication Sig Dispense Refill     aspirin 81 MG EC tablet Take 1 tablet (81 mg) by mouth daily       atorvastatin (LIPITOR) 40 MG tablet TAKE 1 TABLET BY MOUTH ONCE DAILY 90 tablet 0     fish oil-omega-3 fatty acids 1000 MG capsule Take 1 capsule by mouth daily       lisinopril (PRINIVIL/ZESTRIL) 2.5 MG tablet TAKE 1 TABLET BY MOUTH ONCE DAILY 90 tablet 2     LORazepam (ATIVAN) 1 MG tablet TAKE 1 TABLET BY MOUTH AT BEDTIME 90 tablet 1     Multiple Vitamins-Minerals (MULTIVITAMIN & MINERAL PO) Take 1 tablet by mouth every morning       omeprazole (PRILOSEC) 20 MG CR capsule Take 1 capsule (20 mg) by mouth daily 30 capsule      sertraline (ZOLOFT) 100 MG tablet Take 1 tablet by mouth once daily. 90 tablet 3     trimethoprim-polymyxin b (POLYTRIM) 06788-3.1 UNIT/ML-% ophthalmic solution Place 1-2 drops Into the left eye every 4 hours 10 mL 0        Physical Exam:  /72 (BP Location: Right arm, Patient Position: Sitting, Cuff Size: Adult Regular)   Pulse 68   Temp 97  F (36.1  C) (Tympanic)   Resp 16   Ht 1.956 m (6' 5.01\")   Wt 76.7 kg (169 lb)   BMI 20.04 kg/m      GENERAL APPEARANCE: 59 year old male, alert and no distress     NECK: no adenopathy, no asymmetry or masses     LYMPHATICS: No cervical, supraclavicular, axillary lymphadenopathy     RESP: lungs clear to auscultation - no rales, rhonchi or wheezes     CARDIOVASCULAR: regular rates and rhythm, normal S1 S2     ABDOMEN:  soft, nontender, no HSM or masses and bowel sounds normal     MUSCULOSKELETAL: extremities normal- no gross deformities noted, No edema b/l LE.     SKIN: no suspicious lesions or rashes     " PSYCHIATRIC: mentation appears normal and affect normal    Laboratory/Imaging Studies  Orders Only on 10/22/2019   Component Date Value Ref Range Status     Lactate Dehydrogenase 10/22/2019 136* 140 - 271 U/L Final     Sed Rate 10/22/2019 3  1 - 10 mm/h Final     Sodium 10/22/2019 136  134 - 144 mmol/L Final     Potassium 10/22/2019 4.3  3.5 - 5.1 mmol/L Final     Chloride 10/22/2019 103  98 - 107 mmol/L Final     Carbon Dioxide 10/22/2019 26  21 - 31 mmol/L Final     Anion Gap 10/22/2019 7  3 - 14 mmol/L Final     Glucose 10/22/2019 133* 70 - 105 mg/dL Final     Urea Nitrogen 10/22/2019 20  7 - 25 mg/dL Final     Creatinine 10/22/2019 0.99  0.70 - 1.30 mg/dL Final     GFR Estimate 10/22/2019 77  >60 mL/min/[1.73_m2] Final     GFR Estimate If Black 10/22/2019 >90  >60 mL/min/[1.73_m2] Final     Calcium 10/22/2019 9.8  8.6 - 10.3 mg/dL Final     Bilirubin Total 10/22/2019 0.9  0.3 - 1.0 mg/dL Final     Albumin 10/22/2019 4.5  3.5 - 5.7 g/dL Final     Protein Total 10/22/2019 7.3  6.4 - 8.9 g/dL Final     Alkaline Phosphatase 10/22/2019 51  34 - 104 U/L Final     ALT 10/22/2019 22  7 - 52 U/L Final     AST 10/22/2019 20  13 - 39 U/L Final     WBC 10/22/2019 6.9  4.0 - 11.0 10e9/L Final     RBC Count 10/22/2019 4.84  4.4 - 5.9 10e12/L Final     Hemoglobin 10/22/2019 15.2  13.3 - 17.7 g/dL Final     Hematocrit 10/22/2019 46.4  40.0 - 53.0 % Final     MCV 10/22/2019 96  78 - 100 fl Final     MCH 10/22/2019 31.4  26.5 - 33.0 pg Final     MCHC 10/22/2019 32.8  31.5 - 36.5 g/dL Final     RDW 10/22/2019 12.6  10.0 - 15.0 % Final     Platelet Count 10/22/2019 209  150 - 450 10e9/L Final     Diff Method 10/22/2019 Automated Method   Final     % Neutrophils 10/22/2019 75.4  % Final     % Lymphocytes 10/22/2019 18.8  % Final     % Monocytes 10/22/2019 4.7  % Final     % Eosinophils 10/22/2019 0.6  % Final     % Basophils 10/22/2019 0.4  % Final     % Immature Granulocytes 10/22/2019 0.1  % Final     Absolute Neutrophil  10/22/2019 5.2  1.6 - 8.3 10e9/L Final     Absolute Lymphocytes 10/22/2019 1.3  0.8 - 5.3 10e9/L Final     Absolute Monocytes 10/22/2019 0.3  0.0 - 1.3 10e9/L Final     Absolute Eosinophils 10/22/2019 0.0  0.0 - 0.7 10e9/L Final     Absolute Basophils 10/22/2019 0.0  0.0 - 0.2 10e9/L Final     Abs Immature Granulocytes 10/22/2019 0.0  0 - 0.4 10e9/L Final        ASSESSMENT/PLAN:  Stage IV classical Hodgkin lymphoma presenting with right supraclavicular lymphadenopathy, biopsy consistent with classical Hodgkin lymphoma involving the spine, pelvis, as well as lymphadenopathy above and below the diaphragm.  He responded to 6 cycles of ABVD.  Course complicated with neutropenic fever. Pulmonary function tests revealed a depressed DLCO.  PET scan was negative for metastatic disease. Most recent CT from 7/2019 shows no evidence of disease. Labs are all stable.  He will continue to follow up with Dr. Cm London on a yearly basis for bleomycin lung. We will see patient back in January 2020 with  A CBC, CMP, LDH, sed rate and CT neck, chest, abdomen and pelvis.      Twenty minutes spent with patient with greater than 50% of that time spent counseling patient regarding disease process, interpretation of labs, discussing plan for follow up and coordination of care

## 2019-10-29 NOTE — NURSING NOTE
"Chief Complaint   Patient presents with     RECHECK     Lymphoma       Initial /72 (BP Location: Right arm, Patient Position: Sitting, Cuff Size: Adult Regular)   Pulse 68   Temp 97  F (36.1  C) (Tympanic)   Resp 16   Ht 1.956 m (6' 5.01\")   Wt 76.7 kg (169 lb)   BMI 20.04 kg/m   Estimated body mass index is 20.04 kg/m  as calculated from the following:    Height as of this encounter: 1.956 m (6' 5.01\").    Weight as of this encounter: 76.7 kg (169 lb).  Medication Reconciliation: complete  Jennifer Villaseñor LPN  "

## 2019-11-01 RX ORDER — ATORVASTATIN CALCIUM 40 MG/1
TABLET, FILM COATED ORAL
Qty: 90 TABLET | Refills: 2 | Status: SHIPPED | OUTPATIENT
Start: 2019-11-01 | End: 2020-07-29

## 2019-11-01 RX ORDER — LORAZEPAM 1 MG/1
TABLET ORAL
Qty: 90 TABLET | Refills: 0 | Status: SHIPPED | OUTPATIENT
Start: 2019-11-01 | End: 2020-01-29

## 2019-11-01 NOTE — TELEPHONE ENCOUNTER
Routing refill request to provider for review/approval because:  Drug not on the [facility] refill protocol   Labs not current:  Lipid  Patient needs to be seen because it has been more than 1 year since last office visit.  LOV: 5/10/18  Letter sent  Dr. Morrison out of office will route to covering team let for review and consideration.  Lupe London RN on 11/1/2019 at 12:22 PM

## 2019-12-16 DIAGNOSIS — F33.0 MAJOR DEPRESSIVE DISORDER, RECURRENT EPISODE, MILD (H): ICD-10-CM

## 2019-12-16 NOTE — LETTER
December 19, 2019      Daniel Boyd  [address]  [facility] [place] [address]        Dear Daniel,     A refill request was received from your pharmacy for Zoloft.    Additional refills require an office visit with Dr. Morrison for annual review.    Please call [phone] to schedule appointment.      Sincerely,      Refill Nurse

## 2019-12-19 RX ORDER — SERTRALINE HYDROCHLORIDE 100 MG/1
TABLET, FILM COATED ORAL
Qty: 90 TABLET | Refills: 3 | Status: SHIPPED | OUTPATIENT
Start: 2019-12-19 | End: 2020-12-11

## 2019-12-19 NOTE — TELEPHONE ENCOUNTER
Routing refill request to provider for review/approval because:  Patient needs to be seen because it has been more than 1 year since last office visit.    LOV: 5/10/18  Patient is due for annual review  Letter and my chart message sent.    Lupe London RN on 12/19/2019 at 8:19 AM

## 2020-01-07 ENCOUNTER — HOSPITAL ENCOUNTER (OUTPATIENT)
Dept: CT IMAGING | Facility: OTHER | Age: 60
Discharge: HOME | End: 2020-01-07
Attending: NURSE PRACTITIONER | Admitting: NURSE PRACTITIONER
Payer: COMMERCIAL

## 2020-01-07 ENCOUNTER — HOSPITAL ENCOUNTER (OUTPATIENT)
Dept: CT IMAGING | Facility: OTHER | Age: 60
End: 2020-01-07
Attending: NURSE PRACTITIONER
Payer: COMMERCIAL

## 2020-01-07 DIAGNOSIS — C81.90 HODGKIN LYMPHOMA, UNSPECIFIED HODGKIN LYMPHOMA TYPE, UNSPECIFIED BODY REGION (H): ICD-10-CM

## 2020-01-07 LAB
ALBUMIN SERPL-MCNC: 4.6 G/DL (ref 3.5–5.7)
ALP SERPL-CCNC: 45 U/L (ref 34–104)
ALT SERPL W P-5'-P-CCNC: 19 U/L (ref 7–52)
ANION GAP SERPL CALCULATED.3IONS-SCNC: 6 MMOL/L (ref 3–14)
AST SERPL W P-5'-P-CCNC: 21 U/L (ref 13–39)
BASOPHILS # BLD AUTO: 0.1 10E9/L (ref 0–0.2)
BASOPHILS NFR BLD AUTO: 0.9 %
BILIRUB SERPL-MCNC: 0.4 MG/DL (ref 0.3–1)
BUN SERPL-MCNC: 20 MG/DL (ref 7–25)
CALCIUM SERPL-MCNC: 9.5 MG/DL (ref 8.6–10.3)
CHLORIDE SERPL-SCNC: 103 MMOL/L (ref 98–107)
CO2 SERPL-SCNC: 29 MMOL/L (ref 21–31)
CREAT SERPL-MCNC: 1.07 MG/DL (ref 0.7–1.3)
DIFFERENTIAL METHOD BLD: NORMAL
EOSINOPHIL # BLD AUTO: 0.1 10E9/L (ref 0–0.7)
EOSINOPHIL NFR BLD AUTO: 2.4 %
ERYTHROCYTE [DISTWIDTH] IN BLOOD BY AUTOMATED COUNT: 12.6 % (ref 10–15)
ERYTHROCYTE [SEDIMENTATION RATE] IN BLOOD BY WESTERGREN METHOD: 5 MM/H (ref 1–10)
GFR SERPL CREATININE-BSD FRML MDRD: 70 ML/MIN/{1.73_M2}
GLUCOSE SERPL-MCNC: 112 MG/DL (ref 70–105)
HCT VFR BLD AUTO: 44.6 % (ref 40–53)
HGB BLD-MCNC: 14.6 G/DL (ref 13.3–17.7)
IMM GRANULOCYTES # BLD: 0 10E9/L (ref 0–0.4)
IMM GRANULOCYTES NFR BLD: 0.2 %
LDH SERPL L TO P-CCNC: 154 U/L (ref 140–271)
LYMPHOCYTES # BLD AUTO: 1.5 10E9/L (ref 0.8–5.3)
LYMPHOCYTES NFR BLD AUTO: 25.3 %
MCH RBC QN AUTO: 32.1 PG (ref 26.5–33)
MCHC RBC AUTO-ENTMCNC: 32.7 G/DL (ref 31.5–36.5)
MCV RBC AUTO: 98 FL (ref 78–100)
MONOCYTES # BLD AUTO: 0.4 10E9/L (ref 0–1.3)
MONOCYTES NFR BLD AUTO: 7.3 %
NEUTROPHILS # BLD AUTO: 3.8 10E9/L (ref 1.6–8.3)
NEUTROPHILS NFR BLD AUTO: 63.9 %
PLATELET # BLD AUTO: 187 10E9/L (ref 150–450)
POTASSIUM SERPL-SCNC: 4.5 MMOL/L (ref 3.5–5.1)
PROT SERPL-MCNC: 7.4 G/DL (ref 6.4–8.9)
RBC # BLD AUTO: 4.55 10E12/L (ref 4.4–5.9)
SODIUM SERPL-SCNC: 138 MMOL/L (ref 134–144)
WBC # BLD AUTO: 5.9 10E9/L (ref 4–11)

## 2020-01-07 PROCEDURE — 70491 CT SOFT TISSUE NECK W/DYE: CPT

## 2020-01-07 PROCEDURE — 25500064 ZZH RX 255 OP 636: Performed by: NURSE PRACTITIONER

## 2020-01-07 PROCEDURE — 36415 COLL VENOUS BLD VENIPUNCTURE: CPT | Mod: ZL | Performed by: NURSE PRACTITIONER

## 2020-01-07 PROCEDURE — 74177 CT ABD & PELVIS W/CONTRAST: CPT

## 2020-01-07 PROCEDURE — 80053 COMPREHEN METABOLIC PANEL: CPT | Mod: ZL | Performed by: NURSE PRACTITIONER

## 2020-01-07 PROCEDURE — 71260 CT THORAX DX C+: CPT

## 2020-01-07 PROCEDURE — 85652 RBC SED RATE AUTOMATED: CPT | Mod: ZL | Performed by: NURSE PRACTITIONER

## 2020-01-07 PROCEDURE — 83615 LACTATE (LD) (LDH) ENZYME: CPT | Mod: ZL | Performed by: NURSE PRACTITIONER

## 2020-01-07 PROCEDURE — 85025 COMPLETE CBC W/AUTO DIFF WBC: CPT | Mod: ZL | Performed by: NURSE PRACTITIONER

## 2020-01-07 RX ADMIN — IOHEXOL 103 ML: 350 INJECTION, SOLUTION INTRAVENOUS at 10:25

## 2020-01-13 ENCOUNTER — ONCOLOGY VISIT (OUTPATIENT)
Dept: ONCOLOGY | Facility: OTHER | Age: 60
End: 2020-01-13
Attending: NURSE PRACTITIONER
Payer: COMMERCIAL

## 2020-01-13 VITALS
RESPIRATION RATE: 12 BRPM | HEIGHT: 77 IN | HEART RATE: 56 BPM | BODY MASS INDEX: 20.43 KG/M2 | WEIGHT: 173 LBS | TEMPERATURE: 98.2 F | DIASTOLIC BLOOD PRESSURE: 64 MMHG | SYSTOLIC BLOOD PRESSURE: 120 MMHG

## 2020-01-13 DIAGNOSIS — C81.90 HODGKIN LYMPHOMA, UNSPECIFIED HODGKIN LYMPHOMA TYPE, UNSPECIFIED BODY REGION (H): Primary | ICD-10-CM

## 2020-01-13 PROCEDURE — 99213 OFFICE O/P EST LOW 20 MIN: CPT | Performed by: NURSE PRACTITIONER

## 2020-01-13 ASSESSMENT — PAIN SCALES - GENERAL: PAINLEVEL: NO PAIN (0)

## 2020-01-13 ASSESSMENT — MIFFLIN-ST. JEOR: SCORE: 1712.22

## 2020-01-13 NOTE — NURSING NOTE
Patient present for follow up of lymphoma.  Kerri Michelle RN...........1/13/2020 9:02 AM  Chief Complaint   Patient presents with     RECHECK     lymphoma       Medication Reconciliation: complete    Kerri Michelle RN

## 2020-01-13 NOTE — PROGRESS NOTES
Oncology Follow-up Visit:  January 13, 2020  Diagnosis:Lymphoma    History Of Present Illness:  Patient presents for followup of stage IV Hodgkin lymphoma, classical type.  He originally presented with right neck mass over a period of 2 months.  He was subsequently seen by Dr. Radha Kim of General Surgery who noted the patient had right-sided supraclavicular adenopathy.  The biopsy of the lymph node came back consistent with classical Hodgkin lymphoma.  He was subsequently seen by Dr. Jarad Mosqueda on 08/05/2015, who recommended a PET scan for accurate staging.  This was performed on 08/13/2015.  The findings were that there was a hypermetabolic lymphadenopathy both above and below the diaphragm, suspicious for lymphoma.  There was hypermetabolic activity in the spleen suspicious for metastatic disease.  There were multiple foci with abnormal uptake in the spine suspicious for metastatic disease.  There was also a right middle lobe lung nodule that was nonspecific.  Dr. Mosqueda felt the patient had stage IV Hodgkin lymphoma and recommended ABVD x 6 cycles.  An echocardiogram was performed to assess his cardiac function on 08/12/2015.  The findings were that the patient had normal left ventricular size and uptake, and left ventricular ejection fraction was 65%.  The patient was started on ABVD and after 3 cycles he had a repeat PET scan on 11/28/2015.  The findings were that there was marked improvement in the patient's lymphadenopathy over the lower neck, axilla, chest, abdomen, pelvis and groin area.  Focal areas of activity were markedly improved over the cervical, lumbar spine and pelvic area.  The patient subsequently was seen by Dr. Mosqueda on 02/10/2016 and assessment was the patient had completed 6 cycles of ABVD.  Repeat PET scan was performed on 02/24/2016.  The findings were that there was no abnormal uptake to suggest metastatic disease and no evidence of metastatic disease.  Previously seen  lymphadenopathy and bone lesions had all resolved.     The patient has had problems with dysphagia.  He did see Dr. Cook for an EGD which revealed hiatal hernia and Schatzki's rings.  Biopsies were all negative.  The patient's dysphagia subsequently improved. When patient was seen on 06/08/2016, and he had no evidence of disease clinically. We obtained an echocardiogram as well as pulmonary function tests.  Echocardiogram revealed no change.  Ejection fraction was 56%.  Pulmonary functions did reveal decreased DLCO of 50% predicted being reported.  The patient did have some shortness of breath when we saw him on 07/14/2016, and we elected to restage him with a PET scan.  This was done on 10/05/2016, which revealed no suspicious hypermetabolic osseous lesions identified, no evidence of recurrent lymphoma.     Patient was seen by Dr. 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  He felt his symptoms had been stable, but not progressive.  He did have a prechemotherapy pulmonary function test for comparison and he felt the patient certainly could have pulmonary fibrosis due to bleomycin as well as other forms of bleomycin toxicity or hypersensitivity pneumonitis. The patient had a high-resolution CT chest performed 10/03/2016, and findings were there was new mild subpleural fibrotic changes within the anterior right upper lobe which may be related to chemotherapy. The plan was to follow him with serial pulmonary function tests to ensure stability. He follow with pulmonology on a yearly basis.    PET scan was obtained on 07/14/2017, and the findings were there was no evidence of residual recurrent lymphoma. There were minimal subpleural fibrotic changes.  We also did an echocardiogram which was read as borderline left ventricular reduced fraction which the calculated left ventricular ejection fraction of 49%.  His prior echocardiogram  revealed ejection fraction of 56%.  The patient was seen by Dr. Collier from Cardiology and the patient was also seen by Siomara Ruvalcaba, his nurse practitioner, on 08/03/2017.  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  They recommended the patient start lisinopril 2.5 mg daily and ACE inhibitor therapy.  He did have CT scans done on 8/29/18 showing no evidence of recurrent or metastatic disease. It was felt that patient could have CT scans done every 6 months for a total of 5 years per Dr Leung. Most recent imaging from 1/7/2020 show stable, CT scans with no evidence of lymphadenopathy or mass. Labs work from 1/7/2020 shows a normal CBC. Sed rate is normal. Patient states he is feeling well. He does have some occasional shortness of breath with exertion and an occasional cough. He denies pain. No fevers, chills or night sweats. Patient states he is due for a colonoscopy and would like a referral placed. He otherwise has no new concerns.        Review Of Systems:  Review Of Systems  Eyes/Ears/Nose/Throat: denies new vision or hearing changes  Respiratory: Reports occasional shortness of breath with exertion, occasional cough. He denies hemoptysis  Cardiovascular: denies chest pain or palpitations  Gastrointestinal: denies abdominal pain, no bowel changes  Genitourinary: denies dysuria or hematuria  Musculoskeletal: denies new bone pain or muscle weakness  Neurologic: denies headaches, no dizziness  Hematologic/Lymphatic/Immunologic: denies fevers, chills, night sweats      Nursing Notes:   Kerri Michelle RN  1/13/2020  9:03 AM  Signed  Patient present for follow up of lymphoma.  Kerri Michelle RN...........1/13/2020 9:02 AM  Chief Complaint   Patient presents with     RECHECK     lymphoma       Medication Reconciliation: complete    Kerri Michelle RN       Past medical, social, surgical, and family histories  "reviewed.    Allergies:  Allergies as of 01/13/2020     (No Known Allergies)       Current Medications:  Current Outpatient Medications   Medication Sig Dispense Refill     aspirin 81 MG EC tablet Take 1 tablet (81 mg) by mouth daily       atorvastatin (LIPITOR) 40 MG tablet TAKE 1 TABLET BY MOUTH ONCE DAILY 90 tablet 2     fish oil-omega-3 fatty acids 1000 MG capsule Take 1 capsule by mouth daily       lisinopril (PRINIVIL/ZESTRIL) 2.5 MG tablet TAKE 1 TABLET BY MOUTH ONCE DAILY 90 tablet 2     LORazepam (ATIVAN) 1 MG tablet TAKE 1 TABLET BY MOUTH AT BEDTIME 90 tablet 0     Multiple Vitamins-Minerals (MULTIVITAMIN & MINERAL PO) Take 1 tablet by mouth every morning       omeprazole (PRILOSEC) 20 MG CR capsule Take 1 capsule (20 mg) by mouth daily 30 capsule      sertraline (ZOLOFT) 100 MG tablet Take 1 tablet by mouth once daily. 90 tablet 3     trimethoprim-polymyxin b (POLYTRIM) 61716-7.1 UNIT/ML-% ophthalmic solution Place 1-2 drops Into the left eye every 4 hours 10 mL 0        Physical Exam:  /64   Pulse 56   Temp 98.2  F (36.8  C)   Resp 12   Ht 1.956 m (6' 5.01\")   Wt 78.5 kg (173 lb)   BMI 20.51 kg/m      GENERAL APPEARANCE: 59  Year old male, alert and no distress     NECK: no adenopathy, no asymmetry or masses     LYMPHATICS: No cervical, supraclavicular, axillary lymphadenopathy     RESP: lungs clear to auscultation - no rales, rhonchi or wheezes     CARDIOVASCULAR: regular rates and rhythm, normal S1 S2     ABDOMEN:  soft, nontender, no HSM or masses and bowel sounds normal     MUSCULOSKELETAL: extremities normal- no gross deformities noted, No edema b/l LE.     SKIN: no suspicious lesions or rashes on exposed skin     PSYCHIATRIC: mentation appears normal and affect normal    Laboratory/Imaging Studies  Orders Only on 01/07/2020   Component Date Value Ref Range Status     Sed Rate 01/07/2020 5  1 - 10 mm/h Final     Lactate Dehydrogenase 01/07/2020 154  140 - 271 U/L Final     Sodium " 01/07/2020 138  134 - 144 mmol/L Final     Potassium 01/07/2020 4.5  3.5 - 5.1 mmol/L Final     Chloride 01/07/2020 103  98 - 107 mmol/L Final     Carbon Dioxide 01/07/2020 29  21 - 31 mmol/L Final     Anion Gap 01/07/2020 6  3 - 14 mmol/L Final     Glucose 01/07/2020 112* 70 - 105 mg/dL Final     Urea Nitrogen 01/07/2020 20  7 - 25 mg/dL Final     Creatinine 01/07/2020 1.07  0.70 - 1.30 mg/dL Final     GFR Estimate 01/07/2020 70  >60 mL/min/[1.73_m2] Final     GFR Estimate If Black 01/07/2020 85  >60 mL/min/[1.73_m2] Final     Calcium 01/07/2020 9.5  8.6 - 10.3 mg/dL Final     Bilirubin Total 01/07/2020 0.4  0.3 - 1.0 mg/dL Final     Albumin 01/07/2020 4.6  3.5 - 5.7 g/dL Final     Protein Total 01/07/2020 7.4  6.4 - 8.9 g/dL Final     Alkaline Phosphatase 01/07/2020 45  34 - 104 U/L Final     ALT 01/07/2020 19  7 - 52 U/L Final     AST 01/07/2020 21  13 - 39 U/L Final     WBC 01/07/2020 5.9  4.0 - 11.0 10e9/L Final     RBC Count 01/07/2020 4.55  4.4 - 5.9 10e12/L Final     Hemoglobin 01/07/2020 14.6  13.3 - 17.7 g/dL Final     Hematocrit 01/07/2020 44.6  40.0 - 53.0 % Final     MCV 01/07/2020 98  78 - 100 fl Final     MCH 01/07/2020 32.1  26.5 - 33.0 pg Final     MCHC 01/07/2020 32.7  31.5 - 36.5 g/dL Final     RDW 01/07/2020 12.6  10.0 - 15.0 % Final     Platelet Count 01/07/2020 187  150 - 450 10e9/L Final     Diff Method 01/07/2020 Automated Method   Final     % Neutrophils 01/07/2020 63.9  % Final     % Lymphocytes 01/07/2020 25.3  % Final     % Monocytes 01/07/2020 7.3  % Final     % Eosinophils 01/07/2020 2.4  % Final     % Basophils 01/07/2020 0.9  % Final     % Immature Granulocytes 01/07/2020 0.2  % Final     Absolute Neutrophil 01/07/2020 3.8  1.6 - 8.3 10e9/L Final     Absolute Lymphocytes 01/07/2020 1.5  0.8 - 5.3 10e9/L Final     Absolute Monocytes 01/07/2020 0.4  0.0 - 1.3 10e9/L Final     Absolute Eosinophils 01/07/2020 0.1  0.0 - 0.7 10e9/L Final     Absolute Basophils 01/07/2020 0.1  0.0 - 0.2  10e9/L Final     Abs Immature Granulocytes 01/07/2020 0.0  0 - 0.4 10e9/L Final        ASSESSMENT/PLAN:  Stage IV classical Hodgkin lymphoma presenting with right supraclavicular lymphadenopathy, biopsy consistent with classical Hodgkin lymphoma involving the spine, pelvis, as well as lymphadenopathy above and below the diaphragm.  He responded to 6 cycles of ABVD.  Course complicated with neutropenic fever. Pulmonary function tests revealed a depressed DLCO.  PET scan was negative for metastatic disease. He will continue to follow up with Dr. Cm London on a yearly basis for bleomycin lung. Most recent imaging from 1/7/2020 show stable, CT scans with no evidence of lymphadenopathy or mass. It was felt that patient could have CT scans done every 6 months for a total of 5 years per Dr Leung. Labs work from 1/7/2020 shows a normal CBC. Sed rate is normal. We will see patient back in 6 months with  A CBC, CMP, LDH, sed rate and CT neck, chest, abdomen and pelvis.     Twenty minutes spent with patient with greater than 50% of that time spent counseling patient regarding disease process, interpretation of labs and imaging, discussing plan for ongoing follow up and coordination of care

## 2020-01-21 ENCOUNTER — NURSE TRIAGE (OUTPATIENT)
Dept: INTERNAL MEDICINE | Facility: OTHER | Age: 60
End: 2020-01-21

## 2020-01-21 ENCOUNTER — OFFICE VISIT (OUTPATIENT)
Dept: FAMILY MEDICINE | Facility: OTHER | Age: 60
End: 2020-01-21
Attending: PHYSICIAN ASSISTANT
Payer: COMMERCIAL

## 2020-01-21 ENCOUNTER — HOSPITAL ENCOUNTER (OUTPATIENT)
Dept: GENERAL RADIOLOGY | Facility: OTHER | Age: 60
Discharge: HOME | End: 2020-01-21
Attending: PHYSICIAN ASSISTANT | Admitting: PHYSICIAN ASSISTANT
Payer: COMMERCIAL

## 2020-01-21 VITALS
TEMPERATURE: 99.6 F | DIASTOLIC BLOOD PRESSURE: 60 MMHG | WEIGHT: 170.2 LBS | BODY MASS INDEX: 20.1 KG/M2 | HEIGHT: 77 IN | OXYGEN SATURATION: 83 % | SYSTOLIC BLOOD PRESSURE: 122 MMHG | RESPIRATION RATE: 18 BRPM | HEART RATE: 62 BPM

## 2020-01-21 DIAGNOSIS — R05.9 COUGH: ICD-10-CM

## 2020-01-21 DIAGNOSIS — J20.9 ACUTE BRONCHITIS, UNSPECIFIED ORGANISM: Primary | ICD-10-CM

## 2020-01-21 PROCEDURE — 71046 X-RAY EXAM CHEST 2 VIEWS: CPT

## 2020-01-21 PROCEDURE — 99213 OFFICE O/P EST LOW 20 MIN: CPT | Performed by: PHYSICIAN ASSISTANT

## 2020-01-21 RX ORDER — AZITHROMYCIN 250 MG/1
TABLET, FILM COATED ORAL
Qty: 6 TABLET | Refills: 0 | Status: SHIPPED | OUTPATIENT
Start: 2020-01-21 | End: 2020-06-09

## 2020-01-21 ASSESSMENT — ENCOUNTER SYMPTOMS
FATIGUE: 0
CARDIOVASCULAR NEGATIVE: 1
SORE THROAT: 1
SHORTNESS OF BREATH: 1
RHINORRHEA: 1
ACTIVITY CHANGE: 0
CHILLS: 1
EYES NEGATIVE: 1
FEVER: 1
COUGH: 1
CHEST TIGHTNESS: 0
APPETITE CHANGE: 0
WHEEZING: 0

## 2020-01-21 ASSESSMENT — PAIN SCALES - GENERAL: PAINLEVEL: NO PAIN (0)

## 2020-01-21 ASSESSMENT — MIFFLIN-ST. JEOR: SCORE: 1699.4

## 2020-01-21 NOTE — TELEPHONE ENCOUNTER
"Patient soft transferred to scheduling for an appointment. Omayra Coppola RN on 1/21/2020 at 10:57 AM    Additional Information    Patient wants to be seen    Answer Assessment - Initial Assessment Questions  1. WORST SYMPTOM: \"What is your worst symptom?\" (e.g., cough, runny nose, muscle aches, headache, sore throat, fever)       Cough with green mucous  2. ONSET: \"When did your flu symptoms start?\"       3 days ago, Saturday  3. COUGH: \"How bad is the cough?\"        Severe at times, is intermittent   4. RESPIRATORY DISTRESS: \"Describe your breathing.\"       SOB w/activity  5. FEVER: \"Do you have a fever?\" If so, ask: \"What is your temperature, how was it measured, and when did it start?\"      Yes, patient states he did not check temperature but has been sweaty.  6. EXPOSURE: \"Were you exposed to someone with influenza?\"        No  7. FLU VACCINE: \"Did you get a flu shot this year?\"      Yes  8. HIGH RISK DISEASE: \"Do you any chronic medical problems?\" (e.g., heart or lung disease, asthma, weak immune system, or other HIGH RISK conditions)      Hodgkins Lymphoma 4 years ago, has been cancer free since.  9. PREGNANCY: \"Is there any chance you are pregnant?\" \"When was your last menstrual period?\"      No  10. OTHER SYMPTOMS: \"Do you have any other symptoms?\"  (e.g., runny nose, muscle aches, headache, sore throat)        Wheezing with breathing.    Protocols used: INFLUENZA - SEASONAL-A-[place]    "

## 2020-01-21 NOTE — TELEPHONE ENCOUNTER
Patient called and stated he has had the body aches, chills and fever for a week if you could please contact him thank you

## 2020-01-21 NOTE — NURSING NOTE
Patient presents to the clinic for a cough and fever since last Saturday. Patient has history of Pneumonia and thinks it might be that again.  Medication Reconciliation Completed.    José Miguel Barnett LPN  1/21/2020 1:32 PM

## 2020-01-21 NOTE — PROGRESS NOTES
Nursing Notes:   José Miguel Barnett LPN  1/21/2020  1:41 PM  Addendum  Patient presents to the clinic for a cough and fever since last Saturday. Patient has history of Pneumonia and thinks it might be that again.  Medication Reconciliation Completed.    José Miguel Barnett LPN  1/21/2020 1:32 PM    SUBJECTIVE:     HPI  Daniel Boyd is a 59 year old male who presents to clinic today for evaluation of cough, shortness of breath, wheezing, and sweats that started about a week ago. Has been coughing up a green phlegm. Has associated body aches and chills. Has had pneumonia a few times in the past and states that is what this feels like to him. Has multiple sick contacts given he is a . Has been taking Tylenol Cold and Flu with minimal relief. Received influenza vaccine this year. Has a history of hodgkin's lymphoma which has led him to be more anxious about his health.       Review of Systems   Constitutional: Positive for chills and fever. Negative for activity change, appetite change and fatigue.   HENT: Positive for rhinorrhea and sore throat. Negative for ear discharge and ear pain.    Eyes: Negative.    Respiratory: Positive for cough and shortness of breath. Negative for chest tightness and wheezing.    Cardiovascular: Negative.    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  No polyps seen.  Next due 2020.     CYSTOSCOPY      No  "Comments Provided     ESOPHAGOSCOPY, GASTROSCOPY, DUODENOSCOPY (EGD), COMBINED      16,EGD     FOOT SURGERY Left     SUBTALAR ATHROEREISIS, left side, Dr. Schwartz.     HERNIA REPAIR Bilateral     Surgipro mesh     LYMPH NODE BIOPSY         FAMILY HISTORY:   Family History   Problem Relation Age of Onset     Prostate Cancer Father         Also had a CABG and  during open heart surgery, .     Breast Cancer Mother      Family History Negative Sister         Good Health     Family History Negative Sister         Good Health     Prostate Cancer Brother         Cancer-prostate,Age 47.     Heart Disease Brother        SOCIAL HISTORY:   Social History     Tobacco Use     Smoking status: Never Smoker     Smokeless tobacco: Never Used   Substance Use Topics     Alcohol use: Yes     Alcohol/week: 7.0 standard drinks     Comment: 1 beer per day      No Known Allergies'  Current Outpatient Medications   Medication     azithromycin (ZITHROMAX) 250 MG tablet     aspirin 81 MG EC tablet     atorvastatin (LIPITOR) 40 MG tablet     fish oil-omega-3 fatty acids 1000 MG capsule     lisinopril (PRINIVIL/ZESTRIL) 2.5 MG tablet     LORazepam (ATIVAN) 1 MG tablet     Multiple Vitamins-Minerals (MULTIVITAMIN & MINERAL PO)     omeprazole (PRILOSEC) 20 MG CR capsule     sertraline (ZOLOFT) 100 MG tablet     trimethoprim-polymyxin b (POLYTRIM) 04171-0.1 UNIT/ML-% ophthalmic solution     No current facility-administered medications for this visit.        OBJECTIVE:     /60 (BP Location: Left arm, Patient Position: Sitting, Cuff Size: Adult Large)   Pulse 62   Temp 99.6  F (37.6  C)   Resp 18   Ht 1.956 m (6' 5\")   Wt 77.2 kg (170 lb 3.2 oz)   SpO2 (!) 83%   BMI 20.18 kg/m    Body mass index is 20.18 kg/m .  Physical Exam  General: Pleasant, in no apparent distress.  Eyes: Sclera are white and conjunctiva are clear bilaterally. Lacrimal apparatus free of erythema, edema, and discharge bilaterally.  Ears: " External ears without erythema or edema. Tympanic membranes are pearly white and without erythema, scarring or perforations bilaterally. External auditory canals are free of foreign bodies, erythema, ulcers, and masses.  Nose: External nose is symmetrical and free of lesions and deformities. Mucosa is soft pink and without erythema, edema, bleeding, or exudate. No septal perforation or deviation.  Oropharynx: Oral mucosa is pink and without ulcers, nodules, and white patches. Tongue is symmetrical, pink, and without masses or lesions. Pharynx is pink, symmetrical, and without lesions. Uvula is midline. Tonsils are pink, symmetrical, and without edema, ulcers, or exudates, and 1+ bilaterally.  Neck: No cervical lymphadenopathy on inspection and palpation.  Cardiovascular: Regular rate and rhythm with S1 equal to S2. No murmurs, friction rubs, or gallops.   Respiratory: Lungs are resonant and clear to auscultation bilaterally. Diffuse wheezes. No crackles or rhonchi.  Psych: Appropriate mood and affect.    Chest X-ray Results from 01/21/2020:  PROCEDURE: XR CHEST 2 VW 1/21/2020 2:11 PM     HISTORY: Cough     COMPARISONS: None.     TECHNIQUE: 2 views.     FINDINGS: Heart and pulmonary vasculature are normal. No acute  infiltrate or effusion is seen.       IMPRESSION: No acute disease.     RANGEL AGARWAL MD    ASSESSMENT/PLAN:     1. Acute bronchitis, unspecified organism    2. Cough      Discussed with patient that his symptoms, physical exam findings, and negative chest x-ray are most suggestive of an acute bronchitis. Discussed that it is likely viral in nature, however the patient is insistent on trying an antibiotic as he has anxiety about pneumonia developing. Prescribed azithromycin (ZITHROMAX) 250 MG tablet. Encouraged patient to avoid starting the antibiotic and that he may pick it up if his symptoms do no improve within the next week with OTC symptomatic treatment at home. Patient expressed an understanding  and is in agreement with this treatment plan. Encouraged symptomatic relief with Tylenol or ibuprofen, OTC cough or cold medication, warm tea, honey. Call or return to the clinic if symptoms persist, worsen, or new symptoms arise.     Nina Mccoy PA-C  [facility] AND [facility]

## 2020-01-22 DIAGNOSIS — I25.10 CORONARY ARTERY DISEASE INVOLVING NATIVE CORONARY ARTERY OF NATIVE HEART WITHOUT ANGINA PECTORIS: ICD-10-CM

## 2020-01-22 RX ORDER — LISINOPRIL 2.5 MG/1
TABLET ORAL
Qty: 90 TABLET | Refills: 0 | Status: SHIPPED | OUTPATIENT
Start: 2020-01-22 | End: 2020-04-23

## 2020-01-22 NOTE — TELEPHONE ENCOUNTER
"Prescription approved per [facility] Refill Protocol.  LOV: 10/22/2019  No future  Patient given 90 day chloé refill to get them to their next annual due. Omayra Coppola RN on 1/22/2020 at 4:42 PM    Requested Prescriptions   Pending Prescriptions Disp Refills     lisinopril (PRINIVIL/ZESTRIL) 2.5 MG tablet [Pharmacy Med Name: LISINOPRIL 2.5 MG TABLET] 90 tablet 2     Sig: TAKE 1 TABLET BY MOUTH ONCE DAILY       ACE Inhibitors (Including Combos) Protocol Failed - 1/22/2020  4:19 PM        Failed - Recent (12 mo) or future (30 days) visit within the authorizing provider's specialty     Patient has had an office visit with the authorizing provider or a provider within the authorizing providers department within the previous 12 mos or has a future within next 30 days. See \"Patient Info\" tab in inbasket, or \"Choose Columns\" in Meds & Orders section of the refill encounter.              Passed - Blood pressure under 140/90 in past 12 months     BP Readings from Last 3 Encounters:   01/21/20 122/60   01/13/20 120/64   10/29/19 122/72                 Passed - Medication is active on med list        Passed - Patient is age 18 or older        Passed - Normal serum creatinine on file in past 12 months     Recent Labs   Lab Test 01/07/20  0840   CR 1.07             Passed - Normal serum potassium on file in past 12 months     Recent Labs   Lab Test 01/07/20  0840   POTASSIUM 4.5               "

## 2020-01-28 DIAGNOSIS — F51.04 CHRONIC INSOMNIA: ICD-10-CM

## 2020-01-29 RX ORDER — LORAZEPAM 1 MG/1
TABLET ORAL
Qty: 90 TABLET | Refills: 0 | Status: SHIPPED | OUTPATIENT
Start: 2020-01-29 | End: 2020-04-23

## 2020-01-29 NOTE — TELEPHONE ENCOUNTER
Routing refill request to provider for review/approval because:  Drug not on the [facility] refill protocol     Lupe London RN on 1/29/2020 at 10:03 AM

## 2020-03-16 ENCOUNTER — TELEPHONE (OUTPATIENT)
Dept: ONCOLOGY | Facility: OTHER | Age: 60
End: 2020-03-16

## 2020-03-16 NOTE — TELEPHONE ENCOUNTER
Please call Daniel.  He states that he thought Ashwini was going to order a colonoscopy for him and he has not yet been called to schedule.

## 2020-03-16 NOTE — TELEPHONE ENCOUNTER
Daniel due for colonoscopy 12/30/20. Spoke with surgical scheduler that he is due and should be called.  Kerri Michelle RN...........3/16/2020 11:29 AM

## 2020-03-17 ENCOUNTER — TELEPHONE (OUTPATIENT)
Dept: ONCOLOGY | Facility: OTHER | Age: 60
End: 2020-03-17

## 2020-03-17 NOTE — TELEPHONE ENCOUNTER
Spoke with Ashwini Linares NP and patient recent labs all WNL. He is not immunocompromised.  Kerri Michelle RN...........3/17/2020 3:04 PM

## 2020-03-17 NOTE — TELEPHONE ENCOUNTER
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 Wondering of he should be around kids? To Ashwini Linares NP to address.  Kerri Michelle RN...........3/17/2020 2:55 PM

## 2020-04-23 DIAGNOSIS — F51.04 CHRONIC INSOMNIA: ICD-10-CM

## 2020-04-23 DIAGNOSIS — I25.10 CORONARY ARTERY DISEASE INVOLVING NATIVE CORONARY ARTERY OF NATIVE HEART WITHOUT ANGINA PECTORIS: ICD-10-CM

## 2020-04-23 NOTE — TELEPHONE ENCOUNTER
Routing refill request to provider for review/approval because:  Chloé given x1 and patient did not follow up, please advise  Drug not on the [facility] refill protocol - Ativan  Patient needs to be seen because it has been more than 1 year since last office visit.        Last Refill  Ativan 1mg  90# 0 Refills  1/29/2020    Lisinopril 2.5mg tablet  90# chloé  1/22/2020    LOV: 1/21/2020  No future appts    Omayra Coppola RN on 4/23/2020 at 9:02 AM

## 2020-04-24 RX ORDER — LISINOPRIL 2.5 MG/1
TABLET ORAL
Qty: 90 TABLET | Refills: 3 | Status: SHIPPED | OUTPATIENT
Start: 2020-04-24 | End: 2021-04-20

## 2020-04-24 RX ORDER — LORAZEPAM 1 MG/1
TABLET ORAL
Qty: 90 TABLET | Refills: 1 | Status: SHIPPED | OUTPATIENT
Start: 2020-04-24 | End: 2020-10-21

## 2020-06-09 ENCOUNTER — OFFICE VISIT (OUTPATIENT)
Dept: PULMONOLOGY | Facility: OTHER | Age: 60
End: 2020-06-09
Attending: INTERNAL MEDICINE
Payer: COMMERCIAL

## 2020-06-09 ENCOUNTER — TRANSFERRED RECORDS (OUTPATIENT)
Dept: HEALTH INFORMATION MANAGEMENT | Facility: OTHER | Age: 60
End: 2020-06-09

## 2020-06-09 VITALS
WEIGHT: 169.8 LBS | OXYGEN SATURATION: 98 % | RESPIRATION RATE: 16 BRPM | DIASTOLIC BLOOD PRESSURE: 76 MMHG | BODY MASS INDEX: 20.14 KG/M2 | HEART RATE: 86 BPM | SYSTOLIC BLOOD PRESSURE: 110 MMHG

## 2020-06-09 DIAGNOSIS — R06.09 DYSPNEA ON EXERTION: ICD-10-CM

## 2020-06-09 DIAGNOSIS — T45.1X5A BLEOMYCIN LUNG TOXICITY: Primary | ICD-10-CM

## 2020-06-09 DIAGNOSIS — J98.4 BLEOMYCIN LUNG TOXICITY: Primary | ICD-10-CM

## 2020-06-09 PROCEDURE — G0463 HOSPITAL OUTPT CLINIC VISIT: HCPCS

## 2020-06-09 ASSESSMENT — PAIN SCALES - GENERAL: PAINLEVEL: NO PAIN (0)

## 2020-06-15 NOTE — PROGRESS NOTES
HPI  History of Present Illness  This is a [facility] Pulmonary Medicine outreach note.  The patient is a 59-year-old male who returned alone today in follow-up for bleomycin lung toxicity.  He was last seen by me on 6/25/2019.  He returns today without any complaints.  He has no respiratory symptoms and is not limited in any of his activities by his breathing.  He is on no respiratory medications.    Tests obtain since his last visit include:    1/7/2020 CT chest was ordered by oncology is personally reviewed and compared with a previous chest CT done on 8/29/2018.  By my review, there is some rare scattered subpleural interstitial areas of thickening that are unchanged compared to 8/29/2018.  No new abnormalities are seen.  A calcified right hilar lymph node of course remains.    8/7/2019 pulmonary function test showed reduced FVC and FEV1 in the setting of a normal total lung capacity.  Reduced mid range flows consistent with obstruction in small peripheral airways.  FVC was 4.57 L or 77% predicted, FEV1/FVC was 73%, FEV1 was 3.32 L or 74% predicted.  FEF 25-75% was 64% predicted.  Lung volumes showed a TLC of 7.44 L or 87% predicted, RV was 2.82 L or 107% predicted.  DLCO uncorrected for hemoglobin was 86% predicted.  The flow volume loop was consistent with airflow obstruction and lung restriction.    Previous tests include:    1/24/2019 CT chest, abdomen, pelvis is read by radiology as the presence of calcified mediastinal and hilar lymph nodes, mildly prominent left supraclavicular nodes and a small sliding hiatal hernia.  Central airways were patent with focal bronchiectasis and bronchial impaction within the anterior right lower lobe is chronic and unchanged.  The CT chest is personally reviewed and compared with his previous chest CT done 8/29/2018.  Compared to that test, he has had an improvement in a left lower lobe infiltrate that was seen previously.             4/26/2018 pulmonary function test that  showed mild restrictive lung disease. FVC was 4.48 L or 75% predicted, FEV1/FVC was 75%, FEV1 was 3.34 L or 74% predicted. FEF 25-75% was 71% predicted. Lung volumes showed a TLC of 6.67 L or 78% predicted. RV was 2.16 L or 82% predicted. DLCO was 75% predicted.         4/26/2018 chest CT was personally reviewed and compared with his 4/12/2017 chest CT. Right lower lobe bronchiectasis was seen with subtle interstitial thickening. There is a approximately 3 mm right middle lobe pulmonary nodule. All of these findings are unchanged compared to April 2017 and no new findings are seen.                7/6/2017 pulmonary function test that showed an FVC of 4.73 L or 77% predicted, FEV1/FVC of 72%, FEV1 of 3.39 L or 73% predicted. FEF 25-75% was 58% predicted. Lung volumes showed a TLC of 7.40 L or 88% predicted, RV was 2.73 L or 105% predicted, RV/TLC was 115% predicted. DLCO uncorrected for hemoglobin was 50 2% predicted and dL/VA was 65% predicted. Flow volume loop was normal appearing.         10/12/2017 ESR was 6         4/12/2017 chest CT that was compared with his previous chest CT done on 10/3/2016. There is no change in his right lung anterior area of fibrosis. Areas of scarring are present in the right lower lobe and are unchanged as well. 2-3 mm pulmonary nodule in the right upper lobe is unchanged. No new findings are seen. Radiology reports multiple calcified granulomas in the spleen as well as granulomas seen in the lungs.         1/11/2017 pulmonary function test that showed a reduced FEF 25-75% consistent with obstruction in small peripheral airways. It also showed reduced DLCO. His FVC was 4.55 L or 74% predicted, FEV1/FVC was 72%, FEV1 was 3.26 L or 70% predicted. There was a 2% improvement in the FVC in response to bronchodilators increasing up to 4.68 L or 76% predicted. Lung volumes showed a TLC of 6.86 L or 81% predicted, RV of 2.23 L or 86% predicted, and RV/TLC of 101% predicted. Diffusion capacity  was 63% and dL/VA was 63%.         10/3/2016 chest CT which is personally reviewed and shows some right anterior upper lobe subpleural fibrotic changes as well as some bronchiectatic changes in the right mid lung. No infiltrates, nodules, masses, or adenopathy is seen.         10/3/2016 pulmonary function test done at [facility] showed an FVC of 4.76 L or 78% predicted, FEV1/FVC of 73%, FEV1 of 3.48 L or 75% predicted. There were no changes with bronchodilators. FEF 25-75% was 64% predicted. Lung volumes showed a TLC of 7.16 L or 85% predicted, RV of 2.47 L or 96% predicted, and RV/TLC of 107% predicted. Diffusion capacity uncorrected for hemoglobin was 24.68 mL/minute/mmHg or 59% predicted. DL/VA was 74% predicted. Flow volume loop appeared normal.         10/3/2016 6 minutes walk study showed a beginning saturation 96% but desaturations on a 91% with exertion. Recovery saturation was 97%. Although there was significant desaturations with exertion, he did not require supplemental oxygen with exertion.         In review, the patient was referred by Dr. Hayden for diminished DLCO in the setting of bleomycin therapy for stage IV Hodgkin's lymphoma. He underwent 6 cycles of ABVD therapy with marked improvement. His treatment was complicated by dysphagia and a right lower lobe pneumonia. As part of his follow-up, a pulmonary function test was obtained which showed a reduced DLCO. With questioning at that time, he did report some shortness of breath when climbing fields.         Patient's past pulmonary history includes recurrent pneumonias requiring antibiotics on a nearly yearly basis. Is a workup for this, he actually had a chest CT by his primary care physician, Dr. Morrison, which did show some scarring in the right mid lung. He is a lifetime nonsmoker but has had exposure to secondhand smoke from his father. He denies any childhood asthma or allergies. He does think that dust bothersome occasionally. He has had  no industrial exposures. He works on a golf course. He does added [place] but otherwise denies any travel to the [facility]. He has a cat at home that does sleep in bed with him but is had no exposures to birds or hot tubs. 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 Family history is negative for any lung disorders.         Outside tests include:         6/30/2016 pulmonary function test which showed a reduced DLCO. Spirometry showed an FVC of 4.30 L or 70% predicted, FEV1/FVC was 72%, FEV1 was 3.12 L or 66% predicted. There was a 6% improvement in the FEV1 in response to bronchodilators increasing up to 3.33 L or 71% predicted. FEF 25-75% was 54% predicted. Lung volumes showed a TLC of 7.22 L or 85% predicted, RV of 2.73 L or 106% predicted, RV/TLC of 118% predicted. DLCO corrected for hemoglobin was 22.55 mL/minute/mmHg or 53% predicted. DL/VA was 73% predicted. Flow volume loop showed evidence of airflow obstruction and lung restriction. No prior test are available for comparison.         6/30/2016 echocardiogram showed an LVEF of 56% with a mild increase in left ventricular size. RV size was normal with normal function. Trace mitral regurgitation was seen.         2/16/2016 chest x-ray reports right lower lobe infiltrate unchanged from previous chest x-ray on 2/12/2016.         2/24/2016 PET scan documented airspace opacities in the right lower lobe with a small right pleural effusion. They reported low-grade associated hypermetabolic activity. The lungs were otherwise clear. Calcified mediastinal lymph nodes were present. No other uptake was seen to suggest metastatic disease.         7/20/2015 chest CT reports multiple enlarged lymph nodes in the neck, axillary region, hilum, mediastinum, upper abdomen and the retroperitoneum and portal region. Multiple masses in the spleen were noted.         7/7/2016 CMP showed sodium of 133 otherwise unremarkable, ESR was 8. CBC was normal with a hemoglobin of 14.6 and  a normal differential.      Home Medications     - Last Reconciled 06/11/20 by Idalmis Summers, KHADAR    acetaminophen 500 mg tablet (Tylenol Extra Strength)  500 mg PO Q6H PRN  aspirin 81 mg tablet,delayed release  81 mg PO DAILY  lisinopril 2.5 mg tablet  2.5 mg PO DAILY  lorazepam 1 mg tablet  1 mg PO BEDTIME PRN  multivitamin   1 tab PO DAILY  omega 3-dha-epa-fish oil 1,000 mg (120 mg-180 mg) capsule  (Fish Oil)  1 cap PO DAILY  omeprazole 20 mg capsule,delayed release  20 mg PO DAILY  sertraline 100 mg tablet  100 mg PO DAILY  simvastatin 10 mg tablet  10 mg PO BEDTIME    Allergies    No Known Allergies Allergy (Verified 06/11/20 12:08)    PFSH  PFSH:     Medical History (Updated 06/19/19 @ 11:19 by Danielle Kumari)    Diagnosis unknown  Depression, mild  Insomnia, chronic  Les pain  Prostate cancer  Hodgkin lymphoma  Tremor  Peripheral neuropathy  Dysphagia  Gastritis  Schatzki's ring, non-obstructing     Family History (Updated 06/19/19 @ 11:20 by Danielle uKmari)  Father  Prostate cancer       diagnosed with cancer  Heart disease  Mother  Breast cancer       diagnosed with cancer     Social History (Updated 06/19/19 @ 11:20 by Danielle Kumari)  Smoking Status:  Never smoker  second hand exposure:  No       ROS  Constitutional:   Denies chills, daytime sleepiness, difficulty sleeping, fatigue, fever(s), headache(s), night sweats, snoring, weight gain or weight loss    Eyes:   Denies change in vision    ENT:   Denies vertigo, headache(s), hearing problems or snoring    Cardiovascular:   Denies chest pain, lightheadedness or dyspnea    Respiratory:   Denies cough, hemoptysis, excessive phlegm production, dyspnea, snoring or wheezing    Gastrointestinal:   Denies constipation, diarrhea, nausea or vomiting    Urinary:   Denies dysuria    Musculoskeletal:   Denies myalgias, arthralgias, muscle weakness, stiffness or swelling    Integumentary:   Denies skin change, change in hair, change in nails or rash     Neurological:   Denies lightheadedness, vertigo, headache(s), memory loss or seizures    Psychiatric:   Denies anxiety, depression or memory loss    Endocrine:   Denies fatigue or thyroid disease    Allergic/Immunologic:   Denies dyspnea, wheezing, neck lymphadenopathy or rash      Vital Signs      06/11/20  12:08  Weight    76.884 kg  Weight (lb)    169 lbs and  8.0  ozs  BP    110/76  Blood Pressure Location    Right Arm  Position    Sitting  Respiration    16  Pulse Rate    86  Pulse Oximetry (%)    98  Oxygen Delivery Method    room air  Pain Scale (0-10)    0    Exam  Physical Exam:   General:  Alert and oriented, appears comfortable.    Head: Normocephalic and atraumatic.    Eyes: Pupils equal, round, reactive to light, sclera are clear.    Face: Facial muscles are symmetric.    Oral cavity: The patient wore a mask during the visit.    Neck: Supple and without adenopathy.    Respiratory/Chest: Normal chest rise and respiratory excursion.  Clear to auscultation and percussion bilaterally. No wheezes, rhonchi, or rales noted.    Extremities: No cyanosis, clubbing, or edema noted.    Cardiovascular: Regular rate and rhythm, normal S1 and S2, no murmurs, gallops, or rubs.    Lymph nodes: No cervical, supraclavicular adenopathy noted.    Skin: Normal, no rashes or skin lesions.    Neurologic: Moves all extremities and ambulates without difficulty.     A&P  Assessment & Plan  (1) Bleomycin lung toxicity:        Code(s):  J98.4 - Other disorders of lung; T45.1X5A - Adverse effect of antineoplastic and immunosuppressive drugs, initial encounter        Bleomycin lung toxicity. The patient has had dyspnea with exertion felt to be secondary to bleomycin lung toxicity.  His respiratory symptoms have completely resolved.     He has a history of Hodgkin's lymphoma and was treated with ABVD therapy which includes bleomycin.  His 1/20/2020 chest CT that has shown overall stability in his subpleural interstitial changes on  the right consistent with bleomycin lung toxicity. Given the time since he has completed chemotherapy combined with his CT changes, bleomycin associated subacute progressive pulmonary fibrosis is most likely.    I am following him with pulmonary function testing, and his lung volumes and DLCO have remained stable.  He is due for tests and this year, but I expect it will be the same.  Currently, [facility] is not performing PFTs due to COVID 19.  When they start doing PFT testing again, then he will have his PFT.  He will be contacted by letter with result.  I will plan to repeat his PFT 1 more time in a year.  If that is unchanged, then I do not think he needs any further follow-up as he will be 5 years out from his last bleomycin exposure.    Certainly, should he develop any respiratory symptoms in the future, a repeat chest CT and PFT could be obtained.  For now, he will return to see me in 1 year with PFT testing.  (2) Dyspnea on exertion:        Code(s):  R06.00 - Dyspnea, unspecified        Stable.  Departure  Follow Up:      1 Year ([place])

## 2020-07-01 ENCOUNTER — HOSPITAL ENCOUNTER (OUTPATIENT)
Dept: CT IMAGING | Facility: OTHER | Age: 60
End: 2020-07-01
Attending: NURSE PRACTITIONER
Payer: COMMERCIAL

## 2020-07-01 DIAGNOSIS — C81.90 HODGKIN LYMPHOMA, UNSPECIFIED HODGKIN LYMPHOMA TYPE, UNSPECIFIED BODY REGION (H): ICD-10-CM

## 2020-07-01 LAB
ALBUMIN SERPL-MCNC: 4.5 G/DL (ref 3.5–5.7)
ALP SERPL-CCNC: 53 U/L (ref 34–104)
ALT SERPL W P-5'-P-CCNC: 22 U/L (ref 7–52)
ANION GAP SERPL CALCULATED.3IONS-SCNC: 5 MMOL/L (ref 3–14)
AST SERPL W P-5'-P-CCNC: 21 U/L (ref 13–39)
BASOPHILS # BLD AUTO: 0 10E9/L (ref 0–0.2)
BASOPHILS NFR BLD AUTO: 0.7 %
BILIRUB SERPL-MCNC: 1 MG/DL (ref 0.3–1)
BUN SERPL-MCNC: 22 MG/DL (ref 7–25)
CALCIUM SERPL-MCNC: 9.6 MG/DL (ref 8.6–10.3)
CHLORIDE SERPL-SCNC: 103 MMOL/L (ref 98–107)
CO2 SERPL-SCNC: 29 MMOL/L (ref 21–31)
CREAT SERPL-MCNC: 1.08 MG/DL (ref 0.7–1.3)
DIFFERENTIAL METHOD BLD: NORMAL
EOSINOPHIL # BLD AUTO: 0.1 10E9/L (ref 0–0.7)
EOSINOPHIL NFR BLD AUTO: 1.8 %
ERYTHROCYTE [DISTWIDTH] IN BLOOD BY AUTOMATED COUNT: 12.4 % (ref 10–15)
GFR SERPL CREATININE-BSD FRML MDRD: 70 ML/MIN/{1.73_M2}
GLUCOSE SERPL-MCNC: 96 MG/DL (ref 70–105)
HCT VFR BLD AUTO: 45 % (ref 40–53)
HGB BLD-MCNC: 14.9 G/DL (ref 13.3–17.7)
IMM GRANULOCYTES # BLD: 0 10E9/L (ref 0–0.4)
IMM GRANULOCYTES NFR BLD: 0.2 %
LDH SERPL L TO P-CCNC: 140 U/L (ref 140–271)
LYMPHOCYTES # BLD AUTO: 1.4 10E9/L (ref 0.8–5.3)
LYMPHOCYTES NFR BLD AUTO: 24 %
MCH RBC QN AUTO: 31.6 PG (ref 26.5–33)
MCHC RBC AUTO-ENTMCNC: 33.1 G/DL (ref 31.5–36.5)
MCV RBC AUTO: 95 FL (ref 78–100)
MONOCYTES # BLD AUTO: 0.4 10E9/L (ref 0–1.3)
MONOCYTES NFR BLD AUTO: 6.1 %
NEUTROPHILS # BLD AUTO: 4 10E9/L (ref 1.6–8.3)
NEUTROPHILS NFR BLD AUTO: 67.2 %
PLATELET # BLD AUTO: 198 10E9/L (ref 150–450)
POTASSIUM SERPL-SCNC: 4 MMOL/L (ref 3.5–5.1)
PROT SERPL-MCNC: 7.3 G/DL (ref 6.4–8.9)
RBC # BLD AUTO: 4.72 10E12/L (ref 4.4–5.9)
SODIUM SERPL-SCNC: 137 MMOL/L (ref 134–144)
WBC # BLD AUTO: 6 10E9/L (ref 4–11)

## 2020-07-01 PROCEDURE — 36415 COLL VENOUS BLD VENIPUNCTURE: CPT | Mod: ZL | Performed by: NURSE PRACTITIONER

## 2020-07-01 PROCEDURE — 83615 LACTATE (LD) (LDH) ENZYME: CPT | Mod: ZL | Performed by: NURSE PRACTITIONER

## 2020-07-01 PROCEDURE — 74177 CT ABD & PELVIS W/CONTRAST: CPT

## 2020-07-01 PROCEDURE — 80053 COMPREHEN METABOLIC PANEL: CPT | Mod: ZL | Performed by: NURSE PRACTITIONER

## 2020-07-01 PROCEDURE — 71260 CT THORAX DX C+: CPT

## 2020-07-01 PROCEDURE — 85025 COMPLETE CBC W/AUTO DIFF WBC: CPT | Mod: ZL | Performed by: NURSE PRACTITIONER

## 2020-07-01 PROCEDURE — 25500064 ZZH RX 255 OP 636: Performed by: NURSE PRACTITIONER

## 2020-07-01 PROCEDURE — 70491 CT SOFT TISSUE NECK W/DYE: CPT

## 2020-07-01 RX ADMIN — IOHEXOL 100 ML: 350 INJECTION, SOLUTION INTRAVENOUS at 13:35

## 2020-07-08 ENCOUNTER — TELEPHONE (OUTPATIENT)
Dept: ONCOLOGY | Facility: OTHER | Age: 60
End: 2020-07-08

## 2020-07-08 DIAGNOSIS — C81.90 HODGKIN LYMPHOMA, UNSPECIFIED HODGKIN LYMPHOMA TYPE, UNSPECIFIED BODY REGION (H): Primary | ICD-10-CM

## 2020-07-08 NOTE — TELEPHONE ENCOUNTER
Per Kia Leung MD negative Ct scans and labs. Patient advised that follow up is recommended in 6 months with Ct scans and labs prior.  Kerri Michelle RN...........7/8/2020 2:19 PM

## 2020-07-10 ENCOUNTER — TELEPHONE (OUTPATIENT)
Dept: ONCOLOGY | Facility: OTHER | Age: 60
End: 2020-07-10

## 2020-07-10 NOTE — TELEPHONE ENCOUNTER
Patient called. Questioning if he should be seen for enlarged prostate on recent Ct scan. Advised patient that he should follow up with Rudy Morrison or urology. Patient will discuss with wife.  Kerri Michelle RN...........7/10/2020 3:01 PM

## 2020-07-20 ENCOUNTER — OFFICE VISIT (OUTPATIENT)
Dept: UROLOGY | Facility: OTHER | Age: 60
End: 2020-07-20
Attending: UROLOGY
Payer: COMMERCIAL

## 2020-07-20 VITALS
BODY MASS INDEX: 19.92 KG/M2 | DIASTOLIC BLOOD PRESSURE: 70 MMHG | WEIGHT: 168 LBS | HEART RATE: 68 BPM | RESPIRATION RATE: 16 BRPM | SYSTOLIC BLOOD PRESSURE: 120 MMHG

## 2020-07-20 DIAGNOSIS — N40.0 ENLARGED PROSTATE: Primary | ICD-10-CM

## 2020-07-20 LAB
ALBUMIN UR-MCNC: 10 MG/DL
APPEARANCE UR: CLEAR
BILIRUB UR QL STRIP: NEGATIVE
COLOR UR AUTO: YELLOW
GLUCOSE UR STRIP-MCNC: NEGATIVE MG/DL
HGB UR QL STRIP: NEGATIVE
KETONES UR STRIP-MCNC: NEGATIVE MG/DL
LEUKOCYTE ESTERASE UR QL STRIP: NEGATIVE
MUCOUS THREADS #/AREA URNS LPF: PRESENT /LPF
NITRATE UR QL: NEGATIVE
PH UR STRIP: 5.5 PH (ref 5–7)
RBC #/AREA URNS AUTO: <1 /HPF (ref 0–2)
SOURCE: ABNORMAL
SP GR UR STRIP: 1.03 (ref 1–1.03)
UROBILINOGEN UR STRIP-MCNC: 2 MG/DL (ref 0–2)
WBC #/AREA URNS AUTO: 1 /HPF (ref 0–5)

## 2020-07-20 PROCEDURE — 81001 URINALYSIS AUTO W/SCOPE: CPT | Mod: ZL | Performed by: UROLOGY

## 2020-07-20 PROCEDURE — 99203 OFFICE O/P NEW LOW 30 MIN: CPT | Mod: 25 | Performed by: UROLOGY

## 2020-07-20 PROCEDURE — 51798 US URINE CAPACITY MEASURE: CPT | Performed by: UROLOGY

## 2020-07-20 ASSESSMENT — PAIN SCALES - GENERAL: PAINLEVEL: NO PAIN (0)

## 2020-07-20 NOTE — NURSING NOTE
Pt presents to clinic for enlarged prostate, confirmed from 7/1/20 CT urinates frequently,dribbles.      Review of Systems:    Weight loss:    No     Recent fever/chills:  No   Night sweats:   No  Current skin rash:  No   Recent hair loss:  No  Heat intolerance:  No   Cold intolerance:  No  Chest pain:   No   Palpitations:   No  Shortness of breath:  No   Wheezing:   No  Constipation:    No   Diarrhea:   No   Nausea:   No   Vomiting:   No   Kidney/side pain:  No   Back pain:   No  Frequent headaches:  No   Dizziness:     No  Leg swelling:   No   Calf pain:    No    Parents, brothers or sisters with history of kidney cancer:   No  Parents, brothers or sisters with history of bladder cancer: No  Father or brother with history of prostate cancer:  Yes  Brother & Father

## 2020-07-20 NOTE — PROGRESS NOTES
Type of Visit  NPV    Chief Complaint  Urinary urgency and frequency    HPI  Mr. Boyd is a 59 year old male with history of stage IV Hodgkin's lymphoma s/p chemotherapy and remission who presents with urinary urgency and frequency.  The patient recently underwent a CT scan which noted prostatomegaly.  Because of this he requested discussion with the urologist.    He describes his urinary symptoms as a low clinical bother.  Some days he experiences no irritative symptoms and other days he may notice 2 or 3 voids with significant urgency.  He also has frequency intermittently but denies weak stream or sense of incomplete emptying.  The symptoms predominantly started about 4-5 years ago around the time he started chemotherapy.  He denies dysuria, fevers.      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    Medications  He has a current medication list which includes the following prescription(s): aspirin, atorvastatin, fish oil-omega-3 fatty acids, lisinopril, lorazepam, multiple vitamins-minerals, omeprazole, and  sertraline.    Allergies  No Known Allergies    Social History  He  reports that he has never smoked. He has never used smokeless tobacco. He reports current alcohol use of about 7.0 standard drinks of alcohol per week. He reports that he does not use drugs.  No drug abuse.    Family History  Family History   Problem Relation Age of Onset     Prostate Cancer Father         Also had a CABG and  during open heart surgery, .     Breast Cancer Mother      Family History Negative Sister         Good Health     Family History Negative Sister         Good Health     Prostate Cancer Brother         Cancer-prostate,Age 47.     Heart Disease Brother        Review of Systems  I personally reviewed the ROS with the patient.    Nursing Notes:   Rabia Gaviria LPN  2020  3:03 PM  Signed  Pt presents to clinic for enlarged prostate, confirmed from 20 CT urinates frequently,dribbles.      Review of Systems:    Weight loss:    No     Recent fever/chills:  No   Night sweats:   No  Current skin rash:  No   Recent hair loss:  No  Heat intolerance:  No   Cold intolerance:  No  Chest pain:   No   Palpitations:   No  Shortness of breath:  No   Wheezing:   No  Constipation:    No   Diarrhea:   No   Nausea:   No   Vomiting:   No   Kidney/side pain:  No   Back pain:   No  Frequent headaches:  No   Dizziness:     No  Leg swelling:   No   Calf pain:    No    Parents, brothers or sisters with history of kidney cancer:   No  Parents, brothers or sisters with history of bladder cancer: No  Father or brother with history of prostate cancer:  Yes  Brother & Father      Rabia Gaviria LPN  2020  3:21 PM  Signed  Post-Void Residual  A post-void residual was measured by ultrasonic bladder scanner.  11 mL      Physical Exam  Vitals:    20 1454   BP: 120/70   BP Location: Left arm   Patient Position: Sitting   Cuff Size: Adult Regular   Pulse: 68   Resp: 16   Weight: 76.2 kg (168 lb)     Constitutional: No acute  distress.  Alert and cooperative   Head: NCAT  Eyes: Conjunctivae normal  Cardiovascular: Regular rate.  Pulmonary/Chest: Respirations are even and non-labored bilaterally, no audible wheezing  Abdominal: Soft. No distension, tenderness, masses or guarding.   Neurological: A + O x 3.  Cranial Nerves II-XII grossly intact.  Extremities: MIKE x 4, Warm. No clubbing.  No cyanosis.    Skin: Pink, warm and dry.  No visible rashes noted.  Psychiatric:  Normal mood and affect  Back:  No left CVA tenderness.  No right CVA tenderness.  Genitourinary:  Nonpalpable bladder    Labs  Results for SAM AGUIRRE (MRN [id number]) as of 7/20/2020 15:08   6/15/2018 11:12   Prostate Specific Antigen 1.666     Results for SAM AGUIRRE (MRN [id number]) as of 7/20/2020 16:00   7/20/2020 15:17   Color Urine Yellow   Appearance Urine Clear   Glucose Urine Negative   Bilirubin Urine Negative   Ketones Urine Negative   Specific Gravity Urine 1.029   pH Urine 5.5   Protein Albumin Urine 10 (A)   Urobilinogen mg/dL 2.0   Nitrite Urine Negative   Blood Urine Negative   Leukocyte Esterase Urine Negative   Source Midstream Urine   WBC Urine 1   RBC Urine <1   Mucous Urine Present (A)     Imaging  CT a/p   7/1/2020  I personally reviewed and interpreted the images and report.  COMBINED IMPRESSION:    No evidence of recurrent adenopathy.    Post-Void Residual  A post-void residual was measured by ultrasonic bladder scanner.  11 mL today    Assessment  Mr. Aguirre is a 59 year old male who presents with urinary urgency and frequency.  The patient does have a strong family history of prostate cancer.  As such I recommended annual screening.  Most recent PSA was reviewed and it is low.    We discussed anticholinergics however given his low clinical bother I explained we were most likely to cause more side effects than improve symptoms.    Plan  Follow-up in 1 year with a PSA

## 2020-07-28 DIAGNOSIS — I25.10 CORONARY ARTERY DISEASE INVOLVING NATIVE CORONARY ARTERY OF NATIVE HEART WITHOUT ANGINA PECTORIS: ICD-10-CM

## 2020-07-29 RX ORDER — ATORVASTATIN CALCIUM 40 MG/1
TABLET, FILM COATED ORAL
Qty: 90 TABLET | Refills: 2 | Status: SHIPPED | OUTPATIENT
Start: 2020-07-29 | End: 2021-04-20

## 2020-07-29 NOTE — TELEPHONE ENCOUNTER
ASHISH sent Rx request for the following:   atorvastatin (LIPITOR) 40 MG tablet   Sig: TAKE 1 TABLET BY MOUTH ONCE DAILY     Last Prescription Date:   11/01/2019  Last Fill Qty/Refills:         90, R-2    Last Office Visit:              01/21/2020   Future Office visit:           none    Routing refill request to provider for review/approval because:    Labs not current:  LDL  Recent Labs   Lab Test 06/15/18  1112 02/15/17  1035 05/03/16  1201   CHOL 129  --   --    HDL 38 30 33   LDL 67  --  94   TRIG 120 405* 239*           Unable to complete prescription refill per RN Medication Refill Policy..................... Cecily Catalan RN ....................  7/29/2020   10:29 AM

## 2020-09-02 ENCOUNTER — ALLIED HEALTH/NURSE VISIT (OUTPATIENT)
Dept: FAMILY MEDICINE | Facility: OTHER | Age: 60
End: 2020-09-02
Attending: INTERNAL MEDICINE
Payer: COMMERCIAL

## 2020-09-02 DIAGNOSIS — R50.9 FEVER AND CHILLS: Primary | ICD-10-CM

## 2020-09-02 PROCEDURE — 99207 ZZC NO CHARGE NURSE ONLY: CPT

## 2020-09-02 PROCEDURE — C9803 HOPD COVID-19 SPEC COLLECT: HCPCS

## 2020-09-02 PROCEDURE — U0003 INFECTIOUS AGENT DETECTION BY NUCLEIC ACID (DNA OR RNA); SEVERE ACUTE RESPIRATORY SYNDROME CORONAVIRUS 2 (SARS-COV-2) (CORONAVIRUS DISEASE [COVID-19]), AMPLIFIED PROBE TECHNIQUE, MAKING USE OF HIGH THROUGHPUT TECHNOLOGIES AS DESCRIBED BY CMS-2020-01-R: HCPCS | Mod: ZL | Performed by: INTERNAL MEDICINE

## 2020-09-04 LAB
SARS-COV-2 RNA SPEC QL NAA+PROBE: NOT DETECTED
SPECIMEN SOURCE: NORMAL

## 2020-09-10 ENCOUNTER — OFFICE VISIT (OUTPATIENT)
Dept: FAMILY MEDICINE | Facility: OTHER | Age: 60
End: 2020-09-10
Attending: PHYSICIAN ASSISTANT
Payer: COMMERCIAL

## 2020-09-10 VITALS
RESPIRATION RATE: 16 BRPM | OXYGEN SATURATION: 98 % | BODY MASS INDEX: 19.61 KG/M2 | SYSTOLIC BLOOD PRESSURE: 108 MMHG | TEMPERATURE: 96.8 F | DIASTOLIC BLOOD PRESSURE: 64 MMHG | WEIGHT: 165.4 LBS | HEART RATE: 74 BPM

## 2020-09-10 DIAGNOSIS — R19.7 DIARRHEA OF PRESUMED INFECTIOUS ORIGIN: ICD-10-CM

## 2020-09-10 DIAGNOSIS — R19.7 DIARRHEA, UNSPECIFIED TYPE: Primary | ICD-10-CM

## 2020-09-10 PROCEDURE — 87209 SMEAR COMPLEX STAIN: CPT | Mod: ZL | Performed by: PHYSICIAN ASSISTANT

## 2020-09-10 PROCEDURE — 99213 OFFICE O/P EST LOW 20 MIN: CPT | Performed by: PHYSICIAN ASSISTANT

## 2020-09-10 PROCEDURE — 99000 SPECIMEN HANDLING OFFICE-LAB: CPT

## 2020-09-10 PROCEDURE — 87899 AGENT NOS ASSAY W/OPTIC: CPT | Mod: ZL | Performed by: PHYSICIAN ASSISTANT

## 2020-09-10 PROCEDURE — 87177 OVA AND PARASITES SMEARS: CPT | Mod: ZL | Performed by: PHYSICIAN ASSISTANT

## 2020-09-10 PROCEDURE — 87045 FECES CULTURE AEROBIC BACT: CPT | Mod: ZL | Performed by: PHYSICIAN ASSISTANT

## 2020-09-10 PROCEDURE — 87046 STOOL CULTR AEROBIC BACT EA: CPT | Mod: ZL,91 | Performed by: PHYSICIAN ASSISTANT

## 2020-09-10 PROCEDURE — 87046 STOOL CULTR AEROBIC BACT EA: CPT | Mod: ZL | Performed by: PHYSICIAN ASSISTANT

## 2020-09-10 RX ORDER — LOPERAMIDE HYDROCHLORIDE 2 MG/1
2 TABLET ORAL 4 TIMES DAILY PRN
Qty: 30 TABLET | Refills: 3 | Status: SHIPPED | OUTPATIENT
Start: 2020-09-10 | End: 2020-10-10

## 2020-09-10 ASSESSMENT — ANXIETY QUESTIONNAIRES
GAD7 TOTAL SCORE: 0
1. FEELING NERVOUS, ANXIOUS, OR ON EDGE: NOT AT ALL
7. FEELING AFRAID AS IF SOMETHING AWFUL MIGHT HAPPEN: NOT AT ALL
2. NOT BEING ABLE TO STOP OR CONTROL WORRYING: NOT AT ALL
3. WORRYING TOO MUCH ABOUT DIFFERENT THINGS: NOT AT ALL
5. BEING SO RESTLESS THAT IT IS HARD TO SIT STILL: NOT AT ALL
IF YOU CHECKED OFF ANY PROBLEMS ON THIS QUESTIONNAIRE, HOW DIFFICULT HAVE THESE PROBLEMS MADE IT FOR YOU TO DO YOUR WORK, TAKE CARE OF THINGS AT HOME, OR GET ALONG WITH OTHER PEOPLE: NOT DIFFICULT AT ALL
6. BECOMING EASILY ANNOYED OR IRRITABLE: NOT AT ALL

## 2020-09-10 ASSESSMENT — PAIN SCALES - GENERAL: PAINLEVEL: MILD PAIN (3)

## 2020-09-10 ASSESSMENT — PATIENT HEALTH QUESTIONNAIRE - PHQ9: 5. POOR APPETITE OR OVEREATING: NOT AT ALL

## 2020-09-10 NOTE — NURSING NOTE
"Coming in with abdominall pains with cramping for the last two weeks    With diarrhea  Night sweats  Temp with chills over 100, -none the last 5-6 days  Negative covid phoebe    Last colonoscopy 2010-due was normal    Chief Complaint   Patient presents with     Diarrhea     over two weeks, night sweats, temp over 100, stomach cramps       Initial /64   Pulse 74   Temp 96.8  F (36  C)   Resp 16   Wt 75 kg (165 lb 6.4 oz)   SpO2 98%   BMI 19.61 kg/m   Estimated body mass index is 19.61 kg/m  as calculated from the following:    Height as of 1/21/20: 1.956 m (6' 5\").    Weight as of this encounter: 75 kg (165 lb 6.4 oz).  Medication Reconciliation: complete    Yu Neal LPN  "

## 2020-09-10 NOTE — PROGRESS NOTES
"SUBJECTIVE:   Daniel Boyd is a 59 year old male here for the following health issues:    HPI  2 weeks of diarrhea.  He states his symptoms started suddenly, were the worst at the onset, and have been gradually improving, more so over the last few days.  He denies any hematochezia, frothy, foamy, or fatty stools.  He states he now has approximately 1 bowel movement a day now which is formed but slightly loose.  At the onset of  symptoms he had low grade temp at 100 F, shaking chills, night sweats, body aches, and frequent watery diarrhea of up to 6 per day.  He had he has had a decrease in appetite however has had no trouble keeping water down.  He has tried Imodium AD with mild to moderate relief of symptoms.  It is you note slight abdominal cramping and \"grumbly belly\".    Allergies:  No Known Allergies    Review of Systems   As above otherwise ROS is unremarkable.     OBJECTIVE:     Vitals:    09/10/20 1247   BP: 108/64   Pulse: 74   Resp: 16   Temp: 96.8  F (36  C)   SpO2: 98%   Weight: 75 kg (165 lb 6.4 oz)       Physical Exam  General Appearance: Pleasant, alert, appropriate appearance for age and circumstances, no acute distress  Head: Normocephalic, atraumatic  Eyes: PERRL, EOMI  stridor  Cardiovascular: Regular rate and rhythm. S1 and S2 audible, no murmurs, clicks, rubs, or gallops  Gastrointestinal: Slight tenderness palpation of the lower left lower quadrant.  Slight tympany upon percussion.  Otherwise, abd symmetrical, soft, no masses, guarding, normoactive bowel sounds  Skin: no concerning or new rashes  Psychiatric Exam: Alert and oriented, appropriate affect      ASSESSMENT/PLAN:     1. Diarrhea, unspecified type    2. Diarrhea of presumed infectious origin        Although symptoms are improving onset was suggestive of infectious etiology.    He is unable to leave a stool sample today so I will send him home with a stool sample collection kit.     I recommended probiotic and/or yogurt intake for the " next 14 days.    Although his blood pressure today is slightly lower than normal he is not lightheaded, dizzy, hypotensive, or tachycardic.    Suggest continue use of Imodium AD.  However, I explicitly said to stop this if he has any constipation or increasing abdominal pain, fevers, chills, sweats, or worsening diarrhea.  He voiced understanding of these directions and agreed with the plan.    Follow-up after stool studies and if symptoms worsen or fail to improve.    SHAWN Godoy  [facility]    This document was prepared using voice generated software.  While every attempt was made for accuracy, grammatical errors may exist.

## 2020-09-11 ASSESSMENT — ANXIETY QUESTIONNAIRES: GAD7 TOTAL SCORE: 0

## 2020-09-12 LAB
BACTERIA SPEC CULT: NORMAL
E COLI SXT1+2 STL IA: NORMAL
E COLI SXT1+2 STL IA: NORMAL
SPECIMEN SOURCE: NORMAL

## 2020-09-14 LAB
O+P STL MICRO: NORMAL
O+P STL MICRO: NORMAL
SPECIMEN SOURCE: NORMAL

## 2020-10-21 DIAGNOSIS — F51.04 CHRONIC INSOMNIA: ICD-10-CM

## 2020-10-21 RX ORDER — LORAZEPAM 1 MG/1
TABLET ORAL
Qty: 90 TABLET | Refills: 1 | Status: SHIPPED | OUTPATIENT
Start: 2020-10-21 | End: 2021-04-20

## 2020-10-21 NOTE — TELEPHONE ENCOUNTER
ASHISH sent Rx request for the following:   LORazepam (ATIVAN) 1 MG tablet  Sig: TAKE 1 TABLET BY MOUTH AT BEDTIME    Last Prescription Date:   04/24/2020  Last Fill Qty/Refills:         90, R-1    Last Office Visit:              09/10/2020   Future Office visit:           none  Routing refill request to provider for review/approval because:  Drug not on the [facility], [facility] or [facility] refill protocol or controlled substance    Unable to complete prescription refill per RN Medication Refill Policy.................... Cecily Catalan RN ....................  10/21/2020   9:42 AM

## 2020-11-10 ENCOUNTER — TELEPHONE (OUTPATIENT)
Dept: INTERNAL MEDICINE | Facility: OTHER | Age: 60
End: 2020-11-10

## 2020-11-10 DIAGNOSIS — Z12.11 SCREEN FOR COLON CANCER: Primary | ICD-10-CM

## 2020-11-10 NOTE — TELEPHONE ENCOUNTER
Patient called and would like to schedule a colonoscopy ,  If this is ok to schedule would you place a order .   Thank you.   Zoey Steven on 11/10/2020 at 9:49 AM

## 2020-11-11 ENCOUNTER — HOSPITAL ENCOUNTER (OUTPATIENT)
Facility: OTHER | Age: 60
End: 2020-11-11
Attending: SURGERY | Admitting: SURGERY
Payer: COMMERCIAL

## 2020-11-11 DIAGNOSIS — Z12.11 SCREENING FOR COLON CANCER: Primary | ICD-10-CM

## 2020-11-11 DIAGNOSIS — Z01.818 PRE-OP TESTING: ICD-10-CM

## 2020-11-11 NOTE — TELEPHONE ENCOUNTER
Screening Questions for the Scheduling of Screening Colonoscopies   (If Colonoscopy is diagnostic, Provider should review the chart before scheduling.)  Are you younger than 50 or older than 80?  NO   Do you take aspirin or fish oil?  YES - BOTH  (if yes, tell patient to stop 1 week prior to Colonoscopy)  Do you take warfarin (Coumadin), clopidogrel (Plavix), apixaban (Eliquis), dabigatram (Pradaxa), rivaroxaban (Xarelto) or any blood thinner? NO   Do you use oxygen at home?  NO   Do you have kidney disease? NO   Are you on dialysis? NO   Have you had a stroke or heart attack in the last year? NO   Have you had a stent in your heart or any blood vessel in the last year? NO   Have you had a transplant of any organ? NO   Have you had a colonoscopy or upper endoscopy (EGD) before? YES          When?  10 YRS   Date of scheduled Colonoscopy. 12/22/2020  Provider [facility]   Pharmacy [facility]

## 2020-11-20 RX ORDER — POLYETHYLENE GLYCOL 3350, SODIUM CHLORIDE, SODIUM BICARBONATE, POTASSIUM CHLORIDE 420; 11.2; 5.72; 1.48 G/4L; G/4L; G/4L; G/4L
4000 POWDER, FOR SOLUTION ORAL ONCE
Qty: 4000 ML | Refills: 0 | Status: SHIPPED | OUTPATIENT
Start: 2020-11-20 | End: 2020-11-20

## 2020-11-20 RX ORDER — BISACODYL 5 MG/1
TABLET, DELAYED RELEASE ORAL
Qty: 2 TABLET | Refills: 0 | Status: SHIPPED | OUTPATIENT
Start: 2020-11-20 | End: 2021-06-08

## 2020-12-11 DIAGNOSIS — F33.0 MAJOR DEPRESSIVE DISORDER, RECURRENT EPISODE, MILD (H): ICD-10-CM

## 2020-12-11 RX ORDER — SERTRALINE HYDROCHLORIDE 100 MG/1
TABLET, FILM COATED ORAL
Qty: 90 TABLET | Refills: 3 | Status: SHIPPED | OUTPATIENT
Start: 2020-12-11 | End: 2021-11-29

## 2020-12-11 NOTE — TELEPHONE ENCOUNTER
"Requested Prescriptions   Pending Prescriptions Disp Refills     sertraline (ZOLOFT) 100 MG tablet [Pharmacy Med Name: sertraline 100 mg tablet] 90 tablet 3     Sig: Take 1 tablet by mouth once daily.       SSRIs Protocol Failed - 12/11/2020  7:39 AM        Failed - PHQ-9 score less than 5 in past 6 months     Please review last PHQ-9 score.           Failed - Recent (6 mo) or future (30 days) visit within the authorizing provider's specialty     Patient had office visit in the last 6 months or has a visit in the next 30 days with authorizing provider or within the authorizing provider's specialty.  See \"Patient Info\" tab in inbasket, or \"Choose Columns\" in Meds & Orders section of the refill encounter.            Passed - Medication is active on med list        Passed - Patient is age 18 or older               Last Written Prescription Date:  12/19/2019  Last Fill Quantity: 90,   # refills: 3  Last Office Visit: 09/10/2020 with Vinayak ESCOBAR  Future Office visit:    Next 5 appointments (look out 90 days)    Dec 18, 2020  9:30 AM  Nurse Only with  CHANDA  [facility] ([facility]) [address]  [place] [address] [phone]      Unable to complete prescription refill per RN medication refill policy. Will route to provider for review and consideration.  Bonnie Cleaning RN on 12/11/2020 at 8:05 AM               "

## 2020-12-14 NOTE — PROGRESS NOTES
Nursing Notes:   Avril Easley LPN  12/15/2020  9:15 AM  Addendum  Patient presents to clinic with left ankle pain. He states that this started about a year ago and the pain became worse in the last 2 weeks. No known injury  Avril Easley LPN ....................  12/15/2020   9:11 AM        SUBJECTIVE:     HPI  Daniel Boyd is a 60 year old male who presents to clinic today for evaluation of left ankle concerns. Has had lateral ankle pain that began about one year ago and has been progressively worsening. Worst in the past 2-3 weeks. No known injury. Enjoys walking and golfing but the pain is limiting his activity. Has been consistently icing and taking ibuprofen which provides minimal symptomatic relief. No associated swelling, bruising. Some numbness to plantar aspect of distal left foot secondary to cancer treatments. No other numbness or tingling.       Review of Systems   Per HPI.     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  No polyps seen.  Next due .     CYSTOSCOPY      No Comments Provided     ESOPHAGOSCOPY, GASTROSCOPY, DUODENOSCOPY (EGD), COMBINED      16,EGD     FOOT SURGERY Left     SUBTALAR ATHROEREISIS, left side, Dr. Schwartz.     HERNIA REPAIR Bilateral     Surgipro mesh     LYMPH NODE BIOPSY         FAMILY HISTORY:   Family History   Problem Relation Age of Onset     Prostate Cancer Father         Also had a CABG and  during open heart  "surgery, 8/76.     Breast Cancer Mother      Family History Negative Sister         Good Health     Family History Negative Sister         Good Health     Prostate Cancer Brother         Cancer-prostate,Age 47.     Heart Disease Brother        SOCIAL HISTORY:   Social History     Tobacco Use     Smoking status: Never Smoker     Smokeless tobacco: Never Used   Substance Use Topics     Alcohol use: Yes     Alcohol/week: 7.0 standard drinks     Comment: 1 beer per day      No Known Allergies  Current Outpatient Medications   Medication     aspirin 81 MG EC tablet     atorvastatin (LIPITOR) 40 MG tablet     bisacodyl (DULCOLAX) 5 MG EC tablet     fish oil-omega-3 fatty acids 1000 MG capsule     lisinopril (ZESTRIL) 2.5 MG tablet     LORazepam (ATIVAN) 1 MG tablet     Multiple Vitamins-Minerals (MULTIVITAMIN & MINERAL PO)     omeprazole (PRILOSEC) 20 MG CR capsule     sertraline (ZOLOFT) 100 MG tablet     No current facility-administered medications for this visit.        OBJECTIVE:     /64   Pulse 76   Temp 96.9  F (36.1  C)   Resp 12   Ht 1.956 m (6' 5\")   Wt 77.4 kg (170 lb 9.6 oz)   SpO2 98%   BMI 20.23 kg/m    Body mass index is 20.23 kg/m .  Physical Exam  General: Pleasant, in no apparent distress.  Musculoskeletal: Left ankle tenderness of lateral aspect below malleolar region. No decrease in ankle ROM, however increased pain elicited with eversion of left ankle. No swelling or ecchymosis.   Neurologic Exam: Non-focal, symmetric DTRs, normal gross motor, tone coordination and no visible tremor.  Skin: No jaundice, pallor, rashes, or lesions.  Psych: Appropriate mood and affect.    Results for orders placed or performed during the hospital encounter of 12/15/20   XR Ankle Left G/E 3 Views     Status: None    Narrative    PROCEDURE: XR ANKLE LT G/E 3 VW 12/15/2020 9:36 AM    HISTORY: Chronic pain of left ankle; Chronic pain of left ankle    COMPARISONS: None.    TECHNIQUE: 3 views.    FINDINGS: No " acute fracture or dislocation is seen. Ankle mortise  appears congruent.    Vascular calcification is seen posteriorly.         Impression    IMPRESSION: No acute abnormality.    RANGEL AGARWAL MD         ASSESSMENT/PLAN:     (M25.572,  G89.29) Chronic pain of left ankle  (primary encounter diagnosis)  Comment: Discussed differential with patient including tendonitis, tenosynovitis, fracture, osteophytes, arthritis, etc. Most concerned for tendon etiology. Given persistent, progressive pain over the past year did order ankle x-ray which is unremarkable as above. Offered options of symptomatic management with Tylenol, ibuprofen, icing, elevating, resting versus pursuing additional evaluation with ankle MRI. Patient has chosen to proceed with MRI as he has had worsening of symptoms despite consistent symptomatic management. Order placed, patient will schedule at his convenience. Will make additional treatment plan once MRI results are received.   Plan: XR Ankle Left G/E 3 Views            Nina Mccoy PA-C  [facility] AND [facility]

## 2020-12-15 ENCOUNTER — OFFICE VISIT (OUTPATIENT)
Dept: FAMILY MEDICINE | Facility: OTHER | Age: 60
End: 2020-12-15
Attending: PHYSICIAN ASSISTANT
Payer: COMMERCIAL

## 2020-12-15 ENCOUNTER — HOSPITAL ENCOUNTER (OUTPATIENT)
Dept: GENERAL RADIOLOGY | Facility: OTHER | Age: 60
End: 2020-12-15
Attending: PHYSICIAN ASSISTANT
Payer: COMMERCIAL

## 2020-12-15 VITALS
DIASTOLIC BLOOD PRESSURE: 64 MMHG | HEIGHT: 77 IN | TEMPERATURE: 96.9 F | HEART RATE: 76 BPM | RESPIRATION RATE: 12 BRPM | WEIGHT: 170.6 LBS | BODY MASS INDEX: 20.14 KG/M2 | OXYGEN SATURATION: 98 % | SYSTOLIC BLOOD PRESSURE: 118 MMHG

## 2020-12-15 DIAGNOSIS — M25.572 CHRONIC PAIN OF LEFT ANKLE: ICD-10-CM

## 2020-12-15 DIAGNOSIS — G89.29 CHRONIC PAIN OF LEFT ANKLE: ICD-10-CM

## 2020-12-15 DIAGNOSIS — G89.29 CHRONIC PAIN OF LEFT ANKLE: Primary | ICD-10-CM

## 2020-12-15 DIAGNOSIS — M25.572 CHRONIC PAIN OF LEFT ANKLE: Primary | ICD-10-CM

## 2020-12-15 DIAGNOSIS — G62.2 POLYNEUROPATHY DUE TO OTHER TOXIC AGENTS (H): ICD-10-CM

## 2020-12-15 PROCEDURE — 73610 X-RAY EXAM OF ANKLE: CPT | Mod: LT

## 2020-12-15 PROCEDURE — 99213 OFFICE O/P EST LOW 20 MIN: CPT | Performed by: PHYSICIAN ASSISTANT

## 2020-12-15 ASSESSMENT — PAIN SCALES - GENERAL: PAINLEVEL: EXTREME PAIN (8)

## 2020-12-15 ASSESSMENT — MIFFLIN-ST. JEOR: SCORE: 1696.22

## 2020-12-15 NOTE — NURSING NOTE
Patient presents to clinic with left ankle pain. He states that this started about a year ago and the pain became worse in the last 2 weeks. No known injury  Avril Easley LPN ....................  12/15/2020   9:11 AM

## 2020-12-18 ENCOUNTER — ALLIED HEALTH/NURSE VISIT (OUTPATIENT)
Dept: FAMILY MEDICINE | Facility: OTHER | Age: 60
End: 2020-12-18
Attending: SURGERY
Payer: COMMERCIAL

## 2020-12-18 DIAGNOSIS — Z01.818 PRE-OP TESTING: ICD-10-CM

## 2020-12-18 PROCEDURE — U0003 INFECTIOUS AGENT DETECTION BY NUCLEIC ACID (DNA OR RNA); SEVERE ACUTE RESPIRATORY SYNDROME CORONAVIRUS 2 (SARS-COV-2) (CORONAVIRUS DISEASE [COVID-19]), AMPLIFIED PROBE TECHNIQUE, MAKING USE OF HIGH THROUGHPUT TECHNOLOGIES AS DESCRIBED BY CMS-2020-01-R: HCPCS | Mod: ZL | Performed by: SURGERY

## 2020-12-18 PROCEDURE — C9803 HOPD COVID-19 SPEC COLLECT: HCPCS

## 2020-12-18 NOTE — NURSING NOTE
Patient swabbed for COVID-19 testing.  Procedure.  Kendra Gillette LPN on 12/18/2020 at 9:27 AM

## 2020-12-19 LAB
LABORATORY COMMENT REPORT: ABNORMAL
SARS-COV-2 RNA SPEC QL NAA+PROBE: NORMAL
SARS-COV-2 RNA SPEC QL NAA+PROBE: POSITIVE
SPECIMEN SOURCE: ABNORMAL
SPECIMEN SOURCE: NORMAL

## 2020-12-20 ENCOUNTER — TELEPHONE (OUTPATIENT)
Dept: FAMILY MEDICINE | Facility: OTHER | Age: 60
End: 2020-12-20

## 2020-12-20 NOTE — TELEPHONE ENCOUNTER
"Coronavirus (COVID-19) Notification    Caller Name (Patient, parent, daughter/son, grandparent, etc)  Daniel    Reason for call  Notify of Positive Coronavirus (COVID-19) lab results, assess symptoms,  review  [employer] [facility] recommendations    Lab Result    Lab test:  2019-nCoV rRt-PCR or SARS-CoV-2 PCR    Oropharyngeal AND/OR nasopharyngeal swabs is POSITIVE for 2019-nCoV RNA/SARS-COV-2 PCR (COVID-19 virus)    RN Recommendations/Instructions per [facility] Coronavirus COVID-19 recommendations    Brief introduction script  Introduce self then review script:  \"I am calling on behalf of [employer].  We were notified that your Coronavirus test (COVID-19) for was POSITIVE for the virus.  I have some information to relay to you but first I wanted to mention that the [place] Dept of Health will be contacting you shortly [it's possible MD already called Patient] to talk to you more about how you are feeling and other people you have had contact with who might now also have the virus.  Also,  [employer] [facility] is Partnering with the [facility] for Covid-19 research, you may be contacted directly by research staff.\"    Assessment (Inquire about Patient's current symptoms)   Assessment   Current Symptoms at time of phone call: (if no symptoms, document No symptoms] asymptomatic   Symptoms onset (if applicable) Will isolate from date of testing. 12/18/20     If at time of call, Patients symptoms hare worsened, the Patient should contact 911 or have someone drive them to Emergency Dept promptly:      If Patient calling 911, inform 911 personal that you have tested positive for the Coronavirus (COVID-19).  Place mask on and await 911 to arrive.    If Emergency Dept, If possible, please have another adult drive you to the Emergency Dept but you need to wear mask when in contact with other people.      Monoclonal Antibody Administration    You may be eligible to receive a new treatment with a monoclonal antibody for " "preventing hospitalization in patients at high risk for complications from COVID-19.   This medication is still experimental and available on a limited basis; it is given through an IV and must be given at an infusion center. Please note that not all people who are eligible will receive the medication since it is in limited supply.     Are you interested in being considered for this medication?  No.   Does the patient fit the criteria: Patient declined    If patient qualifies based on above criteria:  \"We will contact you if you are selected to receive the medication in the next 1-2 days.   This is time sensitive and if you are not selected in the next 1-2 days, you will not receive the medication.  If you do not receive a call to schedule, you have not been selected.\"    Review information with Patient    Your result was positive. This means you have COVID-19 (coronavirus).  We have sent you a letter that reviews the information that I'll be reviewing with you now.    How can I protect others?    If you have symptoms: stay home and away from others (self-isolate) until:    You've had no fever--and no medicine that reduces fever--for 1 full day (24 hours). And       Your other symptoms have gotten better. For example, your cough or breathing has improved. And     At least 10 days have passed since your symptoms started. (If you've been told by a doctor that you have a weak immune system, wait 20 days.)     If you don't have symptoms: Stay home and away from others (self-isolate) until at least 10 days have passed since your first positive COVID-19 test. (Date test collected)    During this time:    Stay in your own room, including for meals. Use your own bathroom if you can.    Stay away from others in your home. No hugging, kissing or shaking hands. No visitors.     Don't go to work, school or anywhere else.     Clean  high touch  surfaces often (doorknobs, counters, handles, etc.). Use a household cleaning spray " or wipes. You'll find a full list on the EPA website at www.epa.gov/pesticide-registration/list-n-disinfectants-use-against-sars-cov-2.     Cover your mouth and nose with a mask, tissue or other face covering to avoid spreading germs.    Wash your hands and face often with soap and water.    Caregivers in these groups are at risk for severe illness due to COVID-19:  o People 65 years and older  o People who live in a nursing home or long-term care facility  o People with chronic disease (lung, heart, cancer, diabetes, kidney, liver, immunologic)  o People who have a weakened immune system, including those who:  - Are in cancer treatment  - Take medicine that weakens the immune system, such as corticosteroids  - Had a bone marrow or organ transplant  - Have an immune deficiency  - Have poorly controlled HIV or AIDS  - Are obese (body mass index of 40 or higher)  - Smoke regularly    Caregivers should wear gloves while washing dishes, handling laundry and cleaning bedrooms and bathrooms.    Wash and dry laundry with special caution. Don't shake dirty laundry, and use the warmest water setting you can.    If you have a weakened immune system, ask your doctor about other actions you should take.    For more tips, go to www.cdc.gov/coronavirus/2019-ncov/downloads/10Things.pdf.    You should not go back to work until you meet the guidelines above for ending your home isolation. You don't need to be retested for COVID-19 before going back to work--studies show that you won't spread the virus if it's been at least 10 days since your symptoms started (or 20 days, if you have a weak immune system).    Employers: This document serves as formal notice of your employee's medical guidelines for going back to work. They must meet the above guidelines before going back to work in person.    How can I take care of myself?    1. Get lots of rest. Drink extra fluids (unless a doctor has told you not to).    2. Take Tylenol  (acetaminophen) for fever or pain. If you have liver or kidney problems, ask your family doctor if it's okay to take Tylenol.     Take either:     650 mg (two 325 mg pills) every 4 to 6 hours, or     1,000 mg (two 500 mg pills) every 8 hours as needed.     Note: Don't take more than 3,000 mg in one day. Acetaminophen is found in many medicines (both prescribed and over-the-counter medicines). Read all labels to be sure you don't take too much.    For children, check the Tylenol bottle for the right dose (based on their age or weight).    3. If you have other health problems (like cancer, heart failure, an organ transplant or severe kidney disease): Call your specialty clinic if you don't feel better in the next 2 days.    4. Know when to call 911: Emergency warning signs include:    Trouble breathing or shortness of breath    Pain or pressure in the chest that doesn't go away    Feeling confused like you haven't felt before, or not being able to wake up    Bluish-colored lips or face    5. Sign up for [employer]. We know it's scary to hear that you have COVID-19. We want to track your symptoms to make sure you're okay over the next 2 weeks. Please look for an email from [employer]--this is a free, online program that we'll use to keep in touch. To sign up, follow the link in the email. Learn more at [email or web address].    Where can I get more information?    [facility]: [email or web address]    Coronavirus Basics: www.health.[facility].[place]/diseases/coronavirus/basics.html    What to Do If You're Sick: www.cdc.gov/coronavirus/2019-ncov/about/steps-when-sick.html    Ending Home Isolation: www.cdc.gov/coronavirus/2019-ncov/hcp/disposition-in-home-patients.html     Caring for Someone with COVID-19: www.cdc.gov/coronavirus/2019-ncov/if-you-are-sick/care-for-someone.html     [facility] clinical trials (COVID-19 research studies): clinicalaffairs.[facility]-clinical-trials     A  Positive COVID-19 letter will be sent via [employer] or the mail. (Exception, no letters sent to Presurgerical/Preprocedure Patients)    Gayatri Saleem, MSN, RN

## 2020-12-28 ENCOUNTER — HOSPITAL ENCOUNTER (OUTPATIENT)
Dept: CT IMAGING | Facility: OTHER | Age: 60
End: 2020-12-28
Attending: INTERNAL MEDICINE
Payer: COMMERCIAL

## 2020-12-28 DIAGNOSIS — C81.90 HODGKIN LYMPHOMA, UNSPECIFIED HODGKIN LYMPHOMA TYPE, UNSPECIFIED BODY REGION (H): ICD-10-CM

## 2020-12-28 LAB
ALBUMIN SERPL-MCNC: 4.3 G/DL (ref 3.5–5.7)
ALP SERPL-CCNC: 51 U/L (ref 34–104)
ALT SERPL W P-5'-P-CCNC: 22 U/L (ref 7–52)
ANION GAP SERPL CALCULATED.3IONS-SCNC: 6 MMOL/L (ref 3–14)
AST SERPL W P-5'-P-CCNC: 22 U/L (ref 13–39)
BASOPHILS # BLD AUTO: 0 10E9/L (ref 0–0.2)
BASOPHILS NFR BLD AUTO: 0.7 %
BILIRUB SERPL-MCNC: 0.6 MG/DL (ref 0.3–1)
BUN SERPL-MCNC: 18 MG/DL (ref 7–25)
CALCIUM SERPL-MCNC: 9.6 MG/DL (ref 8.6–10.3)
CHLORIDE SERPL-SCNC: 102 MMOL/L (ref 98–107)
CO2 SERPL-SCNC: 28 MMOL/L (ref 21–31)
CREAT SERPL-MCNC: 1.12 MG/DL (ref 0.7–1.3)
DIFFERENTIAL METHOD BLD: NORMAL
EOSINOPHIL # BLD AUTO: 0.1 10E9/L (ref 0–0.7)
EOSINOPHIL NFR BLD AUTO: 2.5 %
ERYTHROCYTE [DISTWIDTH] IN BLOOD BY AUTOMATED COUNT: 12.7 % (ref 10–15)
ERYTHROCYTE [SEDIMENTATION RATE] IN BLOOD BY WESTERGREN METHOD: 4 MM/H (ref 1–10)
GFR SERPL CREATININE-BSD FRML MDRD: 67 ML/MIN/{1.73_M2}
GLUCOSE SERPL-MCNC: 113 MG/DL (ref 70–105)
HCT VFR BLD AUTO: 45 % (ref 40–53)
HGB BLD-MCNC: 15.1 G/DL (ref 13.3–17.7)
IMM GRANULOCYTES # BLD: 0 10E9/L (ref 0–0.4)
IMM GRANULOCYTES NFR BLD: 0.2 %
LDH SERPL L TO P-CCNC: 142 U/L (ref 140–271)
LYMPHOCYTES # BLD AUTO: 1.3 10E9/L (ref 0.8–5.3)
LYMPHOCYTES NFR BLD AUTO: 23.6 %
MCH RBC QN AUTO: 31.7 PG (ref 26.5–33)
MCHC RBC AUTO-ENTMCNC: 33.6 G/DL (ref 31.5–36.5)
MCV RBC AUTO: 95 FL (ref 78–100)
MONOCYTES # BLD AUTO: 0.4 10E9/L (ref 0–1.3)
MONOCYTES NFR BLD AUTO: 7.4 %
NEUTROPHILS # BLD AUTO: 3.7 10E9/L (ref 1.6–8.3)
NEUTROPHILS NFR BLD AUTO: 65.6 %
PLATELET # BLD AUTO: 198 10E9/L (ref 150–450)
POTASSIUM SERPL-SCNC: 4.1 MMOL/L (ref 3.5–5.1)
PROT SERPL-MCNC: 7.1 G/DL (ref 6.4–8.9)
RBC # BLD AUTO: 4.76 10E12/L (ref 4.4–5.9)
SODIUM SERPL-SCNC: 136 MMOL/L (ref 134–144)
WBC # BLD AUTO: 5.6 10E9/L (ref 4–11)

## 2020-12-28 PROCEDURE — 255N000002 HC RX 255 OP 636: Performed by: INTERNAL MEDICINE

## 2020-12-28 PROCEDURE — 71260 CT THORAX DX C+: CPT

## 2020-12-28 PROCEDURE — 80053 COMPREHEN METABOLIC PANEL: CPT | Mod: ZL | Performed by: INTERNAL MEDICINE

## 2020-12-28 PROCEDURE — 36415 COLL VENOUS BLD VENIPUNCTURE: CPT | Mod: ZL | Performed by: INTERNAL MEDICINE

## 2020-12-28 PROCEDURE — 70491 CT SOFT TISSUE NECK W/DYE: CPT

## 2020-12-28 PROCEDURE — 85652 RBC SED RATE AUTOMATED: CPT | Mod: ZL | Performed by: INTERNAL MEDICINE

## 2020-12-28 PROCEDURE — 85025 COMPLETE CBC W/AUTO DIFF WBC: CPT | Mod: ZL | Performed by: INTERNAL MEDICINE

## 2020-12-28 PROCEDURE — 74177 CT ABD & PELVIS W/CONTRAST: CPT

## 2020-12-28 PROCEDURE — 83615 LACTATE (LD) (LDH) ENZYME: CPT | Mod: ZL | Performed by: INTERNAL MEDICINE

## 2020-12-28 RX ADMIN — IOHEXOL 100 ML: 350 INJECTION, SOLUTION INTRAVENOUS at 09:22

## 2020-12-29 ENCOUNTER — HOSPITAL ENCOUNTER (OUTPATIENT)
Dept: MRI IMAGING | Facility: OTHER | Age: 60
Discharge: HOME | End: 2020-12-29
Attending: PHYSICIAN ASSISTANT | Admitting: PHYSICIAN ASSISTANT
Payer: COMMERCIAL

## 2020-12-29 PROCEDURE — 73721 MRI JNT OF LWR EXTRE W/O DYE: CPT | Mod: LT

## 2021-01-11 ENCOUNTER — ONCOLOGY VISIT (OUTPATIENT)
Dept: ONCOLOGY | Facility: OTHER | Age: 61
End: 2021-01-11
Attending: NURSE PRACTITIONER
Payer: COMMERCIAL

## 2021-01-11 VITALS
TEMPERATURE: 96.6 F | RESPIRATION RATE: 14 BRPM | SYSTOLIC BLOOD PRESSURE: 120 MMHG | OXYGEN SATURATION: 96 % | HEART RATE: 63 BPM | DIASTOLIC BLOOD PRESSURE: 70 MMHG | WEIGHT: 173 LBS | HEIGHT: 77 IN | BODY MASS INDEX: 20.43 KG/M2

## 2021-01-11 DIAGNOSIS — C81.90 HODGKIN LYMPHOMA, UNSPECIFIED HODGKIN LYMPHOMA TYPE, UNSPECIFIED BODY REGION (H): Primary | ICD-10-CM

## 2021-01-11 PROCEDURE — 99214 OFFICE O/P EST MOD 30 MIN: CPT | Performed by: NURSE PRACTITIONER

## 2021-01-11 ASSESSMENT — PAIN SCALES - GENERAL: PAINLEVEL: NO PAIN (0)

## 2021-01-11 ASSESSMENT — MIFFLIN-ST. JEOR: SCORE: 1707.22

## 2021-01-11 NOTE — PROGRESS NOTES
Oncology Follow-up Visit:  January 11, 2021  Diagnosis:Lymphoma    History Of Present Illness:  Patient presents for followup of stage IV Hodgkin lymphoma, classical type.  He originally presented with right neck mass over a period of 2 months.  He was subsequently seen by Dr. Radha Kim of General Surgery who noted the patient had right-sided supraclavicular adenopathy.  The biopsy of the lymph node came back consistent with classical Hodgkin lymphoma.  He was subsequently seen by Dr. Jarad Mosqueda on 08/05/2015, who recommended a PET scan for accurate staging.  This was performed on 08/13/2015.  The findings were that there was a hypermetabolic lymphadenopathy both above and below the diaphragm, suspicious for lymphoma.  There was hypermetabolic activity in the spleen suspicious for metastatic disease.  There were multiple foci with abnormal uptake in the spine suspicious for metastatic disease.  There was also a right middle lobe lung nodule that was nonspecific.  Dr. Mosqueda felt the patient had stage IV Hodgkin lymphoma and recommended ABVD x 6 cycles.  An echocardiogram was performed to assess his cardiac function on 08/12/2015.  The findings were that the patient had normal left ventricular size and uptake, and left ventricular ejection fraction was 65%.  The patient was started on ABVD and after 3 cycles he had a repeat PET scan on 11/28/2015.  The findings were that there was marked improvement in the patient's lymphadenopathy over the lower neck, axilla, chest, abdomen, pelvis and groin area.  Focal areas of activity were markedly improved over the cervical, lumbar spine and pelvic area.  The patient subsequently was seen by Dr. Mosqueda on 02/10/2016 and assessment was the patient had completed 6 cycles of ABVD.  Repeat PET scan was performed on 02/24/2016.  The findings were that there was no abnormal uptake to suggest metastatic disease and no evidence of metastatic disease.  Previously seen  lymphadenopathy and bone lesions had all resolved. When patient was seen on 06/08/2016, and he had no evidence of disease clinically. We obtained an echocardiogram as well as pulmonary function tests.  Echocardiogram revealed no change.  Ejection fraction was 56%.  Pulmonary functions did reveal decreased DLCO of 50% predicted being reported.  The patient did have some shortness of breath when we saw him on 07/14/2016, and we elected to restage him with a PET scan.  This was done on 10/05/2016, which revealed no suspicious hypermetabolic osseous lesions identified, no evidence of recurrent lymphoma.     Patient was seen by Dr. 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  He felt his symptoms had been stable, but not progressive.  He did have a prechemotherapy pulmonary function test for comparison and he felt the patient certainly could have pulmonary fibrosis due to bleomycin as well as other forms of bleomycin toxicity or hypersensitivity pneumonitis. The patient had a high-resolution CT chest performed 10/03/2016, and findings were there was new mild subpleural fibrotic changes within the anterior right upper lobe which may be related to chemotherapy. The plan was to follow him with serial pulmonary function tests to ensure stability. He follow with pulmonology on a yearly basis. 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 He did have CT scans done on 8/29/18 showing no evidence of recurrent or metastatic disease. It was felt that patient could have CT scans done every 6 months for a total of 5 years per Dr Leung. Most recent CT scans from 12/28/20 show no evidence or recurrent disease. Labs from 12/28/20 show a normal CBC, normal LDH. All other labs are stable. Patient has been feeling well. He was unable to have his colonoscopy last month after he tested positive for covid. He will get colonoscopy  rescheduled. Patient denies any new lymphadenopathy. He has no new concerns at this time.      Review Of Systems:  Review Of Systems  Eyes/Ears/Nose/Throat: denies new vision or hearing changes  Respiratory: No shortness of breath, dyspnea on exertion, cough, or hemoptysis  Cardiovascular: denies chest pain or palpitations  Gastrointestinal: denies abdominal pain, no bowel changes  Genitourinary: denies dysuria or hematuria  Musculoskeletal: reports some ongoing left ankle pain, seeing primary care for this. He denies new bone pain or muscle weakness  Neurologic:denies headaches, no dizziness  Hematologic/Lymphatic/Immunologic: denies fevers, chills, night sweats        There are no exam notes on file for this visit.    Past medical, social, surgical, and family histories reviewed.    Allergies:  Allergies as of 01/11/2021     (No Known Allergies)       Current Medications:  Current Outpatient Medications   Medication Sig Dispense Refill     aspirin 81 MG EC tablet Take 1 tablet (81 mg) by mouth daily       atorvastatin (LIPITOR) 40 MG tablet TAKE 1 TABLET BY MOUTH ONCE DAILY 90 tablet 2     fish oil-omega-3 fatty acids 1000 MG capsule Take 1 capsule by mouth daily       lisinopril (ZESTRIL) 2.5 MG tablet TAKE 1 TABLET BY MOUTH ONCE DAILY 90 tablet 3     LORazepam (ATIVAN) 1 MG tablet TAKE 1 TABLET BY MOUTH AT BEDTIME 90 tablet 1     Multiple Vitamins-Minerals (MULTIVITAMIN & MINERAL PO) Take 1 tablet by mouth every morning       omeprazole (PRILOSEC) 20 MG CR capsule Take 1 capsule (20 mg) by mouth daily 30 capsule      sertraline (ZOLOFT) 100 MG tablet Take 1 tablet by mouth once daily. 90 tablet 3     bisacodyl (DULCOLAX) 5 MG EC tablet Take as directed by colonoscopy prep instructions 2 tablet 0        Physical Exam:  There were no vitals taken for this visit.    GENERAL APPEARANCE: 60 year old male, alert and no distress     NECK: no adenopathy, no asymmetry or masses     LYMPHATICS: No cervical,  supraclavicular, axillary lymphadenopathy     RESP: lungs clear to auscultation - no rales, rhonchi or wheezes     CARDIOVASCULAR: regular rates and rhythm, normal S1 S2     ABDOMEN:  soft, nontender, no HSM or masses and bowel sounds normal     MUSCULOSKELETAL: extremities normal- no gross deformities noted, No edema b/l LE.     SKIN: no suspicious lesions or rashes on exposed skin     PSYCHIATRIC: mentation appears normal and affect normal    Laboratory/Imaging Studies  No visits with results within 2 Week(s) from this visit.   Latest known visit with results is:   Orders Only on 12/28/2020   Component Date Value Ref Range Status     Sed Rate 12/28/2020 4  1 - 10 mm/h Final     Lactate Dehydrogenase 12/28/2020 142  140 - 271 U/L Final     Sodium 12/28/2020 136  134 - 144 mmol/L Final     Potassium 12/28/2020 4.1  3.5 - 5.1 mmol/L Final     Chloride 12/28/2020 102  98 - 107 mmol/L Final     Carbon Dioxide 12/28/2020 28  21 - 31 mmol/L Final     Anion Gap 12/28/2020 6  3 - 14 mmol/L Final     Glucose 12/28/2020 113* 70 - 105 mg/dL Final     Urea Nitrogen 12/28/2020 18  7 - 25 mg/dL Final     Creatinine 12/28/2020 1.12  0.70 - 1.30 mg/dL Final     GFR Estimate 12/28/2020 67  >60 mL/min/[1.73_m2] Final     GFR Estimate If Black 12/28/2020 81  >60 mL/min/[1.73_m2] Final     Calcium 12/28/2020 9.6  8.6 - 10.3 mg/dL Final     Bilirubin Total 12/28/2020 0.6  0.3 - 1.0 mg/dL Final     Albumin 12/28/2020 4.3  3.5 - 5.7 g/dL Final     Protein Total 12/28/2020 7.1  6.4 - 8.9 g/dL Final     Alkaline Phosphatase 12/28/2020 51  34 - 104 U/L Final     ALT 12/28/2020 22  7 - 52 U/L Final     AST 12/28/2020 22  13 - 39 U/L Final     WBC 12/28/2020 5.6  4.0 - 11.0 10e9/L Final     RBC Count 12/28/2020 4.76  4.4 - 5.9 10e12/L Final     Hemoglobin 12/28/2020 15.1  13.3 - 17.7 g/dL Final     Hematocrit 12/28/2020 45.0  40.0 - 53.0 % Final     MCV 12/28/2020 95  78 - 100 fl Final     MCH 12/28/2020 31.7  26.5 - 33.0 pg Final     MCHC  12/28/2020 33.6  31.5 - 36.5 g/dL Final     RDW 12/28/2020 12.7  10.0 - 15.0 % Final     Platelet Count 12/28/2020 198  150 - 450 10e9/L Final     Diff Method 12/28/2020 Automated Method   Final     % Neutrophils 12/28/2020 65.6  % Final     % Lymphocytes 12/28/2020 23.6  % Final     % Monocytes 12/28/2020 7.4  % Final     % Eosinophils 12/28/2020 2.5  % Final     % Basophils 12/28/2020 0.7  % Final     % Immature Granulocytes 12/28/2020 0.2  % Final     Absolute Neutrophil 12/28/2020 3.7  1.6 - 8.3 10e9/L Final     Absolute Lymphocytes 12/28/2020 1.3  0.8 - 5.3 10e9/L Final     Absolute Monocytes 12/28/2020 0.4  0.0 - 1.3 10e9/L Final     Absolute Eosinophils 12/28/2020 0.1  0.0 - 0.7 10e9/L Final     Absolute Basophils 12/28/2020 0.0  0.0 - 0.2 10e9/L Final     Abs Immature Granulocytes 12/28/2020 0.0  0 - 0.4 10e9/L Final        ASSESSMENT/PLAN:  Stage IV classical Hodgkin lymphoma presenting with right supraclavicular lymphadenopathy, biopsy consistent with classical Hodgkin lymphoma involving the spine, pelvis, as well as lymphadenopathy above and below the diaphragm.  He responded to 6 cycles of ABVD.  Course complicated with neutropenic fever. Pulmonary function tests revealed a depressed DLCO.  PET scan was negative for metastatic disease. He will continue to follow up with Dr. Cm London on a yearly basis for bleomycin lung. Most recent imaging from 12/28/20 shows stable CT scans with no evidence of recurrent disease. It was felt that patient could have CT scans done every 6 months for a total of 5 years per Dr Leung. Patient is near his 5 year mary.  Labs work from 12/28/20 shows a normal CBC. LDH is normal. All other labs are stable. We will see patient back in 6 months with  A CBC, CMP, LDH, sed rate. He will continue to follow with pulmonology and cardiology.    Thirty minutes spent on this encounter. Time spent reviewing patients health record, counseling patient regarding disease process,  interpreting lab and imaging results, obtaining a review of systems, performing an exam, discussing plan for ongoing imaging and follow up, documenting in EHR, and coordination of care

## 2021-01-14 ENCOUNTER — HEALTH MAINTENANCE LETTER (OUTPATIENT)
Age: 61
End: 2021-01-14

## 2021-02-09 ENCOUNTER — OFFICE VISIT (OUTPATIENT)
Dept: ORTHOPEDICS | Facility: OTHER | Age: 61
End: 2021-02-09
Attending: ORTHOPAEDIC SURGERY
Payer: COMMERCIAL

## 2021-02-09 VITALS
SYSTOLIC BLOOD PRESSURE: 110 MMHG | WEIGHT: 173 LBS | DIASTOLIC BLOOD PRESSURE: 72 MMHG | HEART RATE: 60 BPM | BODY MASS INDEX: 20.51 KG/M2

## 2021-02-09 DIAGNOSIS — G89.29 CHRONIC PAIN OF LEFT ANKLE: Primary | ICD-10-CM

## 2021-02-09 DIAGNOSIS — M25.572 CHRONIC PAIN OF LEFT ANKLE: Primary | ICD-10-CM

## 2021-02-09 PROCEDURE — 99203 OFFICE O/P NEW LOW 30 MIN: CPT | Performed by: ORTHOPAEDIC SURGERY

## 2021-02-09 NOTE — PROGRESS NOTES
Visit Date:   02/09/2021      REASON FOR EVALUATION:  Left ankle pain.      HISTORY:  Daniel comes in with regards to his left ankle.  It has been bothering him for about a year to 2.  He is an active golfer.  Pain is on the lateral aspect.  Actually through the golf season, he is able to manage this, but it got kind of acutely worse in the fall.  It hurts on the lateral side.  Reports there is a lot of pain on the outer aspect.  It is now kind of settling down here at this point.  Uses occasional anti-inflammatories.  No formalized therapy has been done.  Did have MRI scan done, which showed some thickening and a split within the peroneus brevis tendon here, but no tearing present at this point in time.  Ligaments are otherwise unremarkable.  Cartilage also unremarkable.  Does have a little bit of edema in the lateral aspect of the fibula.  He denies any popping sensation as far as that is concerned.  Does report discomfort on the lateral aspect here predominantly at this point in time.      MEDICATIONS:  Have been reviewed.      ALLERGIES:  NO KNOWN DRUG ALLERGIES.      REVIEW OF SYSTEMS:  A 12-point otherwise negative with the exception as stated above.      PHYSICAL EXAMINATION:  The patient is 6 feet 5 inches, 173 pounds.  Blood pressure 110/72, pulse 60.  He is alert and oriented x 3, cooperative with exam, in no acute distress.  Does ambulate with satisfactory gait.  Affect is appropriate here as well.  Examination of left ankle shows dorsiflexion/plantarflexion about 30/30.  Full pronation and supination.  Mild discomfort with resisted eversion of the ankle.  Does not appear to have any popping sensation across his peroneal groove with extension of the ankle and eversion at this point in time.  No tenderness across the medial aspect.  Sensation grossly intact to light touch.  Cap refill less than 2 seconds and dorsalis pedis pulse is 1+ here as well.  He does have significant deformities of the toes.  He had  multiple hammertoes corrected.  Does have significant hammer of the IP joint of his great toe here as well.      IMPRESSION:  Left ankle peroneal tendinitis with partial split.      PLAN:  Discussed options.  As he has settled down in terms of pain, advised that he does get set forward on therapy for ankle stabilization.  Procedure if his pain does ramp up.  Certainly an injection would be considered.  If long-term management of his tendons continue to irritate on the lateral side, particularly at the golf season, consideration for operative exploration would be warranted.  I will refer him over to Dr. Montez in regard to this.  He does have some forefoot issues that need correction as well.  He will keep me posted in regards to the next steps at this point in time.    Time spent on appointment: 30 minutes         FARZANA JENKINS MD             D: 2021   T: 2021   MTRoslyn ROBERSON      Name:     SAM AGUIRRE   MRN:      [id number]        Account:      [id number]   :      1960           Visit Date:   2021      Document: [id number]

## 2021-02-09 NOTE — PROGRESS NOTES
Patient is here for consult on his left ankle pain.   Susan Bonner LPN .....................2/9/2021 10:03 AM

## 2021-03-18 DIAGNOSIS — N40.0 ENLARGED PROSTATE: Primary | ICD-10-CM

## 2021-03-18 NOTE — PROGRESS NOTES
"Per last office visit  7/20/20 with Collin Kruger MD \"Plan  Follow-up in 1 year with a PSA\"        Orders Placed    "

## 2021-03-23 ENCOUNTER — OFFICE VISIT (OUTPATIENT)
Dept: ORTHOPEDICS | Facility: OTHER | Age: 61
End: 2021-03-23
Attending: ORTHOPAEDIC SURGERY
Payer: COMMERCIAL

## 2021-03-23 DIAGNOSIS — G89.29 CHRONIC PAIN OF LEFT ANKLE: Primary | ICD-10-CM

## 2021-03-23 DIAGNOSIS — M25.572 CHRONIC PAIN OF LEFT ANKLE: Primary | ICD-10-CM

## 2021-03-23 PROCEDURE — 99212 OFFICE O/P EST SF 10 MIN: CPT | Mod: 25 | Performed by: ORTHOPAEDIC SURGERY

## 2021-03-23 PROCEDURE — 250N000009 HC RX 250: Performed by: ORTHOPAEDIC SURGERY

## 2021-03-23 PROCEDURE — 20550 NJX 1 TENDON SHEATH/LIGAMENT: CPT | Mod: LT | Performed by: ORTHOPAEDIC SURGERY

## 2021-03-23 PROCEDURE — 250N000011 HC RX IP 250 OP 636: Performed by: ORTHOPAEDIC SURGERY

## 2021-03-23 RX ORDER — TRIAMCINOLONE ACETONIDE 40 MG/ML
40 INJECTION, SUSPENSION INTRA-ARTICULAR; INTRAMUSCULAR ONCE
Status: COMPLETED | OUTPATIENT
Start: 2021-03-23 | End: 2021-03-23

## 2021-03-23 RX ORDER — LIDOCAINE HYDROCHLORIDE 10 MG/ML
1 INJECTION, SOLUTION EPIDURAL; INFILTRATION; INTRACAUDAL; PERINEURAL ONCE
Status: COMPLETED | OUTPATIENT
Start: 2021-03-23 | End: 2021-03-23

## 2021-03-23 RX ADMIN — LIDOCAINE HYDROCHLORIDE 1 ML: 10 INJECTION, SOLUTION EPIDURAL; INFILTRATION; INTRACAUDAL; PERINEURAL at 11:49

## 2021-03-23 RX ADMIN — TRIAMCINOLONE ACETONIDE 40 MG: 40 INJECTION, SUSPENSION INTRA-ARTICULAR; INTRAMUSCULAR at 11:49

## 2021-03-23 NOTE — PROGRESS NOTES
Visit Date:   2021      REASON FOR EVALUATION:  Left ankle pain.      HISTORY OF PRESENT ILLNESS:  Daniel comes in with regards to his left ankle.  He has had discomfort here for a bit of time on that ankle here.  He had MRI scan done, which showed evidence for some peroneal tendinitis.  He is here for treatment and evaluation at this point.  The patient's pain is persisting here.  He is getting up on the golf season and he wanted to get things checked out and evaluated at this time.      PHYSICAL EXAMINATION:     EXTREMITIES:  Examination of the ankle shows tenderness on the lateral side.  Weakness with resisted external rotation.  Neurovascular examination intact.      PROCEDURE PERFORMED:  Following informed consent and sterile preparation, the patient's left ankle peroneal tendon and sheath was injected with 1 mL of 1% lidocaine and 40 mg of Kenalog under sterile conditions.  The patient did tolerate the procedure well.      IMPRESSION:  Left ankle peroneal tendinitis, partial tearing.      PLAN:  Injection as stated above.  Repeat down the road if necessary.  If symptoms continue to progress, certainly referral to Dr. Johnson for a peroneal tendon repair will be recommended in that scenario.  He will keep me posted in regards to needs for that process.         FARZANA JENKINS MD             D: 2021   T: 2021   MT: IRON      Name:     DANIEL AGUIRRE   MRN:      [id number]        Account:      [id number]   :      1960           Visit Date:   2021      Document: [id number]

## 2021-03-23 NOTE — PROGRESS NOTES
Patient is here for follow up on his left ankle.  Susan Bonner LPN .....................3/23/2021 11:43 AM

## 2021-04-09 NOTE — PROGRESS NOTES
Patient Information     Patient Name MRN Sex Daniel Veras [id number] Male 1960      Progress Notes by Cecily Radford at 2017 11:22 AM     Author:  Cecily Radford Service:  (none) Author Type:  Other Clinical Staff     Filed:  2017 11:22 AM Date of Service:  2017 11:22 AM Status:  Signed     :  Cecily aRdford (Other Clinical Staff)            1.  Has the patient had a previous reaction to IV contrast? No    2.  Does the patient have kidney disease? No    3.  Is the patient on dialysis? No    If YES to any of these questions, exam will be reviewed with a Radiologist before administering contrast.             show

## 2021-04-19 DIAGNOSIS — I25.10 CORONARY ARTERY DISEASE INVOLVING NATIVE CORONARY ARTERY OF NATIVE HEART WITHOUT ANGINA PECTORIS: ICD-10-CM

## 2021-04-19 DIAGNOSIS — F51.04 CHRONIC INSOMNIA: ICD-10-CM

## 2021-04-20 RX ORDER — LISINOPRIL 2.5 MG/1
TABLET ORAL
Qty: 90 TABLET | Refills: 3 | Status: SHIPPED | OUTPATIENT
Start: 2021-04-20 | End: 2022-04-18

## 2021-04-20 RX ORDER — ATORVASTATIN CALCIUM 40 MG/1
TABLET, FILM COATED ORAL
Qty: 90 TABLET | Refills: 2 | Status: SHIPPED | OUTPATIENT
Start: 2021-04-20 | End: 2022-01-27

## 2021-04-20 RX ORDER — LORAZEPAM 1 MG/1
TABLET ORAL
Qty: 90 TABLET | Refills: 1 | Status: SHIPPED | OUTPATIENT
Start: 2021-04-20 | End: 2021-10-13

## 2021-04-20 NOTE — TELEPHONE ENCOUNTER
"Requested Prescriptions   Pending Prescriptions Disp Refills     LORazepam (ATIVAN) 1 MG tablet [Pharmacy Med Name: lorazepam 1 mg tablet] 90 tablet 1     Sig: TAKE 1 TABLET BY MOUTH AT BEDTIME       There is no refill protocol information for this order        atorvastatin (LIPITOR) 40 MG tablet [Pharmacy Med Name: atorvastatin 40 mg tablet] 90 tablet 2     Sig: TAKE 1 TABLET BY MOUTH ONCE DAILY       Statins Protocol Failed - 4/20/2021 11:39 AM        Failed - LDL on file in past 12 months     Recent Labs   Lab Test 06/15/18  1112   LDL 67             Failed - Recent (12 mo) or future (30 days) visit within the authorizing provider's specialty     Patient has had an office visit with the authorizing provider or a provider within the authorizing providers department within the previous 12 mos or has a future within next 30 days. See \"Patient Info\" tab in inbasket, or \"Choose Columns\" in Meds & Orders section of the refill encounter.              Passed - No abnormal creatine kinase in past 12 months     No lab results found.             Passed - Medication is active on med list        Passed - Patient is age 18 or older           lisinopril (ZESTRIL) 2.5 MG tablet [Pharmacy Med Name: lisinopril 2.5 mg tablet] 90 tablet 3     Sig: TAKE 1 TABLET BY MOUTH ONCE DAILY       ACE Inhibitors (Including Combos) Protocol Failed - 4/19/2021  8:06 AM        Failed - Recent (12 mo) or future (30 days) visit within the authorizing provider's specialty     Patient has had an office visit with the authorizing provider or a provider within the authorizing providers department within the previous 12 mos or has a future within next 30 days. See \"Patient Info\" tab in inbasket, or \"Choose Columns\" in Meds & Orders section of the refill encounter.              Passed - Blood pressure under 140/90 in past 12 months     BP Readings from Last 3 Encounters:   02/09/21 110/72   01/11/21 120/70   12/15/20 118/64                 Passed - " Medication is active on med list        Passed - Patient is age 18 or older        Passed - Normal serum creatinine on file in past 12 months     Recent Labs   Lab Test 12/28/20  0719   CR 1.12       Ok to refill medication if creatinine is low          Passed - Normal serum potassium on file in past 12 months     Recent Labs   Lab Test 12/28/20  0719   POTASSIUM 4.1               Last Written Prescription Date:  04/24/2020 Lisinopril  Last Fill Quantity: 90,   # refills: 3    Last Written Prescription Date:  07/29/2020 Atorvastatin  Last Fill Quantity: 90,   # refills: 2    Last Written Prescription Date:  10/21/2020 Lorazepam  Last Fill Quantity: 90,   # refills: 1  Last Office Visit: 12/15/2020 with Nina Mccoy PA-C  Future Office visit:   None noted.  Unable to complete prescription refill per RN medication refill policy. Will route to provider for review and consideration. Bonnie Cleaning RN on 4/20/2021 at 11:45 AM

## 2021-05-13 ENCOUNTER — PATIENT OUTREACH (OUTPATIENT)
Dept: INTERNAL MEDICINE | Facility: OTHER | Age: 61
End: 2021-05-13

## 2021-05-13 NOTE — LETTER
[facility]  [address]  [place] [id number] [phone]       May 13, 2021    Daniel Boyd  [address] [address] [place] [address]    Dear Daniel,    We care about your health and have reviewed your health plan and are making recommendations based on this review, to optimize your health.    You are in particular need of attention regarding:  -Colon Cancer Screening    We are recommending that you:  -schedule a COLONOSCOPY to look for colon cancer (due every 10 years or 5 years in higher risk situations.)        Colon cancer is now the second leading cause of cancer-related deaths in the [place] [facility] for both men and women and there are over 130,000 new cases and 50,000 deaths per year from colon cancer.  Colonoscopies can prevent 90-95% of these deaths.  Problem lesions can be removed before they ever become cancer.  This test is not only looking for cancer, but also getting rid of precancerious lesions.    If you are under/uninsured, we recommend you contact the [employer] program. [facility]ma is a free colorectal cancer screening program that provides colonoscopies for eligible under/uninsured [place] men and women. If you are interested in receiving a free colonoscopy, please call [facility] at [phone] (mention code [employer]) to see if you re eligible.      If you do not wish to do a colonoscopy or cannot afford to do one, at this time, there is another option. It is called a FIT test or Fecal Immunochemical Occult Blood Test (take home stool sample kit).  It does not replace the colonoscopy for colorectal cancer screening, but it can detect hidden bleeding in the lower colon.  It does need to be repeated every year and if a positive result is obtained, you would be referred for a colonoscopy.          If you have completed either one of these tests at another facility, please call with the details of when and where the tests were done and if they were normal  or not. Or have the records sent to our clinic so that we can best coordinate your care.      In addition, here is a list of due or overdue Health Maintenance reminders.    Health Maintenance Due   Topic Date Due     Discuss Advance Care Planning  Never done     HIV Screening  Never done     COVID-19 Vaccine (1) Never done     Zoster (Shingles) Vaccine (1 of 2) Never done     Pneumococcal Vaccine (2 of 4 - PPSV23) 01/09/2017     Preventive Care Visit  05/10/2019     Depression Assessment  04/29/2020     Colorectal Cancer Screening  12/30/2020       To address the above recommendations, we encourage you to contact us at [phone], via [employer] or by contacting Central Scheduling toll free at [phone] 24 hours a day. They will assist you with finding the most convenient time and location.    Thank you for trusting [facility] and we appreciate the opportunity to serve you.  We look forward to supporting your healthcare needs in the future.    Healthy Regards,    Your [facility] Team

## 2021-05-13 NOTE — TELEPHONE ENCOUNTER
Patient Quality Outreach      Summary:    Patient has the following on his problem list/HM: None    Patient is due/failing the following:   Colonoscopy    Type of outreach:    Sent letter.    Questions for provider review:    None                                                                                                                                          Chart routed to Care Team.

## 2021-06-04 ENCOUNTER — TELEPHONE (OUTPATIENT)
Dept: UROLOGY | Facility: OTHER | Age: 61
End: 2021-06-04

## 2021-06-04 NOTE — TELEPHONE ENCOUNTER
Patient is due for a 1 year follow up appointment with a PSA lab. I hav eleft several messages for patient to return my call to schedule. He has not returned my calls. Letter sent.

## 2021-06-08 ENCOUNTER — OFFICE VISIT (OUTPATIENT)
Dept: PULMONOLOGY | Facility: OTHER | Age: 61
End: 2021-06-08
Attending: INTERNAL MEDICINE
Payer: COMMERCIAL

## 2021-06-08 ENCOUNTER — TRANSFERRED RECORDS (OUTPATIENT)
Dept: HEALTH INFORMATION MANAGEMENT | Facility: OTHER | Age: 61
End: 2021-06-08

## 2021-06-08 VITALS
BODY MASS INDEX: 19.68 KG/M2 | TEMPERATURE: 97.1 F | RESPIRATION RATE: 16 BRPM | WEIGHT: 166 LBS | SYSTOLIC BLOOD PRESSURE: 114 MMHG | DIASTOLIC BLOOD PRESSURE: 64 MMHG

## 2021-06-08 DIAGNOSIS — J98.4 BLEOMYCIN LUNG TOXICITY: Primary | ICD-10-CM

## 2021-06-08 DIAGNOSIS — T45.1X5A BLEOMYCIN LUNG TOXICITY: Primary | ICD-10-CM

## 2021-06-08 DIAGNOSIS — R06.09 DYSPNEA ON EXERTION: ICD-10-CM

## 2021-06-08 DIAGNOSIS — R05.9 COUGH: ICD-10-CM

## 2021-06-08 PROCEDURE — G0463 HOSPITAL OUTPT CLINIC VISIT: HCPCS

## 2021-06-08 SDOH — HEALTH STABILITY: MENTAL HEALTH: HOW OFTEN DO YOU HAVE 6 OR MORE DRINKS ON ONE OCCASION?: NOT ASKED

## 2021-06-08 SDOH — HEALTH STABILITY: MENTAL HEALTH: HOW MANY STANDARD DRINKS CONTAINING ALCOHOL DO YOU HAVE ON A TYPICAL DAY?: NOT ASKED

## 2021-06-08 SDOH — HEALTH STABILITY: MENTAL HEALTH: HOW OFTEN DO YOU HAVE A DRINK CONTAINING ALCOHOL?: MONTHLY OR LESS

## 2021-06-08 ASSESSMENT — PATIENT HEALTH QUESTIONNAIRE - PHQ9: 5. POOR APPETITE OR OVEREATING: NOT AT ALL

## 2021-06-08 ASSESSMENT — ANXIETY QUESTIONNAIRES
7. FEELING AFRAID AS IF SOMETHING AWFUL MIGHT HAPPEN: NOT AT ALL
6. BECOMING EASILY ANNOYED OR IRRITABLE: NOT AT ALL
1. FEELING NERVOUS, ANXIOUS, OR ON EDGE: NOT AT ALL
GAD7 TOTAL SCORE: 0
3. WORRYING TOO MUCH ABOUT DIFFERENT THINGS: NOT AT ALL
5. BEING SO RESTLESS THAT IT IS HARD TO SIT STILL: NOT AT ALL
2. NOT BEING ABLE TO STOP OR CONTROL WORRYING: NOT AT ALL
IF YOU CHECKED OFF ANY PROBLEMS ON THIS QUESTIONNAIRE, HOW DIFFICULT HAVE THESE PROBLEMS MADE IT FOR YOU TO DO YOUR WORK, TAKE CARE OF THINGS AT HOME, OR GET ALONG WITH OTHER PEOPLE: NOT DIFFICULT AT ALL

## 2021-06-08 ASSESSMENT — PAIN SCALES - GENERAL: PAINLEVEL: NO PAIN (0)

## 2021-06-08 NOTE — NURSING NOTE
"Coming in for a f/u Blastomycin    Chief Complaint   Patient presents with     RECHECK     BLASTOMYCIN       Initial /64   Temp 97.1  F (36.2  C)   Resp 16   Wt 166 lb (75.3 kg)   BMI 19.68 kg/m   Estimated body mass index is 19.68 kg/m  as calculated from the following:    Height as of 1/11/21: 6' 5.01\" (1.956 m).    Weight as of this encounter: 166 lb (75.3 kg).  Medication Reconciliation: complete    Yu Neal LPN  "

## 2021-06-09 ASSESSMENT — ANXIETY QUESTIONNAIRES: GAD7 TOTAL SCORE: 0

## 2021-06-12 NOTE — PROGRESS NOTES
History of Present Illness  This is a [facility] Pulmonary Medicine outreach note.  The patient is a 60-year-old male who returned alone today in follow-up for bleomycin lung toxicity.  He was last seen by me on 6/9/2020.     He returns today without any complaints.  He has no respiratory symptoms and is not limited in any of his activities by his breathing.  He is on no respiratory medications.    Tests obtain since his last visit include:    12/28/2020 CT chest reports no evidence of active disease throughout the chest, abdomen, or pelvis.  Chronic changes throughout the chest, abdomen, and pelvis are stable when compared to the previous study.  The CT scan is personally reviewed.  I agree with that interpretation.  When compared to 1/10/2019, there has been no significant change.  He does have chronic stable areas of bronchiectasis in the right lower lobe distally.    Previous tests include:    1/7/2020 CT chest was ordered by oncology is personally reviewed and compared with a previous chest CT done on 8/29/2018.  By my review, there is some rare scattered subpleural interstitial areas of thickening that are unchanged compared to 8/29/2018.  No new abnormalities are seen.  A calcified right hilar lymph node of course remains.    8/7/2019 pulmonary function test showed reduced FVC and FEV1 in the setting of a normal total lung capacity.  Reduced mid range flows consistent with obstruction in small peripheral airways.  FVC was 4.57 L or 77% predicted, FEV1/FVC was 73%, FEV1 was 3.32 L or 74% predicted.  FEF 25-75% was 64% predicted.  Lung volumes showed a TLC of 7.44 L or 87% predicted, RV was 2.82 L or 107% predicted.  DLCO uncorrected for hemoglobin was 86% predicted.  The flow volume loop was consistent with airflow obstruction and lung restriction.    1/24/2019 CT chest, abdomen, pelvis is read by radiology as the presence of calcified mediastinal and hilar lymph nodes, mildly prominent left supraclavicular  nodes and a small sliding hiatal hernia.  Central airways were patent with focal bronchiectasis and bronchial impaction within the anterior right lower lobe is chronic and unchanged.  The CT chest is personally reviewed and compared with his previous chest CT done 8/29/2018.  Compared to that test, he has had an improvement in a left lower lobe infiltrate that was seen previously.             4/26/2018 pulmonary function test that showed mild restrictive lung disease. FVC was 4.48 L or 75% predicted, FEV1/FVC was 75%, FEV1 was 3.34 L or 74% predicted. FEF 25-75% was 71% predicted. Lung volumes showed a TLC of 6.67 L or 78% predicted. RV was 2.16 L or 82% predicted. DLCO was 75% predicted.         4/26/2018 chest CT was personally reviewed and compared with his 4/12/2017 chest CT. Right lower lobe bronchiectasis was seen with subtle interstitial thickening. There is a approximately 3 mm right middle lobe pulmonary nodule. All of these findings are unchanged compared to April 2017 and no new findings are seen.                7/6/2017 pulmonary function test that showed an FVC of 4.73 L or 77% predicted, FEV1/FVC of 72%, FEV1 of 3.39 L or 73% predicted. FEF 25-75% was 58% predicted. Lung volumes showed a TLC of 7.40 L or 88% predicted, RV was 2.73 L or 105% predicted, RV/TLC was 115% predicted. DLCO uncorrected for hemoglobin was 50 2% predicted and dL/VA was 65% predicted. Flow volume loop was normal appearing.         10/12/2017 ESR was 6         4/12/2017 chest CT that was compared with his previous chest CT done on 10/3/2016. There is no change in his right lung anterior area of fibrosis. Areas of scarring are present in the right lower lobe and are unchanged as well. 2-3 mm pulmonary nodule in the right upper lobe is unchanged. No new findings are seen. Radiology reports multiple calcified granulomas in the spleen as well as granulomas seen in the lungs.         1/11/2017 pulmonary function test that showed a  reduced FEF 25-75% consistent with obstruction in small peripheral airways. It also showed reduced DLCO. His FVC was 4.55 L or 74% predicted, FEV1/FVC was 72%, FEV1 was 3.26 L or 70% predicted. There was a 2% improvement in the FVC in response to bronchodilators increasing up to 4.68 L or 76% predicted. Lung volumes showed a TLC of 6.86 L or 81% predicted, RV of 2.23 L or 86% predicted, and RV/TLC of 101% predicted. Diffusion capacity was 63% and dL/VA was 63%.         10/3/2016 chest CT which is personally reviewed and shows some right anterior upper lobe subpleural fibrotic changes as well as some bronchiectatic changes in the right mid lung. No infiltrates, nodules, masses, or adenopathy is seen.         10/3/2016 pulmonary function test done at [facility] showed an FVC of 4.76 L or 78% predicted, FEV1/FVC of 73%, FEV1 of 3.48 L or 75% predicted. There were no changes with bronchodilators. FEF 25-75% was 64% predicted. Lung volumes showed a TLC of 7.16 L or 85% predicted, RV of 2.47 L or 96% predicted, and RV/TLC of 107% predicted. Diffusion capacity uncorrected for hemoglobin was 24.68 mL/minute/mmHg or 59% predicted. DL/VA was 74% predicted. Flow volume loop appeared normal.         10/3/2016 6 minutes walk study showed a beginning saturation 96% but desaturations on a 91% with exertion. Recovery saturation was 97%. Although there was significant desaturations with exertion, he did not require supplemental oxygen with exertion.         In review, the patient was referred by Dr. Hayden for diminished DLCO in the setting of bleomycin therapy for stage IV Hodgkin's lymphoma. He underwent 6 cycles of ABVD therapy with marked improvement. His treatment was complicated by dysphagia and a right lower lobe pneumonia. As part of his follow-up, a pulmonary function test was obtained which showed a reduced DLCO. With questioning at that time, he did report some shortness of breath when climbing fields.          Patient's past pulmonary history includes recurrent pneumonias requiring antibiotics on a nearly yearly basis. Is a workup for this, he actually had a chest CT by his primary care physician, Dr. Morrison, which did show some scarring in the right mid lung. He is a lifetime nonsmoker but has had exposure to secondhand smoke from his father. He denies any childhood asthma or allergies. He does think that dust bothersome occasionally. He has had no industrial exposures. He works on a golf course. He does added [place] but otherwise denies any travel to the [facility]. He has a cat at home that does sleep in bed with him but is had no exposures to birds or hot tubs. 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 Family history is negative for any lung disorders.         Outside tests include:         6/30/2016 pulmonary function test which showed a reduced DLCO. Spirometry showed an FVC of 4.30 L or 70% predicted, FEV1/FVC was 72%, FEV1 was 3.12 L or 66% predicted. There was a 6% improvement in the FEV1 in response to bronchodilators increasing up to 3.33 L or 71% predicted. FEF 25-75% was 54% predicted. Lung volumes showed a TLC of 7.22 L or 85% predicted, RV of 2.73 L or 106% predicted, RV/TLC of 118% predicted. DLCO corrected for hemoglobin was 22.55 mL/minute/mmHg or 53% predicted. DL/VA was 73% predicted. Flow volume loop showed evidence of airflow obstruction and lung restriction. No prior test are available for comparison.         6/30/2016 echocardiogram showed an LVEF of 56% with a mild increase in left ventricular size. RV size was normal with normal function. Trace mitral regurgitation was seen.         2/16/2016 chest x-ray reports right lower lobe infiltrate unchanged from previous chest x-ray on 2/12/2016.         2/24/2016 PET scan documented airspace opacities in the right lower lobe with a small right pleural effusion. They reported low-grade associated hypermetabolic activity. The lungs were otherwise  clear. Calcified mediastinal lymph nodes were present. No other uptake was seen to suggest metastatic disease.         7/20/2015 chest CT reports multiple enlarged lymph nodes in the neck, axillary region, hilum, mediastinum, upper abdomen and the retroperitoneum and portal region. Multiple masses in the spleen were noted.         7/7/2016 CMP showed sodium of 133 otherwise unremarkable, ESR was 8. CBC was normal with a hemoglobin of 14.6 and a normal differential.      Home Medications     - Last Reconciled 06/09/21 by Gustavo Flynn    acetaminophen 500 mg tablet (Tylenol Extra Strength)  500 mg PO Q6H PRN  aspirin 81 mg tablet,delayed release  81 mg PO DAILY  atorvastatin 40 mg tablet  40 mg PO DAILY  bisacodyl 5 mg tablet,delayed release (Dulcolax (bisacodyl))  5 mg PO BEDTIME  lisinopril 2.5 mg tablet  2.5 mg PO DAILY  lorazepam 1 mg tablet  1 mg PO BEDTIME PRN  multivitamin   1 tab PO DAILY  omega 3-dha-epa-fish oil 1,000 mg (120 mg-180 mg) capsule (Fish Oil)  1 cap PO DAILY  omeprazole 20 mg capsule,delayed release  20 mg PO DAILY  sertraline 100 mg tablet  100 mg PO DAILY    Allergies    No Known Allergies Allergy (Verified 06/09/21 08:36)    PFSH  PFSH:     Medical History (Updated 06/19/19 @ 11:19 by Danielle Kumari)    Diagnosis unknown  Depression, mild  Insomnia, chronic  Les pain  Prostate cancer  Hodgkin lymphoma  Tremor  Peripheral neuropathy  Dysphagia  Gastritis  Schatzki's ring, non-obstructing     Family History (Updated 06/19/19 @ 11:20 by Danielle Kumari)  Father  Prostate cancer       diagnosed with cancer  Heart disease  Mother  Breast cancer       diagnosed with cancer     Social History (Updated 06/19/19 @ 11:20 by Danielle Kumari)  Smoking Status:  Never smoker  second hand exposure:  No       ROS  Constitutional:   Denies chills, daytime sleepiness, difficulty sleeping, fatigue, fever(s), headache(s), night sweats, snoring, weight gain or weight loss    Eyes:   Denies change  in vision    ENT:   Denies vertigo, headache(s), hearing problems or snoring    Cardiovascular:   Denies chest pain, lightheadedness or dyspnea    Respiratory:   Denies cough, hemoptysis, excessive phlegm production, dyspnea, snoring or wheezing    Gastrointestinal:   Denies constipation, diarrhea, nausea or vomiting    Urinary:   Denies dysuria    Musculoskeletal:   Denies myalgias, arthralgias, muscle weakness, stiffness or swelling    Integumentary:   Denies skin change, change in hair, change in nails or rash    Neurological:   Denies lightheadedness, vertigo, headache(s), memory loss or seizures    Psychiatric:   Denies anxiety, depression or memory loss    Endocrine:   Denies fatigue or thyroid disease    Allergic/Immunologic:   Denies dyspnea, wheezing, neck lymphadenopathy or rash      Vital Signs      06/09/21  08:42  Weight    75.296 kg  Weight (lb)    166 lbs and  0.0  ozs    Exam  Physical Exam:   General:  Alert and oriented, appears comfortable.    Head: Normocephalic and atraumatic.    Eyes: Pupils equal, round, reactive to light, sclera are clear.    Face: Facial muscles are symmetric.    Oral cavity: The patient wore a mask during the visit.    Neck: Supple and without adenopathy.    Respiratory/Chest: Normal chest rise and respiratory excursion.  Clear to auscultation and percussion bilaterally. No wheezes, rhonchi, or rales noted.    Extremities: No cyanosis, clubbing, or edema noted.    Cardiovascular: Regular rate and rhythm, normal S1 and S2, no murmurs, gallops, or rubs.    Lymph nodes: No cervical, supraclavicular adenopathy noted.    Skin: Normal, no rashes or skin lesions.    Neurologic: Moves all extremities and ambulates without difficulty.     A&P  Assessment & Plan  (1) Bleomycin lung toxicity:        Code(s):  J98.4 - Other disorders of lung; T45.1X5A - Adverse effect of antineoplastic and immunosuppressive drugs, initial encounter        Bleomycin lung toxicity. The patient has had  dyspnea with exertion felt to be secondary to bleomycin lung toxicity.  His respiratory symptoms have completely resolved.     He has a history of Hodgkin's lymphoma and was treated with ABVD therapy which includes bleomycin.  His 12/28/2020 chest CT that has shown overall stability in his subpleural interstitial changes on the right consistent with bleomycin lung toxicity. Given the time since he has completed chemotherapy combined with his CT changes, bleomycin associated subacute progressive pulmonary fibrosis is most likely.    I have been following him with pulmonary function testing, and his lung volumes and DLCO have remained stable.  He missed his pulmonary function tests last year as they were not testing during the COVID 19 pandemic.  I will order a pulmonary function test now and he will be contacted with results.  If there has been no change, then no further follow-up will be needed.  He can return to see me on an as-needed basis.    He is 5 years out from his last bleomycin exposure.    (2) Dyspnea on exertion:        Code(s):  R06.00 - Dyspnea, unspecified        Resolved.    (3) Cough:        Code(s):  R05 - Cough        Cough.  Resolved.  Departure  Follow Up:      P.r.n.

## 2021-07-09 ENCOUNTER — ALLIED HEALTH/NURSE VISIT (OUTPATIENT)
Dept: FAMILY MEDICINE | Facility: OTHER | Age: 61
End: 2021-07-09
Attending: FAMILY MEDICINE
Payer: COMMERCIAL

## 2021-07-09 DIAGNOSIS — Z20.822 COVID-19 RULED OUT: Primary | ICD-10-CM

## 2021-07-09 LAB
SARS-COV-2 RNA RESP QL NAA+PROBE: NORMAL
SPECIMEN SOURCE: NORMAL

## 2021-07-09 PROCEDURE — C9803 HOPD COVID-19 SPEC COLLECT: HCPCS

## 2021-07-09 PROCEDURE — U0005 INFEC AGEN DETEC AMPLI PROBE: HCPCS | Mod: ZL | Performed by: FAMILY MEDICINE

## 2021-07-09 PROCEDURE — U0003 INFECTIOUS AGENT DETECTION BY NUCLEIC ACID (DNA OR RNA); SEVERE ACUTE RESPIRATORY SYNDROME CORONAVIRUS 2 (SARS-COV-2) (CORONAVIRUS DISEASE [COVID-19]), AMPLIFIED PROBE TECHNIQUE, MAKING USE OF HIGH THROUGHPUT TECHNOLOGIES AS DESCRIBED BY CMS-2020-01-R: HCPCS | Mod: ZL | Performed by: FAMILY MEDICINE

## 2021-07-09 NOTE — PROGRESS NOTES
Patient swabbed for COVID-19 testing.  Paradise Roth LPN on 7/9/2021 at 11:01 AM    Procedure on 7-13-21 at [facility].

## 2021-07-10 LAB
LABORATORY COMMENT REPORT: NORMAL
SARS-COV-2 RNA RESP QL NAA+PROBE: NEGATIVE
SPECIMEN SOURCE: NORMAL

## 2021-07-13 ENCOUNTER — HOSPITAL ENCOUNTER (OUTPATIENT)
Dept: RESPIRATORY THERAPY | Facility: OTHER | Age: 61
Discharge: HOME | End: 2021-07-13
Attending: INTERNAL MEDICINE | Admitting: INTERNAL MEDICINE
Payer: COMMERCIAL

## 2021-07-13 DIAGNOSIS — T45.1X5A BLEOMYCIN LUNG TOXICITY: ICD-10-CM

## 2021-07-13 DIAGNOSIS — J98.4 BLEOMYCIN LUNG TOXICITY: ICD-10-CM

## 2021-07-13 PROCEDURE — 94726 PLETHYSMOGRAPHY LUNG VOLUMES: CPT

## 2021-07-13 PROCEDURE — 94726 PLETHYSMOGRAPHY LUNG VOLUMES: CPT | Mod: 26 | Performed by: INTERNAL MEDICINE

## 2021-07-13 PROCEDURE — 94010 BREATHING CAPACITY TEST: CPT

## 2021-07-13 PROCEDURE — 94729 DIFFUSING CAPACITY: CPT

## 2021-07-13 PROCEDURE — 999N000157 HC STATISTIC RCP TIME EA 10 MIN

## 2021-07-13 PROCEDURE — 94729 DIFFUSING CAPACITY: CPT | Mod: 26 | Performed by: INTERNAL MEDICINE

## 2021-07-13 PROCEDURE — 94010 BREATHING CAPACITY TEST: CPT | Mod: 26 | Performed by: INTERNAL MEDICINE

## 2021-07-22 DIAGNOSIS — T45.1X5A BLEOMYCIN LUNG TOXICITY: Primary | ICD-10-CM

## 2021-07-22 DIAGNOSIS — J98.4 BLEOMYCIN LUNG TOXICITY: Primary | ICD-10-CM

## 2021-08-02 ENCOUNTER — LAB (OUTPATIENT)
Dept: LAB | Facility: OTHER | Age: 61
End: 2021-08-02
Attending: NURSE PRACTITIONER
Payer: COMMERCIAL

## 2021-08-02 DIAGNOSIS — C81.90 HODGKIN LYMPHOMA, UNSPECIFIED HODGKIN LYMPHOMA TYPE, UNSPECIFIED BODY REGION (H): ICD-10-CM

## 2021-08-02 DIAGNOSIS — N40.0 ENLARGED PROSTATE: ICD-10-CM

## 2021-08-02 LAB
ALBUMIN SERPL-MCNC: 4.1 G/DL (ref 3.5–5.7)
ALP SERPL-CCNC: 45 U/L (ref 34–104)
ALT SERPL W P-5'-P-CCNC: 34 U/L (ref 7–52)
ANION GAP SERPL CALCULATED.3IONS-SCNC: 7 MMOL/L (ref 3–14)
AST SERPL W P-5'-P-CCNC: 38 U/L (ref 13–39)
BASOPHILS # BLD AUTO: 0 10E3/UL (ref 0–0.2)
BASOPHILS NFR BLD AUTO: 1 %
BILIRUB SERPL-MCNC: 0.6 MG/DL (ref 0.3–1)
BUN SERPL-MCNC: 15 MG/DL (ref 7–25)
CALCIUM SERPL-MCNC: 9.1 MG/DL (ref 8.6–10.3)
CHLORIDE BLD-SCNC: 106 MMOL/L (ref 98–107)
CO2 SERPL-SCNC: 26 MMOL/L (ref 21–31)
CREAT SERPL-MCNC: 1.11 MG/DL (ref 0.7–1.3)
EOSINOPHIL # BLD AUTO: 0.1 10E3/UL (ref 0–0.7)
EOSINOPHIL NFR BLD AUTO: 1 %
ERYTHROCYTE [DISTWIDTH] IN BLOOD BY AUTOMATED COUNT: 12.9 % (ref 10–15)
GFR SERPL CREATININE-BSD FRML MDRD: 71 ML/MIN/1.73M2
GLUCOSE BLD-MCNC: 99 MG/DL (ref 70–105)
HCT VFR BLD AUTO: 38.4 % (ref 40–53)
HGB BLD-MCNC: 13.1 G/DL (ref 13.3–17.7)
IMM GRANULOCYTES # BLD: 0 10E3/UL
IMM GRANULOCYTES NFR BLD: 0 %
LDH SERPL L TO P-CCNC: 177 U/L (ref 140–271)
LYMPHOCYTES # BLD AUTO: 1.2 10E3/UL (ref 0.8–5.3)
LYMPHOCYTES NFR BLD AUTO: 25 %
MCH RBC QN AUTO: 32.5 PG (ref 26.5–33)
MCHC RBC AUTO-ENTMCNC: 34.1 G/DL (ref 31.5–36.5)
MCV RBC AUTO: 95 FL (ref 78–100)
MONOCYTES # BLD AUTO: 0.3 10E3/UL (ref 0–1.3)
MONOCYTES NFR BLD AUTO: 7 %
NEUTROPHILS # BLD AUTO: 3.3 10E3/UL (ref 1.6–8.3)
NEUTROPHILS NFR BLD AUTO: 66 %
NRBC # BLD AUTO: 0 10E3/UL
NRBC BLD AUTO-RTO: 0 /100
PLATELET # BLD AUTO: 198 10E3/UL (ref 150–450)
POTASSIUM BLD-SCNC: 3.9 MMOL/L (ref 3.5–5.1)
PROT SERPL-MCNC: 6.5 G/DL (ref 6.4–8.9)
PSA SERPL-MCNC: 3.77 UG/L (ref 0–4)
RBC # BLD AUTO: 4.03 10E6/UL (ref 4.4–5.9)
SODIUM SERPL-SCNC: 139 MMOL/L (ref 134–144)
WBC # BLD AUTO: 5 10E3/UL (ref 4–11)

## 2021-08-02 PROCEDURE — 82247 BILIRUBIN TOTAL: CPT | Mod: ZL

## 2021-08-02 PROCEDURE — 84153 ASSAY OF PSA TOTAL: CPT | Mod: ZL

## 2021-08-02 PROCEDURE — 36415 COLL VENOUS BLD VENIPUNCTURE: CPT | Mod: ZL

## 2021-08-02 PROCEDURE — 85004 AUTOMATED DIFF WBC COUNT: CPT | Mod: ZL

## 2021-08-02 PROCEDURE — 83615 LACTATE (LD) (LDH) ENZYME: CPT | Mod: ZL

## 2021-08-09 ENCOUNTER — ONCOLOGY VISIT (OUTPATIENT)
Dept: ONCOLOGY | Facility: OTHER | Age: 61
End: 2021-08-09
Attending: NURSE PRACTITIONER
Payer: COMMERCIAL

## 2021-08-09 VITALS
SYSTOLIC BLOOD PRESSURE: 120 MMHG | BODY MASS INDEX: 19.44 KG/M2 | OXYGEN SATURATION: 99 % | DIASTOLIC BLOOD PRESSURE: 78 MMHG | RESPIRATION RATE: 14 BRPM | WEIGHT: 164 LBS | HEART RATE: 72 BPM | TEMPERATURE: 98.2 F

## 2021-08-09 DIAGNOSIS — C81.90 HODGKIN LYMPHOMA, UNSPECIFIED HODGKIN LYMPHOMA TYPE, UNSPECIFIED BODY REGION (H): Primary | ICD-10-CM

## 2021-08-09 PROCEDURE — 99213 OFFICE O/P EST LOW 20 MIN: CPT | Performed by: NURSE PRACTITIONER

## 2021-08-09 ASSESSMENT — PAIN SCALES - GENERAL: PAINLEVEL: NO PAIN (0)

## 2021-08-09 NOTE — PROGRESS NOTES
Oncology Follow-up Visit:  August 9, 2021  Diagnosis:Lymphoma    History Of Present Illness:  Patient presents for followup of stage IV Hodgkin lymphoma, classical type.  He originally presented with right neck mass over a period of 2 months.  He was subsequently seen by Dr. Radha Kim of General Surgery who noted the patient had right-sided supraclavicular adenopathy.  The biopsy of the lymph node came back consistent with classical Hodgkin lymphoma.  He was subsequently seen by Dr. Jarad Mosqueda on 08/05/2015, who recommended a PET scan for accurate staging.  This was performed on 08/13/2015.  The findings were that there was a hypermetabolic lymphadenopathy both above and below the diaphragm, suspicious for lymphoma.  There was hypermetabolic activity in the spleen suspicious for metastatic disease.  There were multiple foci with abnormal uptake in the spine suspicious for metastatic disease.  There was also a right middle lobe lung nodule that was nonspecific.  Dr. Mosqueda felt the patient had stage IV Hodgkin lymphoma and recommended ABVD x 6 cycles.  An echocardiogram was performed to assess his cardiac function on 08/12/2015.  The findings were that the patient had normal left ventricular size and uptake, and left ventricular ejection fraction was 65%.  The patient was started on ABVD and after 3 cycles he had a repeat PET scan on 11/28/2015.  The findings were that there was marked improvement in the patient's lymphadenopathy over the lower neck, axilla, chest, abdomen, pelvis and groin area.  Focal areas of activity were markedly improved over the cervical, lumbar spine and pelvic area.  The patient subsequently was seen by Dr. Mosqueda on 02/10/2016 and assessment was the patient had completed 6 cycles of ABVD.  Repeat PET scan was performed on 02/24/2016.  The findings were that there was no abnormal uptake to suggest metastatic disease and no evidence of metastatic disease.  Previously seen  lymphadenopathy and bone lesions had all resolved. When patient was seen on 06/08/2016, and he had no evidence of disease clinically. We obtained an echocardiogram as well as pulmonary function tests.  Echocardiogram revealed no change.  Ejection fraction was 56%.  Pulmonary functions did reveal decreased DLCO of 50% predicted being reported.  The patient did have some shortness of breath when we saw him on 07/14/2016, and we elected to restage him with a PET scan.  This was done on 10/05/2016, which revealed no suspicious hypermetabolic osseous lesions identified, no evidence of recurrent lymphoma.     Patient was seen by Dr. 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  He felt his symptoms had been stable, but not progressive.  He did have a prechemotherapy pulmonary function test for comparison and he felt the patient certainly could have pulmonary fibrosis due to bleomycin as well as other forms of bleomycin toxicity or hypersensitivity pneumonitis. The patient had a high-resolution CT chest performed 10/03/2016, and findings were there was new mild subpleural fibrotic changes within the anterior right upper lobe which may be related to chemotherapy. The plan was to follow him with serial pulmonary function tests to ensure stability. He follow with pulmonology on a yearly basis. 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 He did have CT scans done on 8/29/18 showing no evidence of recurrent or metastatic disease. It was felt that patient could have CT scans done every 6 months for a total of 5 years per Dr Leung. Most recent CT scans from 12/28/20 show no evidence or recurrent disease. Patient has been feeling well. He is seeing pulmonology and had recent pulmonary function testing. He will follow up again with pulmonology in September with repeat testing. He denies any new lymphadenopathy. Labs done  on 8/2/21 show a normal CBC, hemoglobin of 13.1. LDH is normal. All other labs are stable. Patient has no new concerns.     Review Of Systems  Review Of Systems  Eyes/Ears/Nose/Throat: denies new vision or hearing changes  Respiratory: No shortness of breath, dyspnea on exertion, cough, or hemoptysis  Cardiovascular: denies chest pain or palpitations   Gastrointestinal: denies abdominal pain, no bowel changes  Genitourinary: reports urinary urgency, denies dysuria or hematuria  Musculoskeletal: denies new bone pain or muscle weakness  Neurologic: reports occasional  headaches, no dizziness  Hematologic/Lymphatic/Immunologic: denies fevers, chills, night sweats      There are no exam notes on file for this visit.    Past medical, social, surgical, and family histories reviewed.    Allergies:  Allergies as of 08/09/2021     (No Known Allergies)       Current Medications:  Current Outpatient Medications   Medication Sig Dispense Refill     aspirin 81 MG EC tablet Take 1 tablet (81 mg) by mouth daily       atorvastatin (LIPITOR) 40 MG tablet TAKE 1 TABLET BY MOUTH ONCE DAILY 90 tablet 2     fish oil-omega-3 fatty acids 1000 MG capsule Take 1 capsule by mouth daily       lisinopril (ZESTRIL) 2.5 MG tablet TAKE 1 TABLET BY MOUTH ONCE DAILY 90 tablet 3     LORazepam (ATIVAN) 1 MG tablet TAKE 1 TABLET BY MOUTH AT BEDTIME 90 tablet 1     Multiple Vitamins-Minerals (MULTIVITAMIN & MINERAL PO) Take 1 tablet by mouth every morning       omeprazole (PRILOSEC) 20 MG CR capsule Take 1 capsule (20 mg) by mouth daily 30 capsule      sertraline (ZOLOFT) 100 MG tablet Take 1 tablet by mouth once daily. 90 tablet 3        Physical Exam:  There were no vitals taken for this visit.    GENERAL APPEARANCE: 60 year old male, alert and no distress     NECK: no adenopathy, no asymmetry or masses     LYMPHATICS: No cervical, supraclavicular, axillary lymphadenopathy     RESP: lungs clear to auscultation - no rales, rhonchi or wheezes      CARDIOVASCULAR: regular rates and rhythm, normal S1 S2     ABDOMEN:  soft, nontender, no HSM or masses and bowel sounds normal     MUSCULOSKELETAL: extremities normal- no gross deformities noted,  No edema b/l LE.     SKIN: no suspicious lesions or rashes on exposed skin     PSYCHIATRIC: mentation appears normal and affect normal    Laboratory/Imaging Studies  Lab on 08/02/2021   Component Date Value Ref Range Status     PSA Tumor Marker 08/02/2021 3.77  0.00 - 4.00 ug/L Final     Lactate Dehydrogenase 08/02/2021 177  140 - 271 U/L Final     Sodium 08/02/2021 139  134 - 144 mmol/L Final     Potassium 08/02/2021 3.9  3.5 - 5.1 mmol/L Final     Chloride 08/02/2021 106  98 - 107 mmol/L Final     Carbon Dioxide (CO2) 08/02/2021 26  21 - 31 mmol/L Final     Anion Gap 08/02/2021 7  3 - 14 mmol/L Final     Urea Nitrogen 08/02/2021 15  7 - 25 mg/dL Final     Creatinine 08/02/2021 1.11  0.70 - 1.30 mg/dL Final     Calcium 08/02/2021 9.1  8.6 - 10.3 mg/dL Final     Glucose 08/02/2021 99  70 - 105 mg/dL Final     Alkaline Phosphatase 08/02/2021 45  34 - 104 U/L Final     AST 08/02/2021 38  13 - 39 U/L Final     ALT 08/02/2021 34  7 - 52 U/L Final     Protein Total 08/02/2021 6.5  6.4 - 8.9 g/dL Final     Albumin 08/02/2021 4.1  3.5 - 5.7 g/dL Final     Bilirubin Total 08/02/2021 0.6  0.3 - 1.0 mg/dL Final     GFR Estimate 08/02/2021 71  >60 mL/min/1.73m2 Final    As of July 11, 2021, eGFR is calculated by the CKD-EPI creatinine equation, without race adjustment. eGFR can be influenced by muscle mass, exercise, and diet. The reported eGFR is an estimation only and is only applicable if the renal function is stable.     WBC Count 08/02/2021 5.0  4.0 - 11.0 10e3/uL Final     RBC Count 08/02/2021 4.03* 4.40 - 5.90 10e6/uL Final     Hemoglobin 08/02/2021 13.1* 13.3 - 17.7 g/dL Final     Hematocrit 08/02/2021 38.4* 40.0 - 53.0 % Final     MCV 08/02/2021 95  78 - 100 fL Final     MCH 08/02/2021 32.5  26.5 - 33.0 pg Final     MCHC  08/02/2021 34.1  31.5 - 36.5 g/dL Final     RDW 08/02/2021 12.9  10.0 - 15.0 % Final     Platelet Count 08/02/2021 198  150 - 450 10e3/uL Final     % Neutrophils 08/02/2021 66  % Final     % Lymphocytes 08/02/2021 25  % Final     % Monocytes 08/02/2021 7  % Final     % Eosinophils 08/02/2021 1  % Final     % Basophils 08/02/2021 1  % Final     % Immature Granulocytes 08/02/2021 0  % Final     NRBCs per 100 WBC 08/02/2021 0  <1 /100 Final     Absolute Neutrophils 08/02/2021 3.3  1.6 - 8.3 10e3/uL Final     Absolute Lymphocytes 08/02/2021 1.2  0.8 - 5.3 10e3/uL Final     Absolute Monocytes 08/02/2021 0.3  0.0 - 1.3 10e3/uL Final     Absolute Eosinophils 08/02/2021 0.1  0.0 - 0.7 10e3/uL Final     Absolute Basophils 08/02/2021 0.0  0.0 - 0.2 10e3/uL Final     Absolute Immature Granulocytes 08/02/2021 0.0  <=0.0 10e3/uL Final     Absolute NRBCs 08/02/2021 0.0  10e3/uL Final        ASSESSMENT/PLAN:  Stage IV classical Hodgkin lymphoma presenting with right supraclavicular lymphadenopathy, biopsy consistent with classical Hodgkin lymphoma involving the spine, pelvis, as well as lymphadenopathy above and below the diaphragm.  He responded to 6 cycles of ABVD.  Course complicated with neutropenic fever. Pulmonary function tests revealed a depressed DLCO.  PET scan was negative for metastatic disease. He will continue to follow up with Dr. Cm London on a yearly basis for bleomycin lung. Most recent imaging from 12/28/20 shows stable CT scans with no evidence of recurrent disease. Labs done on 8/2/21 show a normal CBC, hemoglobin of 13.1. LDH is normal. All other labs are stable. Patient will continue follow up with pulmonology and will see Dr London again in September. He is due for a colonoscopy and will have this set up in the near future. Will see patient back in December with yearly CT scans and repeat labs    Twenty five minutes spent with this encounter with time spent reviewing patient records, counseling patient  regarding disease process, interpretation and review of labs with patient, obtaining a review of systems, performing a physical exam, discussing plan for follow up, documentation in EHR and coordination of care

## 2021-10-11 DIAGNOSIS — F51.04 CHRONIC INSOMNIA: ICD-10-CM

## 2021-10-13 RX ORDER — LORAZEPAM 1 MG/1
TABLET ORAL
Qty: 90 TABLET | Refills: 0 | Status: SHIPPED | OUTPATIENT
Start: 2021-10-13 | End: 2022-01-13

## 2021-10-13 NOTE — TELEPHONE ENCOUNTER
LORazepam (ATIVAN) 1 MG tablet     Sig: TAKE 1 TABLET BY MOUTH AT BEDTIME           Last Written Prescription Date:  4/20/21  Last Fill Quantity: 90,   # refills: 1  Last Office Visit: 8/9/21  Future Office visit:    Next 5 appointments (look out 90 days)    Oct 15, 2021  9:00 AM  [facility] [address]  [place] [address]  [phone]           Routing refill request to provider for review/approval because:  Drug not on the [facility], [facility] or [facility] refill protocol or controlled substance Karishma Sanchez RN on 10/13/2021 at 8:57 AM

## 2021-10-23 ENCOUNTER — HEALTH MAINTENANCE LETTER (OUTPATIENT)
Age: 61
End: 2021-10-23

## 2021-11-08 ENCOUNTER — TELEPHONE (OUTPATIENT)
Dept: INTERNAL MEDICINE | Facility: OTHER | Age: 61
End: 2021-11-08
Payer: COMMERCIAL

## 2021-11-08 DIAGNOSIS — Z12.11 SCREEN FOR COLON CANCER: Primary | ICD-10-CM

## 2021-11-08 NOTE — TELEPHONE ENCOUNTER
Patient was scheduled for a colonoscopy on 2020 and it was cancelled. The referral dates were from 2020 to 2021. The patient is rescheduled for 2021 at this time. Please put in a new referral for this year as the one has . Thank You,Sarah Boyd on 2021 at 2:49 PM

## 2021-11-10 ENCOUNTER — HOSPITAL ENCOUNTER (OUTPATIENT)
Facility: OTHER | Age: 61
End: 2021-11-10
Attending: SURGERY | Admitting: SURGERY
Payer: COMMERCIAL

## 2021-11-10 DIAGNOSIS — Z12.11 ENCOUNTER FOR SCREENING COLONOSCOPY: Primary | ICD-10-CM

## 2021-11-10 NOTE — TELEPHONE ENCOUNTER
Screening Questions for the Scheduling of Screening Colonoscopies   (If Colonoscopy is diagnostic, Provider should review the chart before scheduling.)  Are you younger than 50 or older than 80?  NO  Do you take aspirin or fish oil?  YES Patient instructed to stop aspirin and fish oil 7 days prior to procedure (if yes, tell patient to stop 1 week prior to Colonoscopy)  Do you take warfarin (Coumadin), clopidogrel (Plavix), apixaban (Eliquis), dabigatram (Pradaxa), rivaroxaban (Xarelto) or any blood thinner? NO  Do you use oxygen at home?  NO  Do you have kidney disease? NO  Are you on dialysis? NO  Have you had a stroke or heart attack in the last year? NO  Have you had a stent in your heart or any blood vessel in the last year? NO  Have you had a transplant of any organ? NO  Have you had a colonoscopy or upper endoscopy (EGD) before? YES         When?  ASHISH 2010  Date of scheduled Colonoscopy. 12/20/2021  Provider [facility]  Pharmacy [facility]

## 2021-11-11 RX ORDER — POLYETHYLENE GLYCOL 3350, SODIUM CHLORIDE, SODIUM BICARBONATE, POTASSIUM CHLORIDE 420; 11.2; 5.72; 1.48 G/4L; G/4L; G/4L; G/4L
4000 POWDER, FOR SOLUTION ORAL ONCE
Qty: 4000 ML | Refills: 0 | Status: SHIPPED | OUTPATIENT
Start: 2021-11-11 | End: 2021-11-11

## 2021-11-11 RX ORDER — BISACODYL 5 MG
TABLET, DELAYED RELEASE (ENTERIC COATED) ORAL
Qty: 2 TABLET | Refills: 0 | Status: ON HOLD | OUTPATIENT
Start: 2021-11-11 | End: 2022-01-24

## 2021-11-19 ENCOUNTER — OFFICE VISIT (OUTPATIENT)
Dept: INTERNAL MEDICINE | Facility: OTHER | Age: 61
End: 2021-11-19
Attending: PHYSICAL MEDICINE & REHABILITATION
Payer: COMMERCIAL

## 2021-11-19 ENCOUNTER — ALLIED HEALTH/NURSE VISIT (OUTPATIENT)
Dept: FAMILY MEDICINE | Facility: OTHER | Age: 61
End: 2021-11-19
Attending: INTERNAL MEDICINE
Payer: COMMERCIAL

## 2021-11-19 ENCOUNTER — HOSPITAL ENCOUNTER (OUTPATIENT)
Dept: GENERAL RADIOLOGY | Facility: OTHER | Age: 61
End: 2021-11-19
Attending: NURSE PRACTITIONER
Payer: COMMERCIAL

## 2021-11-19 VITALS
TEMPERATURE: 97.5 F | BODY MASS INDEX: 19.62 KG/M2 | DIASTOLIC BLOOD PRESSURE: 78 MMHG | HEART RATE: 60 BPM | RESPIRATION RATE: 14 BRPM | HEIGHT: 77 IN | OXYGEN SATURATION: 95 % | SYSTOLIC BLOOD PRESSURE: 132 MMHG | WEIGHT: 166.2 LBS

## 2021-11-19 DIAGNOSIS — M25.541 ARTHRALGIA OF RIGHT HAND: Primary | ICD-10-CM

## 2021-11-19 DIAGNOSIS — M25.541 ARTHRALGIA OF RIGHT HAND: ICD-10-CM

## 2021-11-19 DIAGNOSIS — Z20.822 COVID-19 RULED OUT: Primary | ICD-10-CM

## 2021-11-19 PROCEDURE — U0005 INFEC AGEN DETEC AMPLI PROBE: HCPCS | Mod: ZL

## 2021-11-19 PROCEDURE — 99212 OFFICE O/P EST SF 10 MIN: CPT | Performed by: NURSE PRACTITIONER

## 2021-11-19 PROCEDURE — C9803 HOPD COVID-19 SPEC COLLECT: HCPCS

## 2021-11-19 PROCEDURE — 73140 X-RAY EXAM OF FINGER(S): CPT | Mod: RT

## 2021-11-19 ASSESSMENT — PAIN SCALES - GENERAL: PAINLEVEL: SEVERE PAIN (7)

## 2021-11-19 ASSESSMENT — MIFFLIN-ST. JEOR: SCORE: 1671.26
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
## 2021-11-19 NOTE — PROGRESS NOTES
Patient swabbed for COVID-19 testing.  PFT 1123/21  Kathrine Doherty MA on 11/19/2021 at 8:07 AM

## 2021-11-20 LAB — SARS-COV-2 RNA RESP QL NAA+PROBE: NEGATIVE

## 2021-11-23 ENCOUNTER — HOSPITAL ENCOUNTER (OUTPATIENT)
Dept: RESPIRATORY THERAPY | Facility: OTHER | Age: 61
Discharge: HOME | End: 2021-11-23
Attending: INTERNAL MEDICINE | Admitting: INTERNAL MEDICINE
Payer: COMMERCIAL

## 2021-11-23 DIAGNOSIS — J98.4 BLEOMYCIN LUNG TOXICITY: ICD-10-CM

## 2021-11-23 DIAGNOSIS — T45.1X5A BLEOMYCIN LUNG TOXICITY: ICD-10-CM

## 2021-11-23 PROCEDURE — 94010 BREATHING CAPACITY TEST: CPT | Mod: 26 | Performed by: INTERNAL MEDICINE

## 2021-11-23 PROCEDURE — 94726 PLETHYSMOGRAPHY LUNG VOLUMES: CPT

## 2021-11-23 PROCEDURE — 94726 PLETHYSMOGRAPHY LUNG VOLUMES: CPT | Mod: 26 | Performed by: INTERNAL MEDICINE

## 2021-11-23 PROCEDURE — 94200 LUNG FUNCTION TEST (MBC/MVV): CPT

## 2021-11-23 PROCEDURE — 94010 BREATHING CAPACITY TEST: CPT

## 2021-11-23 PROCEDURE — 999N000105 HC STATISTIC NO DOCUMENTATION TO SUPPORT CHARGE

## 2021-11-23 PROCEDURE — 94200 LUNG FUNCTION TEST (MBC/MVV): CPT | Mod: 26 | Performed by: INTERNAL MEDICINE

## 2021-11-23 PROCEDURE — 94729 DIFFUSING CAPACITY: CPT

## 2021-11-23 PROCEDURE — 94729 DIFFUSING CAPACITY: CPT | Mod: 26 | Performed by: INTERNAL MEDICINE

## 2021-11-27 DIAGNOSIS — F33.0 MAJOR DEPRESSIVE DISORDER, RECURRENT EPISODE, MILD (H): ICD-10-CM

## 2021-11-29 RX ORDER — SERTRALINE HYDROCHLORIDE 100 MG/1
TABLET, FILM COATED ORAL
Qty: 90 TABLET | Refills: 0 | Status: SHIPPED | OUTPATIENT
Start: 2021-11-29 | End: 2022-03-10

## 2021-11-29 NOTE — TELEPHONE ENCOUNTER
Routing refill request to provider for review/approval because:  SSRIs Protocol Failed 11/27/2021 08:02 AM   Protocol Details  PHQ-9 score less than 5 in past 6 months     LOV; 11/19/2021    Lupe London RN on 11/29/2021 at 12:39 PM

## 2021-12-04 DIAGNOSIS — J98.4 BLEOMYCIN LUNG TOXICITY: Primary | ICD-10-CM

## 2021-12-04 DIAGNOSIS — T45.1X5A BLEOMYCIN LUNG TOXICITY: Primary | ICD-10-CM

## 2021-12-06 ENCOUNTER — HOSPITAL ENCOUNTER (OUTPATIENT)
Dept: CT IMAGING | Facility: OTHER | Age: 61
End: 2021-12-06
Attending: NURSE PRACTITIONER
Payer: COMMERCIAL

## 2021-12-06 ENCOUNTER — LAB (OUTPATIENT)
Dept: LAB | Facility: OTHER | Age: 61
End: 2021-12-06
Attending: NURSE PRACTITIONER
Payer: COMMERCIAL

## 2021-12-06 DIAGNOSIS — C81.90 HODGKIN LYMPHOMA, UNSPECIFIED HODGKIN LYMPHOMA TYPE, UNSPECIFIED BODY REGION (H): ICD-10-CM

## 2021-12-06 LAB
ALBUMIN SERPL-MCNC: 4.3 G/DL (ref 3.5–5.7)
ALP SERPL-CCNC: 48 U/L (ref 34–104)
ALT SERPL W P-5'-P-CCNC: 23 U/L (ref 7–52)
ANION GAP SERPL CALCULATED.3IONS-SCNC: 5 MMOL/L (ref 3–14)
AST SERPL W P-5'-P-CCNC: 22 U/L (ref 13–39)
BASOPHILS # BLD AUTO: 0 10E3/UL (ref 0–0.2)
BASOPHILS NFR BLD AUTO: 0 %
BILIRUB SERPL-MCNC: 0.6 MG/DL (ref 0.3–1)
BUN SERPL-MCNC: 19 MG/DL (ref 7–25)
CALCIUM SERPL-MCNC: 9.5 MG/DL (ref 8.6–10.3)
CHLORIDE BLD-SCNC: 105 MMOL/L (ref 98–107)
CO2 SERPL-SCNC: 28 MMOL/L (ref 21–31)
CREAT SERPL-MCNC: 1.01 MG/DL (ref 0.7–1.3)
EOSINOPHIL # BLD AUTO: 0.2 10E3/UL (ref 0–0.7)
EOSINOPHIL NFR BLD AUTO: 3 %
ERYTHROCYTE [DISTWIDTH] IN BLOOD BY AUTOMATED COUNT: 12.4 % (ref 10–15)
GFR SERPL CREATININE-BSD FRML MDRD: 79 ML/MIN/1.73M2
GLUCOSE BLD-MCNC: 132 MG/DL (ref 70–105)
HCT VFR BLD AUTO: 44.5 % (ref 40–53)
HGB BLD-MCNC: 14.9 G/DL (ref 13.3–17.7)
IMM GRANULOCYTES # BLD: 0 10E3/UL
IMM GRANULOCYTES NFR BLD: 0 %
LDH SERPL L TO P-CCNC: 132 U/L (ref 140–271)
LYMPHOCYTES # BLD AUTO: 1.2 10E3/UL (ref 0.8–5.3)
LYMPHOCYTES NFR BLD AUTO: 19 %
MCH RBC QN AUTO: 32.5 PG (ref 26.5–33)
MCHC RBC AUTO-ENTMCNC: 33.5 G/DL (ref 31.5–36.5)
MCV RBC AUTO: 97 FL (ref 78–100)
MONOCYTES # BLD AUTO: 0.4 10E3/UL (ref 0–1.3)
MONOCYTES NFR BLD AUTO: 6 %
NEUTROPHILS # BLD AUTO: 4.5 10E3/UL (ref 1.6–8.3)
NEUTROPHILS NFR BLD AUTO: 72 %
NRBC # BLD AUTO: 0 10E3/UL
NRBC BLD AUTO-RTO: 0 /100
PLATELET # BLD AUTO: 198 10E3/UL (ref 150–450)
POTASSIUM BLD-SCNC: 3.9 MMOL/L (ref 3.5–5.1)
PROT SERPL-MCNC: 7.1 G/DL (ref 6.4–8.9)
RBC # BLD AUTO: 4.59 10E6/UL (ref 4.4–5.9)
SODIUM SERPL-SCNC: 138 MMOL/L (ref 134–144)
WBC # BLD AUTO: 6.4 10E3/UL (ref 4–11)

## 2021-12-06 PROCEDURE — 250N000011 HC RX IP 250 OP 636: Performed by: NURSE PRACTITIONER

## 2021-12-06 PROCEDURE — 83615 LACTATE (LD) (LDH) ENZYME: CPT | Mod: ZL

## 2021-12-06 PROCEDURE — 74177 CT ABD & PELVIS W/CONTRAST: CPT

## 2021-12-06 PROCEDURE — 85025 COMPLETE CBC W/AUTO DIFF WBC: CPT | Mod: ZL

## 2021-12-06 PROCEDURE — 71260 CT THORAX DX C+: CPT

## 2021-12-06 PROCEDURE — 80053 COMPREHEN METABOLIC PANEL: CPT | Mod: ZL

## 2021-12-06 PROCEDURE — 70491 CT SOFT TISSUE NECK W/DYE: CPT

## 2021-12-06 PROCEDURE — 36415 COLL VENOUS BLD VENIPUNCTURE: CPT | Mod: ZL

## 2021-12-06 RX ORDER — IOPAMIDOL 755 MG/ML
95 INJECTION, SOLUTION INTRAVASCULAR ONCE
Status: COMPLETED | OUTPATIENT
Start: 2021-12-06 | End: 2021-12-06

## 2021-12-06 RX ADMIN — IOPAMIDOL 95 ML: 755 INJECTION, SOLUTION INTRAVENOUS at 10:12

## 2022-01-03 ENCOUNTER — OFFICE VISIT (OUTPATIENT)
Dept: ORTHOPEDICS | Facility: OTHER | Age: 62
End: 2022-01-03
Attending: NURSE PRACTITIONER
Payer: COMMERCIAL

## 2022-01-03 VITALS
DIASTOLIC BLOOD PRESSURE: 80 MMHG | OXYGEN SATURATION: 98 % | WEIGHT: 170.4 LBS | BODY MASS INDEX: 20.75 KG/M2 | HEIGHT: 76 IN | TEMPERATURE: 96.8 F | HEART RATE: 50 BPM | SYSTOLIC BLOOD PRESSURE: 120 MMHG | RESPIRATION RATE: 16 BRPM

## 2022-01-03 DIAGNOSIS — M25.541 ARTHRALGIA OF RIGHT HAND: ICD-10-CM

## 2022-01-03 PROCEDURE — 99213 OFFICE O/P EST LOW 20 MIN: CPT | Performed by: ORTHOPAEDIC SURGERY

## 2022-01-03 ASSESSMENT — MIFFLIN-ST. JEOR: SCORE: 1666.49

## 2022-01-03 ASSESSMENT — PAIN SCALES - GENERAL: PAINLEVEL: SEVERE PAIN (6)

## 2022-01-03 ASSESSMENT — PATIENT HEALTH QUESTIONNAIRE - PHQ9: SUM OF ALL RESPONSES TO PHQ QUESTIONS 1-9: 0
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
## 2022-01-03 NOTE — NURSING NOTE
Chief Complaint   Patient presents with     Consult     Arthralgia right hand             Norma J. Gosselin, KENNEDYN

## 2022-01-06 ENCOUNTER — ONCOLOGY VISIT (OUTPATIENT)
Dept: ONCOLOGY | Facility: OTHER | Age: 62
End: 2022-01-06
Attending: INTERNAL MEDICINE
Payer: COMMERCIAL

## 2022-01-06 VITALS
WEIGHT: 168 LBS | DIASTOLIC BLOOD PRESSURE: 82 MMHG | HEART RATE: 50 BPM | BODY MASS INDEX: 20.72 KG/M2 | RESPIRATION RATE: 16 BRPM | TEMPERATURE: 96.4 F | OXYGEN SATURATION: 97 % | SYSTOLIC BLOOD PRESSURE: 124 MMHG

## 2022-01-06 DIAGNOSIS — C81.90 HODGKIN LYMPHOMA, UNSPECIFIED HODGKIN LYMPHOMA TYPE, UNSPECIFIED BODY REGION (H): Primary | ICD-10-CM

## 2022-01-06 LAB — ERYTHROCYTE [SEDIMENTATION RATE] IN BLOOD BY WESTERGREN METHOD: 6 MM/HR (ref 0–20)

## 2022-01-06 PROCEDURE — 99417 PROLNG OP E/M EACH 15 MIN: CPT | Performed by: INTERNAL MEDICINE

## 2022-01-06 PROCEDURE — 85652 RBC SED RATE AUTOMATED: CPT | Mod: ZL | Performed by: INTERNAL MEDICINE

## 2022-01-06 PROCEDURE — 36415 COLL VENOUS BLD VENIPUNCTURE: CPT | Mod: ZL | Performed by: INTERNAL MEDICINE

## 2022-01-06 PROCEDURE — 99215 OFFICE O/P EST HI 40 MIN: CPT | Performed by: INTERNAL MEDICINE

## 2022-01-06 ASSESSMENT — PAIN SCALES - GENERAL: PAINLEVEL: NO PAIN (0)

## 2022-01-06 NOTE — NURSING NOTE
Chief Complaint   Patient presents with     Oncology Clinic Visit     F/U Lymphoma     Medication Reconciliation: complete   Screening questions refused as they were done earlier this week.    Pratibha Vanessa CMA ([facility])

## 2022-01-06 NOTE — PROGRESS NOTES
Visit Date: 01/06/2022    HEMATOLOGY ONCOLOGY CLINIC NOTE    Mr. Boyd returns for followup of stage IV Hodgkin's lymphoma, classical type.  He had originally presented with right neck mass over a period of 2 months.  He was subsequently seen by Dr. Radha Kim of General Surgery, who felt the patient had right-sided supraclavicular adenopathy.  Biopsy of the lymph node came back consistent with classical Hodgkin's lymphoma.  He was subsequently seen by Dr. Jarad Mosqueda of Oncology on 08/05/2015, who recommended PET scan for accurate staging, which was performed on 08/13/2015.  The findings were there was hypermetabolic lymphadenopathy both above and below the diaphragm, suspicious for lymphoma.  There was hypermetabolic activity in the spleen, suspicious for metastatic disease.  There were multiple foci with abnormal uptake in the spine, suspicious for metastatic disease.  There was also a right middle lobe lung nodule that was nonspecific.  Dr. Mosqueda felt the patient had stage IV Hodgkin's lymphoma and recommend ABVD x6 cycles.  An echocardiogram was performed to assess cardiac function on 08/12/2015.  Findings were that the patient had normal left ventricular size and uptake and left ventricular ejection fraction was 65%.  The patient was started on ABVD.  After 3 cycles, had a repeat PET scan 11/28/2015.  Findings were there was marked improvement.  The patient lymphadenopathy in the lower neck, axilla, chest, abdomen, pelvis and groin focal areas of activity were markedly improved, cervical and lumbar spine and pelvic area.  The patient subsequently was seen by Dr. Mosqueda on 02/10/2016 and his assessment was the patient had completed 6 cycles of ABVD.  Repeat PET scan was performed on 02/24/2016 and the findings there was no abnormal uptake to suggest metastatic disease, no evidence of metastatic disease.  Previously seen lymphadenopathy and bone lesions were all resolved.  The patient had  problems with dysphagia while he was admitted to the hospital for right lower lobe pneumonia and was treated with IV antibiotics.  He did see Dr. Portillo for EGD, which revealed hiatal hernia and Schatzki rings.  Biopsies were all negative.  The patient's dysphagia also improved.  The patient did have an echocardiogram and pulmonary function tests in 06/2016, ejection fraction 56%.  Pulmonary functions revealed decreased DLCO, with 50% predicted being reported.  The patient did have some shortness of breath.  When we saw him on 07/14/2016, we elected to restage him with a PET scan.  This was done on 10/05/2016, which revealed no evidence of metastatic disease.  He also had been seen by Dr. Cm London of Pulmonary for evaluation of possible bleomycin lung toxicity, for which the patient had dyspnea on exertion.  Felt his symptoms had been stable, but not progressing.  We did have prechemotherapy pulmonary function tests for comparison and felt the patient could have pulmonary fibrosis due to bleomycin as well as other forms of bleomycin toxicity or hypersensitivity pneumonitis.  He also obtained CT chest with high resolution cuts and also repeated his pulmonary function tests.  The patient had a high resolution CT chest performed on 10/03/2016.  Findings were there was new mild subpleural fibrotic changes within the anterior right upper lobe, which may be related to chemotherapy.  The patient saw Dr. Cm London on 10/12/2016.  His impression of pulmonary function tests revealed stability of his lung volumes and DLCO revealed improved spirometry.  He felt his dyspnea on exertion was stable, not progressive.  He reviewed the CT scan of the chest, which revealed the patient had subpleural interstitial changes in the right consistent with bleomycin lung toxicity.  Plan was to follow him with serial pulmonary function tests to ensure stability.  The patient had a repeat PET scan on 04/12/2017 and the findings were the  patient had stable tiny right lung nodules that were unchanged with fibrosis in the right lung.  There was no lymphadenopathy.  The patient had also seen Dr. Cm London in Pulmonary, whose assessment was the patient had bleomycin lung toxicity.  The patient had dyspnea on exertion, felt to be secondary to bleomycin lung toxicity.  The patient did see Dr. Cm London on 04/09/2017.  His assessment was the patient had bleomycin lung toxicity and felt that a CT chest showed overall stability, but there were still subpleural interstitial changes on the right consistent with bleomycin lung toxicity.  When we saw him on 04/19/2019, we wanted to restage him given the fact he had stage IV disease with a PET scan.  This was obtained on 07/14/2017 and the findings were there was no evidence of residual or recurrent lymphoma.  There were minimal subpleural fibrotic changes.  He also performed an echocardiogram, which was read as borderline left ventricular ejection fraction with a calculated left ventricular ejection fraction of 49%.  His prior echocardiogram revealed ejection fraction of 56%.  The patient was seen by Dr. 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  They recommended the patient start lisinopril 2.5 mg daily and ACE inhibitor therapy.  She also ordered a CT of the coronary arteries, which was done on 08/28/2017 and the findings were there was a total calcium score of 26.1, which is in the 98th percentile subjects with the same age, gender, race.  Additionally, there was a prominent ramus intermedius artery with prominent moderate focal proximal stenosis, multiple followup narrowing proximal mid LAD focal scarring, anterolateral right small hiatal hernia.  The patient was scheduled to have an echo in November.  He did Dr. Cm London in November for  followup.  Assessment was the patient had bleomycin lung, stable DLCO.  Would continue to monitor him every 6 months.  He had scans done on 07/10/2019, which were normal.  No evidence of malignancy.  Echocardiogram was normal.  He had CT scans again on 12/06/2021 and the findings were there was no evidence of recurrence.  CT neck was negative.  CT chest, abdomen and pelvis revealed that there were no suspicious interval changes or chronic appearing changes of the lungs, evidence of prior granulomatous infection.  The patient otherwise is doing well.  He says he has been out playing golf in the summer as well as riding his bike ride and fishing.  He is scheduled to have PFTs with Dr. Cm London.  He says his dyspnea is fairly stable to improved.  No complaints of fevers, night sweats, weight loss.  No headaches.  No bone pain.    PHYSICAL EXAMINATION:    GENERAL:  He is a middle-aged white male in no acute distress.  ECOG performance status is 0.  VITAL SIGNS:  Reveal blood pressure 124/82, pulse 50, respirations 16, temp 96.4.  HEENT:  Atraumatic, normocephalic.  Oropharynx nonerythematous.  NECK:  Supple.  LUNGS:  Clear to auscultation and percussion.  HEART:  Regular rhythm.  S1, S2 normal.  ABDOMEN:  Soft.  Normoactive bowel sounds.  No mass, nontender.  LYMPHATICS:  No cervical, supraclavicular, axillary or inguinal nodes.  EXTREMITIES:  No edema.  NEUROLOGIC:  Nonfocal.    LABORATORY DATA:  Reveals sed rate of 6.  CBC is essentially within normal limits.  LDH is 132.  Glucose 132.  BUN is 19, creatinine 1.01.  LFTs are normal.    IMPRESSION:  Stage IV classical Hodgkin's lymphoma, presenting with right supraclavicular lymphadenopathy.  Biopsy consistent with classical Hodgkin's lymphoma involving spine and pelvis as well as lymphadenopathy above and below the diaphragm.  He responded to 6 cycles of ABVD.  Course was complicated with neutropenic fever.  Echocardiogram revealed normal cardiac function.   Pulmonary function tests revealed depressed DLCO.  PET scan was negative for metastatic disease.  Otherwise, recent scans indicated no evidence of recurrence.  Sed rate is normal.  The patient has been followed by Dr. Cm London for bleomycin of the lung with yearly pulmonary function tests, which have been stable.    Seventy minutes were spent on this patient.  Time was spent reviewing multiple physician provider notes, lab results, imaging results, performing history and physical, documenting history and physical and ordering followup labs.  We will see the patient in 6 months and obtain CBC, CMP, LDH, sed rate.  He will be due for scans in 1 year.    Kia Leung MD        D: 2022   T: 2022   MT: [id number]    Name:     SAM AGUIRRE  MRN:      [id number]        Account:    [id number]   :      1960           Visit Date: 2022     Document: [id number]    cc:  MD Siomara Hernandez APRN, CNP   Rudy Morrison MD

## 2022-01-11 DIAGNOSIS — F51.04 CHRONIC INSOMNIA: ICD-10-CM

## 2022-01-13 RX ORDER — LORAZEPAM 1 MG/1
TABLET ORAL
Qty: 90 TABLET | Refills: 0 | Status: SHIPPED | OUTPATIENT
Start: 2022-01-13 | End: 2022-04-07

## 2022-01-16 NOTE — PROGRESS NOTES
Patient swabbed for COVID-19 testing.  Pepper Stafford MA on 1/16/2022 at 3:09 PM  GICH procedure Jesse 1/24/22

## 2022-01-18 PROBLEM — Z00.00 HEALTHCARE MAINTENANCE: Status: ACTIVE | Noted: 2022-01-18

## 2022-01-20 ENCOUNTER — ALLIED HEALTH/NURSE VISIT (OUTPATIENT)
Dept: FAMILY MEDICINE | Facility: OTHER | Age: 62
End: 2022-01-20
Attending: FAMILY MEDICINE
Payer: COMMERCIAL

## 2022-01-20 DIAGNOSIS — Z20.822 COVID-19 RULED OUT: Primary | ICD-10-CM

## 2022-01-20 PROCEDURE — U0003 INFECTIOUS AGENT DETECTION BY NUCLEIC ACID (DNA OR RNA); SEVERE ACUTE RESPIRATORY SYNDROME CORONAVIRUS 2 (SARS-COV-2) (CORONAVIRUS DISEASE [COVID-19]), AMPLIFIED PROBE TECHNIQUE, MAKING USE OF HIGH THROUGHPUT TECHNOLOGIES AS DESCRIBED BY CMS-2020-01-R: HCPCS | Mod: ZL

## 2022-01-20 PROCEDURE — C9803 HOPD COVID-19 SPEC COLLECT: HCPCS

## 2022-01-21 LAB — SARS-COV-2 RNA RESP QL NAA+PROBE: NEGATIVE

## 2022-01-24 ENCOUNTER — ANESTHESIA EVENT (OUTPATIENT)
Dept: SURGERY | Facility: OTHER | Age: 62
End: 2022-01-24
Payer: COMMERCIAL

## 2022-01-24 ENCOUNTER — HOSPITAL ENCOUNTER (OUTPATIENT)
Facility: OTHER | Age: 62
Discharge: HOME | End: 2022-01-24
Attending: SURGERY | Admitting: SURGERY
Payer: COMMERCIAL

## 2022-01-24 ENCOUNTER — ANESTHESIA (OUTPATIENT)
Dept: SURGERY | Facility: OTHER | Age: 62
End: 2022-01-24
Payer: COMMERCIAL

## 2022-01-24 VITALS
BODY MASS INDEX: 21.21 KG/M2 | DIASTOLIC BLOOD PRESSURE: 74 MMHG | TEMPERATURE: 97.4 F | WEIGHT: 172 LBS | HEART RATE: 57 BPM | SYSTOLIC BLOOD PRESSURE: 105 MMHG | RESPIRATION RATE: 14 BRPM | OXYGEN SATURATION: 98 %

## 2022-01-24 PROBLEM — K57.30 SIGMOID DIVERTICULOSIS: Status: ACTIVE | Noted: 2022-01-24

## 2022-01-24 PROBLEM — K63.5 COLON POLYP: Status: ACTIVE | Noted: 2022-01-24

## 2022-01-24 PROCEDURE — 250N000011 HC RX IP 250 OP 636: Performed by: NURSE ANESTHETIST, CERTIFIED REGISTERED

## 2022-01-24 PROCEDURE — 88305 TISSUE EXAM BY PATHOLOGIST: CPT

## 2022-01-24 PROCEDURE — 45384 COLONOSCOPY W/LESION REMOVAL: CPT | Mod: PT | Performed by: SURGERY

## 2022-01-24 PROCEDURE — 258N000003 HC RX IP 258 OP 636: Performed by: SURGERY

## 2022-01-24 PROCEDURE — 45380 COLONOSCOPY AND BIOPSY: CPT | Performed by: SURGERY

## 2022-01-24 PROCEDURE — 45384 COLONOSCOPY W/LESION REMOVAL: CPT | Performed by: NURSE ANESTHETIST, CERTIFIED REGISTERED

## 2022-01-24 PROCEDURE — 250N000009 HC RX 250: Performed by: NURSE ANESTHETIST, CERTIFIED REGISTERED

## 2022-01-24 RX ORDER — NALOXONE HYDROCHLORIDE 0.4 MG/ML
0.4 INJECTION, SOLUTION INTRAMUSCULAR; INTRAVENOUS; SUBCUTANEOUS
Status: DISCONTINUED | OUTPATIENT
Start: 2022-01-24 | End: 2022-01-24 | Stop reason: HOSPADM

## 2022-01-24 RX ORDER — PROPOFOL 10 MG/ML
INJECTION, EMULSION INTRAVENOUS PRN
Status: DISCONTINUED | OUTPATIENT
Start: 2022-01-24 | End: 2022-01-24

## 2022-01-24 RX ORDER — SODIUM CHLORIDE, SODIUM LACTATE, POTASSIUM CHLORIDE, CALCIUM CHLORIDE 600; 310; 30; 20 MG/100ML; MG/100ML; MG/100ML; MG/100ML
INJECTION, SOLUTION INTRAVENOUS CONTINUOUS
Status: DISCONTINUED | OUTPATIENT
Start: 2022-01-24 | End: 2022-01-24 | Stop reason: HOSPADM

## 2022-01-24 RX ORDER — LIDOCAINE HYDROCHLORIDE 20 MG/ML
INJECTION, SOLUTION INFILTRATION; PERINEURAL PRN
Status: DISCONTINUED | OUTPATIENT
Start: 2022-01-24 | End: 2022-01-24

## 2022-01-24 RX ORDER — LIDOCAINE 40 MG/G
CREAM TOPICAL
Status: DISCONTINUED | OUTPATIENT
Start: 2022-01-24 | End: 2022-01-24 | Stop reason: HOSPADM

## 2022-01-24 RX ORDER — FLUMAZENIL 0.1 MG/ML
0.2 INJECTION, SOLUTION INTRAVENOUS
Status: DISCONTINUED | OUTPATIENT
Start: 2022-01-24 | End: 2022-01-24 | Stop reason: HOSPADM

## 2022-01-24 RX ORDER — NALOXONE HYDROCHLORIDE 0.4 MG/ML
0.2 INJECTION, SOLUTION INTRAMUSCULAR; INTRAVENOUS; SUBCUTANEOUS
Status: DISCONTINUED | OUTPATIENT
Start: 2022-01-24 | End: 2022-01-24 | Stop reason: HOSPADM

## 2022-01-24 RX ORDER — ONDANSETRON 2 MG/ML
4 INJECTION INTRAMUSCULAR; INTRAVENOUS
Status: DISCONTINUED | OUTPATIENT
Start: 2022-01-24 | End: 2022-01-24 | Stop reason: HOSPADM

## 2022-01-24 RX ORDER — PROPOFOL 10 MG/ML
INJECTION, EMULSION INTRAVENOUS CONTINUOUS PRN
Status: DISCONTINUED | OUTPATIENT
Start: 2022-01-24 | End: 2022-01-24

## 2022-01-24 RX ADMIN — LIDOCAINE HYDROCHLORIDE 60 MG: 20 INJECTION, SOLUTION INFILTRATION; PERINEURAL at 07:30

## 2022-01-24 RX ADMIN — SODIUM CHLORIDE, POTASSIUM CHLORIDE, SODIUM LACTATE AND CALCIUM CHLORIDE: 600; 310; 30; 20 INJECTION, SOLUTION INTRAVENOUS at 07:06

## 2022-01-24 RX ADMIN — PROPOFOL 70 MG: 10 INJECTION, EMULSION INTRAVENOUS at 07:30

## 2022-01-24 RX ADMIN — PROPOFOL 140 MCG/KG/MIN: 10 INJECTION, EMULSION INTRAVENOUS at 07:30

## 2022-01-24 NOTE — DISCHARGE INSTRUCTIONS
[facility]  Adult Discharge Orders & Instructions    ________________________________________________________________          For 12 hours after surgery  1. Get plenty of rest.  A responsible adult must stay with you for at least 12 hours after you leave the hospital.   2. You may feel lightheaded.  IF so, sit for a few minutes before standing.  Have someone help you get up.   3. You may have a slight fever. Call the doctor if your fever is over 101 F (38.3 C) (taken under the tongue) or lasts longer than 24 hours.  4. You may have a dry mouth, a sore throat, muscle aches or trouble sleeping.  These should go away after 24 hours.  5. Do not make important or legal decisions.  6.   Do not drive or use heavy equipment.  If you have weakness or tingling, don't drive or use heavy equipment until this feeling goes away.    To contact a doctor, call   [phone]_______________________

## 2022-01-24 NOTE — INTERVAL H&P NOTE
The history and physical has been reviewed and the patient has been examined.  There are no interim changes to the patient's history or physical condition.  Plan: Colonoscopy for Healthcare maintenance . Risks of bleeding, perforation, incomplete examination discussed and wishes to proceed. MAC needed for ASA III    David Molina MD

## 2022-01-24 NOTE — ANESTHESIA PREPROCEDURE EVALUATION
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  No polyps seen.  Next due .     CYSTOSCOPY      No Comments Provided     ESOPHAGOSCOPY, GASTROSCOPY, DUODENOSCOPY (EGD), COMBINED      16,EGD     FOOT SURGERY Left     SUBTALAR ATHROEREISIS, left side, Dr. Schwartz.     HERNIA REPAIR Bilateral     Surgipro mesh     LYMPH NODE BIOPSY        No Known Allergies   Social History     Tobacco Use     Smoking status: Never Smoker     Smokeless tobacco: Never Used   Substance Use Topics     Alcohol use: Yes     Alcohol/week: 7.0 standard drinks     Types: 7 Cans of beer per week     Comment: 1 beer per day      Wt Readings from Last 1 Encounters:   22 78 kg (172 lb)        Anesthesia Evaluation   Pt has had prior anesthetic.     No history of anesthetic complications       ROS/MED HX  ENT/Pulmonary: Comment: Bleomycin lung toxicity  Chronic congestion      Neurologic: Comment: Peripheral neuropathy, bilateral upper and lower extremities      Cardiovascular:     (+) --CAD ---    METS/Exercise Tolerance: >4 METS    Hematologic:  - neg hematologic  ROS     Musculoskeletal:  - neg musculoskeletal ROS     GI/Hepatic:  - neg GI/hepatic ROS     Renal/Genitourinary:  - neg Renal ROS     Endo:  - neg endo ROS      Psychiatric/Substance Use:     (+) psychiatric history depression     Infectious Disease:  - neg infectious disease ROS     Malignancy: Comment: Hodgkin lymphoma      Other:  - neg other ROS          Physical Exam    Airway        Mallampati: II   TM distance: > 3 FB   Neck ROM: full   Mouth opening: > 3 cm    Respiratory Devices and Support         Dental  no notable dental history         Cardiovascular   cardiovascular exam normal       Rhythm and rate: regular and normal     Pulmonary   pulmonary exam normal        breath sounds clear to auscultation           OUTSIDE LABS:  CBC:   Lab Results   Component Value Date    WBC 6.4 12/06/2021    WBC 5.0 08/02/2021    HGB 14.9 12/06/2021    HGB 13.1 (L) 08/02/2021    HCT 44.5 12/06/2021    HCT 38.4 (L) 08/02/2021     12/06/2021     08/02/2021     BMP:   Lab Results   Component Value Date     12/06/2021     08/02/2021    POTASSIUM 3.9 12/06/2021    POTASSIUM 3.9 08/02/2021    CHLORIDE 105 12/06/2021    CHLORIDE 106 08/02/2021    CO2 28 12/06/2021    CO2 26 08/02/2021    BUN 19 12/06/2021    BUN 15 08/02/2021    CR 1.01 12/06/2021    CR 1.11 08/02/2021     (H) 12/06/2021    GLC 99 08/02/2021     COAGS:   Lab Results   Component Value Date    PTT 32 08/19/2015    INR 1.2 08/19/2015     POC: No results found for: BGM, HCG, HCGS  HEPATIC:   Lab Results   Component Value Date    ALBUMIN 4.3 12/06/2021    PROTTOTAL 7.1 12/06/2021    ALT 23 12/06/2021    AST 22 12/06/2021    ALKPHOS 48 12/06/2021    BILITOTAL 0.6 12/06/2021     OTHER:   Lab Results   Component Value Date    CHELSEA 9.5 12/06/2021    MAG 1.8 (L) 01/09/2016    T4 0.49 (L) 02/15/2017    SED 6 01/06/2022       Anesthesia Plan    ASA Status:  3   NPO Status:  NPO Appropriate    Anesthesia Type: MAC.     - Reason for MAC: straight local not clinically adequate   Induction: Intravenous.   Maintenance: Balanced.        Consents    Anesthesia Plan(s) and associated risks, benefits, and  realistic alternatives discussed. Questions answered and patient/representative(s) expressed understanding.    - Discussed:     - Discussed with:  Patient, Spouse      - Extended Intubation/Ventilatory Support Discussed: No.      - Patient is DNR/DNI Status: No    Use of blood products discussed: No .     Postoperative Care            Comments:    Other Comments: Risks, benefits and alternatives discussed and would like to proceed. General anesthesia ok if indicated.             WILLIE Tracey CRNA

## 2022-01-24 NOTE — ANESTHESIA POSTPROCEDURE EVALUATION
Patient: Daniel Boyd    Procedure: Procedure(s):  COLONOSCOPY, WITH POLYPECTOMY AND BIOPSY       Diagnosis:Screening for colon cancer [Z12.11]  Diagnosis Additional Information: No value filed.    Anesthesia Type:  MAC    Note:  Disposition: Outpatient   Postop Pain Control: Uneventful            Sign Out: Well controlled pain   PONV: No   Neuro/Psych: Uneventful            Sign Out: Acceptable/Baseline neuro status   Airway/Respiratory: Uneventful            Sign Out: Acceptable/Baseline resp. status   CV/Hemodynamics: Uneventful            Sign Out: Acceptable CV status   Other NRE: NONE   DID A NON-ROUTINE EVENT OCCUR? No           Last vitals:  Vitals Value Taken Time   /74 01/24/22 0815   Temp 97.4  F (36.3  C) 01/24/22 0754   Pulse 57 01/24/22 0815   Resp     SpO2 99 % 01/24/22 0816   Vitals shown include unvalidated device data.    Electronically Signed By: WILLIE Tracey CRNA  January 24, 2022  10:48 AM

## 2022-01-24 NOTE — ANESTHESIA CARE TRANSFER NOTE
Patient: Daniel Boyd    Procedure: Procedure(s):  COLONOSCOPY, WITH POLYPECTOMY AND BIOPSY       Diagnosis: Screening for colon cancer [Z12.11]  Diagnosis Additional Information: No value filed.    Anesthesia Type:   MAC     Note:    Oropharynx: oropharynx clear of all foreign objects  Level of Consciousness: awake  Oxygen Supplementation: nasal cannula  Level of Supplemental Oxygen (L/min / FiO2): 2  Independent Airway: airway patency satisfactory and stable  Dentition: dentition unchanged  Vital Signs Stable: post-procedure vital signs reviewed and stable  Report to RN Given: handoff report given  Patient transferred to: Phase II    Handoff Report: Identifed the Patient, Identified the Reponsible Provider, Reviewed the pertinent medical history, Discussed the surgical course, Reviewed Intra-OP anesthesia mangement and issues during anesthesia, Set expectations for post-procedure period and Allowed opportunity for questions and acknowledgement of understanding      Vitals:  Vitals Value Taken Time   BP     Temp     Pulse     Resp     SpO2         Electronically Signed By: WILLIE Tracey CRNA  January 24, 2022  7:51 AM

## 2022-01-24 NOTE — OR NURSING
patient has been discharged to home at 0845 via ambulation accompanied by wife    Written discharge instructions were provided to patient.  Prescriptions were none.      Patient and adult caring for them verbalize understanding of discharge instructions including no driving until tomorrow and no longer taking narcotic pain medications - no operating mechanical equipment and no making any important decisions.They understand reason for discharge, and necessary follow-up appointments.

## 2022-01-24 NOTE — OP NOTE
PROCEDURE NOTE    DATE OF SERVICE: 1/24/2022    SURGEON: David Molina MD    PRE-OP DIAGNOSIS:    Healthcare maintenance     POST-OP DIAGNOSIS:  Same  Sigmoid Diverticulosis  Polyp at 10 cm    PROCEDURE:   Colonoscopy with hot biopsy    ANESTHESIA:  MIKAELA Keating CRNA    INDICATION FOR THE PROCEDURE: The patient is a 61 year old male in need of Healthcare maintenance  . The patient has no other complaints  . After explaining the risks to include bleeding, perforation, potential inability toreach the cecum, the patient wished to proceed.    PROCEDURE:After adequate sedation, the patient was in the left lateral decubitus position.  Rectal exam was performed.  There was normal tone and no palpable masses .  The colonoscope was introduced into the rectum and advanced to the cecum with Mild difficulty.  The patient's prep was excellent.  The terminal cecum was reached.  The cecum, ascending, transverse, descending and sigmoid colon was with sigmoid diverticulosis and a diminutive polyp at 10 cm that was hot biopsied and destroyed .  The scope was retroflexed in the rectum.  The rectum was unremarkable  .  The scope was straightened and removed.  The patient tolerated the procedure well.     ESTIMATED BLOOD LOSS: none    COMPLICATIONS:  None    TISSUE REMOVED:  Yes    RECOMMEND:      Follow-up pending pathology  Fiber  Given literature on diverticulosis    Daivd Molina MD FACS

## 2022-01-25 PROBLEM — Z00.00 HEALTHCARE MAINTENANCE: Status: RESOLVED | Noted: 2022-01-18 | Resolved: 2022-01-25

## 2022-01-25 PROBLEM — Z86.0101 H/O ADENOMATOUS POLYP OF COLON: Status: ACTIVE | Noted: 2022-01-24

## 2022-01-25 LAB
PATH REPORT.COMMENTS IMP SPEC: NORMAL
PATH REPORT.FINAL DX SPEC: NORMAL
PATH REPORT.RELEVANT HX SPEC: NORMAL
PHOTO IMAGE: NORMAL

## 2022-01-26 DIAGNOSIS — I25.10 CORONARY ARTERY DISEASE INVOLVING NATIVE CORONARY ARTERY OF NATIVE HEART WITHOUT ANGINA PECTORIS: ICD-10-CM

## 2022-01-27 RX ORDER — ATORVASTATIN CALCIUM 40 MG/1
TABLET, FILM COATED ORAL
Qty: 90 TABLET | Refills: 2 | Status: SHIPPED | OUTPATIENT
Start: 2022-01-27 | End: 2022-10-17

## 2022-01-27 NOTE — TELEPHONE ENCOUNTER
"[facility]  sent Rx request for the following:      Requested Prescriptions   Pending Prescriptions Disp Refills     atorvastatin (LIPITOR) 40 MG tablet [Pharmacy Med Name: atorvastatin 40 mg tablet] 90 tablet 2     Sig: TAKE 1 TABLET BY MOUTH ONCE DAILY       Statins Protocol Failed - 1/26/2022  8:08 AM        Failed - LDL on file in past 12 months     Recent Labs   Lab Test 06/15/18  1112   LDL 67           Passed - No abnormal creatine kinase in past 12 months     No lab results found.         Passed - Recent (12 mo) or future (30 days) visit within the authorizing provider's specialty     Patient has had an office visit with the authorizing provider or a provider within the authorizing providers department within the previous 12 mos or has a future within next 30 days. See \"Patient Info\" tab in inbasket, or \"Choose Columns\" in Meds & Orders section of the refill encounter.          Passed - Medication is active on med list        Passed - Patient is age 18 or older     Last Prescription Date:   4/20/21  Last Fill Qty/Refills:         90, R-3  Last Office Visit:              11/19/21  Future Office visit:           None  Routing refill request to provider for review/approval because:  Unable to complete prescription refill per RN Medication Refill Policy. Rossy Liu RN .............. 1/27/2022  8:39 AM  "

## 2022-02-12 ENCOUNTER — HEALTH MAINTENANCE LETTER (OUTPATIENT)
Age: 62
End: 2022-02-12

## 2022-03-09 DIAGNOSIS — F33.0 MAJOR DEPRESSIVE DISORDER, RECURRENT EPISODE, MILD (H): ICD-10-CM

## 2022-03-10 RX ORDER — SERTRALINE HYDROCHLORIDE 100 MG/1
TABLET, FILM COATED ORAL
Qty: 90 TABLET | Refills: 0 | Status: SHIPPED | OUTPATIENT
Start: 2022-03-10 | End: 2022-06-14

## 2022-04-03 NOTE — H&P (VIEW-ONLY)
Visit Date: 01/06/2022    HEMATOLOGY ONCOLOGY CLINIC NOTE    Mr. Boyd returns for followup of stage IV Hodgkin's lymphoma, classical type.  He had originally presented with right neck mass over a period of 2 months.  He was subsequently seen by Dr. Radha Kim of General Surgery, who felt the patient had right-sided supraclavicular adenopathy.  Biopsy of the lymph node came back consistent with classical Hodgkin's lymphoma.  He was subsequently seen by Dr. Jarad Mosqueda of Oncology on 08/05/2015, who recommended PET scan for accurate staging, which was performed on 08/13/2015.  The findings were there was hypermetabolic lymphadenopathy both above and below the diaphragm, suspicious for lymphoma.  There was hypermetabolic activity in the spleen, suspicious for metastatic disease.  There were multiple foci with abnormal uptake in the spine, suspicious for metastatic disease.  There was also a right middle lobe lung nodule that was nonspecific.  Dr. Mosqueda felt the patient had stage IV Hodgkin's lymphoma and recommend ABVD x6 cycles.  An echocardiogram was performed to assess cardiac function on 08/12/2015.  Findings were that the patient had normal left ventricular size and uptake and left ventricular ejection fraction was 65%.  The patient was started on ABVD.  After 3 cycles, had a repeat PET scan 11/28/2015.  Findings were there was marked improvement.  The patient lymphadenopathy in the lower neck, axilla, chest, abdomen, pelvis and groin focal areas of activity were markedly improved, cervical and lumbar spine and pelvic area.  The patient subsequently was seen by Dr. Mosqueda on 02/10/2016 and his assessment was the patient had completed 6 cycles of ABVD.  Repeat PET scan was performed on 02/24/2016 and the findings there was no abnormal uptake to suggest metastatic disease, no evidence of metastatic disease.  Previously seen lymphadenopathy and bone lesions were all resolved.  The patient had  problems with dysphagia while he was admitted to the hospital for right lower lobe pneumonia and was treated with IV antibiotics.  He did see Dr. Portillo for EGD, which revealed hiatal hernia and Schatzki rings.  Biopsies were all negative.  The patient's dysphagia also improved.  The patient did have an echocardiogram and pulmonary function tests in 06/2016, ejection fraction 56%.  Pulmonary functions revealed decreased DLCO, with 50% predicted being reported.  The patient did have some shortness of breath.  When we saw him on 07/14/2016, we elected to restage him with a PET scan.  This was done on 10/05/2016, which revealed no evidence of metastatic disease.  He also had been seen by Dr. Cm London of Pulmonary for evaluation of possible bleomycin lung toxicity, for which the patient had dyspnea on exertion.  Felt his symptoms had been stable, but not progressing.  We did have prechemotherapy pulmonary function tests for comparison and felt the patient could have pulmonary fibrosis due to bleomycin as well as other forms of bleomycin toxicity or hypersensitivity pneumonitis.  He also obtained CT chest with high resolution cuts and also repeated his pulmonary function tests.  The patient had a high resolution CT chest performed on 10/03/2016.  Findings were there was new mild subpleural fibrotic changes within the anterior right upper lobe, which may be related to chemotherapy.  The patient saw Dr. Cm London on 10/12/2016.  His impression of pulmonary function tests revealed stability of his lung volumes and DLCO revealed improved spirometry.  He felt his dyspnea on exertion was stable, not progressive.  He reviewed the CT scan of the chest, which revealed the patient had subpleural interstitial changes in the right consistent with bleomycin lung toxicity.  Plan was to follow him with serial pulmonary function tests to ensure stability.  The patient had a repeat PET scan on 04/12/2017 and the findings were the  patient had stable tiny right lung nodules that were unchanged with fibrosis in the right lung.  There was no lymphadenopathy.  The patient had also seen Dr. Cm London in Pulmonary, whose assessment was the patient had bleomycin lung toxicity.  The patient had dyspnea on exertion, felt to be secondary to bleomycin lung toxicity.  The patient did see Dr. Cm London on 04/09/2017.  His assessment was the patient had bleomycin lung toxicity and felt that a CT chest showed overall stability, but there were still subpleural interstitial changes on the right consistent with bleomycin lung toxicity.  When we saw him on 04/19/2019, we wanted to restage him given the fact he had stage IV disease with a PET scan.  This was obtained on 07/14/2017 and the findings were there was no evidence of residual or recurrent lymphoma.  There were minimal subpleural fibrotic changes.  He also performed an echocardiogram, which was read as borderline left ventricular ejection fraction with a calculated left ventricular ejection fraction of 49%.  His prior echocardiogram revealed ejection fraction of 56%.  The patient was seen by Dr. 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  They recommended the patient start lisinopril 2.5 mg daily and ACE inhibitor therapy.  She also ordered a CT of the coronary arteries, which was done on 08/28/2017 and the findings were there was a total calcium score of 26.1, which is in the 98th percentile subjects with the same age, gender, race.  Additionally, there was a prominent ramus intermedius artery with prominent moderate focal proximal stenosis, multiple followup narrowing proximal mid LAD focal scarring, anterolateral right small hiatal hernia.  The patient was scheduled to have an echo in November.  He did Dr. Cm London in November for  followup.  Assessment was the patient had bleomycin lung, stable DLCO.  Would continue to monitor him every 6 months.  He had scans done on 07/10/2019, which were normal.  No evidence of malignancy.  Echocardiogram was normal.  He had CT scans again on 12/06/2021 and the findings were there was no evidence of recurrence.  CT neck was negative.  CT chest, abdomen and pelvis revealed that there were no suspicious interval changes or chronic appearing changes of the lungs, evidence of prior granulomatous infection.  The patient otherwise is doing well.  He says he has been out playing golf in the summer as well as riding his bike ride and fishing.  He is scheduled to have PFTs with Dr. Cm London.  He says his dyspnea is fairly stable to improved.  No complaints of fevers, night sweats, weight loss.  No headaches.  No bone pain.    PHYSICAL EXAMINATION:    GENERAL:  He is a middle-aged white male in no acute distress.  ECOG performance status is 0.  VITAL SIGNS:  Reveal blood pressure 124/82, pulse 50, respirations 16, temp 96.4.  HEENT:  Atraumatic, normocephalic.  Oropharynx nonerythematous.  NECK:  Supple.  LUNGS:  Clear to auscultation and percussion.  HEART:  Regular rhythm.  S1, S2 normal.  ABDOMEN:  Soft.  Normoactive bowel sounds.  No mass, nontender.  LYMPHATICS:  No cervical, supraclavicular, axillary or inguinal nodes.  EXTREMITIES:  No edema.  NEUROLOGIC:  Nonfocal.    LABORATORY DATA:  Reveals sed rate of 6.  CBC is essentially within normal limits.  LDH is 132.  Glucose 132.  BUN is 19, creatinine 1.01.  LFTs are normal.    IMPRESSION:  Stage IV classical Hodgkin's lymphoma, presenting with right supraclavicular lymphadenopathy.  Biopsy consistent with classical Hodgkin's lymphoma involving spine and pelvis as well as lymphadenopathy above and below the diaphragm.  He responded to 6 cycles of ABVD.  Course was complicated with neutropenic fever.  Echocardiogram revealed normal cardiac function.   Pulmonary function tests revealed depressed DLCO.  PET scan was negative for metastatic disease.  Otherwise, recent scans indicated no evidence of recurrence.  Sed rate is normal.  The patient has been followed by Dr. Cm London for bleomycin of the lung with yearly pulmonary function tests, which have been stable.    Seventy minutes were spent on this patient.  Time was spent reviewing multiple physician provider notes, lab results, imaging results, performing history and physical, documenting history and physical and ordering followup labs.  We will see the patient in 6 months and obtain CBC, CMP, LDH, sed rate.  He will be due for scans in 1 year.    Kia Leung MD        D: 2022   T: 2022   MT: [id number]    Name:     SAM AGUIRRE  MRN:      [id number]        Account:    [id number]   :      1960           Visit Date: 2022     Document: [id number]    cc:  MD Siomara Hernandez APRN, CNP   Rudy Morrison MD    : Yes

## 2022-04-07 DIAGNOSIS — F51.04 CHRONIC INSOMNIA: ICD-10-CM

## 2022-04-07 RX ORDER — LORAZEPAM 1 MG/1
TABLET ORAL
Qty: 90 TABLET | Refills: 0 | Status: SHIPPED | OUTPATIENT
Start: 2022-04-07 | End: 2022-07-06

## 2022-04-15 DIAGNOSIS — I25.10 CORONARY ARTERY DISEASE INVOLVING NATIVE CORONARY ARTERY OF NATIVE HEART WITHOUT ANGINA PECTORIS: ICD-10-CM

## 2022-04-15 NOTE — TELEPHONE ENCOUNTER
[facility] sent Rx request for the following:      Requested Prescriptions   Pending Prescriptions Disp Refills     lisinopril (ZESTRIL) 2.5 MG tablet [Pharmacy Med Name: lisinopril 2.5 mg tablet] 90 tablet 3     Sig: TAKE 1 TABLET BY MOUTH ONCE DAILY     Last Prescription Date:   4/20/21  Last Fill Qty/Refills:         90, R-3    Last Office Visit:              11/19/21   Future Office visit:           None   Unable to complete prescription refill per RN Medication Refill Policy.   Jazmín Hutchinson RN on 4/15/2022 at 10:04 AM

## 2022-04-18 RX ORDER — LISINOPRIL 2.5 MG/1
TABLET ORAL
Qty: 90 TABLET | Refills: 3 | Status: SHIPPED | OUTPATIENT
Start: 2022-04-18 | End: 2023-04-12

## 2022-06-13 DIAGNOSIS — F33.0 MAJOR DEPRESSIVE DISORDER, RECURRENT EPISODE, MILD (H): ICD-10-CM

## 2022-06-14 RX ORDER — SERTRALINE HYDROCHLORIDE 100 MG/1
TABLET, FILM COATED ORAL
Qty: 90 TABLET | Refills: 0 | Status: SHIPPED | OUTPATIENT
Start: 2022-06-14 | End: 2022-09-06

## 2022-06-14 NOTE — TELEPHONE ENCOUNTER
"[facility] sent Rx request for the following:   sertraline (ZOLOFT) 100 MG tablet   SigTAKE 1 TABLET BY MOUTH DAILY    Last Prescription Date:   03/10/2022  Last Fill Qty/Refills:         90, R-0    Last Office Visit:              11/19/2021 (Vamshi)   Future Office visit:           None noted   SSRIs Protocol Failed - 6/13/2022  8:33 AM        Failed - Recent (6 mo) or future (30 days) visit within the authorizing provider's specialty     Patient had office visit in the last 6 months or has a visit in the next 30 days with authorizing provider or within the authorizing provider's specialty.  See \"Patient Info\" tab in inbasket, or \"Choose Columns\" in Meds & Orders section of the refill encounter.            Passed - PHQ-9 score less than 5 in past 6 months     Please review last PHQ-9 score.           Passed - Medication is active on med list        Passed - Patient is age 18 or older         Unable to complete prescription refill per RN Medication Refill Policy.................... Jennifer Sorensen RN ....................  6/14/2022   11:23 AM        " Patient called to request an order for mammogram.  She will have it done at [facility]

## 2022-07-05 DIAGNOSIS — F51.04 CHRONIC INSOMNIA: ICD-10-CM

## 2022-07-06 RX ORDER — LORAZEPAM 1 MG/1
TABLET ORAL
Qty: 90 TABLET | Refills: 0 | Status: SHIPPED | OUTPATIENT
Start: 2022-07-06 | End: 2022-10-05

## 2022-07-06 NOTE — TELEPHONE ENCOUNTER
[facility] sent Rx request for the following:   LORazepam (ATIVAN) 1 MG tablet  SigTAKE 1 TABLET BY MOUTH AT BEDTIME    Last Prescription Date:   04/07/2022  Last Fill Qty/Refills:         90, R-0    Last Office Visit:              11/19/2021 (Vamshi)   Future Office visit:           None noted    Routing refill request to provider for review/approval because:  Drug not on the [facility], [facility] or [facility] refill protocol or controlled substance    Unable to complete prescription refill per RN Medication Refill Policy.................... Jennifer Sorensen RN ....................  7/6/2022   3:43 PM

## 2022-07-07 ENCOUNTER — LAB (OUTPATIENT)
Dept: LAB | Facility: OTHER | Age: 62
End: 2022-07-07
Attending: INTERNAL MEDICINE
Payer: COMMERCIAL

## 2022-07-07 DIAGNOSIS — C81.90 HODGKIN LYMPHOMA, UNSPECIFIED HODGKIN LYMPHOMA TYPE, UNSPECIFIED BODY REGION (H): ICD-10-CM

## 2022-07-07 LAB
ALBUMIN SERPL-MCNC: 4.4 G/DL (ref 3.5–5.7)
ALP SERPL-CCNC: 53 U/L (ref 34–104)
ALT SERPL W P-5'-P-CCNC: 32 U/L (ref 7–52)
ANION GAP SERPL CALCULATED.3IONS-SCNC: 8 MMOL/L (ref 3–14)
AST SERPL W P-5'-P-CCNC: 29 U/L (ref 13–39)
BASOPHILS # BLD AUTO: 0 10E3/UL (ref 0–0.2)
BASOPHILS NFR BLD AUTO: 1 %
BILIRUB SERPL-MCNC: 0.8 MG/DL (ref 0.3–1)
BUN SERPL-MCNC: 20 MG/DL (ref 7–25)
CALCIUM SERPL-MCNC: 9.5 MG/DL (ref 8.6–10.3)
CHLORIDE BLD-SCNC: 101 MMOL/L (ref 98–107)
CO2 SERPL-SCNC: 27 MMOL/L (ref 21–31)
CREAT SERPL-MCNC: 1.07 MG/DL (ref 0.7–1.3)
EOSINOPHIL # BLD AUTO: 0.1 10E3/UL (ref 0–0.7)
EOSINOPHIL NFR BLD AUTO: 1 %
ERYTHROCYTE [DISTWIDTH] IN BLOOD BY AUTOMATED COUNT: 12.5 % (ref 10–15)
ERYTHROCYTE [SEDIMENTATION RATE] IN BLOOD BY WESTERGREN METHOD: 7 MM/HR (ref 0–20)
GFR SERPL CREATININE-BSD FRML MDRD: 78 ML/MIN/1.73M2
GLUCOSE BLD-MCNC: 108 MG/DL (ref 70–105)
HCT VFR BLD AUTO: 43.6 % (ref 40–53)
HGB BLD-MCNC: 15.1 G/DL (ref 13.3–17.7)
IMM GRANULOCYTES # BLD: 0 10E3/UL
IMM GRANULOCYTES NFR BLD: 0 %
LDH SERPL L TO P-CCNC: 172 U/L (ref 140–271)
LYMPHOCYTES # BLD AUTO: 1.6 10E3/UL (ref 0.8–5.3)
LYMPHOCYTES NFR BLD AUTO: 22 %
MCH RBC QN AUTO: 32.3 PG (ref 26.5–33)
MCHC RBC AUTO-ENTMCNC: 34.6 G/DL (ref 31.5–36.5)
MCV RBC AUTO: 93 FL (ref 78–100)
MONOCYTES # BLD AUTO: 0.5 10E3/UL (ref 0–1.3)
MONOCYTES NFR BLD AUTO: 7 %
NEUTROPHILS # BLD AUTO: 4.9 10E3/UL (ref 1.6–8.3)
NEUTROPHILS NFR BLD AUTO: 69 %
NRBC # BLD AUTO: 0 10E3/UL
NRBC BLD AUTO-RTO: 0 /100
PLATELET # BLD AUTO: 225 10E3/UL (ref 150–450)
POTASSIUM BLD-SCNC: 4 MMOL/L (ref 3.5–5.1)
PROT SERPL-MCNC: 7.1 G/DL (ref 6.4–8.9)
RBC # BLD AUTO: 4.68 10E6/UL (ref 4.4–5.9)
SODIUM SERPL-SCNC: 136 MMOL/L (ref 134–144)
WBC # BLD AUTO: 7.1 10E3/UL (ref 4–11)

## 2022-07-07 PROCEDURE — 80053 COMPREHEN METABOLIC PANEL: CPT | Mod: ZL

## 2022-07-07 PROCEDURE — 36415 COLL VENOUS BLD VENIPUNCTURE: CPT | Mod: ZL

## 2022-07-07 PROCEDURE — 85025 COMPLETE CBC W/AUTO DIFF WBC: CPT | Mod: ZL

## 2022-07-07 PROCEDURE — 85652 RBC SED RATE AUTOMATED: CPT | Mod: ZL

## 2022-07-07 PROCEDURE — 83615 LACTATE (LD) (LDH) ENZYME: CPT | Mod: ZL

## 2022-07-13 ENCOUNTER — ONCOLOGY VISIT (OUTPATIENT)
Dept: ONCOLOGY | Facility: OTHER | Age: 62
End: 2022-07-13
Attending: NURSE PRACTITIONER
Payer: COMMERCIAL

## 2022-07-13 VITALS
BODY MASS INDEX: 20.97 KG/M2 | OXYGEN SATURATION: 99 % | TEMPERATURE: 96.2 F | WEIGHT: 170 LBS | SYSTOLIC BLOOD PRESSURE: 124 MMHG | RESPIRATION RATE: 12 BRPM | DIASTOLIC BLOOD PRESSURE: 62 MMHG | HEART RATE: 52 BPM

## 2022-07-13 DIAGNOSIS — N40.0 ENLARGED PROSTATE: ICD-10-CM

## 2022-07-13 DIAGNOSIS — R93.1 DECREASED CARDIAC EJECTION FRACTION: ICD-10-CM

## 2022-07-13 DIAGNOSIS — T45.1X5A BLEOMYCIN LUNG TOXICITY: ICD-10-CM

## 2022-07-13 DIAGNOSIS — J98.4 BLEOMYCIN LUNG TOXICITY: ICD-10-CM

## 2022-07-13 DIAGNOSIS — Z85.71 HX OF HODGKIN'S LYMPHOMA: Primary | ICD-10-CM

## 2022-07-13 PROCEDURE — 99215 OFFICE O/P EST HI 40 MIN: CPT | Performed by: NURSE PRACTITIONER

## 2022-07-13 ASSESSMENT — PAIN SCALES - GENERAL: PAINLEVEL: NO PAIN (0)

## 2022-07-13 NOTE — PROGRESS NOTES
Oncology Follow-up Visit    Reason for Visit:  Daniel is a 61 year old getleman with a history of stage IV Hodgkin's lymphoma, who presents to the clinic today for routine surveillance.    Interval History: Daniel reports that he has been doing well. He denies any concerns today. Notes that he was suppose to follow-up with Dr. Cm London's office 6 weeks ago but that visit was canceled. Hasn't heard about rescheduling.     He notes he has been doing well. No recent signs of infection. No fever, chills, chest pain, or cough. Notes that he feels he is breathing well. Really denies any SOB. Went for a bike ride last night with a couple hills and felt he did well. No bleeding concerns. Denies nausea or vomiting. Denies abdominal pain or discomfort. Appetite is good, weight stable. No bowel changes. Energy is good. No rashes. Will occasionally have some swelling in his lower legs if he has been on his feet a lot but nothing chronic or worsening. Overall, doing well.     Oncologic History:   For detailed history, please see previous notes of Dr. Leung. In short, Daniel has followed with Dr. Leung for his history of stage IV Hodgkin's lymphoma, classical type. He had originally presented with right neck mass over a period of 2 months. He was subsequently seen by Dr. Radha Kim of General Surgery, who felt the patient had right-sided supraclavicular adenopathy.  Biopsy of the lymph node came back consistent with classical Hodgkin's lymphoma.  He was subsequently seen by Dr. Jarad Mosqueda of Oncology on 08/05/2015, who recommended PET scan for accurate staging, which was performed on 08/13/2015.  The findings were there was hypermetabolic lymphadenopathy both above and below the diaphragm, suspicious for lymphoma.  There was hypermetabolic activity in the spleen, suspicious for metastatic disease.  There were multiple foci with abnormal uptake in the spine, suspicious for metastatic disease.  There was also a right  middle lobe lung nodule that was nonspecific.      Dr. Mosqueda felt the patient had stage IV Hodgkin's lymphoma and recommend ABVD x6 cycles. Patient went on to complete these 6 cycles. Imaging following showed a complete response. 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 Pulmonary function tests revealed depressed DLCO. He followed with Dr. Cm London in pulmonary for management of this. Post 5 years, felt that PFTs were stable, pulmonary felt no further follow-up was needed. Since treatment, he was also noted to have a decreased LVEF thought possible related to previous Adriamycin use. Per cardiology, he was started on Lisinopril 2.5 mg daily.     He has been followed by oncology for surveillance. Recent scans has shown no evidence of disease recurrence or progression.     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  No polyps seen.  Next due 2020.     COLONOSCOPY N/A 01/24/2022    F/U 2027 tubular adenoma, sigmoid diverticulosis     CYSTOSCOPY      No Comments Provided     ESOPHAGOSCOPY, GASTROSCOPY, DUODENOSCOPY (EGD), COMBINED      5/27/16,EGD     FOOT SURGERY Left 2003    SUBTALAR ATHROEREISIS, left side, Dr. Schwartz.     HERNIA REPAIR Bilateral 2002    Surgipro mesh     LYMPH NODE BIOPSY  2015       Family History   Problem Relation Age of Onset     Prostate Cancer Father         Also had a  CABG and  during open heart surgery, .     Breast Cancer Mother      Family History Negative Sister         Good Health     Family History Negative Sister         Good Health     Prostate Cancer Brother         Cancer-prostate,Age 47.     Heart Disease Brother        Social History     Socioeconomic History     Marital status:      Spouse name: Not on file     Number of children: Not on file     Years of education: Not on file     Highest education level: Not on file   Occupational History     Not on file   Tobacco Use     Smoking status: Never Smoker     Smokeless tobacco: Never Used   Vaping Use     Vaping Use: Never used   Substance and Sexual Activity     Alcohol use: Yes     Alcohol/week: 7.0 standard drinks     Types: 7 Cans of beer per week     Comment: 1 beer per day     Drug use: No     Sexual activity: Yes     Partners: Female   Other Topics Concern     Parent/sibling w/ CABG, MI or angioplasty before 65F 55M? Not Asked   Social History Narrative    Daughter Velvet BD 89.  Son Nick BD 92  Spouse Candis, BD 59   .  He is a golf pro, also drives school bus.  Wife is Candis, nurse at [employer].      Consent signed for Candis     Social Determinants of Health     Financial Resource Strain: Not on file   Food Insecurity: Not on file   Transportation Needs: Not on file   Physical Activity: Not on file   Stress: Not on file   Social Connections: Not on file   Intimate Partner Violence: Not on file   Housing Stability: Not on file       Current Outpatient Medications   Medication     aspirin 81 MG EC tablet     atorvastatin (LIPITOR) 40 MG tablet     fish oil-omega-3 fatty acids 1000 MG capsule     lisinopril (ZESTRIL) 2.5 MG tablet     LORazepam (ATIVAN) 1 MG tablet     Multiple Vitamins-Minerals (MULTIVITAMIN & MINERAL PO)     omeprazole (PRILOSEC) 20 MG CR capsule     sertraline (ZOLOFT) 100 MG tablet     No current facility-administered medications for this  visit.        No Known Allergies    Review Of Systems:  A complete review of systems is negative except for the above mentioned items in the interval history.     ECOG Performance Status: 0    Physical Exam:  /62   Pulse 52   Temp (!) 96.2  F (35.7  C)   Resp 12   Wt 77.1 kg (170 lb)   SpO2 99%   BMI 20.97 kg/m    GENERAL APPEARANCE: Healthy, alert and in no acute distress.  HEENT: Eyes appear normal without scleral icterus. Extraocular movements intact.  NECK:   Supple with normal range of motion. No asymmetry or masses.  LYMPHATICS: No palpable cervical, supraclavicular, axillary nodes.  RESP: Lungs clear to auscultation bilaterally, respirations regular and easy.  CARDIOVASCULAR: Regular rate and rhythm. Normal S1, S2; no murmur, gallop, or rub.  ABDOMEN: Soft, non-tender, non-distended. Bowel sounds auscultated all 4 quadrants. No palpable organomegaly or masses.  MUSCULOSKELETAL: Extremities without gross deformities noted. No edema of bilateral lower extremities.  SKIN: No suspicious lesions or rashes.  NEURO: Alert and oriented x 3.  Gait steady.  PSYCHIATRIC: Mentation and affect appear normal.  Mood appropriate.    Laboratory:  Component      Latest Ref Rng & Units 7/7/2022   WBC      4.0 - 11.0 10e3/uL 7.1   RBC Count      4.40 - 5.90 10e6/uL 4.68   Hemoglobin      13.3 - 17.7 g/dL 15.1   Hematocrit      40.0 - 53.0 % 43.6   MCV      78 - 100 fL 93   MCH      26.5 - 33.0 pg 32.3   MCHC      31.5 - 36.5 g/dL 34.6   RDW      10.0 - 15.0 % 12.5   Platelet Count      150 - 450 10e3/uL 225   % Neutrophils      % 69   % Lymphocytes      % 22   % Monocytes      % 7   % Eosinophils      % 1   % Basophils      % 1   % Immature Granulocytes      % 0   NRBCs per 100 WBC      <1 /100 0   Absolute Neutrophils      1.6 - 8.3 10e3/uL 4.9   Absolute Lymphocytes      0.8 - 5.3 10e3/uL 1.6   Absolute Monocytes      0.0 - 1.3 10e3/uL 0.5   Absolute Eosinophils      0.0 - 0.7 10e3/uL 0.1   Absolute Basophils       0.0 - 0.2 10e3/uL 0.0   Absolute Immature Granulocytes      <=0.4 10e3/uL 0.0   Absolute NRBCs      10e3/uL 0.0   Sodium      134 - 144 mmol/L 136   Potassium      3.5 - 5.1 mmol/L 4.0   Chloride      98 - 107 mmol/L 101   Carbon Dioxide      21 - 31 mmol/L 27   Anion Gap      3 - 14 mmol/L 8   Urea Nitrogen      7 - 25 mg/dL 20   Creatinine      0.70 - 1.30 mg/dL 1.07   Calcium      8.6 - 10.3 mg/dL 9.5   Glucose      70 - 105 mg/dL 108 (H)   Alkaline Phosphatase      34 - 104 U/L 53   AST      13 - 39 U/L 29   ALT      7 - 52 U/L 32   Protein Total      6.4 - 8.9 g/dL 7.1   Albumin      3.5 - 5.7 g/dL 4.4   Bilirubin Total      0.3 - 1.0 mg/dL 0.8   GFR Estimate      >60 mL/min/1.73m2 78   Sed Rate      0 - 20 mm/hr 7   Lactate Dehydrogenase      140 - 271 U/L 172       Imaging Studies:    None this visit    ASSESSMENT/PLAN:    #1 Stage IV classical Hodgkin's lymphoma, presenting with right supraclavicular lymphadenopathy.  Biopsy consistent with classical Hodgkin's lymphoma involving spine and pelvis as well as lymphadenopathy above and below the diaphragm.  He responded to 6 cycles of ABVD.  Scans 01/2021 without evidence of disease recurrence or progression. Today, he reports that he is doing well. Labs today are all normal. We will continue surveillance. He will be due for follow-up in 6 months. At that time, he will also be due for imaging per previous notes of Dr. Leung. That will be scheduled today. He knows to follow-up sooner with concerns.     #2 Bleomycin Toxicity PFTs had been stable. Was due to be seen by Dr. Cm London 6 weeks ago but this was canceled. Hasn't heard back from them. I will have our staff reach out to his to determine if Follow-up is needed.     #3 Decreased LVEF Continue Lisinopril 2.5 mg daily. Most recent echo 2018 showed EF of 56%, which showed improvement since 2017. No current concerning symptoms. We will continue to monitor.     #4 Enlarged Prostate Patient requesting PSA  be added to his labs in 6 months. Previously drawn by Dr. Kruger. Lab added. Will follow-up with PCP if concerns develop.     Patient in agreement with plan and verbalizes understanding. Agrees to call with any questions or concerns.    55 minutes spent in the patient's encounter today with time spent in review of patient's chart along with chart preparation and review of the treatment plan and signing of treatment plan.  Time was also spent with the patient in obtaining a review of systems and performing a physical exam along with detailed review of all test results. Time was also spent in discussing plan for future follow-up and relating instructions for follow-up and in placing future orders.    WILLIE Marino [facility]  Medical Oncology

## 2022-09-05 DIAGNOSIS — F33.0 MAJOR DEPRESSIVE DISORDER, RECURRENT EPISODE, MILD (H): ICD-10-CM

## 2022-09-06 RX ORDER — SERTRALINE HYDROCHLORIDE 100 MG/1
TABLET, FILM COATED ORAL
Qty: 90 TABLET | Refills: 0 | Status: SHIPPED | OUTPATIENT
Start: 2022-09-06 | End: 2022-11-29

## 2022-10-03 DIAGNOSIS — F51.04 CHRONIC INSOMNIA: ICD-10-CM

## 2022-10-05 RX ORDER — LORAZEPAM 1 MG/1
TABLET ORAL
Qty: 90 TABLET | Refills: 0 | Status: SHIPPED | OUTPATIENT
Start: 2022-10-05 | End: 2022-11-29

## 2022-10-10 ENCOUNTER — HEALTH MAINTENANCE LETTER (OUTPATIENT)
Age: 62
End: 2022-10-10

## 2022-10-14 DIAGNOSIS — I25.10 CORONARY ARTERY DISEASE INVOLVING NATIVE CORONARY ARTERY OF NATIVE HEART WITHOUT ANGINA PECTORIS: ICD-10-CM

## 2022-10-14 NOTE — LETTER
October 21, 2022      Daniel Boyd  [id number]  [place] [id number]        Dear Daniel,     This letter is to remind you that you are due for your annual exam with Rudy Morrison. Your last comprehensive visit was more than 12 months ago.    Please call the clinic at [phone] to schedule your appointment.    Thank you for choosing [facility] and [facility] for your health care needs.    Sincerely,    Saritha HANSEN  [facility]

## 2022-10-14 NOTE — TELEPHONE ENCOUNTER
"Routing to pcp and outreach to contact pt and schedule a annual visit which is overdue  Radha Marti RN on 10/14/2022 at 4:18 PM    Last Prescription Date: 1/27/22  Last Qty/Refills: 90 / 2  Last Office Visit: 11/19/21  Future Office Visit: None     Requested Prescriptions   Pending Prescriptions Disp Refills     atorvastatin (LIPITOR) 40 MG tablet [Pharmacy Med Name: atorvastatin 40 mg tablet] 90 tablet 2     Sig: TAKE 1 TABLET BY MOUTH DAILY       Statins Protocol Failed - 10/14/2022  8:03 AM        Failed - LDL on file in past 12 months     Recent Labs   Lab Test 06/15/18  1112   LDL 67             Passed - No abnormal creatine kinase in past 12 months     No lab results found.             Passed - Recent (12 mo) or future (30 days) visit within the authorizing provider's specialty     Patient has had an office visit with the authorizing provider or a provider within the authorizing providers department within the previous 12 mos or has a future within next 30 days. See \"Patient Info\" tab in inbasket, or \"Choose Columns\" in Meds & Orders section of the refill encounter.              Passed - Medication is active on med list        Passed - Patient is age 18 or older             "

## 2022-10-17 RX ORDER — ATORVASTATIN CALCIUM 40 MG/1
TABLET, FILM COATED ORAL
Qty: 90 TABLET | Refills: 2 | Status: SHIPPED | OUTPATIENT
Start: 2022-10-17 | End: 2023-07-20

## 2022-10-18 NOTE — TELEPHONE ENCOUNTER
LMTCB to schedule appointment.    Pt is due for annual exam.    Elise Buenrostro on 10/18/2022 at 1:34 PM

## 2022-10-20 NOTE — TELEPHONE ENCOUNTER
LMTCB x 2 to schedule appointment.    Pt is due for annual exam.    Elise Buenrostro on 10/20/2022 at 10:44 AM

## 2022-10-21 NOTE — TELEPHONE ENCOUNTER
LMB x 3 to schedule appointment.    Pt is due for annual exam. Please send letter.    Elise Buenrostro on 10/21/2022 at 10:59 AM

## 2022-11-23 ASSESSMENT — ENCOUNTER SYMPTOMS
NAUSEA: 0
PARESTHESIAS: 0
HEMATOCHEZIA: 0
CHILLS: 0
SORE THROAT: 0
ABDOMINAL PAIN: 0
JOINT SWELLING: 0
DIARRHEA: 0
ARTHRALGIAS: 0
HEMATURIA: 0
CONSTIPATION: 0
WEAKNESS: 0
PALPITATIONS: 0
SHORTNESS OF BREATH: 0
COUGH: 0
NERVOUS/ANXIOUS: 0
DYSURIA: 0
DIZZINESS: 0
EYE PAIN: 0
FREQUENCY: 0
FEVER: 0
HEARTBURN: 0
HEADACHES: 0
MYALGIAS: 1

## 2022-11-29 ENCOUNTER — OFFICE VISIT (OUTPATIENT)
Dept: INTERNAL MEDICINE | Facility: OTHER | Age: 62
End: 2022-11-29
Attending: INTERNAL MEDICINE
Payer: COMMERCIAL

## 2022-11-29 VITALS
HEART RATE: 61 BPM | TEMPERATURE: 97.5 F | RESPIRATION RATE: 14 BRPM | HEIGHT: 76 IN | WEIGHT: 164.8 LBS | BODY MASS INDEX: 20.07 KG/M2 | SYSTOLIC BLOOD PRESSURE: 126 MMHG | OXYGEN SATURATION: 97 % | DIASTOLIC BLOOD PRESSURE: 68 MMHG

## 2022-11-29 DIAGNOSIS — E78.5 HYPERLIPIDEMIA, UNSPECIFIED HYPERLIPIDEMIA TYPE: ICD-10-CM

## 2022-11-29 DIAGNOSIS — C81.90 HODGKIN LYMPHOMA, UNSPECIFIED HODGKIN LYMPHOMA TYPE, UNSPECIFIED BODY REGION (H): Primary | ICD-10-CM

## 2022-11-29 DIAGNOSIS — J98.4 BLEOMYCIN LUNG TOXICITY: ICD-10-CM

## 2022-11-29 DIAGNOSIS — I25.10 CORONARY ARTERY DISEASE INVOLVING NATIVE CORONARY ARTERY OF NATIVE HEART WITHOUT ANGINA PECTORIS: ICD-10-CM

## 2022-11-29 DIAGNOSIS — Z80.42 FHX: PROSTATE CANCER: ICD-10-CM

## 2022-11-29 DIAGNOSIS — F51.04 CHRONIC INSOMNIA: ICD-10-CM

## 2022-11-29 DIAGNOSIS — R25.1 TREMOR: ICD-10-CM

## 2022-11-29 DIAGNOSIS — I42.7 CARDIOMYOPATHY DUE TO CHEMOTHERAPY (H): ICD-10-CM

## 2022-11-29 DIAGNOSIS — T45.1X5A CARDIOMYOPATHY DUE TO CHEMOTHERAPY (H): ICD-10-CM

## 2022-11-29 DIAGNOSIS — F33.0 MAJOR DEPRESSIVE DISORDER, RECURRENT EPISODE, MILD (H): ICD-10-CM

## 2022-11-29 DIAGNOSIS — T45.1X5A BLEOMYCIN LUNG TOXICITY: ICD-10-CM

## 2022-11-29 PROCEDURE — 99396 PREV VISIT EST AGE 40-64: CPT | Mod: 25 | Performed by: INTERNAL MEDICINE

## 2022-11-29 PROCEDURE — 90471 IMMUNIZATION ADMIN: CPT | Performed by: INTERNAL MEDICINE

## 2022-11-29 PROCEDURE — 90677 PCV20 VACCINE IM: CPT | Performed by: INTERNAL MEDICINE

## 2022-11-29 RX ORDER — LORAZEPAM 1 MG/1
1 TABLET ORAL AT BEDTIME
Qty: 90 TABLET | Refills: 1 | Status: SHIPPED | OUTPATIENT
Start: 2022-11-29 | End: 2023-07-03

## 2022-11-29 RX ORDER — SERTRALINE HYDROCHLORIDE 100 MG/1
100 TABLET, FILM COATED ORAL DAILY
Qty: 90 TABLET | Refills: 3 | Status: SHIPPED | OUTPATIENT
Start: 2022-11-29 | End: 2023-12-12

## 2022-11-29 ASSESSMENT — ENCOUNTER SYMPTOMS
WEAKNESS: 0
MYALGIAS: 1
NAUSEA: 0
DIZZINESS: 0
HEADACHES: 0
ABDOMINAL PAIN: 0
NERVOUS/ANXIOUS: 0
ARTHRALGIAS: 0
CHILLS: 0
PALPITATIONS: 0
EYE PAIN: 0
FEVER: 0
SHORTNESS OF BREATH: 0
JOINT SWELLING: 0
DYSURIA: 0
CONSTIPATION: 0
FREQUENCY: 0
DIARRHEA: 0
HEMATURIA: 0
SORE THROAT: 0
COUGH: 0

## 2022-11-29 ASSESSMENT — PATIENT HEALTH QUESTIONNAIRE - PHQ9
SUM OF ALL RESPONSES TO PHQ QUESTIONS 1-9: 0
10. IF YOU CHECKED OFF ANY PROBLEMS, HOW DIFFICULT HAVE THESE PROBLEMS MADE IT FOR YOU TO DO YOUR WORK, TAKE CARE OF THINGS AT HOME, OR GET ALONG WITH OTHER PEOPLE: NOT DIFFICULT AT ALL
SUM OF ALL RESPONSES TO PHQ QUESTIONS 1-9: 0

## 2022-11-29 ASSESSMENT — PAIN SCALES - GENERAL: PAINLEVEL: NO PAIN (0)

## 2022-11-29 NOTE — PROGRESS NOTES
Chief Complaint:  This patient is here for a comprehensive review of their multiple medical problems, renewal of medications and update on necessary health maintenance issues.      HPI: He comes in today for yearly physical.  He tells me that in most respects he is feeling fine.  He continues with his regular follow-ups with oncology because of his history of Hodgkin's lymphoma.  There is no evidence for recurrence.  He does have a history of peripheral neuropathy from treatment as well as bleomycin lung disease and cardiomyopathy, all of which are stable.  He is basically asymptomatic from a cardiopulmonary standpoint.  He is getting CT scans annually and lab twice per year.    His depression is doing well.  His insomnia is controlled with lorazepam.  His tremor is a little bit bothersome but more bothersome is the fact that he has urinary urgency and dribbling.  I did talk to him about treatment but he did not feel it was bad enough to warrant that.    He has a history of coronary artery calcifications and hyperlipidemia and is treated with moderate intensity statin therapy that he tolerates without difficulty.    Medications are reconciled.  Past medical history, past surgical history, family history and social histories are reviewed and updated.  He is probably not going to get a COVID booster.  He is due for Pneumovax which she is willing to get.  Colonoscopy was done earlier this year.      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  No polyps seen.  Next due .     COLONOSCOPY N/A 2022    F/U  tubular adenoma, sigmoid diverticulosis     CYSTOSCOPY      No Comments Provided     ESOPHAGOSCOPY, GASTROSCOPY, DUODENOSCOPY (EGD), COMBINED      16,EGD     FOOT SURGERY Left     SUBTALAR ATHROEREISIS, left side, Dr. Schwartz.     HERNIA REPAIR Bilateral     Surgipro mesh     LYMPH NODE BIOPSY         Current Outpatient Medications   Medication Sig Dispense Refill     aspirin 81 MG EC tablet Take 1 tablet (81 mg) by mouth daily       atorvastatin (LIPITOR) 40 MG tablet TAKE 1 TABLET BY MOUTH DAILY 90 tablet 2     fish oil-omega-3 fatty acids 1000 MG capsule Take 1 capsule by mouth daily       lisinopril (ZESTRIL) 2.5 MG tablet TAKE 1 TABLET BY MOUTH ONCE DAILY 90 tablet 3     LORazepam (ATIVAN) 1 MG tablet Take 1 tablet (1 mg) by mouth At Bedtime 90 tablet 1     Multiple Vitamins-Minerals (MULTIVITAMIN & MINERAL PO) Take 1 tablet by mouth every morning       omeprazole (PRILOSEC) 20 MG CR capsule Take 1 capsule (20 mg) by mouth daily 30 capsule      sertraline (ZOLOFT) 100 MG tablet Take 1 tablet (100 mg) by mouth daily 90 tablet 3       No Known Allergies    Family History   Problem Relation Age of Onset     Prostate Cancer Father         Also had a CABG and  during open heart surgery, .     Breast Cancer Mother      Family History Negative Sister         Good Health     Family History Negative Sister         Good Health     Prostate Cancer Brother         Cancer-prostate,Age 47.     Heart Disease Brother        Social History     Socioeconomic History     Marital status:      Spouse name: Not on file     Number of children: Not on file     Years of education: Not on file     Highest education level: Not on file   Occupational History     Not on file   Tobacco Use     Smoking status: Never     Smokeless tobacco: Never   Vaping Use     Vaping Use: Never used   Substance and Sexual  Activity     Alcohol use: Yes     Alcohol/week: 7.0 standard drinks     Types: 7 Cans of beer per week     Comment: 1 beer per day     Drug use: No     Sexual activity: Yes     Partners: Female   Other Topics Concern     Parent/sibling w/ CABG, MI or angioplasty before 65F 55M? Not Asked   Social History Narrative    Daughter Velvet BD 8/25/89.  Son Nick BD 4/05/92  Spouse Candis, BD 11/26/59   .  He is a golf pro, also drives school bus.  Wife is Candis, nurse at [employer].      Consent signed for Candis.     Social Determinants of Health     Financial Resource Strain: Not on file   Food Insecurity: Not on file   Transportation Needs: Not on file   Physical Activity: Not on file   Stress: Not on file   Social Connections: Not on file   Intimate Partner Violence: Not on file   Housing Stability: Not on file       Review of Systems   Constitutional: Negative for chills and fever.   HENT: Negative for congestion, ear pain, hearing loss and sore throat.    Eyes: Negative for pain and visual disturbance.   Respiratory: Negative for cough and shortness of breath.    Cardiovascular: Negative for chest pain and palpitations.   Gastrointestinal: Negative for abdominal pain, constipation, diarrhea and nausea.   Genitourinary: Positive for urgency. Negative for dysuria, frequency, genital sores, hematuria and penile discharge.   Musculoskeletal: Positive for myalgias. Negative for arthralgias and joint swelling.   Skin: Negative for rash.   Neurological: Negative for dizziness, weakness and headaches.   Psychiatric/Behavioral: The patient is not nervous/anxious.        Physical Exam  Vitals and nursing note reviewed.   Constitutional:       General: He is not in acute distress.     Appearance: He is well-developed. He is not diaphoretic.   HENT:      Head: Normocephalic.      Right Ear: Tympanic membrane, ear canal and external ear normal.      Left Ear: Tympanic membrane, ear canal and external ear  normal.      Nose: Nose normal.      Mouth/Throat:      Pharynx: No oropharyngeal exudate.   Eyes:      Conjunctiva/sclera: Conjunctivae normal.      Pupils: Pupils are equal, round, and reactive to light.   Neck:      Thyroid: No thyroid mass or thyromegaly.      Vascular: Normal carotid pulses. No carotid bruit or JVD.      Trachea: No tracheal deviation.   Cardiovascular:      Rate and Rhythm: Normal rate and regular rhythm.      Heart sounds: Normal heart sounds. No murmur heard.    No friction rub. No gallop.   Pulmonary:      Effort: Pulmonary effort is normal. No respiratory distress.      Breath sounds: Normal breath sounds. No stridor. No decreased breath sounds, wheezing, rhonchi or rales.   Abdominal:      General: Bowel sounds are normal. There is no distension.      Palpations: Abdomen is soft. There is no mass.      Tenderness: There is no abdominal tenderness. There is no guarding or rebound.      Hernia: No hernia is present.   Genitourinary:     Penis: Normal.       Testes: Normal.      Prostate: Normal.      Rectum: Normal.   Musculoskeletal:         General: Normal range of motion.      Cervical back: Normal range of motion and neck supple.      Right lower leg: No edema.      Left lower leg: No edema.   Lymphadenopathy:      Cervical: No cervical adenopathy.   Skin:     General: Skin is warm and dry.      Findings: No rash.   Neurological:      Mental Status: He is alert and oriented to person, place, and time.      Cranial Nerves: No cranial nerve deficit.      Sensory: No sensory deficit.      Motor: No abnormal muscle tone.      Coordination: Coordination normal.      Deep Tendon Reflexes: Reflexes normal.         Assessment:      ICD-10-CM    1. Hodgkin lymphoma, unspecified Hodgkin lymphoma type, unspecified body region (H)  C81.90       2. Coronary artery disease involving native coronary artery of native heart without angina pectoris  I25.10 Lipid Panel      3. Cardiomyopathy due to  chemotherapy (H)  I42.7     T45.1X5A       4. Hyperlipidemia, unspecified hyperlipidemia type  E78.5       5. Major depressive disorder, recurrent episode, mild (H)  F33.0 sertraline (ZOLOFT) 100 MG tablet      6. Bleomycin lung toxicity  J98.4     T45.1X5A       7. Chronic insomnia  F51.04 LORazepam (ATIVAN) 1 MG tablet      8. FHx: prostate cancer  Z80.42       9. Tremor  R25.1 TSH           Plan: He appears to be stable.  Medications will continue without change.  I did talk to him about Flomax or similar but he declined at this time.  He will let me know if he would like to try something at a later date.  Prevnar given today.  He will have lab in January for oncology and at that time we will add a PSA, lipid and TSH and I will let him know the results.  Follow-up annually, sooner if there are problems        Answers for HPI/ROS submitted by the patient on 11/29/2022  If you checked off any problems, how difficult have these problems made it for you to do your work, take care of things at home, or get along with other people?: Not difficult at all  PHQ9 TOTAL SCORE: 0  Frequency of exercise:: 1 day/week  Getting at least 3 servings of Calcium per day:: NO  Diet:: Regular (no restrictions)  Taking medications regularly:: Yes  Medication side effects:: Not applicable  Bi-annual eye exam:: Yes  Dental care twice a year:: NO  Sleep apnea or symptoms of sleep apnea:: None  Blood in stool: No  heartburn: No  peripheral edema: No  mood changes: No  Skin sensation changes: No  impotence: No  Additional concerns today:: No

## 2023-01-13 ENCOUNTER — HOSPITAL ENCOUNTER (OUTPATIENT)
Dept: CT IMAGING | Facility: OTHER | Age: 63
Discharge: HOME | End: 2023-01-13
Attending: NURSE PRACTITIONER
Payer: COMMERCIAL

## 2023-01-13 ENCOUNTER — LAB (OUTPATIENT)
Dept: LAB | Facility: OTHER | Age: 63
End: 2023-01-13
Attending: NURSE PRACTITIONER
Payer: COMMERCIAL

## 2023-01-13 DIAGNOSIS — Z85.71 HX OF HODGKIN'S LYMPHOMA: ICD-10-CM

## 2023-01-13 DIAGNOSIS — N40.0 ENLARGED PROSTATE: ICD-10-CM

## 2023-01-13 LAB
ALBUMIN SERPL BCG-MCNC: 4.3 G/DL (ref 3.5–5.2)
ALP SERPL-CCNC: 75 U/L (ref 40–129)
ALT SERPL W P-5'-P-CCNC: 30 U/L (ref 10–50)
ANION GAP SERPL CALCULATED.3IONS-SCNC: 8 MMOL/L (ref 7–15)
AST SERPL W P-5'-P-CCNC: 27 U/L (ref 10–50)
BASOPHILS # BLD AUTO: 0 10E3/UL (ref 0–0.2)
BASOPHILS NFR BLD AUTO: 1 %
BILIRUB SERPL-MCNC: 0.3 MG/DL
BUN SERPL-MCNC: 16.7 MG/DL (ref 8–23)
CALCIUM SERPL-MCNC: 9.5 MG/DL (ref 8.8–10.2)
CHLORIDE SERPL-SCNC: 105 MMOL/L (ref 98–107)
CREAT SERPL-MCNC: 1.03 MG/DL (ref 0.67–1.17)
DEPRECATED HCO3 PLAS-SCNC: 27 MMOL/L (ref 22–29)
EOSINOPHIL # BLD AUTO: 0.3 10E3/UL (ref 0–0.7)
EOSINOPHIL NFR BLD AUTO: 5 %
ERYTHROCYTE [DISTWIDTH] IN BLOOD BY AUTOMATED COUNT: 12.5 % (ref 10–15)
ERYTHROCYTE [SEDIMENTATION RATE] IN BLOOD BY WESTERGREN METHOD: 7 MM/HR (ref 0–20)
GFR SERPL CREATININE-BSD FRML MDRD: 82 ML/MIN/1.73M2
GLUCOSE SERPL-MCNC: 130 MG/DL (ref 70–99)
HCT VFR BLD AUTO: 44.3 % (ref 40–53)
HGB BLD-MCNC: 14.6 G/DL (ref 13.3–17.7)
HOLD SPECIMEN: NORMAL
IMM GRANULOCYTES # BLD: 0 10E3/UL
IMM GRANULOCYTES NFR BLD: 0 %
LDH SERPL L TO P-CCNC: 128 U/L (ref 0–250)
LYMPHOCYTES # BLD AUTO: 1.1 10E3/UL (ref 0.8–5.3)
LYMPHOCYTES NFR BLD AUTO: 19 %
MCH RBC QN AUTO: 31.3 PG (ref 26.5–33)
MCHC RBC AUTO-ENTMCNC: 33 G/DL (ref 31.5–36.5)
MCV RBC AUTO: 95 FL (ref 78–100)
MONOCYTES # BLD AUTO: 0.4 10E3/UL (ref 0–1.3)
MONOCYTES NFR BLD AUTO: 7 %
NEUTROPHILS # BLD AUTO: 3.8 10E3/UL (ref 1.6–8.3)
NEUTROPHILS NFR BLD AUTO: 68 %
NRBC # BLD AUTO: 0 10E3/UL
NRBC BLD AUTO-RTO: 0 /100
PLATELET # BLD AUTO: 188 10E3/UL (ref 150–450)
POTASSIUM SERPL-SCNC: 4.5 MMOL/L (ref 3.4–5.3)
PROT SERPL-MCNC: 6.9 G/DL (ref 6.4–8.3)
PSA SERPL-MCNC: 3.61 NG/ML (ref 0–4.5)
RBC # BLD AUTO: 4.67 10E6/UL (ref 4.4–5.9)
SODIUM SERPL-SCNC: 140 MMOL/L (ref 136–145)
WBC # BLD AUTO: 5.6 10E3/UL (ref 4–11)

## 2023-01-13 PROCEDURE — 250N000011 HC RX IP 250 OP 636: Performed by: NURSE PRACTITIONER

## 2023-01-13 PROCEDURE — 85004 AUTOMATED DIFF WBC COUNT: CPT | Mod: ZL

## 2023-01-13 PROCEDURE — 80053 COMPREHEN METABOLIC PANEL: CPT | Mod: ZL

## 2023-01-13 PROCEDURE — 36415 COLL VENOUS BLD VENIPUNCTURE: CPT | Mod: ZL

## 2023-01-13 PROCEDURE — 83615 LACTATE (LD) (LDH) ENZYME: CPT | Mod: ZL

## 2023-01-13 PROCEDURE — 85652 RBC SED RATE AUTOMATED: CPT | Mod: ZL

## 2023-01-13 PROCEDURE — 74177 CT ABD & PELVIS W/CONTRAST: CPT

## 2023-01-13 PROCEDURE — G0103 PSA SCREENING: HCPCS | Mod: ZL

## 2023-01-13 PROCEDURE — 70491 CT SOFT TISSUE NECK W/DYE: CPT

## 2023-01-13 RX ORDER — IOPAMIDOL 755 MG/ML
94 INJECTION, SOLUTION INTRAVASCULAR ONCE
Status: COMPLETED | OUTPATIENT
Start: 2023-01-13 | End: 2023-01-13

## 2023-01-13 RX ADMIN — IOPAMIDOL 94 ML: 755 INJECTION, SOLUTION INTRAVENOUS at 09:21

## 2023-01-18 ENCOUNTER — ONCOLOGY VISIT (OUTPATIENT)
Dept: ONCOLOGY | Facility: OTHER | Age: 63
End: 2023-01-18
Attending: INTERNAL MEDICINE

## 2023-01-18 VITALS
BODY MASS INDEX: 20.58 KG/M2 | RESPIRATION RATE: 20 BRPM | WEIGHT: 168 LBS | HEART RATE: 74 BPM | TEMPERATURE: 97.5 F | SYSTOLIC BLOOD PRESSURE: 112 MMHG | DIASTOLIC BLOOD PRESSURE: 74 MMHG | OXYGEN SATURATION: 93 %

## 2023-01-18 DIAGNOSIS — Z85.71 HX OF HODGKIN'S LYMPHOMA: Primary | ICD-10-CM

## 2023-01-18 PROCEDURE — 99417 PROLNG OP E/M EACH 15 MIN: CPT | Performed by: INTERNAL MEDICINE

## 2023-01-18 PROCEDURE — 99215 OFFICE O/P EST HI 40 MIN: CPT | Performed by: INTERNAL MEDICINE

## 2023-01-18 ASSESSMENT — PAIN SCALES - GENERAL: PAINLEVEL: NO PAIN (0)

## 2023-01-18 NOTE — PATIENT INSTRUCTIONS
Follow up with Ashwini Linares NP or Kia Leung MD in 1 year      Please come prior for lab work.     You will need imaging done prior to your next visit.  Radiology scheduling will contact you to arrange these scans.  If you have not received a call in the next 2 weeks, please call them.  For MRIs call [phone] and all other scans call [phone].

## 2023-01-18 NOTE — NURSING NOTE
Chief Complaint   Patient presents with     Oncology Clinic Visit     F/U Lymphoma     Medication Reconciliation: complete    Pratibha Vanessa CMA ([facility])

## 2023-04-12 DIAGNOSIS — I25.10 CORONARY ARTERY DISEASE INVOLVING NATIVE CORONARY ARTERY OF NATIVE HEART WITHOUT ANGINA PECTORIS: ICD-10-CM

## 2023-04-12 RX ORDER — LISINOPRIL 2.5 MG/1
TABLET ORAL
Qty: 90 TABLET | Refills: 1 | Status: SHIPPED | OUTPATIENT
Start: 2023-04-12 | End: 2023-07-20

## 2023-04-12 NOTE — TELEPHONE ENCOUNTER
[facility] sent Rx request for the following:      Requested Prescriptions   Pending Prescriptions Disp Refills     lisinopril (ZESTRIL) 2.5 MG tablet [Pharmacy Med Name: lisinopril 2.5 mg tablet] 90 tablet 3     Sig: TAKE 1 TABLET/ BY MOUTH ONCE DAILY   Last Prescription Date:   4/18/22  Last Fill Qty/Refills:         90, R-3    Last Office Visit:              11/29/22   Future Office visit:           None    Delilah Mckeon RN .............. 4/12/2023  1:15 PM

## 2023-07-03 DIAGNOSIS — F51.04 CHRONIC INSOMNIA: ICD-10-CM

## 2023-07-03 RX ORDER — LORAZEPAM 1 MG/1
1 TABLET ORAL AT BEDTIME
Qty: 30 TABLET | Refills: 0 | Status: SHIPPED | OUTPATIENT
Start: 2023-07-03 | End: 2023-07-31

## 2023-07-03 NOTE — TELEPHONE ENCOUNTER
Wife Candis states Pt is needing a refill of the following to be sent to [facility]:    Requested Prescriptions   Pending Prescriptions Disp Refills     LORazepam (ATIVAN) 1 MG tablet 90 tablet 1     Sig: Take 1 tablet (1 mg) by mouth At Bedtime   Last Prescription Date:   11/29/22  Last Fill Qty/Refills:         90, R-1    Last Office Visit:              11/29/22 (Dr. Morrison)  Future Office visit:           None    She requested this be sent high priority.    Per LOV note:  Follow-up annually, sooner if there are problems    In clinical absence of patient's primary, Rudy Morrison, patient is requesting that this message be sent to the covering provider for consideration please.    Delilah Mckeon RN .............. 7/3/2023  8:47 AM

## 2023-07-03 NOTE — TELEPHONE ENCOUNTER
Refill provided however patient needs to schedule establish care appointment with new provider since Dr. Morrison retired

## 2023-07-20 DIAGNOSIS — I25.10 CORONARY ARTERY DISEASE INVOLVING NATIVE CORONARY ARTERY OF NATIVE HEART WITHOUT ANGINA PECTORIS: ICD-10-CM

## 2023-07-20 RX ORDER — LISINOPRIL 2.5 MG/1
2.5 TABLET ORAL DAILY
Qty: 90 TABLET | Refills: 1 | Status: CANCELLED | OUTPATIENT
Start: 2023-07-20

## 2023-07-20 RX ORDER — LISINOPRIL 2.5 MG/1
2.5 TABLET ORAL DAILY
Qty: 90 TABLET | Refills: 1 | Status: SHIPPED | OUTPATIENT
Start: 2023-07-20 | End: 2024-01-10

## 2023-07-20 RX ORDER — ATORVASTATIN CALCIUM 40 MG/1
40 TABLET, FILM COATED ORAL DAILY
Qty: 30 TABLET | Refills: 0 | Status: SHIPPED | OUTPATIENT
Start: 2023-07-20 | End: 2023-08-18

## 2023-07-24 ASSESSMENT — ANXIETY QUESTIONNAIRES
GAD7 TOTAL SCORE: 0
7. FEELING AFRAID AS IF SOMETHING AWFUL MIGHT HAPPEN: NOT AT ALL
2. NOT BEING ABLE TO STOP OR CONTROL WORRYING: NOT AT ALL
5. BEING SO RESTLESS THAT IT IS HARD TO SIT STILL: NOT AT ALL
6. BECOMING EASILY ANNOYED OR IRRITABLE: NOT AT ALL
GAD7 TOTAL SCORE: 0
7. FEELING AFRAID AS IF SOMETHING AWFUL MIGHT HAPPEN: NOT AT ALL
3. WORRYING TOO MUCH ABOUT DIFFERENT THINGS: NOT AT ALL
1. FEELING NERVOUS, ANXIOUS, OR ON EDGE: NOT AT ALL
GAD7 TOTAL SCORE: 0
4. TROUBLE RELAXING: NOT AT ALL

## 2023-07-31 ENCOUNTER — HOSPITAL ENCOUNTER (OUTPATIENT)
Dept: GENERAL RADIOLOGY | Facility: OTHER | Age: 63
Discharge: HOME | End: 2023-07-31
Attending: FAMILY MEDICINE
Payer: COMMERCIAL

## 2023-07-31 ENCOUNTER — OFFICE VISIT (OUTPATIENT)
Dept: FAMILY MEDICINE | Facility: OTHER | Age: 63
End: 2023-07-31
Attending: FAMILY MEDICINE
Payer: COMMERCIAL

## 2023-07-31 VITALS
RESPIRATION RATE: 20 BRPM | SYSTOLIC BLOOD PRESSURE: 114 MMHG | WEIGHT: 162 LBS | DIASTOLIC BLOOD PRESSURE: 80 MMHG | HEART RATE: 68 BPM | OXYGEN SATURATION: 98 % | BODY MASS INDEX: 19.73 KG/M2 | TEMPERATURE: 98.4 F | HEIGHT: 76 IN

## 2023-07-31 DIAGNOSIS — F51.04 CHRONIC INSOMNIA: ICD-10-CM

## 2023-07-31 DIAGNOSIS — N40.1 BENIGN PROSTATIC HYPERPLASIA WITH URINARY FREQUENCY: Primary | ICD-10-CM

## 2023-07-31 DIAGNOSIS — I25.10 CORONARY ARTERY DISEASE INVOLVING NATIVE CORONARY ARTERY OF NATIVE HEART WITHOUT ANGINA PECTORIS: ICD-10-CM

## 2023-07-31 DIAGNOSIS — M25.531 RIGHT WRIST PAIN: ICD-10-CM

## 2023-07-31 DIAGNOSIS — R35.0 BENIGN PROSTATIC HYPERPLASIA WITH URINARY FREQUENCY: Primary | ICD-10-CM

## 2023-07-31 LAB
ALBUMIN UR-MCNC: NEGATIVE MG/DL
APPEARANCE UR: CLEAR
BILIRUB UR QL STRIP: NEGATIVE
COLOR UR AUTO: NORMAL
GLUCOSE UR STRIP-MCNC: NEGATIVE MG/DL
HGB UR QL STRIP: NEGATIVE
KETONES UR STRIP-MCNC: NEGATIVE MG/DL
LEUKOCYTE ESTERASE UR QL STRIP: NEGATIVE
NITRATE UR QL: NEGATIVE
PH UR STRIP: 6 [PH] (ref 5–9)
PSA SERPL DL<=0.01 NG/ML-MCNC: 2.88 NG/ML (ref 0–4.5)
SP GR UR STRIP: 1.02 (ref 1–1.03)
UROBILINOGEN UR STRIP-MCNC: NORMAL MG/DL

## 2023-07-31 PROCEDURE — 99213 OFFICE O/P EST LOW 20 MIN: CPT | Performed by: FAMILY MEDICINE

## 2023-07-31 PROCEDURE — 36415 COLL VENOUS BLD VENIPUNCTURE: CPT | Mod: ZL | Performed by: FAMILY MEDICINE

## 2023-07-31 PROCEDURE — 84153 ASSAY OF PSA TOTAL: CPT | Mod: ZL | Performed by: FAMILY MEDICINE

## 2023-07-31 PROCEDURE — G0103 PSA SCREENING: HCPCS | Mod: ZL | Performed by: FAMILY MEDICINE

## 2023-07-31 PROCEDURE — 81003 URINALYSIS AUTO W/O SCOPE: CPT | Mod: ZL | Performed by: FAMILY MEDICINE

## 2023-07-31 PROCEDURE — 73110 X-RAY EXAM OF WRIST: CPT | Mod: RT

## 2023-07-31 RX ORDER — LORAZEPAM 1 MG/1
1 TABLET ORAL AT BEDTIME
Qty: 90 TABLET | Refills: 1 | Status: SHIPPED | OUTPATIENT
Start: 2023-07-31 | End: 2024-01-18

## 2023-07-31 ASSESSMENT — PATIENT HEALTH QUESTIONNAIRE - PHQ9
SUM OF ALL RESPONSES TO PHQ QUESTIONS 1-9: 0
SUM OF ALL RESPONSES TO PHQ QUESTIONS 1-9: 0

## 2023-07-31 ASSESSMENT — PAIN SCALES - GENERAL: PAINLEVEL: NO PAIN (0)

## 2023-07-31 NOTE — NURSING NOTE
Patient here for medication review and to establish care with a new provider. Medication Reconciliation: complete.    Dedra Liu LPN  7/31/2023 1:48 PM

## 2023-07-31 NOTE — PROGRESS NOTES
"  SUBJECTIVE:   Daniel Boyd is a 62 year old male who presents to clinic today for the following health issues: Refill Ativan.  Wrist pain.  And a mole on his back.  Urinating problems and normal    Patient arrives here because of a mole on his back increased urinary frequency.  He states that he recently drove back from [place] and had to stop 10 times.  He states is slowly worsening he has seen urology in the past.  He also reports a fall about 10 days ago and jammed his right wrist.  He has been taking ibuprofen on a regular basis.  Requesting a refill of Ativan which has been on for 15 years which she takes 1 at night to help him sleep.  No dysuria but does report hesitancy.    History of Present Illness       Reason for visit:  The physician I ve been seeing for the past seven years has retired.    He eats 0-1 servings of fruits and vegetables daily.He consumes 2 sweetened beverage(s) daily.He exercises with enough effort to increase his heart rate 10 to 19 minutes per day.  He exercises with enough effort to increase his heart rate 3 or less days per week.   He is taking medications regularly.            Review of Systems     OBJECTIVE:     /80   Pulse 68   Temp 98.4  F (36.9  C)   Resp 20   Ht 1.93 m (6' 4\")   Wt 73.5 kg (162 lb)   SpO2 98%   BMI 19.72 kg/m    Body mass index is 19.72 kg/m .  Physical Exam  Constitutional:       Appearance: Normal appearance.   HENT:      Head: Normocephalic.   Pulmonary:      Effort: Pulmonary effort is normal.   Musculoskeletal:      Comments: Slight amount of tenderness in the snuffbox   Skin:     Comments: There is a 7 mm dark lesion.  Irregular surface no scalloping or ulcerations.   Neurological:      Mental Status: He is alert.   Psychiatric:         Mood and Affect: Mood normal.         Thought Content: Thought content normal.         Diagnostic Test Results:  Results for orders placed or performed in visit on 07/31/23 (from the past 24 hour(s))   PSA " Screen GH   Result Value Ref Range    Prostate Specific Antigen Screen 2.88 0.00 - 4.50 ng/mL    Narrative    This result is obtained using the Roche Elecsys total PSA method on the kesha e601 immunoassay analyzer. Results obtained with different assay methods or kits cannot be used interchangeably.   UA reflex to Microscopic   Result Value Ref Range    Color Urine Light Yellow Colorless, Straw, Light Yellow, Yellow    Appearance Urine Clear Clear    Glucose Urine Negative Negative mg/dL    Bilirubin Urine Negative Negative    Ketones Urine Negative Negative mg/dL    Specific Gravity Urine 1.024 1.000 - 1.030    Blood Urine Negative Negative    pH Urine 6.0 5.0 - 9.0    Protein Albumin Urine Negative Negative mg/dL    Urobilinogen Urine Normal Normal, 2.0 mg/dL    Nitrite Urine Negative Negative    Leukocyte Esterase Urine Negative Negative    Narrative    Microscopic not indicated       ASSESSMENT/PLAN:         (N40.1,  R35.0) Benign prostatic hyperplasia with urinary frequency  (primary encounter diagnosis)  Comment:   Plan: PSA Screen GH, UA reflex to Microscopic        Discussed Flomax.  This has been discussed before in the past with urology and at that time it was felt that the side effects would be worse than the benefits.  Offered Flomax patient will hold off for now      (F51.04) Chronic insomnia  Comment:   Plan: LORazepam (ATIVAN) 1 MG tablet        Refill    (M25.531) Right wrist pain  Comment: X-rays unremarkable.  Plan: XR Wrist Right G/E 3 Views    Skin lesion.  Likely a seborrhea although I do have a little concerned that this needs to be biopsied to rule out melanoma.  Patient will make an appointment at his earliest convenience              Avelino Luque MD  [facility]

## 2023-08-02 RX ORDER — LORAZEPAM 1 MG/1
1 TABLET ORAL AT BEDTIME
Qty: 30 TABLET | Refills: 0 | OUTPATIENT
Start: 2023-08-02

## 2023-08-02 NOTE — TELEPHONE ENCOUNTER
Prescription refused.  This is a duplicate request.       Requested Prescriptions   Pending Prescriptions Disp Refills    LORazepam (ATIVAN) 1 MG tablet [Pharmacy Med Name: lorazepam 1 mg tablet] 30 tablet 0     Sig: Take 1 tablet (1 mg) by mouth At Bedtime       There is no refill protocol information for this order        Last Prescription Date:   07/31/23  Last Fill Qty/Refills:         90, R-1    Rossy Liu RN on 8/2/2023 at 8:59 AM

## 2023-08-15 ASSESSMENT — PATIENT HEALTH QUESTIONNAIRE - PHQ9
SUM OF ALL RESPONSES TO PHQ QUESTIONS 1-9: 0
SUM OF ALL RESPONSES TO PHQ QUESTIONS 1-9: 0

## 2023-08-16 ENCOUNTER — OFFICE VISIT (OUTPATIENT)
Dept: FAMILY MEDICINE | Facility: OTHER | Age: 63
End: 2023-08-16
Attending: FAMILY MEDICINE
Payer: COMMERCIAL

## 2023-08-16 VITALS
DIASTOLIC BLOOD PRESSURE: 68 MMHG | BODY MASS INDEX: 19.87 KG/M2 | WEIGHT: 163.2 LBS | TEMPERATURE: 98.7 F | OXYGEN SATURATION: 97 % | SYSTOLIC BLOOD PRESSURE: 112 MMHG | RESPIRATION RATE: 20 BRPM | HEART RATE: 80 BPM

## 2023-08-16 DIAGNOSIS — L98.9 SKIN LESION: Primary | ICD-10-CM

## 2023-08-16 PROCEDURE — 11104 PUNCH BX SKIN SINGLE LESION: CPT | Performed by: FAMILY MEDICINE

## 2023-08-16 PROCEDURE — 88305 TISSUE EXAM BY PATHOLOGIST: CPT

## 2023-08-16 ASSESSMENT — PAIN SCALES - GENERAL: PAINLEVEL: NO PAIN (0)

## 2023-08-16 NOTE — NURSING NOTE
Patient here for mole removal off mid back. Medication Reconciliation: complete.    Dedra Liu LPN  8/16/2023 2:37 PM

## 2023-08-16 NOTE — PROGRESS NOTES
SUBJECTIVE:   Daniel Boyd is a 62 year old male who presents to clinic today for the following health issues: Lesion removal    Patient arrives here for lesion biopsy he has a growing mole on his back.  Patient also reports is changing color    History of Present Illness       Reason for visit:  Mole removal.    He eats 0-1 servings of fruits and vegetables daily.He consumes 2 sweetened beverage(s) daily.He exercises with enough effort to increase his heart rate 20 to 29 minutes per day.  He exercises with enough effort to increase his heart rate 3 or less days per week.   He is taking medications regularly.            Review of Systems     OBJECTIVE:     /68   Pulse 80   Temp 98.7  F (37.1  C)   Resp 20   Wt 74 kg (163 lb 3.2 oz)   SpO2 97%   BMI 19.87 kg/m    Body mass index is 19.87 kg/m .  Physical Exam  Skin:     Comments: 1 cm dark lesion no scalloping or ulcerations.  Somewhat waxy in appearance   Neurological:      Mental Status: He is alert.             ASSESSMENT/PLAN:         (L98.9) Skin lesion  (primary encounter diagnosis)  Comment: Discussed procedure with the patient patient is agreeable  Plan: Surgical Pathology Exam        Area was prepped with ChloraPrep.  Anesthesia obtained with lidocaine and epi.  Using a 3 mm punch biopsy portion was removed at the edge.  Steri-Strips applied.  We will follow-up when path returns.      Avelino Luqeu MD  [facility] Hospital[facility]swers submitted by the patient for this visit:  Patient Health Questionnaire (Submitted on 8/15/2023)  PHQ9 TOTAL SCORE: 0  General Questionnaire (Submitted on 8/15/2023)  Chief Complaint: Chronic problems general questions HPI Form  What is the reason for your visit today? : Mole removal.  How many servings of fruits and vegetables do you eat daily?: 0-1  On average, how many sweetened beverages do you drink each day (Examples: soda, juice, sweet tea, etc.  Do NOT count diet or artificially sweetened  beverages)?: 2  How many minutes a day do you exercise enough to make your heart beat faster?: 20 to 29  How many days a week do you exercise enough to make your heart beat faster?: 3 or less  How many days per week do you miss taking your medication?: 0

## 2023-08-18 DIAGNOSIS — I25.10 CORONARY ARTERY DISEASE INVOLVING NATIVE CORONARY ARTERY OF NATIVE HEART WITHOUT ANGINA PECTORIS: ICD-10-CM

## 2023-08-21 LAB
PATH REPORT.COMMENTS IMP SPEC: NORMAL
PATH REPORT.FINAL DX SPEC: NORMAL
PHOTO IMAGE: NORMAL

## 2023-08-21 RX ORDER — ATORVASTATIN CALCIUM 40 MG/1
40 TABLET, FILM COATED ORAL DAILY
Qty: 90 TABLET | Refills: 3 | Status: SHIPPED | OUTPATIENT
Start: 2023-08-21 | End: 2024-08-14

## 2023-08-21 NOTE — TELEPHONE ENCOUNTER
[facility] sent Rx request for the following:      Requested Prescriptions   Pending Prescriptions Disp Refills    atorvastatin (LIPITOR) 40 MG tablet 30 tablet 0     Sig: Take 1 tablet (40 mg) by mouth daily       Statins Protocol Failed - 8/21/2023  2:12 PM        Failed - LDL on file in past 12 months     Recent Labs   Lab Test 06/15/18  1112   LDL 67        Last Prescription Date:   7/20/23  Last Fill Qty/Refills:         30, R-0    Last Office Visit:              8/16/23   Future Office visit:           None    Delilah Mckeon RN .............. 8/21/2023  2:21 PM

## 2023-10-30 ENCOUNTER — ALLIED HEALTH/NURSE VISIT (OUTPATIENT)
Dept: FAMILY MEDICINE | Facility: OTHER | Age: 63
End: 2023-10-30
Payer: COMMERCIAL

## 2023-10-30 DIAGNOSIS — Z23 NEED FOR PROPHYLACTIC VACCINATION AND INOCULATION AGAINST INFLUENZA: Primary | ICD-10-CM

## 2023-10-30 PROCEDURE — 90471 IMMUNIZATION ADMIN: CPT

## 2023-10-30 PROCEDURE — 90682 RIV4 VACC RECOMBINANT DNA IM: CPT

## 2023-10-30 NOTE — PROGRESS NOTES
Immunization Documentation    Prior to Immunization administration, verified patients identity using patient's name and date of birth. Please see IMMUNIZATIONS  and order for additional information.  Patient / Parent instructed to remain in clinic for 15 minutes and report any adverse reaction to staff immediately.    Was entire vial of medication used? Yes  Vial/Syringe: Rosy Granados LPN  10/30/2023   11:54 AM

## 2023-12-12 DIAGNOSIS — F33.0 MAJOR DEPRESSIVE DISORDER, RECURRENT EPISODE, MILD (H): ICD-10-CM

## 2023-12-13 RX ORDER — SERTRALINE HYDROCHLORIDE 100 MG/1
100 TABLET, FILM COATED ORAL DAILY
Qty: 90 TABLET | Refills: 3 | Status: SHIPPED | OUTPATIENT
Start: 2023-12-13

## 2023-12-13 NOTE — TELEPHONE ENCOUNTER
[facility] sent Rx request for the following:      Requested Prescriptions   Pending Prescriptions Disp Refills    sertraline (ZOLOFT) 100 MG tablet 90 tablet 3     Sig: Take 1 tablet (100 mg) by mouth daily       SSRIs Protocol Passed - 12/12/2023  2:57 PM     Last Prescription Date:   11/29/22  Last Fill Qty/Refills:         90, R-3    Last Office Visit:              8/16/23   Future Office visit:           none    Prescription approved per [facility] Refill Protocol.  Marcella Downing RN on 12/13/2023 at 3:59 PM

## 2024-01-07 ENCOUNTER — HEALTH MAINTENANCE LETTER (OUTPATIENT)
Age: 64
End: 2024-01-07

## 2024-01-08 ENCOUNTER — HOSPITAL ENCOUNTER (OUTPATIENT)
Dept: CT IMAGING | Facility: OTHER | Age: 64
Discharge: HOME | End: 2024-01-08
Attending: INTERNAL MEDICINE
Payer: COMMERCIAL

## 2024-01-08 ENCOUNTER — LAB (OUTPATIENT)
Dept: LAB | Facility: OTHER | Age: 64
End: 2024-01-08
Attending: INTERNAL MEDICINE
Payer: COMMERCIAL

## 2024-01-08 DIAGNOSIS — Z85.71 HX OF HODGKIN'S LYMPHOMA: ICD-10-CM

## 2024-01-08 DIAGNOSIS — I25.10 CORONARY ARTERY DISEASE INVOLVING NATIVE CORONARY ARTERY OF NATIVE HEART WITHOUT ANGINA PECTORIS: ICD-10-CM

## 2024-01-08 DIAGNOSIS — C81.90 HODGKIN LYMPHOMA, UNSPECIFIED HODGKIN LYMPHOMA TYPE, UNSPECIFIED BODY REGION (H): ICD-10-CM

## 2024-01-08 DIAGNOSIS — R35.0 URINARY FREQUENCY: ICD-10-CM

## 2024-01-08 DIAGNOSIS — N40.0 ENLARGED PROSTATE: ICD-10-CM

## 2024-01-08 DIAGNOSIS — R39.15 URINARY URGENCY: ICD-10-CM

## 2024-01-08 LAB
ALBUMIN SERPL BCG-MCNC: 4.6 G/DL (ref 3.5–5.2)
ALP SERPL-CCNC: 81 U/L (ref 40–150)
ALT SERPL W P-5'-P-CCNC: 37 U/L (ref 0–70)
ANION GAP SERPL CALCULATED.3IONS-SCNC: 10 MMOL/L (ref 7–15)
AST SERPL W P-5'-P-CCNC: 29 U/L (ref 0–45)
BASOPHILS # BLD AUTO: 0.1 10E3/UL (ref 0–0.2)
BASOPHILS NFR BLD AUTO: 1 %
BILIRUB SERPL-MCNC: 0.8 MG/DL
BUN SERPL-MCNC: 24.3 MG/DL (ref 8–23)
CALCIUM SERPL-MCNC: 10.2 MG/DL (ref 8.8–10.2)
CHLORIDE SERPL-SCNC: 103 MMOL/L (ref 98–107)
CREAT SERPL-MCNC: 1.27 MG/DL (ref 0.67–1.17)
DEPRECATED HCO3 PLAS-SCNC: 27 MMOL/L (ref 22–29)
EGFRCR SERPLBLD CKD-EPI 2021: 63 ML/MIN/1.73M2
EOSINOPHIL # BLD AUTO: 0.1 10E3/UL (ref 0–0.7)
EOSINOPHIL NFR BLD AUTO: 2 %
ERYTHROCYTE [DISTWIDTH] IN BLOOD BY AUTOMATED COUNT: 12.4 % (ref 10–15)
ERYTHROCYTE [SEDIMENTATION RATE] IN BLOOD BY WESTERGREN METHOD: 2 MM/HR (ref 0–20)
GLUCOSE SERPL-MCNC: 119 MG/DL (ref 70–99)
HCT VFR BLD AUTO: 43.7 % (ref 40–53)
HGB BLD-MCNC: 15 G/DL (ref 13.3–17.7)
IMM GRANULOCYTES # BLD: 0 10E3/UL
IMM GRANULOCYTES NFR BLD: 0 %
LDH SERPL L TO P-CCNC: 166 U/L (ref 0–250)
LYMPHOCYTES # BLD AUTO: 1.1 10E3/UL (ref 0.8–5.3)
LYMPHOCYTES NFR BLD AUTO: 18 %
MCH RBC QN AUTO: 32.6 PG (ref 26.5–33)
MCHC RBC AUTO-ENTMCNC: 34.3 G/DL (ref 31.5–36.5)
MCV RBC AUTO: 95 FL (ref 78–100)
MONOCYTES # BLD AUTO: 0.4 10E3/UL (ref 0–1.3)
MONOCYTES NFR BLD AUTO: 7 %
NEUTROPHILS # BLD AUTO: 4.4 10E3/UL (ref 1.6–8.3)
NEUTROPHILS NFR BLD AUTO: 72 %
NRBC # BLD AUTO: 0 10E3/UL
NRBC BLD AUTO-RTO: 0 /100
PLATELET # BLD AUTO: 212 10E3/UL (ref 150–450)
POTASSIUM SERPL-SCNC: 4.8 MMOL/L (ref 3.4–5.3)
PROT SERPL-MCNC: 7.7 G/DL (ref 6.4–8.3)
RBC # BLD AUTO: 4.6 10E6/UL (ref 4.4–5.9)
SODIUM SERPL-SCNC: 140 MMOL/L (ref 135–145)
WBC # BLD AUTO: 6.1 10E3/UL (ref 4–11)

## 2024-01-08 PROCEDURE — 70491 CT SOFT TISSUE NECK W/DYE: CPT

## 2024-01-08 PROCEDURE — 71260 CT THORAX DX C+: CPT

## 2024-01-08 PROCEDURE — 83615 LACTATE (LD) (LDH) ENZYME: CPT | Mod: ZL

## 2024-01-08 PROCEDURE — 85652 RBC SED RATE AUTOMATED: CPT | Mod: ZL

## 2024-01-08 PROCEDURE — 82040 ASSAY OF SERUM ALBUMIN: CPT | Mod: ZL

## 2024-01-08 PROCEDURE — 85025 COMPLETE CBC W/AUTO DIFF WBC: CPT | Mod: ZL

## 2024-01-08 PROCEDURE — 36415 COLL VENOUS BLD VENIPUNCTURE: CPT | Mod: ZL

## 2024-01-08 PROCEDURE — 82374 ASSAY BLOOD CARBON DIOXIDE: CPT | Mod: ZL

## 2024-01-08 PROCEDURE — 250N000011 HC RX IP 250 OP 636: Performed by: INTERNAL MEDICINE

## 2024-01-08 PROCEDURE — 84153 ASSAY OF PSA TOTAL: CPT | Mod: ZL

## 2024-01-08 PROCEDURE — 74177 CT ABD & PELVIS W/CONTRAST: CPT

## 2024-01-08 RX ORDER — IOPAMIDOL 755 MG/ML
94 INJECTION, SOLUTION INTRAVASCULAR ONCE
Status: COMPLETED | OUTPATIENT
Start: 2024-01-08 | End: 2024-01-08

## 2024-01-08 RX ADMIN — IOPAMIDOL 94 ML: 755 INJECTION, SOLUTION INTRAVENOUS at 10:38

## 2024-01-10 RX ORDER — LISINOPRIL 2.5 MG/1
2.5 TABLET ORAL DAILY
Qty: 90 TABLET | Refills: 3 | Status: SHIPPED | OUTPATIENT
Start: 2024-01-10

## 2024-01-10 NOTE — TELEPHONE ENCOUNTER
[facility] sent Rx request for the following:      Requested Prescriptions   Pending Prescriptions Disp Refills    lisinopril (ZESTRIL) 2.5 MG tablet [Pharmacy Med Name: lisinopril 2.5 mg tablet] 90 tablet 1     Sig: Take 1 tablet (2.5 mg) by mouth daily       ACE Inhibitors (Including Combos) Protocol Failed - 1/10/2024 10:34 AM     Failed - Normal serum creatinine on file in past 12 months     Recent Labs   Lab Test 01/08/24  0926   CR 1.27*   Ok to refill medication if creatinine is low     Last Prescription Date:   7/20/23  Last Fill Qty/Refills:         90, R-1    Last Office Visit:              8/16/23   Future Office visit:           None    Unable to complete prescription refill per RN Medication Refill Policy.     Delilah Mckeon RN .............. 1/10/2024  10:35 AM

## 2024-01-11 ENCOUNTER — ONCOLOGY VISIT (OUTPATIENT)
Dept: ONCOLOGY | Facility: OTHER | Age: 64
End: 2024-01-11
Attending: INTERNAL MEDICINE
Payer: COMMERCIAL

## 2024-01-11 VITALS
TEMPERATURE: 96.2 F | RESPIRATION RATE: 16 BRPM | SYSTOLIC BLOOD PRESSURE: 112 MMHG | HEART RATE: 61 BPM | DIASTOLIC BLOOD PRESSURE: 72 MMHG | WEIGHT: 165 LBS | BODY MASS INDEX: 20.08 KG/M2 | OXYGEN SATURATION: 100 %

## 2024-01-11 DIAGNOSIS — C81.90 HODGKIN LYMPHOMA, UNSPECIFIED HODGKIN LYMPHOMA TYPE, UNSPECIFIED BODY REGION (H): ICD-10-CM

## 2024-01-11 DIAGNOSIS — N40.0 ENLARGED PROSTATE: Primary | ICD-10-CM

## 2024-01-11 DIAGNOSIS — R39.15 URINARY URGENCY: ICD-10-CM

## 2024-01-11 DIAGNOSIS — R35.0 URINARY FREQUENCY: ICD-10-CM

## 2024-01-11 LAB — PSA SERPL DL<=0.01 NG/ML-MCNC: 3.87 NG/ML (ref 0–4.5)

## 2024-01-11 PROCEDURE — 99417 PROLNG OP E/M EACH 15 MIN: CPT | Performed by: INTERNAL MEDICINE

## 2024-01-11 PROCEDURE — 99215 OFFICE O/P EST HI 40 MIN: CPT | Performed by: INTERNAL MEDICINE

## 2024-01-11 ASSESSMENT — PAIN SCALES - GENERAL: PAINLEVEL: NO PAIN (0)

## 2024-01-11 NOTE — PROGRESS NOTES
[facility] Progress Note    Patient: Daniel Boyd  MRN: [id number]  Date of Service: Jan 11, 2024         Reason for Visit    Chief Complaint   Patient presents with    Hematology     Hodgkin's Lymphoma       ECOG Performance Status0        Encounter Diagnoses:    Stage IV Hodgkin's lymphoma      History of Present Illness    Mr. Daniel Boyd returns for follow-up of stage IV Hodgkin's lymphoma.  For details of history see previous notes.The patient had been initially diagnosed back in August 2015.  He was treated with ABVD x 6 cycles and PET scan imaging revealed a complete response back in February 2016.  Patient had problems with bleomycin lung toxicity his DLCO had dropped to 50% of predicted.  He was followed by pulmonary.  He had a transient reduction of cardiac ejection fraction of complexly resolved and was treated with lisinopril and ACE inhibitor therapy he had serial CTs to evaluate his bleomycin lung toxicity and DLCO did improve.  Since then has been followed yearly with serial imaging not been negative except for some stable findings on CT chest with right lower lobe bronchiectasis and chronic scarring in the right lower lobe.  Patient comes in today is doing relatively well he says he is denies any fevers, night sweats or weight loss.  He states he is now retired and has been doing more exercising he is particularly enjoying pickleball which he does at the [facility] [employer].  He is also done some golfing in the summer.  He was a long-distance runner in the past he is not obviously doing that he still has some dyspnea on exertion which limits his activities.  He had repeat imaging including CT neck chest and pelvis which revealed no evidence of recurrence of Hodgkin's lymphoma there was stable findings in the right lower lobe of the lung.  His biggest problem is that he has symptoms of BPH she had seen Dr. Kruger in the past and his PSAs have been in the normal range.  He is not  interested in any kind of medication like Flomax but he does admit that his quality of life is drastically worsening over the past year apparently because of this 15 times per day but only dribbles a small amount of urine.  Staff are not a problem with nocturia but he says this is less than subclavius drinking fluids on a regular basis he says he could drink more fluids.  He is interested in potentially a surgical approach to treating his BPH.  Otherwise he has no major complaints of pain change in bowel habits right upper clinical hematemesis otherwise doing well.      ______________________________________________________________________________    Past History    Past Medical History:   Diagnosis Date    Bleomycin lung toxicity     Colonic adenoma     Depression     Family history of prostate cancer     Hodgkin lymphoma (H)     8/25/2015,Stage 4, s/p 6 cycles ABVD    Hyperlipidemia     No Comments Provided    Insomnia     Peripheral neuropathy due to chemotherapy  (H24)     No Comments Provided    Schatzki's ring     Tremor     No Comments Provided       Past Surgical History:   Procedure Laterality Date    APPENDECTOMY OPEN      No Comments Provided    COLONOSCOPY  12/30/2010    12/30/10,Normal.  No polyps seen.  Next due 2020.    COLONOSCOPY N/A 01/24/2022    F/U 2027 tubular adenoma, sigmoid diverticulosis    CYSTOSCOPY      No Comments Provided    ESOPHAGOSCOPY, GASTROSCOPY, DUODENOSCOPY (EGD), COMBINED      5/27/16,EGD    FOOT SURGERY Left 2003    SUBTALAR ATHROEREISIS, left side, Dr. Schwartz.    HERNIA REPAIR Bilateral 2002    Surgipro mesh    LYMPH NODE BIOPSY  2015           Review of systems.  CNS: There are no headaches, no blurred vision, no change in mental status,   ENT: There is no hearing loss.  Respiratory: There is dyspnea exertion no cough or hemoptysis  Cardiac: There is no chest pain, orthopnea, PND, or ankle edema.  GI: There is no bright red blood per rectum, no hematemesis, no reflux, no  diarrhea or constipation  Musculoskeletal: No myalgias or joint pains  : There is urinary urgency/urinary frequency, no hematuria.  Constitutional: There is no fevers, night sweats, weight loss.  Endocrine: He does have problems with chronic fatigue at times  Neuro: There is no tingling or numbness in the hands or feet.  Hematologic: There is no gingival bleeding, epistaxis, or easy bruisability.  Dermatologic: There is no skin rash.  A 14 point review of systems is otherwise negative.          Physical Exam    /72 (BP Location: Right arm, Patient Position: Sitting, Cuff Size: Adult Regular)   Pulse 61   Temp (!) 96.2  F (35.7  C) (Tympanic)   Resp 16   Wt 74.8 kg (165 lb)   SpO2 100%   BMI 20.08 kg/m        GENERAL: Alert and oriented to time place and person. Seated comfortably. In no distress.    HEAD: Atraumatic and normocephalic.    EYES: YESSENIA, EOMI.  No pallor.  No icterus.    Oral cavity: no mucosal lesion or tonsillar enlargement.    NECK: supple. JVP normal.  No thyroid enlargement.    LYMPH NODES: There are no palpable cervical, supraclavicular, axillary, or inguinal nodes.    LUNGS: clear to auscultation bilaterally.  Resonant to percussion throughout bilaterally.  Symmetrical breath movements bilaterally.    HEART: S1 and S2 are heard. Regular rate and rhythm.  No murmur or gallop or rub heard.  No peripheral edema.    ABDOMEN: Soft. Not tender. Not distended.  No palpable hepatomegaly or splenomegaly.  No other mass palpable.  Bowel sounds heard.    EXTREMITIES: There is no ankle edema.  SKIN: no rash, or bruising or purpura.    NEURO: Grossly non-focal.    Lab Results    Recent Results (from the past 240 hour(s))   Erythrocyte sedimentation rate auto    Collection Time: 01/08/24  9:26 AM   Result Value Ref Range    Erythrocyte Sedimentation Rate 2 0 - 20 mm/hr   Lactate Dehydrogenase    Collection Time: 01/08/24  9:26 AM   Result Value Ref Range    Lactate Dehydrogenase 166 0 - 250 U/L    Comprehensive metabolic panel    Collection Time: 01/08/24  9:26 AM   Result Value Ref Range    Sodium 140 135 - 145 mmol/L    Potassium 4.8 3.4 - 5.3 mmol/L    Carbon Dioxide (CO2) 27 22 - 29 mmol/L    Anion Gap 10 7 - 15 mmol/L    Urea Nitrogen 24.3 (H) 8.0 - 23.0 mg/dL    Creatinine 1.27 (H) 0.67 - 1.17 mg/dL    GFR Estimate 63 >60 mL/min/1.73m2    Calcium 10.2 8.8 - 10.2 mg/dL    Chloride 103 98 - 107 mmol/L    Glucose 119 (H) 70 - 99 mg/dL    Alkaline Phosphatase 81 40 - 150 U/L    AST 29 0 - 45 U/L    ALT 37 0 - 70 U/L    Protein Total 7.7 6.4 - 8.3 g/dL    Albumin 4.6 3.5 - 5.2 g/dL    Bilirubin Total 0.8 <=1.2 mg/dL   CBC with platelets and differential    Collection Time: 01/08/24  9:26 AM   Result Value Ref Range    WBC Count 6.1 4.0 - 11.0 10e3/uL    RBC Count 4.60 4.40 - 5.90 10e6/uL    Hemoglobin 15.0 13.3 - 17.7 g/dL    Hematocrit 43.7 40.0 - 53.0 %    MCV 95 78 - 100 fL    MCH 32.6 26.5 - 33.0 pg    MCHC 34.3 31.5 - 36.5 g/dL    RDW 12.4 10.0 - 15.0 %    Platelet Count 212 150 - 450 10e3/uL    % Neutrophils 72 %    % Lymphocytes 18 %    % Monocytes 7 %    % Eosinophils 2 %    % Basophils 1 %    % Immature Granulocytes 0 %    NRBCs per 100 WBC 0 <1 /100    Absolute Neutrophils 4.4 1.6 - 8.3 10e3/uL    Absolute Lymphocytes 1.1 0.8 - 5.3 10e3/uL    Absolute Monocytes 0.4 0.0 - 1.3 10e3/uL    Absolute Eosinophils 0.1 0.0 - 0.7 10e3/uL    Absolute Basophils 0.1 0.0 - 0.2 10e3/uL    Absolute Immature Granulocytes 0.0 <=0.4 10e3/uL    Absolute NRBCs 0.0 10e3/uL   PSA tumor marker    Collection Time: 01/08/24  9:26 AM   Result Value Ref Range    PSA Tumor Marker 3.87 0.00 - 4.50 ng/mL       Imaging    CT Soft Tissue Neck w Contrast    Result Date: 1/8/2024  PROCEDURE: CT SOFT TISSUE NECK W CONTRAST HISTORY: lymphoma; Hx of Hodgkin's lymphoma TECHNIQUE: Following the administration of intravenous contrast, helical straight and angled axial images of the neck were obtained. Multiplanar reformatted images were  reviewed. This CT exam was performed using one or more the following dose reduction techniques: automated exposure control, adjustment of the mA and/or kV according to patient size, and/or iterative reconstruction technique. COMPARISON: 1/13/2023 FINDINGS: Pharynx/larynx: The nasopharynx, oropharynx, hypopharynx, and larynx are patent without asymmetric soft tissue. The proximal trachea and cervical esophagus are unremarkable. Oral cavity: Evaluation of the oral cavity is limited by artifact from dental amalgam. The visualized portions of the oral tongue and floor of mouth are intact. Glands: The parotid and submandibular salivary glands have a normal appearance without focal lesions. No calcifications are seen along the course of Stensen's or Lara's ducts. No abnormalities of the thyroid gland are identified. Cervical soft tissues: No suspicious cervical adenopathy. Other: The lung apices are clear. No suspicious osseous lesion is identified.     IMPRESSION: No suspicious cervical adenopathy. ROBERT HERRERA MD   SYSTEM ID:  [id number]    CT Abdomen Pelvis w Contrast    Result Date: 1/8/2024  PROCEDURE:  CT CHEST W CONTRAST, CT ABDOMEN PELVIS W CONTRAST.  HISTORY:  lymphoma; Hx of Hodgkin's lymphoma  TECHNIQUE:  Contrast enhanced helical thoracic CT was performed. This CT exam was performed using one or more the following dose reduction techniques: automated exposure control, adjustment of the mA and/or kV according to patient size, and/or iterative reconstruction technique. COMPARISON:  Multiple priors, most recently 1/13/2023. MEDS/CONTRAST: 94 ml isovue 370 FINDINGS: The neck base is grossly symmetric. Cardiac size is normal. There are mild coronary calcifications. There is no pericardial or pleural effusion. The thoracic aorta and main pulmonary artery are within normal limits in size. No abnormal thoracic adenopathy is identified. The central airways are patent. A calcified granuloma in the right upper  lobe is unchanged. A triangular nodule in the right middle lobe is unchanged. Scarring and localized bronchiectasis in the right lower lobe is unchanged. The liver, gallbladder, pancreas and adrenal glands are unremarkable. Symmetric nephrograms are present without hydronephrosis. Calcified granulomata are present in the spleen. No abnormal bowel dilatation is present. The prostate is enlarged. The aorta is normal in size with scattered atherosclerotic calcifications. No free fluid, free air or adenopathy is present.  No suspicious osseous lesions are identified.     IMPRESSION:  No evidence of recurrent adenopathy. ROBERT HERRERA MD   SYSTEM ID:  [id number]    CT Chest w Contrast    Result Date: 1/8/2024  PROCEDURE:  CT CHEST W CONTRAST, CT ABDOMEN PELVIS W CONTRAST.  HISTORY:  lymphoma; Hx of Hodgkin's lymphoma  TECHNIQUE:  Contrast enhanced helical thoracic CT was performed. This CT exam was performed using one or more the following dose reduction techniques: automated exposure control, adjustment of the mA and/or kV according to patient size, and/or iterative reconstruction technique. COMPARISON:  Multiple priors, most recently 1/13/2023. MEDS/CONTRAST: 94 ml isovue 370 FINDINGS: The neck base is grossly symmetric. Cardiac size is normal. There are mild coronary calcifications. There is no pericardial or pleural effusion. The thoracic aorta and main pulmonary artery are within normal limits in size. No abnormal thoracic adenopathy is identified. The central airways are patent. A calcified granuloma in the right upper lobe is unchanged. A triangular nodule in the right middle lobe is unchanged. Scarring and localized bronchiectasis in the right lower lobe is unchanged. The liver, gallbladder, pancreas and adrenal glands are unremarkable. Symmetric nephrograms are present without hydronephrosis. Calcified granulomata are present in the spleen. No abnormal bowel dilatation is present. The prostate is enlarged.  The aorta is normal in size with scattered atherosclerotic calcifications. No free fluid, free air or adenopathy is present.  No suspicious osseous lesions are identified.     IMPRESSION:  No evidence of recurrent adenopathy. ROBERT HERRERA MD   SYSTEM ID:  [id number]     Assessment and Plan: #1Stage IV classical Hodgkin lymphoma diagnosed in 2015 status post 6 cycles of ABVD with complete response course complicated by bleomycin lung toxicity transient cardiomyopathy.  Recent imaging continues to show complete response.  Sed rate is normal plan to continue surveillance we will see the patient in 1 year obtain a CBC CMP LDH and sed rate and repeat CT neck chest and pelvis.  2.:  BPH with significant symptoms of urinary frequency/urinary urgency: PSA remains within normal range nonetheless patient would like to consult with urology will refer the patient to Dr. Sudhir Fabian for potential surgical options for treatment of his BPH.  Time spent: 70 minutes spent on this total patient visit, we spent time reviewing previous physician provider notes, discussing imaging results with patient, performing history physical documenting physical ordering lab work and follow-up imaging as well as ordering consultation with Dr. Sudhir Fabina of urology.    Cancer Staging   No matching staging information was found for the patient.        Signed by: Kia Leung MD    CC: Avelino Luque MD

## 2024-01-11 NOTE — PATIENT INSTRUCTIONS
Follow up with Dr Leung in 1 year.      Please come prior for lab work and imaging. Radiology scheduling will contact you to arrange these scans.  If you do not received a call, please call them at [phone].       A referral was placed to Dr Fabian.  If you have not heard from them after 2 weeks, please notify us at [phone].

## 2024-01-11 NOTE — NURSING NOTE
Chief Complaint   Patient presents with    Hematology     Hodgkin's Lymphoma     Medication Reconciliation: complete    Maria Eugenia Moyer CMA ([facility]) 1/11/2024 9:43 AM

## 2024-01-17 ENCOUNTER — PATIENT OUTREACH (OUTPATIENT)
Dept: GASTROENTEROLOGY | Facility: CLINIC | Age: 64
End: 2024-01-17
Payer: COMMERCIAL

## 2024-01-17 ENCOUNTER — OFFICE VISIT (OUTPATIENT)
Dept: ORTHOPEDICS | Facility: OTHER | Age: 64
End: 2024-01-17
Attending: ORTHOPAEDIC SURGERY
Payer: COMMERCIAL

## 2024-01-17 DIAGNOSIS — M25.572 CHRONIC PAIN OF LEFT ANKLE: Primary | ICD-10-CM

## 2024-01-17 DIAGNOSIS — G89.29 CHRONIC PAIN OF LEFT ANKLE: Primary | ICD-10-CM

## 2024-01-17 PROCEDURE — 250N000009 HC RX 250: Performed by: ORTHOPAEDIC SURGERY

## 2024-01-17 PROCEDURE — 20550 NJX 1 TENDON SHEATH/LIGAMENT: CPT | Mod: LT | Performed by: ORTHOPAEDIC SURGERY

## 2024-01-17 PROCEDURE — 250N000011 HC RX IP 250 OP 636: Performed by: ORTHOPAEDIC SURGERY

## 2024-01-17 RX ORDER — TRIAMCINOLONE ACETONIDE 40 MG/ML
40 INJECTION, SUSPENSION INTRA-ARTICULAR; INTRAMUSCULAR ONCE
Status: COMPLETED | OUTPATIENT
Start: 2024-01-17 | End: 2024-01-17

## 2024-01-17 RX ORDER — LIDOCAINE HYDROCHLORIDE 10 MG/ML
1 INJECTION, SOLUTION EPIDURAL; INFILTRATION; INTRACAUDAL; PERINEURAL ONCE
Status: COMPLETED | OUTPATIENT
Start: 2024-01-17 | End: 2024-01-17

## 2024-01-17 RX ADMIN — LIDOCAINE HYDROCHLORIDE 1 ML: 10 INJECTION, SOLUTION EPIDURAL; INFILTRATION; INTRACAUDAL; PERINEURAL at 10:13

## 2024-01-17 RX ADMIN — TRIAMCINOLONE ACETONIDE 40 MG: 40 INJECTION, SUSPENSION INTRA-ARTICULAR; INTRAMUSCULAR at 10:13

## 2024-01-17 NOTE — PROGRESS NOTES
SUBJECTIVE:  Patient returns in regards to left ankle pain.  Patient is here for repeat injection.  Last injection was done about 3 years back.    ROS: Musculoskeletal and general review of systems are negative, per review of previous clinic questionnaire.  Denies SOB and calf pain.    EXAM: Left ankle shows tenderness across the peroneal tendons.  Mild swelling is present there as well.  Increased pain with resisted ankle eversion.    PROCEDURE NOTE: Left ankle injected with 1 cc 1% lidocaine and 40 mg of Kenalog under sterile conditions into the peroneal tendon sheath.    IMAGING: None    ASSESSMENT: Left ankle peroneal tendinitis    PLAN: Injection as stated above.  If symptoms continue to be recurrent referral to Dr. Montez would be recommended.    Hector Alvares MD

## 2024-01-18 DIAGNOSIS — F51.04 CHRONIC INSOMNIA: ICD-10-CM

## 2024-01-18 RX ORDER — LORAZEPAM 1 MG/1
1 TABLET ORAL AT BEDTIME
Qty: 90 TABLET | Refills: 1 | Status: SHIPPED | OUTPATIENT
Start: 2024-01-18 | End: 2024-07-24

## 2024-01-18 NOTE — TELEPHONE ENCOUNTER
[facility] sent Rx request for the following:      Requested Prescriptions   Pending Prescriptions Disp Refills    LORazepam (ATIVAN) 1 MG tablet [Pharmacy Med Name: lorazepam 1 mg tablet] 90 tablet 1     Sig: Take 1 tablet (1 mg) by mouth At Bedtime       There is no refill protocol information for this order        Last Prescription Date:   7/31/23  Last Fill Qty/Refills:         90, R-1    Last Office Visit:              8/16/23   Future Office visit:           None    Unable to complete prescription refill per RN Medication Refill Policy.     Delilah Mckeon RN .............. 1/18/2024  10:07 AM

## 2024-03-29 DIAGNOSIS — N40.0 ENLARGED PROSTATE: Primary | ICD-10-CM

## 2024-04-08 ENCOUNTER — OFFICE VISIT (OUTPATIENT)
Dept: FAMILY MEDICINE | Facility: OTHER | Age: 64
End: 2024-04-08
Attending: FAMILY MEDICINE
Payer: COMMERCIAL

## 2024-04-08 ENCOUNTER — HOSPITAL ENCOUNTER (OUTPATIENT)
Dept: GENERAL RADIOLOGY | Facility: OTHER | Age: 64
Discharge: HOME | End: 2024-04-08
Attending: FAMILY MEDICINE
Payer: COMMERCIAL

## 2024-04-08 VITALS
BODY MASS INDEX: 20.04 KG/M2 | HEART RATE: 67 BPM | DIASTOLIC BLOOD PRESSURE: 64 MMHG | SYSTOLIC BLOOD PRESSURE: 106 MMHG | OXYGEN SATURATION: 95 % | WEIGHT: 164.6 LBS | RESPIRATION RATE: 20 BRPM | TEMPERATURE: 98 F

## 2024-04-08 DIAGNOSIS — G89.29 CHRONIC MIDLINE LOW BACK PAIN WITHOUT SCIATICA: ICD-10-CM

## 2024-04-08 DIAGNOSIS — G89.29 CHRONIC MIDLINE LOW BACK PAIN WITHOUT SCIATICA: Primary | ICD-10-CM

## 2024-04-08 DIAGNOSIS — F33.0 MAJOR DEPRESSIVE DISORDER, RECURRENT EPISODE, MILD (H): ICD-10-CM

## 2024-04-08 DIAGNOSIS — M54.50 CHRONIC MIDLINE LOW BACK PAIN WITHOUT SCIATICA: ICD-10-CM

## 2024-04-08 DIAGNOSIS — M47.816 SPONDYLOSIS OF LUMBAR REGION WITHOUT MYELOPATHY OR RADICULOPATHY: ICD-10-CM

## 2024-04-08 DIAGNOSIS — M54.50 CHRONIC MIDLINE LOW BACK PAIN WITHOUT SCIATICA: Primary | ICD-10-CM

## 2024-04-08 DIAGNOSIS — G62.2 POLYNEUROPATHY DUE TO OTHER TOXIC AGENTS (H): ICD-10-CM

## 2024-04-08 PROCEDURE — 99213 OFFICE O/P EST LOW 20 MIN: CPT | Performed by: FAMILY MEDICINE

## 2024-04-08 PROCEDURE — 72100 X-RAY EXAM L-S SPINE 2/3 VWS: CPT

## 2024-04-08 RX ORDER — TRAMADOL HYDROCHLORIDE 50 MG/1
50 TABLET ORAL EVERY 6 HOURS PRN
Qty: 25 TABLET | Refills: 1 | Status: SHIPPED | OUTPATIENT
Start: 2024-04-08 | End: 2024-07-11

## 2024-04-08 RX ORDER — PREDNISONE 20 MG/1
20 TABLET ORAL DAILY
Qty: 10 TABLET | Refills: 0 | Status: SHIPPED | OUTPATIENT
Start: 2024-04-08 | End: 2024-04-18

## 2024-04-08 ASSESSMENT — PAIN SCALES - GENERAL: PAINLEVEL: SEVERE PAIN (7)

## 2024-04-08 ASSESSMENT — PATIENT HEALTH QUESTIONNAIRE - PHQ9
SUM OF ALL RESPONSES TO PHQ QUESTIONS 1-9: 0
SUM OF ALL RESPONSES TO PHQ QUESTIONS 1-9: 0

## 2024-04-08 NOTE — NURSING NOTE
Patient here for low mid back pain for the past 3 weeks. Today's pain being the worst. Medication Reconciliation: complete.    Dedra Liu LPN  4/8/2024 3:14 PM

## 2024-04-10 ENCOUNTER — OFFICE VISIT (OUTPATIENT)
Dept: UROLOGY | Facility: OTHER | Age: 64
End: 2024-04-10
Attending: UROLOGY
Payer: COMMERCIAL

## 2024-04-10 VITALS
RESPIRATION RATE: 18 BRPM | OXYGEN SATURATION: 96 % | HEIGHT: 76 IN | WEIGHT: 164 LBS | DIASTOLIC BLOOD PRESSURE: 60 MMHG | SYSTOLIC BLOOD PRESSURE: 120 MMHG | HEART RATE: 85 BPM | BODY MASS INDEX: 19.97 KG/M2

## 2024-04-10 DIAGNOSIS — N13.8 BPH WITH OBSTRUCTION/LOWER URINARY TRACT SYMPTOMS: Primary | ICD-10-CM

## 2024-04-10 DIAGNOSIS — N40.1 BPH WITH OBSTRUCTION/LOWER URINARY TRACT SYMPTOMS: Primary | ICD-10-CM

## 2024-04-10 DIAGNOSIS — R35.0 FREQUENCY OF URINATION: ICD-10-CM

## 2024-04-10 DIAGNOSIS — Z80.42 FAMILY HISTORY OF PROSTATE CANCER IN FATHER: ICD-10-CM

## 2024-04-10 PROBLEM — I42.7 CARDIOMYOPATHY DUE TO CHEMOTHERAPY (H): Status: RESOLVED | Noted: 2018-05-10 | Resolved: 2024-04-10

## 2024-04-10 PROBLEM — T45.1X5A CARDIOMYOPATHY DUE TO CHEMOTHERAPY (H): Status: RESOLVED | Noted: 2018-05-10 | Resolved: 2024-04-10

## 2024-04-10 LAB
ALBUMIN UR-MCNC: NEGATIVE MG/DL
APPEARANCE UR: CLEAR
BILIRUB UR QL STRIP: NEGATIVE
COLOR UR AUTO: YELLOW
GLUCOSE UR STRIP-MCNC: NEGATIVE MG/DL
HGB UR QL STRIP: NEGATIVE
KETONES UR STRIP-MCNC: NEGATIVE MG/DL
LEUKOCYTE ESTERASE UR QL STRIP: NEGATIVE
NITRATE UR QL: NEGATIVE
PH UR STRIP: 5.5 [PH] (ref 5–9)
SP GR UR STRIP: 1.02 (ref 1–1.03)
UROBILINOGEN UR STRIP-MCNC: NORMAL MG/DL

## 2024-04-10 PROCEDURE — G2211 COMPLEX E/M VISIT ADD ON: HCPCS | Performed by: UROLOGY

## 2024-04-10 PROCEDURE — 51798 US URINE CAPACITY MEASURE: CPT | Performed by: UROLOGY

## 2024-04-10 PROCEDURE — 81003 URINALYSIS AUTO W/O SCOPE: CPT | Mod: ZL | Performed by: UROLOGY

## 2024-04-10 PROCEDURE — 99202 OFFICE O/P NEW SF 15 MIN: CPT | Performed by: UROLOGY

## 2024-04-10 RX ORDER — TAMSULOSIN HYDROCHLORIDE 0.4 MG/1
0.4 CAPSULE ORAL DAILY
Qty: 30 CAPSULE | Refills: 11 | Status: SHIPPED | OUTPATIENT
Start: 2024-04-10

## 2024-04-10 ASSESSMENT — PAIN SCALES - GENERAL: PAINLEVEL: NO PAIN (0)

## 2024-04-10 ASSESSMENT — ENCOUNTER SYMPTOMS: BACK PAIN: 1

## 2024-04-10 NOTE — PROGRESS NOTES
Chief Complaint: No chief complaint on file.  BPH, family history of prostate cancer    HPI: Mr. Daniel Boyd is a 63 year old year old male with a history of stage IV non-Hodgkin's lymphoma in remission who presents today April 10, 2024 for evaluation of BPH as well as a family history of prostate cancer.  New patient to me although he saw Dr. Kruger in 2020.      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  No polyps seen.  Next due 2020.    COLONOSCOPY N/A 01/24/2022    F/U 2027 tubular adenoma, sigmoid diverticulosis    CYSTOSCOPY      No Comments Provided    ESOPHAGOSCOPY, GASTROSCOPY, DUODENOSCOPY (EGD), COMBINED      5/27/16,EGD    FOOT SURGERY Left 2003    SUBTALAR ATHROEREISIS, left side, Dr. Schwartz.    HERNIA REPAIR Bilateral 2002    Surgipro mesh    LYMPH NODE BIOPSY  2015       FAMILY HISTORY: Positive and reports a family history of prostate cancer in his father.      SOCIAL HISTORY:    reports that he has never smoked. He has been exposed to tobacco smoke. He has never used smokeless tobacco.    Current Outpatient Medications   Medication Sig Dispense Refill    aspirin 81 MG EC tablet Take 1 tablet (81 mg) by mouth daily      atorvastatin (LIPITOR) 40 MG tablet Take 1 tablet (40 mg) by mouth daily 90 tablet 3    fish oil-omega-3 fatty acids 1000 MG capsule Take 1 capsule by mouth daily      lisinopril (ZESTRIL) 2.5 MG tablet Take 1 tablet (2.5 mg) by mouth daily 90 tablet 3    LORazepam (ATIVAN) 1 MG tablet Take 1  tablet (1 mg) by mouth At Bedtime 90 tablet 1    Multiple Vitamins-Minerals (MULTIVITAMIN & MINERAL PO) Take 1 tablet by mouth every morning      omeprazole (PRILOSEC) 20 MG CR capsule Take 1 capsule (20 mg) by mouth daily 30 capsule     predniSONE (DELTASONE) 20 MG tablet Take 1 tablet (20 mg) by mouth daily for 10 days 10 tablet 0    sertraline (ZOLOFT) 100 MG tablet Take 1 tablet (100 mg) by mouth daily 90 tablet 3    traMADol (ULTRAM) 50 MG tablet Take 1 tablet (50 mg) by mouth every 6 hours as needed for severe pain 25 tablet 1       ALLERGIES: Patient has no known allergies.     GENERAL PHYSICAL EXAM:   Vitals: There were no vitals taken for this visit.  There is no height or weight on file to calculate BMI.    GENERAL: Well groomed, well developed, well nourished male in NAD.  HEENT:  Normal   CV:  Warm extremities   RESPIRATORY: Normal respiratory effort.    GI: Soft, NT, ND  MS: Moving all 4  NEURO: Alert and oriented x 3.  PSYCH: Normal mood and affect, pleasant and agreeable during interview and exam.    :      Prostate: 50 gms    PVR: Residual urine by ultrasound was 3  ml.           RADIOLOGY: The following tests were reviewed:   CT abdomen and pelvis with contrast 1/8/2024 reviewed by me.  Prostate volume calculated to be 64 cm  with mild median lobe protrusion    LABS: The last test results for Ms. Daniel Boyd were reviewed.   No results found for this or any previous visit (from the past 24 hour(s)).    PSA -   Lab Results   Component Value Date    PSA 3.87 01/08/2024    PSA 2.88 07/31/2023    PSA 3.61 01/13/2023    PSA 3.77 08/02/2021     BMP -   Recent Labs   Lab Test 01/08/24  0926 01/13/23  0844 07/07/22  1051    140 136   POTASSIUM 4.8 4.5 4.0   CHLORIDE 103 105 101   CO2 27 27 27   BUN 24.3* 16.7 20   CR 1.27* 1.03 1.07   * 130* 108*   CHELSEA 10.2 9.5 9.5       CBC -   Recent Labs   Lab Test 01/08/24  0926 01/13/23  0844 07/07/22  1051   WBC 6.1 5.6 7.1   HGB 15.0 14.6 15.1   PLT  212 188 442       ASSESSMENT:   BPH with LUTS  Family history of prostate cancer    PLAN:   Discussion with patient about Benign Prostatic Hypertophy and enlargement of the male prostate with aging.  Explained the incidence of BPH which occurs in 40% of 40-year olds, 50% of 50-year olds, 60% of 60-year olds, etc.      Explained to patient that other conditions may mimic BPH including excessive caffeine consumption, alcohol consumption, constipation, spicy and acidic foods, stress, diabetes, obesity, and neurologic reasons such as Multiple Sclerosis and Parkinson's Disease.     Recommended behavioral therapy with reduction/elimination of caffeine and alcohol, certain foods/spices, avoidance of constipation and all cold and pain medications.      Discussed the use of alpha blockers such as tamsulosin. Side effects of alpha blockers discussed including retrograde ejaculation, ED, dizziness upon standing, fatigue and nasal congestion.    Finally discussed surgical therapies such as TURP, TUIP, Holep, Urolift, Rezum and Robotic assisted Simple Prostatectomy and their associated risks and benefits.      Patient voiced an understanding.  Follow up 3 months     The longitudinal plan of care for BPH was addressed during this visit.  Due to the added complexity in care, I will continue to support Mr. Boyd in the subsequent management of this condition(s) and with the ongoing continuity of care of this condition.       20 minutes spent on the date of this encounter doing chart review, history and exam, documentation and further activities as noted above.      Devin Fabian MD   [facility]

## 2024-04-10 NOTE — NURSING NOTE
"Chief Complaint   Patient presents with    Consult     Enlarged prostate       Initial /60   Pulse 85   Resp 18   Ht 1.93 m (6' 4\")   Wt 74.4 kg (164 lb)   SpO2 96%   BMI 19.96 kg/m   Estimated body mass index is 19.96 kg/m  as calculated from the following:    Height as of this encounter: 1.93 m (6' 4\").    Weight as of this encounter: 74.4 kg (164 lb).  Medication Reconciliation: complete      AUA=33    post void residual = 3 ml    Mimi Merino LPN    "

## 2024-04-10 NOTE — PATIENT INSTRUCTIONS
Consider using metamucil or citrucel daily in the morning with a glass of water    Cut back on the alcohol and caffeine to see what happens    Trial of tamsulosin, 1 capsule at night prior to bedtime.  Watch for dizziness upon standing.     Cold medicines should be avoided.

## 2024-04-17 DIAGNOSIS — F51.04 CHRONIC INSOMNIA: ICD-10-CM

## 2024-04-17 RX ORDER — LORAZEPAM 1 MG/1
1 TABLET ORAL AT BEDTIME
Qty: 90 TABLET | Refills: 1 | Status: CANCELLED | OUTPATIENT
Start: 2024-04-17

## 2024-04-17 NOTE — TELEPHONE ENCOUNTER
Patient's wife presented and states Pt needs prescription for Lorazepam.     Requested Prescriptions   Pending Prescriptions Disp Refills    LORazepam (ATIVAN) 1 MG tablet 90 tablet 1     Sig: Take 1 tablet (1 mg) by mouth at bedtime       There is no refill protocol information for this order        Last Prescription Date:   24  Last Fill Qty/Refills:         90, R-1      Called [facility], and spoke with Cheryl, after verifying Pt's last name and . She states it is too soon to fill, but can fill again on . Called and spoke to Patient after verifying last name and date of birth. Pt states they are going out of town but will be back . Pt has 8 days remaining. He will plan to  . Delilah Mckeon RN .............. 2024  1:16 PM

## 2024-04-29 ENCOUNTER — THERAPY VISIT (OUTPATIENT)
Dept: CHIROPRACTIC MEDICINE | Facility: OTHER | Age: 64
End: 2024-04-29
Attending: CHIROPRACTOR
Payer: COMMERCIAL

## 2024-04-29 VITALS — HEART RATE: 78 BPM | RESPIRATION RATE: 16 BRPM | OXYGEN SATURATION: 98 % | TEMPERATURE: 96.9 F

## 2024-04-29 DIAGNOSIS — M51.369 DDD (DEGENERATIVE DISC DISEASE), LUMBAR: ICD-10-CM

## 2024-04-29 DIAGNOSIS — M99.04 SEGMENTAL AND SOMATIC DYSFUNCTION OF SACRAL REGION: Primary | ICD-10-CM

## 2024-04-29 DIAGNOSIS — M47.816 FACET SYNDROME, LUMBAR: ICD-10-CM

## 2024-04-29 PROCEDURE — 99203 OFFICE O/P NEW LOW 30 MIN: CPT | Mod: 25 | Performed by: CHIROPRACTOR

## 2024-04-29 PROCEDURE — 98940 CHIROPRACT MANJ 1-2 REGIONS: CPT | Mod: AT | Performed by: CHIROPRACTOR

## 2024-04-29 NOTE — PROGRESS NOTES
Started over the winter. Has increased over the weekend. Left lower back and hip are constantly sharp. 3/10 W24 8/10. Taking medications, using ice, and heat. These are providing a slight decrease in pain.   Shayna Llanos on 4/29/2024 at 10:33 AM    Reviewed by EW    PATIENT:  Daniel Boyd is a 63 year old male presenting for back pain    PROBLEM:   Date of Initial Visit for this Episode:  4/29/2024     Visit #1    SUBJECTIVE / HPI: Patient presents with primary complaints of low back pain.  States that pain began over the winter.  At the time patient was doing exercises to help with his health.  Focusing a lot on core work with bands as well as kettle bell work.    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    Denies any past history of back concerns.  Currently finds that sitting on a lawnmower will aggravate back pain.  Notes that standing or doing generalized activity seems to help reduce back pain symptoms.  Patient reports that he was able to play pickle ball earlier today without aggravation of back symptoms.    Denies any radicular symptoms but notes some thigh cramping, this does seem to be worse since back pain started.  Patient has been noticing some tightness around his adductors, no apparent saddle paresthesia.    Patient is currently dealing with difficulty urinating, currently working with urology for BPH.  Also voices concern of constipation.  Both of which could also be contributing to back pain.    No red flags consistent with cauda equina at this time.    Patient did undergo chemotherapy 8-9 years ago.  Denies any loss of weight.    (DVPRS) Pain Rating Score : 3-Sometimes distracts me (W24 8/10) (04/29/24 1028)      See flowsheets in chart for details.  4/29/2024       Oswestry (MAN) Questionnaire        4/29/2024    10:33 AM   OSWESTRY DISABILITY INDEX   Count 9   Sum 7   Oswestry Score (%) 15.56 %        PAST MEDICAL HISTORY:  Past Medical  History:   Diagnosis Date    Bleomycin lung toxicity     Colonic adenoma     Depression     Family history of prostate cancer     Hodgkin lymphoma (H)     8/25/2015,Stage 4, s/p 6 cycles ABVD    Hyperlipidemia     No Comments Provided    Insomnia     Peripheral neuropathy due to chemotherapy (H24)     No Comments Provided    Schatzki's ring     Tremor     No Comments Provided       PAST SURGICAL HISTORY:  Past Surgical History:   Procedure Laterality Date    APPENDECTOMY OPEN      No Comments Provided    COLONOSCOPY  12/30/2010    12/30/10,Normal.  No polyps seen.  Next due 2020.    COLONOSCOPY N/A 01/24/2022    F/U 2027 tubular adenoma, sigmoid diverticulosis    CYSTOSCOPY      No Comments Provided    ESOPHAGOSCOPY, GASTROSCOPY, DUODENOSCOPY (EGD), COMBINED      5/27/16,EGD    FOOT SURGERY Left 2003    SUBTALAR ATHROEREISIS, left side, Dr. Schwartz.    HERNIA REPAIR Bilateral 2002    Surgipro mesh    LYMPH NODE BIOPSY  2015       ALLERGIES:  No Known Allergies    CURRENT MEDICATIONS:  Current Outpatient Medications   Medication Sig Dispense Refill    aspirin 81 MG EC tablet Take 1 tablet (81 mg) by mouth daily      atorvastatin (LIPITOR) 40 MG tablet Take 1 tablet (40 mg) by mouth daily 90 tablet 3    fish oil-omega-3 fatty acids 1000 MG capsule Take 1 capsule by mouth daily      lisinopril (ZESTRIL) 2.5 MG tablet Take 1 tablet (2.5 mg) by mouth daily 90 tablet 3    LORazepam (ATIVAN) 1 MG tablet Take 1 tablet (1 mg) by mouth At Bedtime 90 tablet 1    Multiple Vitamins-Minerals (MULTIVITAMIN & MINERAL PO) Take 1 tablet by mouth every morning      omeprazole (PRILOSEC) 20 MG CR capsule Take 1 capsule (20 mg) by mouth daily 30 capsule     sertraline (ZOLOFT) 100 MG tablet Take 1 tablet (100 mg) by mouth daily 90 tablet 3    tamsulosin (FLOMAX) 0.4 MG capsule Take 1 capsule (0.4 mg) by mouth daily Watch for dizziness upon standing. 30 capsule 11       SOCIAL HISTORY:  Social History     Socioeconomic History    Marital  status:    Tobacco Use    Smoking status: Never     Passive exposure: Past    Smokeless tobacco: Never    Tobacco comments:     Passive exposure in childhood home.    Vaping Use    Vaping status: Never Used   Substance and Sexual Activity    Alcohol use: Yes     Alcohol/week: 7.0 standard drinks of alcohol     Types: 7 Cans of beer per week     Comment: 1 beer per day    Drug use: No    Sexual activity: Yes     Partners: Female   Social History Narrative    Daughter Velvet BD 8/25/89.  Son Nick BD 4/05/92  Spouse Candis PEPE 11/26/59   .  He is a golf pro, also drives school bus.  Wife is Candis, nurse at [employer].      Consent signed for Candis.     Social Determinants of Health     Financial Resource Strain: Low Risk  (4/8/2024)    Financial Resource Strain     Within the past 12 months, have you or your family members you live with been unable to get utilities (heat, electricity) when it was really needed?: No   Food Insecurity: Low Risk  (4/8/2024)    Food Insecurity     Within the past 12 months, did you worry that your food would run out before you got money to buy more?: No     Within the past 12 months, did the food you bought just not last and you didn t have money to get more?: No   Transportation Needs: Low Risk  (4/8/2024)    Transportation Needs     Within the past 12 months, has lack of transportation kept you from medical appointments, getting your medicines, non-medical meetings or appointments, work, or from getting things that you need?: No   Interpersonal Safety: Low Risk  (4/8/2024)    Interpersonal Safety     Do you feel physically and emotionally safe where you currently live?: Yes     Within the past 12 months, have you been hit, slapped, kicked or otherwise physically hurt by someone?: No     Within the past 12 months, have you been humiliated or emotionally abused in other ways by your partner or ex-partner?: No   Housing Stability: Low Risk  (4/8/2024)     Housing Stability     Do you have housing? : Yes     Are you worried about losing your housing?: No        FAMILY HISTORY:  Family History   Problem Relation Age of Onset    Prostate Cancer Father         Also had a CABG and  during open heart surgery, .    Breast Cancer Mother     Family History Negative Sister         Good Health    Family History Negative Sister         Good Health    Prostate Cancer Brother         Cancer-prostate,Age 47.    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    OBJECTIVE:    DIAGNOSTICS:Study Result    Narrative & Impression   Exam: XR LUMBAR SPINE 2/3 VIEWS      History:Male, age 63 years, Chronic midline low back pain without  sciatica; Chronic midline low back pain without sciatica     Comparison:  CT scan abdomen and pelvis 2024     Technique: Three views are submitted.     Findings: Bones are normally mineralized. No evidence of acute or  subacute fracture.  Vertebral bodies and posterior elements are well  aligned.                                                                           Impression:  No evidence of acute or subacute bony abnormality.      Left upper quadrant calcifications consistent with a number of splenic  granulomas, similar when compared to prior examination.     Mild degenerative changes of the lumbar spine.     ZIA  MD AASHISH         SYSTEM ID:  [id number]     Chiropractic assessment shows an abrupt sacral angle but no signs of encroachment of the L5/S1 foramen.     PHYSICAL EXAM:   Pulse 78   Temp 96.9  F (36.1  C) (Tympanic)   Resp 16   SpO2 98%    GENERAL APPEARANCE: healthy, alert, no distress, and cooperative   GAIT: NORMAL      MUSCULOSKELETAL:   Posture: Anterior head carriage, rounded shoulders.  Low right iliac crest when compared to the left side.  Gait:  unremarkable.     Thoracic and Lumbar performed actively, measured approximately  ROM:  55/60 flexion 45/60 extension pain as reported by the patient   40/45 RR slight left sided back pain     40/45 LR    +Kemps: left side L5/S1  +Gillets:GILBERTO   - Slumps: negative bilaterally  Other:  Ely's: -right, -left    +Tenderness: left side L5/S1  +Muscle spasm: not apparent, slight hypertonicity of the left quadratus lumborum  +Joint asymmetry and restriction: Sacrum with extension    ASSESSMENT: Daniel Boyd is a 63 year old male presenting with primary complaints of low back pain.  Chiropractic assessment suggestive of segmental/somatic dysfunction primarily of the sacrum most likely causing a facet syndrome involving L5 and S1.  Review of patient's x-ray shows increased angle of the sacrum.  Most likely congenital.  This could be creating a situation in which performing extension motions would result in aggravation of the facet joints.  Chiropractic care does seem appropriate given today's exam findings and presentation of the patient.  Patient will also be provided with home exercises as I do believe this would also be very beneficial.  At this time there are no absolute contraindications precluding patient from receiving care today.  We did discuss alternative options including but not limited to physical therapy if chiropractic care fails to provide benefit in reducing back pain symptoms.  All questions were answered to patient's satisfaction prior to providing  treatment today.     1. Segmental and somatic dysfunction of sacral region    2. DDD (degenerative disc disease), lumbar    3. Facet syndrome, lumbar        PLAN    Evaluation and Management:  91885 Moderate exam established patient 20 min    Procedures:  Modalities:  None performed this visit    CMT:  55925 Chiropractic manipulative treatment 1-2 regions performed   Pelvis: Drop Table, Sacrum , Prone, superior contact on L4 with slight P-A and I-S traction, inferior contact with left sacral ala with slight P-A and S-I traction. Three light drops without aggravation of symptoms    Therapeutic procedures:  Cindy pose, Cat Backs, Knee to chest.    Response to Treatment  Reduction in symptoms. No patient with extension    Prognosis: Excellent    4/29/2024 Plan of Care:  up to 5 visits of Chiropractic Care including Spinal Adjustments and/or physiotherapy and active rehabilitation, to include exercises in the office and/or at home to meet care plan goals.     Frequency: 1-2xweek for up to 4 weeks. A reevaluation would be clinically appropriate in 5 visits, to determine progress and further course of care.    Initial trial of care to last for up to 2 weeks.  If symptoms fail to improve in that timeframe refer patient for physical therapy.  If patient is showing signs of progress continue with course of treatment.    Care plan is subject to change pending response to patient treatment and response to home exercises.    POC discussed and patient agreeable to plan of care.      4/29/2024 Goals:      Patient will report improved pain by 75% within 4 weeks.   Patient will report able to go fishing without painful limits.   Patient will report able to sit and perform job-related duties without painful limits.   Patient will demonstrate an improved ability to complete Activities of Daily Living  as shown by a reported 10% reduced score on  back index.    Patient will demonstrate improved pain-free ROM.        INSTRUCTIONS    stretch as instructed at visit    Follow-up:  Return to care in 4 days.

## 2024-05-02 ENCOUNTER — THERAPY VISIT (OUTPATIENT)
Dept: CHIROPRACTIC MEDICINE | Facility: OTHER | Age: 64
End: 2024-05-02
Attending: CHIROPRACTOR
Payer: COMMERCIAL

## 2024-05-02 VITALS — HEART RATE: 67 BPM | RESPIRATION RATE: 16 BRPM | TEMPERATURE: 96.7 F | OXYGEN SATURATION: 97 %

## 2024-05-02 DIAGNOSIS — M47.816 FACET SYNDROME, LUMBAR: ICD-10-CM

## 2024-05-02 DIAGNOSIS — M51.369 DDD (DEGENERATIVE DISC DISEASE), LUMBAR: ICD-10-CM

## 2024-05-02 DIAGNOSIS — M99.04 SEGMENTAL AND SOMATIC DYSFUNCTION OF SACRAL REGION: Primary | ICD-10-CM

## 2024-05-02 PROCEDURE — 98940 CHIROPRACT MANJ 1-2 REGIONS: CPT | Mod: AT | Performed by: CHIROPRACTOR

## 2024-05-02 NOTE — PROGRESS NOTES
Medial lower back is constantly sore. 5/10 W24 7/10. Doing stretches with no change in pain.  Shayna Llanos on 5/2/2024 at 11:06 AM    Reviewed by EW    Visit #:  2    Subjective:  Daniel Boyd is a 63 year old male who is seen in f/u up for:        Segmental and somatic dysfunction of sacral region  DDD (degenerative disc disease), lumbar  Facet syndrome, lumbar.     Since last visit on 4/29/2024,  Daniel Boyd reports: Symptoms did well initially after our last encounter.  Symptoms aggravated on Tuesday after patient perform child's pose stretch and came out of it too quickly.  Symptoms more central to the low back.  No other aggravation of symptoms from other exercises recommended by our office.    (DVPRS) Pain Rating Score : 5-Interrupts some activities (W24 7/10) (05/02/24 1103)     Objective:  The following was observed:  Pulse 67   Temp (!) 96.7  F (35.9  C) (Tympanic)   Resp 16   SpO2 97%      P: palpatory tenderness not reported by patient:    A: static palpation demonstrates intersegmental asymmetry , pelvis  R: motion palpation notes restricted motion, L5  and Sacrum   T: No obvious spasms, mild hypertonicity lumbar paraspinals bilaterally    Segmental spinal dysfunction/restrictions found at:  :  Sacrum Extension restriction.      Assessment: Symptoms are showing signs of progress despite mild exacerbation from home exercise.  At this time we are discontinuing child's pose stretch.  Replacing with pelvic tilt exercises.  Anticipate tapering of patient care to begin in the next week or so barring acute exacerbation of symptoms and response to home exercises.    Diagnoses:      1. Segmental and somatic dysfunction of sacral region    2. DDD (degenerative disc disease), lumbar    3. Facet syndrome, lumbar        Patient's condition:  Patient had restrictions pre-manipulation    Treatment effectiveness:  Post manipulation there is better intersegmental movement and Patient claims to feel looser post  manipulation      Procedures:  CMT:  05012 Chiropractic manipulative treatment 1-2 regions performed   Pelvis: Drop Table, Sacrum , Prone, superior hand contacting L4 with line of Drive P-A, I-S, inferior hand contacting sacral ala plan of Drive P-A, S-I 3 drops performed.    Modalities:  None performed this visit    Therapeutic procedures:  Discontinue child's pose, recommended gentle pelvic tilts in supine position.    Response to Treatment  Reduction in symptoms as reported by patient    Prognosis: Good    Progress towards Goals: Patient is making progress towards the goal.     Recommendations:    Instructions: Monitor symptoms and perform activities as tolerated.  Discontinue child's pose stretch    Follow-up:  Return to care in 5 days.

## 2024-05-09 ENCOUNTER — THERAPY VISIT (OUTPATIENT)
Dept: CHIROPRACTIC MEDICINE | Facility: OTHER | Age: 64
End: 2024-05-09
Attending: CHIROPRACTOR
Payer: COMMERCIAL

## 2024-05-09 VITALS — HEART RATE: 79 BPM | TEMPERATURE: 96.5 F | OXYGEN SATURATION: 94 % | RESPIRATION RATE: 16 BRPM

## 2024-05-09 DIAGNOSIS — M51.369 DDD (DEGENERATIVE DISC DISEASE), LUMBAR: ICD-10-CM

## 2024-05-09 DIAGNOSIS — M99.04 SEGMENTAL AND SOMATIC DYSFUNCTION OF SACRAL REGION: Primary | ICD-10-CM

## 2024-05-09 DIAGNOSIS — M47.816 FACET SYNDROME, LUMBAR: ICD-10-CM

## 2024-05-09 PROCEDURE — 98940 CHIROPRACT MANJ 1-2 REGIONS: CPT | Mod: AT | Performed by: CHIROPRACTOR

## 2024-05-09 NOTE — PROGRESS NOTES
Both hips are intermittently tight. 1/10 W24 1/10. Doing stretches which have been providing relief for back but is noticing tightness in both hips while doing them.  Shayna Llanos on 5/9/2024 at 11:40 AM    Reviewed by EW    Visit #:  3    Subjective:  Daniel Boyd is a 63 year old male who is seen in f/u up for:        Segmental and somatic dysfunction of sacral region  DDD (degenerative disc disease), lumbar  Facet syndrome, lumbar.     Since last visit on 5/2/2024,  Daniel Boyd reports: Symptoms have been doing well for the past few days.  Has been noticing when he does Back stretches this will cause some discomfort in the abdomen as well as some general lightheadedness.  Mostly noticing hip tightness.        (DVPRS) Pain Rating Score : 1-Hardly notice pain (W24 1/10) (05/09/24 1136)     Objective:  The following was observed:  Pulse 79   Temp (!) 96.5  F (35.8  C) (Tympanic)   Resp 16   SpO2 94%      P: palpatory tenderness not apparent:    A: static palpation demonstrates intersegmental asymmetry , pelvis  R: motion palpation notes restricted motion, Sacrum   T: No spasms, mild hypertonicity of the lumbar paraspinals    Segmental spinal dysfunction/restrictions found at:  :  Sacrum Left lateral flexion restricted and Extension restriction.      Assessment: Symptoms showing good improvement at this time.  Regarding our office we will plan to follow-up with patient if needed in the next couple of weeks if symptoms acutely exacerbate or have not fully resolved.  Recommend discontinuation of Back stretches.  Provided seated crosslegged hip stretch.    Consider spine therapy program if symptoms continue.    Diagnoses:      1. Segmental and somatic dysfunction of sacral region    2. DDD (degenerative disc disease), lumbar    3. Facet syndrome, lumbar        Patient's condition:  Patient had restrictions pre-manipulation    Treatment effectiveness:  Post manipulation there is better intersegmental  movement      Procedures:  CMT:  30026 Chiropractic manipulative treatment 1-2 regions performed   Pelvis: Activator, Sacrum , Prone    Modalities:  None performed this visit    Therapeutic procedures:  Discontinue cat backs, continue with knee to chest, include cross leg hip stretch    Response to Treatment  Tolerated treatment well    Prognosis: Excellent    Progress towards Goals: Patient is making progress towards the goal.     Recommendations:    Instructions: Stop Back stretches, continue with knee-to-chest as well as cross leg hip stretch    Follow-up:  Return to care if symptoms persist.

## 2024-05-20 ENCOUNTER — TELEPHONE (OUTPATIENT)
Dept: FAMILY MEDICINE | Facility: OTHER | Age: 64
End: 2024-05-20
Payer: COMMERCIAL

## 2024-05-20 DIAGNOSIS — G89.29 CHRONIC MIDLINE LOW BACK PAIN WITHOUT SCIATICA: Primary | ICD-10-CM

## 2024-05-20 DIAGNOSIS — M54.50 CHRONIC MIDLINE LOW BACK PAIN WITHOUT SCIATICA: Primary | ICD-10-CM

## 2024-05-20 DIAGNOSIS — C81.90 HODGKIN LYMPHOMA, UNSPECIFIED HODGKIN LYMPHOMA TYPE, UNSPECIFIED BODY REGION (H): ICD-10-CM

## 2024-05-20 NOTE — TELEPHONE ENCOUNTER
Pt call with cont back pain and no improvement.  Pt would like to proceed with MRI.  Has been going to chiropractor without improvement

## 2024-05-31 ENCOUNTER — HOSPITAL ENCOUNTER (OUTPATIENT)
Dept: MRI IMAGING | Facility: OTHER | Age: 64
Discharge: HOME | End: 2024-05-31
Attending: FAMILY MEDICINE | Admitting: FAMILY MEDICINE
Payer: COMMERCIAL

## 2024-05-31 DIAGNOSIS — G89.29 CHRONIC MIDLINE LOW BACK PAIN WITHOUT SCIATICA: ICD-10-CM

## 2024-05-31 DIAGNOSIS — M54.50 CHRONIC MIDLINE LOW BACK PAIN WITHOUT SCIATICA: ICD-10-CM

## 2024-05-31 DIAGNOSIS — C81.90 HODGKIN LYMPHOMA, UNSPECIFIED HODGKIN LYMPHOMA TYPE, UNSPECIFIED BODY REGION (H): ICD-10-CM

## 2024-05-31 PROCEDURE — 72148 MRI LUMBAR SPINE W/O DYE: CPT

## 2024-06-04 PROBLEM — M47.816 SPONDYLOSIS OF LUMBAR REGION WITHOUT MYELOPATHY OR RADICULOPATHY: Status: ACTIVE | Noted: 2024-06-04

## 2024-07-07 DIAGNOSIS — M54.50 CHRONIC MIDLINE LOW BACK PAIN WITHOUT SCIATICA: ICD-10-CM

## 2024-07-07 DIAGNOSIS — G89.29 CHRONIC MIDLINE LOW BACK PAIN WITHOUT SCIATICA: ICD-10-CM

## 2024-07-11 ENCOUNTER — OFFICE VISIT (OUTPATIENT)
Dept: UROLOGY | Facility: OTHER | Age: 64
End: 2024-07-11
Attending: UROLOGY
Payer: COMMERCIAL

## 2024-07-11 VITALS
BODY MASS INDEX: 19.97 KG/M2 | OXYGEN SATURATION: 98 % | WEIGHT: 164 LBS | HEART RATE: 70 BPM | HEIGHT: 76 IN | RESPIRATION RATE: 18 BRPM

## 2024-07-11 DIAGNOSIS — N13.8 BPH WITH OBSTRUCTION/LOWER URINARY TRACT SYMPTOMS: Primary | ICD-10-CM

## 2024-07-11 DIAGNOSIS — Z80.42 FAMILY HISTORY OF PROSTATE CANCER: ICD-10-CM

## 2024-07-11 DIAGNOSIS — N40.1 BPH WITH OBSTRUCTION/LOWER URINARY TRACT SYMPTOMS: Primary | ICD-10-CM

## 2024-07-11 LAB
ALBUMIN UR-MCNC: 20 MG/DL
APPEARANCE UR: CLEAR
BILIRUB UR QL STRIP: NEGATIVE
COLOR UR AUTO: YELLOW
GLUCOSE UR STRIP-MCNC: NEGATIVE MG/DL
HGB UR QL STRIP: ABNORMAL
KETONES UR STRIP-MCNC: NEGATIVE MG/DL
LEUKOCYTE ESTERASE UR QL STRIP: NEGATIVE
NITRATE UR QL: NEGATIVE
PH UR STRIP: 5.5 [PH] (ref 5–9)
SP GR UR STRIP: 1.03 (ref 1–1.03)
UROBILINOGEN UR STRIP-MCNC: 2 MG/DL

## 2024-07-11 PROCEDURE — 250N000013 HC RX MED GY IP 250 OP 250 PS 637: Performed by: UROLOGY

## 2024-07-11 PROCEDURE — 81003 URINALYSIS AUTO W/O SCOPE: CPT | Mod: ZL | Performed by: UROLOGY

## 2024-07-11 PROCEDURE — 250N000009 HC RX 250: Performed by: UROLOGY

## 2024-07-11 PROCEDURE — 52000 CYSTOURETHROSCOPY: CPT | Performed by: UROLOGY

## 2024-07-11 PROCEDURE — 99213 OFFICE O/P EST LOW 20 MIN: CPT | Mod: 25 | Performed by: UROLOGY

## 2024-07-11 PROCEDURE — 51798 US URINE CAPACITY MEASURE: CPT | Performed by: UROLOGY

## 2024-07-11 RX ORDER — TRAMADOL HYDROCHLORIDE 50 MG/1
50 TABLET ORAL EVERY 6 HOURS PRN
Qty: 25 TABLET | Refills: 0 | Status: SHIPPED | OUTPATIENT
Start: 2024-07-11 | End: 2024-08-07

## 2024-07-11 RX ORDER — LIDOCAINE HYDROCHLORIDE 20 MG/ML
JELLY TOPICAL ONCE
Status: COMPLETED | OUTPATIENT
Start: 2024-07-11 | End: 2024-07-11

## 2024-07-11 RX ADMIN — CEPHALEXIN 250 MG: 250 CAPSULE ORAL at 16:50

## 2024-07-11 RX ADMIN — LIDOCAINE HYDROCHLORIDE: 20 JELLY TOPICAL at 16:49

## 2024-07-11 ASSESSMENT — PAIN SCALES - GENERAL: PAINLEVEL: NO PAIN (0)

## 2024-07-11 NOTE — PROGRESS NOTES
Chief Complaint: Follow Up (3 month follow up enlarged prostate )  .    HPI: Mr. Daniel Boyd is a 63 year old year old male presenting today July 11, 2024 in follow up for evaluation of BPH with irritative symptoms including frequency and nocturia.    Prostate is felt to be up around 60 g based on CT imaging.    PSA has been normal although high normal and he does have a family history of prostate cancer.    He was tried on tamsulosin without improvement.  Desires further treatment.    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  No polyps seen.  Next due 2020.    COLONOSCOPY N/A 01/24/2022    F/U 2027 tubular adenoma, sigmoid diverticulosis    CYSTOSCOPY      No Comments Provided    ESOPHAGOSCOPY, GASTROSCOPY, DUODENOSCOPY (EGD), COMBINED      5/27/16,EGD    FOOT SURGERY Left 2003    SUBTALAR ATHROEREISIS, left side, Dr. Schwartz.    HERNIA REPAIR Bilateral 2002    Surgipro mesh    LYMPH NODE BIOPSY  2015       Current Outpatient Medications   Medication Sig Dispense Refill    aspirin 81 MG EC tablet Take 1 tablet (81 mg) by mouth daily      atorvastatin (LIPITOR) 40 MG tablet Take 1 tablet (40 mg) by mouth daily 90 tablet 3    fish oil-omega-3 fatty acids 1000 MG capsule Take 1 capsule by mouth daily      lisinopril (ZESTRIL) 2.5 MG tablet Take 1 tablet (2.5 mg) by mouth daily 90 tablet 3    LORazepam (ATIVAN) 1 MG tablet Take 1 tablet (1 mg) by mouth At Bedtime 90 tablet 1    Multiple Vitamins-Minerals (MULTIVITAMIN & MINERAL  "PO) Take 1 tablet by mouth every morning      omeprazole (PRILOSEC) 20 MG CR capsule Take 1 capsule (20 mg) by mouth daily 30 capsule     sertraline (ZOLOFT) 100 MG tablet Take 1 tablet (100 mg) by mouth daily 90 tablet 3    tamsulosin (FLOMAX) 0.4 MG capsule Take 1 capsule (0.4 mg) by mouth daily Watch for dizziness upon standing. 30 capsule 11    traMADol (ULTRAM) 50 MG tablet Take 1 tablet (50 mg) by mouth every 6 hours as needed for severe pain 25 tablet 0       ALLERGIES: Patient has no known allergies.     GENERAL PHYSICAL EXAM:   Vitals: Pulse 70   Resp 18   Ht 1.93 m (6' 4\")   Wt 74.4 kg (164 lb)   SpO2 98%   BMI 19.96 kg/m    Body mass index is 19.96 kg/m .    GENERAL: Well groomed, well developed, well nourished male in NAD.  ENT:  ENT exam normal  CV:  Warm extremities   RESPIRATORY: Normal respiratory effort.   GI:  Soft, NT, ND  MS: Moving all four  NEURO: Alert and oriented x 3.  PSYCH: Normal mood and affect, pleasant and agreeable during interview and exam.    :  Prostate 50 to 55 g, smooth benign    RADIOLOGY: The following tests were reviewed: None    LABS: The last test results for Mr. Daniel Boyd were reviewed:  Results for orders placed or performed in visit on 07/11/24 (from the past 24 hour(s))   Urinalysis Macroscopic   Result Value Ref Range    Color Urine Yellow Colorless, Straw, Light Yellow, Yellow    Appearance Urine Clear Clear    Glucose Urine Negative Negative mg/dL    Bilirubin Urine Negative Negative    Ketones Urine Negative Negative mg/dL    Specific Gravity Urine 1.034 (H) 1.000 - 1.030    Blood Urine Trace (A) Negative    pH Urine 5.5 5.0 - 9.0    Protein Albumin Urine 20 (A) Negative mg/dL    Urobilinogen Urine 2.0 Normal, 2.0 mg/dL    Nitrite Urine Negative Negative    Leukocyte Esterase Urine Negative Negative       PSA -   Lab Results   Component Value Date    PSA 3.87 01/08/2024    PSA 2.88 07/31/2023    PSA 3.61 01/13/2023    PSA 3.77 08/02/2021     BMP -   Recent " Labs   Lab Test 01/08/24  0926 01/13/23  0844 07/07/22  1051    140 136   POTASSIUM 4.8 4.5 4.0   CHLORIDE 103 105 101   CO2 27 27 27   BUN 24.3* 16.7 20   CR 1.27* 1.03 1.07   * 130* 108*   CHELSEA 10.2 9.5 9.5       CBC -   Recent Labs   Lab Test 01/08/24  0926 01/13/23  0844 07/07/22  1051   WBC 6.1 5.6 7.1   HGB 15.0 14.6 15.1    188 225     Male Cystoscopy Procedure Note     PRE-PROCEDURE DIAGNOSIS: BPH failing medical therapy   POST-PROCEDURE DIAGNOSIS: Same  PROCEDURE: cystoscopy    RISKS DISCUSSED:  Bleeding, infection, urethral stricture, irritative voiding symptoms, retention of urine.      DESCRIPTION OF PROCEDURE:  After informed consent was obtained, the patient was brought to the procedure room where he was placed in the supine position with all pressure points well padded.  The penis and scrotum were prepped and draped in a sterile fashion. A flexible cystoscope was introduced through a well-lubricated urethra.      FINDINGS:    Meatus: normal  Urethra: Normal  Prostate: Lateral lobe hypertrophy with kissing lateral lobes, mild median lobe with 1.5 cm protrusion into the bladder posteriorly  Bladder Neck: Moderate obstruction  Bladder: No visible tumor, stones or lesions, moderate trabeculations, several cellules  Trigone:  Normal ureters, normal efflux    The flexible cystoscope was removed and the findings were described to the patient.     FINDINGS: Findings consistent with BPH and obstruction but amenable to TURP      ASSESSMENT:   BPH with refractory lower urinary tract symptoms failing medical therapy  Family history of prostate cancer    PLAN:   Patient failing medical therapy.  Not a candidate for UroLift given his median lobe.  In addition prostate size would warrant either TURP or Rezum.    Risks of TURP explained including retrograde ejaculation, stricture disease of 10 to 12%, vesical neck contracture of 10 to 12%, EDF 5 to 7% and stress incontinence of 5 to  7%.    Explained that we can find cancer in 10% of men after final analysis and some may need treatment with radiation.  Others may just be watched.    Patient desires TURP.  He will need preop evaluation.  Surgery will be set up.    Today's visit was 30 minutes with cystoscopy.  Cystoscopy was 5 minutes of that visit time.  Moderate complexity with moderate decision making regarding surgery with the risks and benefits.        Devin Fabian MD  [facility]

## 2024-07-11 NOTE — NURSING NOTE
Patient positioned in supine position, perineum area prepped with chlorhexidene Gluconate, betadine swabs, patient draped per sterile technique. Per verbal order read back by Devin Fabian MD, Urojet 10mL 2% lidocaine jelly to be instilled into urethra.      Universal Protocol    A. Pre-procedure verification complete Yes  1-relevant information / documentation available, reviewed and properly matched to the patient; 2-consent accurate and complete, 3-equipment and supplies available    B. Site marking complete Yes  Site marked if not in continuous attendance with patient    C. TIME OUT completed Yes  Time Out was conducted just prior to starting procedure to verify the eight required elements: 1-patient identity, 2-consent accurate and complete, 3-position, 4-correct side/site marked (if applicable), 5-procedure, 6-relevant images / results properly labeled and displayed (if applicable), 7-antibiotics / irrigation fluids (if applicable), 8-safety precautions.    After procedure perineum area rinsed. Discharge instructions reviewed with patient. Patient verbalized understanding of discharge instructions and discharged ambulatory.  Kenyatta Marino RN..................7/11/2024  3:29 PM

## 2024-07-11 NOTE — TELEPHONE ENCOUNTER
Requested Prescriptions   Pending Prescriptions Disp Refills    traMADol (ULTRAM) 50 MG tablet [Pharmacy Med Name: tramadol 50 mg tablet] 25 tablet 1     Sig: Take 1 tablet (50 mg) by mouth every 6 hours as needed for severe pain       There is no refill protocol information for this order           LOV: 4/8/2024  Future Office visit:    Next 5 appointments (look out 90 days)      Jul 11, 2024 2:30 PM  (Arrive by 2:15 PM)  Return Visit with Devin Fabian MD  [facility] ([facility] ) [address]  [place] [id number] [phone]           Patient was to follow up if back pain did not improve. Patient has no followed up at this time.      Last Prescription Date:   4/8/2024   End date 4/21/2024  Last Fill Qty/Refills:         25, R-1     Routing refill request to provider for review/approval as no protocol and not active on medication list. Sent patient message that if back pain is still present that he should make an appointment to follow up with you as noted in office visit notation.    Madhavi Villalpando RN  ....................  7/11/2024   7:46 AM

## 2024-07-11 NOTE — NURSING NOTE
"Chief Complaint   Patient presents with    Follow Up     3 month follow up enlarged prostate        Initial Pulse 70   Resp 18   Ht 1.93 m (6' 4\")   Wt 74.4 kg (164 lb)   SpO2 98%   BMI 19.96 kg/m   Estimated body mass index is 19.96 kg/m  as calculated from the following:    Height as of this encounter: 1.93 m (6' 4\").    Weight as of this encounter: 74.4 kg (164 lb).  Medication Reconciliation: complete    AUA= 27  post void residual= 0 ml  Mimi Merino, [facility]    "

## 2024-07-12 DIAGNOSIS — N40.1 BPH WITH OBSTRUCTION/LOWER URINARY TRACT SYMPTOMS: Primary | ICD-10-CM

## 2024-07-12 DIAGNOSIS — N13.8 BPH WITH OBSTRUCTION/LOWER URINARY TRACT SYMPTOMS: Primary | ICD-10-CM

## 2024-07-22 DIAGNOSIS — F51.04 CHRONIC INSOMNIA: ICD-10-CM

## 2024-07-24 ENCOUNTER — THERAPY VISIT (OUTPATIENT)
Dept: PHYSICAL THERAPY | Facility: OTHER | Age: 64
End: 2024-07-24
Attending: FAMILY MEDICINE
Payer: COMMERCIAL

## 2024-07-24 DIAGNOSIS — G89.29 CHRONIC MIDLINE LOW BACK PAIN WITHOUT SCIATICA: ICD-10-CM

## 2024-07-24 DIAGNOSIS — M54.50 CHRONIC MIDLINE LOW BACK PAIN WITHOUT SCIATICA: ICD-10-CM

## 2024-07-24 DIAGNOSIS — M47.816 SPONDYLOSIS OF LUMBAR REGION WITHOUT MYELOPATHY OR RADICULOPATHY: Primary | ICD-10-CM

## 2024-07-24 PROCEDURE — 97161 PT EVAL LOW COMPLEX 20 MIN: CPT | Mod: GP

## 2024-07-24 PROCEDURE — 97110 THERAPEUTIC EXERCISES: CPT | Mod: GP

## 2024-07-24 RX ORDER — LORAZEPAM 1 MG/1
1 TABLET ORAL AT BEDTIME
Qty: 90 TABLET | Refills: 1 | Status: SHIPPED | OUTPATIENT
Start: 2024-07-24

## 2024-07-24 NOTE — TELEPHONE ENCOUNTER
lorazepam 1 mg tablet       Last Written Prescription Date:  1/18/24  Last Fill Quantity: 90,   # refills: 1  Last Office Visit: 4/8/24, this particular not discussed, but reviewed depression and medication current on med list.  Future Office visit:       Routing refill request to provider for review/approval because:  Drug not on the [facility], [facility] or [facility] refill protocol or controlled substance. Unable to complete prescription refill per RNMedication Refill Policy.................... Reba Ponce RN ....................  7/24/2024   5:43 AM

## 2024-07-25 PROBLEM — G89.29 CHRONIC MIDLINE LOW BACK PAIN WITHOUT SCIATICA: Status: ACTIVE | Noted: 2024-07-25

## 2024-07-25 PROBLEM — M54.50 CHRONIC MIDLINE LOW BACK PAIN WITHOUT SCIATICA: Status: ACTIVE | Noted: 2024-07-25

## 2024-07-25 NOTE — PROGRESS NOTES
PHYSICAL THERAPY EVALUATION  Type of Visit: Evaluation              Subjective  Patient is a 63 year old male with c/o midline LBP that has now raditaed over to the left lumbosacral area for the past 6 months. Noted onset in February, with symptoms worsening and are now constant. Pain worsens with bending forward and he avoids this motion secondary to the pain. Can be increased with sitting or laying down for sleep but feels best if stretched out laying down. Patient is usually very active playing pickleball 3-4x/week in the winter months and golfing in the summer. He has not been able to golf due to the pain with bending down and can only play pickleball every 1-2 weeks now. He works mowing for the golf course and reports increased pain after doing so, today 6/10. He is better if he is up and moving around for the most part. Heat can be helpful.  Patient has had imaging done. Denies leg symptoms.        Presenting condition or subjective complaint: Midline to left sided LBP  Date of onset: 02/01/24    Relevant medical history:  Reviewed in the EMR     Prior diagnostic imaging/testing results: MRI   MRI NOTE: multilevel degenerative changes of the lumbar spine. There is a mild symmetric disc bulge with a shallow superimposed left paracentral disc protrusion at L2-L3 which contacts the descending left L3 nerve roots in the left subarticular recess.    Prior therapy history for the same diagnosis, illness or injury: Yes Three visits to chiropractor.    Living Environment  Social support: With a significant other or spouse   Type of home: House; Basement   Stairs to enter the home: Yes 12 Is there a railing: Yes     Ramp: No   Stairs inside the home: Yes 1 Is there a railing: Yes     Help at home: None     Employment: Yes Seasonal golf course maintenance.  Hobbies/Interests: Golf, biking, fishing, hunting, reading.    Patient goals for therapy: Alleviate the pain, so I can return to normal physical activities.    Pain  assessment: Location: low back midline to left/Ratin/10     Objective   LUMBAR SPINE EVALUATION  PAIN: Pain Level at Rest: 3/10  Pain Level with Use: 8/10  Pain Location: midline low back to left lumbosacral junction  Pain Quality: Aching and Sharp  Pain Frequency: constant  Pain is Exacerbated By: sitting, lifting, carrying, bending, certain positions, household and work tasks. Transitional movements  Pain is Relieved By: heat  INTEGUMENTARY (edema, incisions): WNL  POSTURE: WFL  BALANCE/PROPRIOCEPTION: WNL  ROM:  AROM Lumbar WFL. Slight Right hip flex decrease in supine PROM  PELVIC/SI SCREEN: Lidia (March): L mild hypomobility  STRENGTH: WFL for lower extremities  NEURAL TENSION: Lumbar WNL  FLEXIBILITY:  tight left hamstring, piriformis   Supine 90/90 felt relieving, educated on spinal anatomy.    Assessment & Plan   CLINICAL IMPRESSIONS  Medical Diagnosis: Chronic midline low back pain without sciatica, Spondylosis of lumbar region without myelopathy or radiculopathy    Treatment Diagnosis: Chronic midline low back pain without sciatica, Spondylosis of lumbar region without myelopathy or radiculopathy   Impression/Assessment: Patient is a 63 year old male with midline low back pain complaints, with lumbar degenerative changes, suspect some SI involvement mildly.  The following significant findings have been identified: Pain, Decreased ROM/flexibility, and Decreased activity tolerance. These impairments interfere with their ability to perform self care tasks, work tasks, recreational activities, and household chores as compared to previous level of function.     Clinical Decision Making (Complexity):  Clinical Presentation: Stable/Uncomplicated  Clinical Presentation Rationale: based on medical and personal factors listed in PT evaluation  Clinical Decision Making (Complexity): Low complexity    PLAN OF CARE  Treatment Interventions:  Interventions: Manual Therapy, Neuromuscular Re-education, Therapeutic  Exercise    Long Term Goals     PT Goal 1  Goal Identifier: Pain-STG riding mower  Goal Description: Patient will report pain no more then a 4/10 after mowing lawn to perform with less pain.  Target Date: 08/14/24  PT Goal 2  Goal Identifier: Bending/Mobility  Goal Description: Patient will be able to bend forward to  objects off the floor without pain limiting return to his previous level of function.  Target Date: 09/04/24  PT Goal 3  Goal Identifier: Pain  Goal Description: Patient reports 0/10 pain allowing him to sit and lay and rest complete without pain symptoms present.  Target Date: 09/04/24      Frequency of Treatment: 2x/week  Duration of Treatment: 6 weeks    Education Assessment:   Learner/Method: Patient;Listening;Demonstration    Risks and benefits of evaluation/treatment have been explained.   Patient/Family/caregiver agrees with Plan of Care.     Evaluation Time:     PT Eval, Low Complexity Minutes (87211): 25       Signing Clinician: Cecily Spencer, PT

## 2024-07-30 ENCOUNTER — THERAPY VISIT (OUTPATIENT)
Dept: PHYSICAL THERAPY | Facility: OTHER | Age: 64
End: 2024-07-30
Attending: FAMILY MEDICINE
Payer: COMMERCIAL

## 2024-07-30 DIAGNOSIS — G89.29 CHRONIC MIDLINE LOW BACK PAIN WITHOUT SCIATICA: ICD-10-CM

## 2024-07-30 DIAGNOSIS — M54.50 CHRONIC MIDLINE LOW BACK PAIN WITHOUT SCIATICA: ICD-10-CM

## 2024-07-30 DIAGNOSIS — M47.816 SPONDYLOSIS OF LUMBAR REGION WITHOUT MYELOPATHY OR RADICULOPATHY: Primary | ICD-10-CM

## 2024-07-30 PROCEDURE — 97110 THERAPEUTIC EXERCISES: CPT | Mod: GP

## 2024-08-04 DIAGNOSIS — M54.50 CHRONIC MIDLINE LOW BACK PAIN WITHOUT SCIATICA: ICD-10-CM

## 2024-08-04 DIAGNOSIS — G89.29 CHRONIC MIDLINE LOW BACK PAIN WITHOUT SCIATICA: ICD-10-CM

## 2024-08-06 ENCOUNTER — THERAPY VISIT (OUTPATIENT)
Dept: PHYSICAL THERAPY | Facility: OTHER | Age: 64
End: 2024-08-06
Attending: FAMILY MEDICINE
Payer: COMMERCIAL

## 2024-08-06 DIAGNOSIS — M47.816 SPONDYLOSIS OF LUMBAR REGION WITHOUT MYELOPATHY OR RADICULOPATHY: Primary | ICD-10-CM

## 2024-08-06 DIAGNOSIS — M54.50 CHRONIC MIDLINE LOW BACK PAIN WITHOUT SCIATICA: ICD-10-CM

## 2024-08-06 DIAGNOSIS — G89.29 CHRONIC MIDLINE LOW BACK PAIN WITHOUT SCIATICA: ICD-10-CM

## 2024-08-06 PROCEDURE — 97110 THERAPEUTIC EXERCISES: CPT | Mod: GP

## 2024-08-06 NOTE — TELEPHONE ENCOUNTER
[facility] sent Rx request for the following:      Requested Prescriptions   Pending Prescriptions Disp Refills    traMADol (ULTRAM) 50 MG tablet [Pharmacy Med Name: tramadol 50 mg tablet] 25 tablet 0     Sig: Take 1 tablet (50 mg) by mouth every 6 hours as needed for severe pain       There is no refill protocol information for this order        Last Prescription Date:     traMADol (ULTRAM) 50 MG tablet 25 tablet 0 7/11/2024 7/24/2024 No   Sig - Route: Take 1 tablet (50 mg) by mouth every 6 hours as needed for severe pain - Oral     Last Office Visit:              4/8/24 (back pain)  Future Office visit:           10/28/24     Unable to complete prescription refill per RN Medication Refill Policy. Delilah Mckeon RN .............. 8/6/2024  4:10 PM

## 2024-08-07 RX ORDER — TRAMADOL HYDROCHLORIDE 50 MG/1
50 TABLET ORAL EVERY 6 HOURS PRN
Qty: 25 TABLET | Refills: 0 | Status: SHIPPED | OUTPATIENT
Start: 2024-08-07 | End: 2024-08-20

## 2024-08-08 ENCOUNTER — THERAPY VISIT (OUTPATIENT)
Dept: PHYSICAL THERAPY | Facility: OTHER | Age: 64
End: 2024-08-08
Attending: FAMILY MEDICINE
Payer: COMMERCIAL

## 2024-08-08 DIAGNOSIS — M54.50 CHRONIC MIDLINE LOW BACK PAIN WITHOUT SCIATICA: ICD-10-CM

## 2024-08-08 DIAGNOSIS — M47.816 SPONDYLOSIS OF LUMBAR REGION WITHOUT MYELOPATHY OR RADICULOPATHY: Primary | ICD-10-CM

## 2024-08-08 DIAGNOSIS — G89.29 CHRONIC MIDLINE LOW BACK PAIN WITHOUT SCIATICA: ICD-10-CM

## 2024-08-08 PROCEDURE — 97110 THERAPEUTIC EXERCISES: CPT | Mod: GP

## 2024-08-12 DIAGNOSIS — I25.10 CORONARY ARTERY DISEASE INVOLVING NATIVE CORONARY ARTERY OF NATIVE HEART WITHOUT ANGINA PECTORIS: ICD-10-CM

## 2024-08-13 ENCOUNTER — THERAPY VISIT (OUTPATIENT)
Dept: PHYSICAL THERAPY | Facility: OTHER | Age: 64
End: 2024-08-13
Attending: FAMILY MEDICINE
Payer: COMMERCIAL

## 2024-08-13 DIAGNOSIS — M47.816 SPONDYLOSIS OF LUMBAR REGION WITHOUT MYELOPATHY OR RADICULOPATHY: Primary | ICD-10-CM

## 2024-08-13 DIAGNOSIS — G89.29 CHRONIC MIDLINE LOW BACK PAIN WITHOUT SCIATICA: ICD-10-CM

## 2024-08-13 DIAGNOSIS — M54.50 CHRONIC MIDLINE LOW BACK PAIN WITHOUT SCIATICA: ICD-10-CM

## 2024-08-13 PROCEDURE — 97110 THERAPEUTIC EXERCISES: CPT | Mod: GP

## 2024-08-14 RX ORDER — ATORVASTATIN CALCIUM 40 MG/1
40 TABLET, FILM COATED ORAL DAILY
Qty: 90 TABLET | Refills: 2 | Status: SHIPPED | OUTPATIENT
Start: 2024-08-14

## 2024-08-14 NOTE — TELEPHONE ENCOUNTER
[facility] sent Rx request for the following:      Requested Prescriptions   Pending Prescriptions Disp Refills    atorvastatin (LIPITOR) 40 MG tablet [Pharmacy Med Name: atorvastatin 40 mg tablet] 90 tablet 3     Sig: Take 1 tablet (40 mg) by mouth daily       Antihyperlipidemic agents Failed - 8/14/2024  9:43 AM        Failed - LDL on file in the past 12 months     Last Prescription Date:   8/21/23  Last Fill Qty/Refills:         90, R-3    Last Office Visit:                4/8/24 7/31/23 (CAD discussed)    Future Office visit:           10/28/24    Unable to complete prescription refill per RN Medication Refill Policy. Delilah Mckeon RN .............. 8/14/2024  9:44 AM

## 2024-09-05 ENCOUNTER — HOSPITAL ENCOUNTER (OUTPATIENT)
Dept: GENERAL RADIOLOGY | Facility: OTHER | Age: 64
Discharge: HOME | End: 2024-09-05
Attending: FAMILY MEDICINE | Admitting: FAMILY MEDICINE
Payer: MEDICARE

## 2024-09-05 DIAGNOSIS — M47.816 SPONDYLOSIS OF LUMBAR REGION WITHOUT MYELOPATHY OR RADICULOPATHY: ICD-10-CM

## 2024-09-05 PROCEDURE — 62323 NJX INTERLAMINAR LMBR/SAC: CPT

## 2024-09-05 PROCEDURE — 250N000009 HC RX 250: Performed by: RADIOLOGY

## 2024-09-05 PROCEDURE — 255N000002 HC RX 255 OP 636: Performed by: RADIOLOGY

## 2024-09-05 PROCEDURE — 250N000011 HC RX IP 250 OP 636: Performed by: RADIOLOGY

## 2024-09-05 RX ORDER — DEXAMETHASONE SODIUM PHOSPHATE 10 MG/ML
10 INJECTION, SOLUTION INTRAMUSCULAR; INTRAVENOUS ONCE
Status: COMPLETED | OUTPATIENT
Start: 2024-09-05 | End: 2024-09-05

## 2024-09-05 RX ORDER — LIDOCAINE HYDROCHLORIDE 10 MG/ML
20 INJECTION, SOLUTION INFILTRATION; PERINEURAL ONCE
Status: COMPLETED | OUTPATIENT
Start: 2024-09-05 | End: 2024-09-05

## 2024-09-05 RX ORDER — LIDOCAINE HYDROCHLORIDE 10 MG/ML
2 INJECTION, SOLUTION EPIDURAL; INFILTRATION; INTRACAUDAL; PERINEURAL ONCE
Status: COMPLETED | OUTPATIENT
Start: 2024-09-05 | End: 2024-09-05

## 2024-09-05 RX ADMIN — DEXAMETHASONE SODIUM PHOSPHATE 10 MG: 10 INJECTION, SOLUTION INTRAMUSCULAR; INTRAVENOUS at 10:26

## 2024-09-05 RX ADMIN — LIDOCAINE HYDROCHLORIDE 3 ML: 10 INJECTION, SOLUTION INFILTRATION; PERINEURAL at 10:27

## 2024-09-05 RX ADMIN — IOHEXOL 1 ML: 240 INJECTION, SOLUTION INTRATHECAL; INTRAVASCULAR; INTRAVENOUS; ORAL at 10:27

## 2024-09-05 RX ADMIN — LIDOCAINE HYDROCHLORIDE 2 ML: 10 INJECTION, SOLUTION EPIDURAL; INFILTRATION; INTRACAUDAL; PERINEURAL at 10:26

## 2024-09-09 ENCOUNTER — NURSE TRIAGE (OUTPATIENT)
Dept: FAMILY MEDICINE | Facility: OTHER | Age: 64
End: 2024-09-09
Payer: COMMERCIAL

## 2024-09-09 DIAGNOSIS — M54.50 CHRONIC MIDLINE LOW BACK PAIN WITHOUT SCIATICA: ICD-10-CM

## 2024-09-09 DIAGNOSIS — G89.29 CHRONIC MIDLINE LOW BACK PAIN WITHOUT SCIATICA: ICD-10-CM

## 2024-09-09 NOTE — TELEPHONE ENCOUNTER
"LIANA Rogel, NP -     S: Patient is requesting to see a provider tomorrow around 1:00-2:00 PM and you have a provider-add at 1:40 PM.     B: Patient developed overnight chills and the sweats two days ago. Yesterday he coughed up blood-tinged sputum twice. Today he coughed up a glob of blood which was maybe 1/2 tsp worth. He does have left-lung-sided pressure but it is not painful unless he takes a deep breath. He has a history of cancer/chemo and states he has scar tissue on the left lung that they have been monitoring without progression for years.     A: Patient states he does not think he is running a fever.     R: Patient states he feels stable and prefers an appointment. PCP does not have an appointment during the time frame he is available. Are you comfortable seeing him tomorrow afternoon?     Pamela Dsouza RN on 9/9/2024 at 3:40 PM      Reason for Disposition   Chest pain    Additional Information   Negative: SEVERE difficulty breathing (e.g., struggling for each breath, speaks in single words)   Negative: Chest pain and difficulty breathing   Negative: Bluish (or gray) lips or face now   Negative: Passed out (i.e., lost consciousness, collapsed and was not responding)   Negative: Shock suspected (e.g., cold/pale/clammy skin, too weak to stand, low BP, rapid pulse)   Negative: Difficult to awaken or acting confused (e.g., disoriented, slurred speech)   Negative: Recent chest injury (i.e., past 24 hours)   Negative: Coughed up blood and large amount (Such as: 'a half cup of blood')   Negative: Sounds like a life-threatening emergency to the triager   Negative: MODERATE difficulty breathing (e.g., speaks in phrases, SOB even at rest, pulse 100-120) and still present when not coughing    Answer Assessment - Initial Assessment Questions  1. ONSET: \"When did the cough begin?\"       Has not had much of a cough, started Saturday evening with chills and the sweats during the last two nights.     2. SEVERITY: \"How bad " "is the cough today?\" \"Did the blood appear after a coughing spell?\"       Not coughing typically     3. SPUTUM: \"Describe the color of your sputum\" (none, dry cough; clear, white, yellow, green)      A couple had blood tinge, but now had a glob like 1/2 tsp amount.     4. HEMOPTYSIS: \"How much blood?\" (flecks, streaks, tablespoons, etc.)      As above     5. DIFFICULTY BREATHING: \"Are you having difficulty breathing?\" If Yes, ask: \"How bad is it?\" (e.g., mild, moderate, severe)     - MILD: No SOB at rest, mild SOB with walking, speaks normally in sentences, can lie down, no retractions, pulse < 100.     - MODERATE: SOB at rest, SOB with minimal exertion and prefers to sit, cannot lie down flat, speaks in phrases, mild retractions, audible wheezing, pulse 100-120.     - SEVERE: Very SOB at rest, speaks in single words, struggling to breathe, sitting hunched forward, retractions, pulse > 120       Feels sharpness and pain on the left side, a little short of breath     6. FEVER: \"Do you have a fever?\" If Yes, ask: \"What is your temperature, how was it measured, and when did it start?\"      Denies     7. CARDIAC HISTORY: \"Do you have any history of heart disease?\" (e.g., heart attack, congestive heart failure)       Family history present     8. 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 PE RISK FACTORS: \"Do you have a history of blood clots?\" (or: recent major surgery, recent prolonged travel, bedridden)      Denies     10. OTHER SYMPTOMS: \"Do you have any other symptoms?\" (e.g., runny nose, wheezing, chest pain)        Denies     11. PREGNANCY: \"Is there any chance you are pregnant?\" \"When was your last menstrual period?\"        No     12. TRAVEL: \"Have you traveled out of the country in " "the last month?\" (e.g., travel history, exposures)        No    Protocols used: Coughing Up Blood-A-[place]    "

## 2024-09-09 NOTE — TELEPHONE ENCOUNTER
Patient states he is unable to make a morning appointment work. He intends to call tomorrow morning to check for openings. Pamela Dsouza, RN on 9/9/2024 at 4:22 PM      Tamara Paulino APRN CNP  You14 minutes ago (4:02 PM)     LAUREEN Santiago is out today. I am able to add him into my schedule tomorrow for the provider add in space if he is available then. If symptoms worsen in the meantime he should be seen in ED.  WILLIE RUSH CNP on 9/9/2024 at 4:02 PM

## 2024-09-10 ENCOUNTER — HOSPITAL ENCOUNTER (OUTPATIENT)
Dept: GENERAL RADIOLOGY | Facility: OTHER | Age: 64
Discharge: HOME | End: 2024-09-10
Attending: FAMILY MEDICINE
Payer: MEDICARE

## 2024-09-10 ENCOUNTER — OFFICE VISIT (OUTPATIENT)
Dept: FAMILY MEDICINE | Facility: OTHER | Age: 64
End: 2024-09-10
Attending: FAMILY MEDICINE
Payer: MEDICARE

## 2024-09-10 VITALS
SYSTOLIC BLOOD PRESSURE: 100 MMHG | TEMPERATURE: 98.1 F | BODY MASS INDEX: 19.16 KG/M2 | WEIGHT: 157.4 LBS | RESPIRATION RATE: 20 BRPM | HEART RATE: 72 BPM | DIASTOLIC BLOOD PRESSURE: 64 MMHG | OXYGEN SATURATION: 92 %

## 2024-09-10 DIAGNOSIS — M47.816 SPONDYLOSIS OF LUMBAR REGION WITHOUT MYELOPATHY OR RADICULOPATHY: ICD-10-CM

## 2024-09-10 DIAGNOSIS — R05.1 ACUTE COUGH: ICD-10-CM

## 2024-09-10 DIAGNOSIS — R05.1 ACUTE COUGH: Primary | ICD-10-CM

## 2024-09-10 LAB
FLUAV RNA SPEC QL NAA+PROBE: NEGATIVE
FLUBV RNA RESP QL NAA+PROBE: NEGATIVE
RSV RNA SPEC NAA+PROBE: NEGATIVE
SARS-COV-2 RNA RESP QL NAA+PROBE: NEGATIVE

## 2024-09-10 PROCEDURE — G2211 COMPLEX E/M VISIT ADD ON: HCPCS | Performed by: FAMILY MEDICINE

## 2024-09-10 PROCEDURE — 99213 OFFICE O/P EST LOW 20 MIN: CPT | Performed by: FAMILY MEDICINE

## 2024-09-10 PROCEDURE — 87637 SARSCOV2&INF A&B&RSV AMP PRB: CPT | Mod: ZL | Performed by: FAMILY MEDICINE

## 2024-09-10 PROCEDURE — G0463 HOSPITAL OUTPT CLINIC VISIT: HCPCS | Mod: 25

## 2024-09-10 PROCEDURE — 71046 X-RAY EXAM CHEST 2 VIEWS: CPT

## 2024-09-10 RX ORDER — AZITHROMYCIN 250 MG/1
TABLET, FILM COATED ORAL
Qty: 6 TABLET | Refills: 0 | Status: SHIPPED | OUTPATIENT
Start: 2024-09-10 | End: 2024-09-15

## 2024-09-10 RX ORDER — TRAMADOL HYDROCHLORIDE 50 MG/1
50 TABLET ORAL EVERY 6 HOURS PRN
Qty: 30 TABLET | Refills: 2 | Status: SHIPPED | OUTPATIENT
Start: 2024-09-10 | End: 2024-10-03

## 2024-09-10 ASSESSMENT — PATIENT HEALTH QUESTIONNAIRE - PHQ9
SUM OF ALL RESPONSES TO PHQ QUESTIONS 1-9: 2
10. IF YOU CHECKED OFF ANY PROBLEMS, HOW DIFFICULT HAVE THESE PROBLEMS MADE IT FOR YOU TO DO YOUR WORK, TAKE CARE OF THINGS AT HOME, OR GET ALONG WITH OTHER PEOPLE: NOT DIFFICULT AT ALL
SUM OF ALL RESPONSES TO PHQ QUESTIONS 1-9: 2

## 2024-09-10 ASSESSMENT — PAIN SCALES - GENERAL: PAINLEVEL: MODERATE PAIN (5)

## 2024-09-10 NOTE — NURSING NOTE
Patient here for cough, chills night sweats sore left side since Saturday night. He needs a refill on his Tramadol for back pain as well. Medication Reconciliation: complete.    Dedra Liu LPN  9/10/2024 10:47 AM

## 2024-09-10 NOTE — LETTER

## 2024-09-11 ASSESSMENT — ENCOUNTER SYMPTOMS: COUGH: 1

## 2024-09-11 NOTE — PROGRESS NOTES
SUBJECTIVE:   Daniel Boyd is a 63 year old male who presents to clinic today for the following health issues: Cough fevers    Patient arrives here for cough fevers.  Is been going on since Saturday night.  Also reports chills.  He did have some bloody sputum present.  Also requesting a refill of his tramadol.  Recently underwent an epidural.  He is complaining of left lower chest pain.    Cough    History of Present Illness       Reason for visit:  Flu like symtoms  Symptom onset:  3-7 days ago  Symptoms include:  Chest tightness. some coughing with bloody sputum.  Symptom intensity:  Moderate  Symptom progression:  Staying the same  Had these symptoms before:  Yes  Has tried/received treatment for these symptoms:  Yes  Previous treatment was successful:  Yes  Prior treatment description:  Antibiotics  What makes it worse:  No  What makes it better:  Na   He is taking medications regularly.        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) 12/15/2020     Priority: Medium    Coronary artery disease involving native coronary artery of native heart without angina pectoris 05/10/2018     Priority: Medium     CTA 2017 showing non obstructive disease      Bleomycin lung toxicity 02/15/2017     Priority: Medium    Hyperlipidemia 02/15/2017     Priority: Medium    Gastritis 05/27/2016     Priority: Medium    Schatzki's ring 05/27/2016     Priority: Medium    Peripheral neuropathy 05/03/2016     Priority: Medium    Tremor 05/03/2016     Priority: Medium    Hodgkin lymphoma (H) 08/25/2015     Priority: Medium    FHx: prostate cancer 09/30/2014     Priority: Medium      Overview:   Brother at 40   Father at 60      Chronic insomnia 2011     Priority: Medium    Major depressive disorder, recurrent episode, mild (H24) 2010     Priority: Medium     Past Medical History:   Diagnosis Date    Bleomycin lung toxicity     Colonic adenoma     Depression     Family history of prostate cancer     Hodgkin lymphoma (H)     2015,Stage 4, s/p 6 cycles ABVD    Hyperlipidemia     No Comments Provided    Insomnia     Peripheral neuropathy due to chemotherapy (H24)     No Comments Provided    Schatzki's ring     Tremor     No Comments Provided      Past Surgical History:   Procedure Laterality Date    APPENDECTOMY OPEN      No Comments Provided    COLONOSCOPY  2010    12/30/10,Normal.  No polyps seen.  Next due .    COLONOSCOPY N/A 2022    F/U  tubular adenoma, sigmoid diverticulosis    CYSTOSCOPY      No Comments Provided    ESOPHAGOSCOPY, GASTROSCOPY, DUODENOSCOPY (EGD), COMBINED      16,EGD    FOOT SURGERY Left     SUBTALAR ATHROEREISIS, left side, Dr. Schwartz.    HERNIA REPAIR Bilateral     Surgipro mesh    LYMPH NODE BIOPSY       Family History   Problem Relation Age of Onset    Prostate Cancer Father         Also had a CABG and  during open heart surgery, .    Breast Cancer Mother     Family History Negative Sister         Good Health    Family History Negative Sister         Good Health    Prostate Cancer Brother         Cancer-prostate,Age 47.    Heart Disease Brother        Review of Systems   Respiratory:  Positive for cough.         OBJECTIVE:     /64   Pulse 72   Temp 98.1  F (36.7  C)   Resp 20   Wt 71.4 kg (157 lb 6.4 oz)   SpO2 92%   BMI 19.16 kg/m    Body mass index is 19.16 kg/m .  Physical Exam  Constitutional:       Comments: Sick in appearance nontoxic   Cardiovascular:      Rate and Rhythm: Normal rate.   Pulmonary:      Effort: Pulmonary effort is normal.   Neurological:      Mental Status: He is alert.    Psychiatric:         Mood and Affect: Mood normal.         Diagnostic Test Results:  Results for orders placed or performed in visit on 09/10/24 (from the past 24 hour(s))   Symptomatic Influenza A/B, RSV, & SARS-CoV2 PCR (COVID-19) Nose    Specimen: Nose; Swab   Result Value Ref Range    Influenza A PCR Negative Negative    Influenza B PCR Negative Negative    RSV PCR Negative Negative    SARS CoV2 PCR Negative Negative    Narrative    Testing was performed using the Xpert Xpress CoV2/Flu/RSV Assay on the Cepheid GeneXpert Instrument. This test should be ordered for the detection of SARS-CoV2, influenza, and RSV viruses in individuals with signs and symptoms of respiratory tract infection. This test is for in vitro diagnostic use under the [place] FDA for laboratories certified under CLIA to perform high or moderate complexity testing. This test has been [place] FDA cleared. A negative result does not rule out the presence of PCR inhibitors in the specimen or target RNA in concentration below the limit of detection for the assay. If only one viral target is positive but coinfection with multiple targets is suspected, the sample should be re-tested with another FDA cleared, approved, or authorized test, if coninfection would change clinical management. This test was validated by the [facility]. These laboratories are certified under the Clinical Laboratory Improvement Amendments of 1988 (CLIA-88) as qualified to perfom high complexity laboratory testing.       ASSESSMENT/PLAN:         (R05.1) Acute cough  (primary encounter diagnosis) likely viral  Comment: X-ray reports not done prior to patient leaving.  There is questionable infiltrate on the left lower lung field.  Patient advised to start Zithromax if infiltrate was present.  Otherwise just to monitor.  Plan: XR Chest 2 Views, Symptomatic Influenza A/B,         RSV, & SARS-CoV2 PCR (COVID-19) Nose,         azithromycin (ZITHROMAX) 250 MG tablet,          CANCELED: Symptomatic Influenza A/B, RSV, &         SARS-CoV2 PCR (COVID-19)            (M47.816) Spondylosis of lumbar region without myelopathy or radiculopathy  Comment:   Plan: traMADol (ULTRAM) 50 MG tablet        Refill.  Narcotic contract updated.  The longitudinal plan of care for the diagnosis(es)/condition(s) as documented were addressed during this visit. Due to the added complexity in care, I will continue to support Daniel in the subsequent management and with ongoing continuity of care.      Avelino Luque MD  [facility]

## 2024-09-12 RX ORDER — TRAMADOL HYDROCHLORIDE 50 MG/1
50 TABLET ORAL EVERY 6 HOURS PRN
Qty: 25 TABLET | Refills: 0 | OUTPATIENT
Start: 2024-09-12

## 2024-09-12 NOTE — TELEPHONE ENCOUNTER
[facility] in #[id number] in [facility] [place] sent Rx request for the following:      Requested Prescriptions   Pending Prescriptions Disp Refills    traMADol (ULTRAM) 50 MG tablet 25 tablet 0     Sig: Take 1 tablet (50 mg) by mouth every 6 hours as needed for severe pain.     Approved 9/10/24. Pharmacy notified. Delilah Mckeon RN .............. 9/12/2024  9:16 AM

## 2024-09-16 ENCOUNTER — TELEPHONE (OUTPATIENT)
Dept: GENERAL RADIOLOGY | Facility: OTHER | Age: 64
End: 2024-09-16
Payer: COMMERCIAL

## 2024-10-18 DIAGNOSIS — N13.8 BPH WITH OBSTRUCTION/LOWER URINARY TRACT SYMPTOMS: Primary | ICD-10-CM

## 2024-10-18 DIAGNOSIS — N40.1 BPH WITH OBSTRUCTION/LOWER URINARY TRACT SYMPTOMS: Primary | ICD-10-CM

## 2024-10-28 ENCOUNTER — OFFICE VISIT (OUTPATIENT)
Dept: FAMILY MEDICINE | Facility: OTHER | Age: 64
End: 2024-10-28
Attending: FAMILY MEDICINE
Payer: COMMERCIAL

## 2024-10-28 VITALS
TEMPERATURE: 98.4 F | RESPIRATION RATE: 20 BRPM | HEIGHT: 76 IN | SYSTOLIC BLOOD PRESSURE: 124 MMHG | HEART RATE: 44 BPM | OXYGEN SATURATION: 94 % | WEIGHT: 160 LBS | BODY MASS INDEX: 19.48 KG/M2 | DIASTOLIC BLOOD PRESSURE: 76 MMHG

## 2024-10-28 DIAGNOSIS — N40.1 BPH WITH OBSTRUCTION/LOWER URINARY TRACT SYMPTOMS: ICD-10-CM

## 2024-10-28 DIAGNOSIS — I25.10 CORONARY ARTERY DISEASE INVOLVING NATIVE CORONARY ARTERY OF NATIVE HEART WITHOUT ANGINA PECTORIS: ICD-10-CM

## 2024-10-28 DIAGNOSIS — Z01.818 PRE-OP EXAM: ICD-10-CM

## 2024-10-28 DIAGNOSIS — N40.1 BENIGN PROSTATIC HYPERPLASIA WITH URINARY HESITANCY: Primary | ICD-10-CM

## 2024-10-28 DIAGNOSIS — R39.11 BENIGN PROSTATIC HYPERPLASIA WITH URINARY HESITANCY: Primary | ICD-10-CM

## 2024-10-28 DIAGNOSIS — N13.8 BPH WITH OBSTRUCTION/LOWER URINARY TRACT SYMPTOMS: ICD-10-CM

## 2024-10-28 LAB
ALBUMIN UR-MCNC: 10 MG/DL
ANION GAP SERPL CALCULATED.3IONS-SCNC: 7 MMOL/L (ref 7–15)
APPEARANCE UR: CLEAR
BILIRUB UR QL STRIP: NEGATIVE
BUN SERPL-MCNC: 13.3 MG/DL (ref 8–23)
CALCIUM SERPL-MCNC: 9 MG/DL (ref 8.8–10.4)
CHLORIDE SERPL-SCNC: 106 MMOL/L (ref 98–107)
COLOR UR AUTO: ABNORMAL
CREAT SERPL-MCNC: 1.07 MG/DL (ref 0.67–1.17)
EGFRCR SERPLBLD CKD-EPI 2021: 77 ML/MIN/1.73M2
GLUCOSE SERPL-MCNC: 101 MG/DL (ref 70–99)
GLUCOSE UR STRIP-MCNC: NEGATIVE MG/DL
HCO3 SERPL-SCNC: 26 MMOL/L (ref 22–29)
HGB UR QL STRIP: NEGATIVE
KETONES UR STRIP-MCNC: NEGATIVE MG/DL
LEUKOCYTE ESTERASE UR QL STRIP: NEGATIVE
MUCOUS THREADS #/AREA URNS LPF: PRESENT /LPF
NITRATE UR QL: NEGATIVE
PH UR STRIP: 5.5 [PH] (ref 5–9)
POTASSIUM SERPL-SCNC: 3.9 MMOL/L (ref 3.4–5.3)
RBC URINE: <1 /HPF
SODIUM SERPL-SCNC: 139 MMOL/L (ref 135–145)
SP GR UR STRIP: 1.02 (ref 1–1.03)
UROBILINOGEN UR STRIP-MCNC: NORMAL MG/DL
WBC URINE: <1 /HPF

## 2024-10-28 PROCEDURE — G0463 HOSPITAL OUTPT CLINIC VISIT: HCPCS | Mod: 25,27

## 2024-10-28 PROCEDURE — 93010 ELECTROCARDIOGRAM REPORT: CPT | Performed by: INTERNAL MEDICINE

## 2024-10-28 PROCEDURE — 93005 ELECTROCARDIOGRAM TRACING: CPT | Performed by: FAMILY MEDICINE

## 2024-10-28 PROCEDURE — G0008 ADMIN INFLUENZA VIRUS VAC: HCPCS

## 2024-10-28 PROCEDURE — 81001 URINALYSIS AUTO W/SCOPE: CPT | Mod: ZL | Performed by: FAMILY MEDICINE

## 2024-10-28 PROCEDURE — G0463 HOSPITAL OUTPT CLINIC VISIT: HCPCS | Mod: 25

## 2024-10-28 PROCEDURE — 80048 BASIC METABOLIC PNL TOTAL CA: CPT | Mod: ZL | Performed by: FAMILY MEDICINE

## 2024-10-28 PROCEDURE — 87086 URINE CULTURE/COLONY COUNT: CPT | Mod: ZL | Performed by: FAMILY MEDICINE

## 2024-10-28 PROCEDURE — G2211 COMPLEX E/M VISIT ADD ON: HCPCS | Performed by: FAMILY MEDICINE

## 2024-10-28 PROCEDURE — 82310 ASSAY OF CALCIUM: CPT | Mod: ZL | Performed by: FAMILY MEDICINE

## 2024-10-28 PROCEDURE — 36415 COLL VENOUS BLD VENIPUNCTURE: CPT | Mod: ZL | Performed by: FAMILY MEDICINE

## 2024-10-28 PROCEDURE — 99214 OFFICE O/P EST MOD 30 MIN: CPT | Performed by: FAMILY MEDICINE

## 2024-10-28 RX ORDER — TRAMADOL HYDROCHLORIDE 50 MG/1
50 TABLET ORAL EVERY 6 HOURS PRN
Status: ON HOLD | COMMUNITY
End: 2024-11-06

## 2024-10-28 ASSESSMENT — PATIENT HEALTH QUESTIONNAIRE - PHQ9
SUM OF ALL RESPONSES TO PHQ QUESTIONS 1-9: 0
SUM OF ALL RESPONSES TO PHQ QUESTIONS 1-9: 0
10. IF YOU CHECKED OFF ANY PROBLEMS, HOW DIFFICULT HAVE THESE PROBLEMS MADE IT FOR YOU TO DO YOUR WORK, TAKE CARE OF THINGS AT HOME, OR GET ALONG WITH OTHER PEOPLE: NOT DIFFICULT AT ALL

## 2024-10-28 ASSESSMENT — PAIN SCALES - GENERAL: PAINLEVEL_OUTOF10: NO PAIN (0)

## 2024-10-28 NOTE — PROGRESS NOTES
Preoperative Evaluation  [facility]  [address]  [place] [address]  Phone: [phone]  Fax: [phone]  Primary Provider: Avelino Luque MD  Pre-op Performing Provider: Avelino Luque MD  Oct 28, 2024             10/24/2024   Surgical Information   What procedure is being done? TURP    Facility or Hospital where procedure/surgery will be performed: [facility]    Who is doing the procedure / surgery? Dr. Fabian    Date of surgery / procedure: 11/05/2024    Time of surgery / procedure: NA    Where do you plan to recover after surgery? at home with family        Patient-reported     Fax number for surgical facility: Note does not need to be faxed, will be available electronically in Epic.    Assessment & Plan     The proposed surgical procedure is considered INTERMEDIATE risk.    Pre-op exam      Benign prostatic hyperplasia with urinary hesitancy      Coronary artery disease involving native coronary artery of native heart without angina pectoris  Met score 9.89            - No identified additional risk factors other than previously addressed         Recommendation  Approval given to proceed with proposed procedure, without further diagnostic evaluation.    Marlon Sanchez is a 64 year old, presenting for the following:  Pre-Op Exam (TURP  )      He will be undergoing TURP.  Surgery scheduled for November 5.  Patient with a history of urinary frequency hesitancy.  HPI related to upcoming procedure: Patient arrives here for preop.      10/24/2024   Pre-Op Questionnaire   Have you ever had a heart attack or stroke? No    Have you ever had surgery on your heart or blood vessels, such as a stent placement, a coronary artery bypass, or surgery on an artery in your head, neck, heart, or legs? No    Do you have chest pain with activity? No    Do you have a history of heart failure? No    Do you currently have a cold, bronchitis or symptoms of other infection? No     Do you have a cough, shortness of breath, or wheezing? No    Do you or anyone in your family have previous history of blood clots? No    Do you or does anyone in your family have a serious bleeding problem such as prolonged bleeding following surgeries or cuts? No    Have you ever had problems with anemia or been told to take iron pills? No    Have you had any abnormal blood loss such as black, tarry or bloody stools? No    Have you ever had a blood transfusion? No    Are you willing to have a blood transfusion if it is medically needed before, during, or after your surgery? Yes    Have you or any of your relatives ever had problems with anesthesia? No    Do you have sleep apnea, excessive snoring or daytime drowsiness? No    Do you have any artifical heart valves or other implanted medical devices like a pacemaker, defibrillator, or continuous glucose monitor? No    Do you have artificial joints? No    Are you allergic to latex? No        Patient-reported     Health Care Directive  Patient does not have a Health Care Directive: Discussed advance care planning with patient; however, patient declined at this time.    Preoperative Review of    reviewed - controlled substances reflected in medication list.          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 (H) 12/15/2020     Priority: Medium    Coronary artery disease involving native coronary artery of native heart without angina pectoris 05/10/2018     Priority: Medium     CTA 2017 showing non obstructive disease      Bleomycin lung toxicity 02/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     Priority: Medium    Hyperlipidemia 02/15/2017      Priority: Medium    Gastritis 05/27/2016     Priority: Medium    Schatzki's ring 05/27/2016     Priority: Medium    Peripheral neuropathy 05/03/2016     Priority: Medium    Tremor 05/03/2016     Priority: Medium    Hodgkin lymphoma (H) 08/25/2015     Priority: Medium    FHx: prostate cancer 09/30/2014     Priority: Medium     Overview:   Brother at 40   Father at 60      Chronic insomnia 12/29/2011     Priority: Medium    Major depressive disorder, recurrent episode, mild (H) 12/06/2010     Priority: Medium      Past Medical History:   Diagnosis Date    Bleomycin lung toxicity     Colonic adenoma     Depression     Family history of prostate cancer     Hodgkin lymphoma (H)     8/25/2015,Stage 4, s/p 6 cycles ABVD    Hyperlipidemia     No Comments Provided    Insomnia     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  No polyps seen.  Next due 2020.    COLONOSCOPY N/A 01/24/2022    F/U 2027 tubular adenoma, sigmoid diverticulosis    CYSTOSCOPY      No Comments Provided    ESOPHAGOSCOPY, GASTROSCOPY, DUODENOSCOPY (EGD), COMBINED      5/27/16,EGD    FOOT SURGERY Left 2003    SUBTALAR ATHROEREISIS, left side, Dr. Schwartz.    HERNIA REPAIR Bilateral 2002    Surgipro mesh    LYMPH NODE BIOPSY  2015     Current Outpatient Medications   Medication Sig Dispense Refill    aspirin 81 MG EC tablet Take 1 tablet (81 mg) by mouth daily      atorvastatin (LIPITOR) 40 MG tablet Take 1 tablet (40 mg) by mouth daily 90 tablet 2    fish oil-omega-3 fatty acids 1000 MG capsule Take 1 capsule by mouth daily      lisinopril (ZESTRIL) 2.5 MG tablet Take 1 tablet (2.5 mg) by mouth daily 90 tablet 3    LORazepam (ATIVAN) 1 MG tablet Take 1 tablet (1 mg) by mouth At Bedtime 90 tablet 1    Multiple Vitamins-Minerals  "(MULTIVITAMIN & MINERAL PO) Take 1 tablet by mouth every morning      omeprazole (PRILOSEC) 20 MG CR capsule Take 1 capsule (20 mg) by mouth daily 30 capsule     sertraline (ZOLOFT) 100 MG tablet Take 1 tablet (100 mg) by mouth daily 90 tablet 3    tamsulosin (FLOMAX) 0.4 MG capsule Take 1 capsule (0.4 mg) by mouth daily Watch for dizziness upon standing. 30 capsule 11    traMADol (ULTRAM) 50 MG tablet Take 50 mg by mouth every 6 hours as needed for severe pain.         No Known Allergies     Social History     Tobacco Use    Smoking status: Never     Passive exposure: Past    Smokeless tobacco: Never    Tobacco comments:     Passive exposure in childhood home.    Substance Use Topics    Alcohol use: Yes     Alcohol/week: 7.0 standard drinks of alcohol     Types: 7 Cans of beer per week     Comment: 1 beer per day       History   Drug Use No             Review of Systems  CONSTITUTIONAL: NEGATIVE for fever, chills, change in weight  ENT/MOUTH: NEGATIVE for ear, mouth and throat problems  RESP: NEGATIVE for significant cough or SOB  CV: NEGATIVE for chest pain, palpitations or peripheral edema    Objective    /76   Pulse (!) 44   Temp 98.4  F (36.9  C)   Resp 20   Ht 1.93 m (6' 4\")   Wt 72.6 kg (160 lb)   SpO2 94%   BMI 19.48 kg/m     Estimated body mass index is 19.48 kg/m  as calculated from the following:    Height as of this encounter: 1.93 m (6' 4\").    Weight as of this encounter: 72.6 kg (160 lb).  Physical Exam  GENERAL: alert and no distress  NECK: no adenopathy, no asymmetry, masses, or scars  RESP: lungs clear to auscultation - no rales, rhonchi or wheezes  CV: regular rate and rhythm, normal S1 S2, no S3 or S4, no murmur, click or rub, no peripheral edema  ABDOMEN: soft, nontender, no hepatosplenomegaly, no masses and bowel sounds normal  MS: no gross musculoskeletal defects noted, no edema    Recent Labs   Lab Test 01/08/24  0926   HGB 15.0         POTASSIUM 4.8   CR 1.27*    "   Results for orders placed or performed in visit on 10/28/24   UA reflex to Microscopic     Status: Abnormal   Result Value Ref Range    Color Urine Light Yellow Colorless, Straw, Light Yellow, Yellow    Appearance Urine Clear Clear    Glucose Urine Negative Negative mg/dL    Bilirubin Urine Negative Negative    Ketones Urine Negative Negative mg/dL    Specific Gravity Urine 1.025 1.000 - 1.030    Blood Urine Negative Negative    pH Urine 5.5 5.0 - 9.0    Protein Albumin Urine 10 (A) Negative mg/dL    Urobilinogen Urine Normal Normal, 2.0 mg/dL    Nitrite Urine Negative Negative    Leukocyte Esterase Urine Negative Negative    RBC Urine <1 <=2 /HPF    WBC Urine <1 <=5 /HPF    Mucus Urine Present (A) None Seen /LPF   EKG 12-lead, tracing only (Same Day)     Status: None (Preliminary result)   Result Value Ref Range    Systolic Blood Pressure  mmHg    Diastolic Blood Pressure  mmHg    Ventricular Rate 65 BPM    Atrial Rate 65 BPM    AR Interval 144 ms    QRS Duration 96 ms     ms    QTc 424 ms    P Axis  degrees    R AXIS -43 degrees    T Axis 21 degrees    Interpretation ECG       Sinus rhythm with Premature atrial complexes in a pattern of bigeminy  Left axis deviation  Abnormal ECG  No previous ECGs available       BMP pending urine culture pending  Diagnostics  Labs pending at this time.  Results will be reviewed when available.   EKG: appears normal, NSR, with premature atrial complexes, normal axis, normal intervals, no acute ST/T changes c/w ischemia, no LVH by voltage criteria    Revised Cardiac Risk Index (RCRI)  The patient has the following serious cardiovascular risks for perioperative complications:   - No serious cardiac risks = 0 points     RCRI Interpretation: 0 points: Class I (very low risk - 0.4% complication rate)         Signed Electronically by: Avelino Luque MD  A copy of this evaluation report is provided to the requesting physician.  The longitudinal plan of care for the  diagnosis(es)/condition(s) as documented were addressed during this visit. Due to the added complexity in care, I will continue to support Daniel in the subsequent management and with ongoing continuity of care.         Answers submitted by the patient for this visit:  Patient Health Questionnaire (Submitted on 10/28/2024)  If you checked off any problems, how difficult have these problems made it for you to do your work, take care of things at home, or get along with other people?: Not difficult at all  PHQ9 TOTAL SCORE: 0

## 2024-10-28 NOTE — NURSING NOTE
Patient here for preop for TURP with . Medication Reconciliation: complete.    Dedra Liu LPN  10/28/2024 11:12 AM

## 2024-10-29 LAB
ATRIAL RATE - MUSE: 65 BPM
DIASTOLIC BLOOD PRESSURE - MUSE: NORMAL MMHG
INTERPRETATION ECG - MUSE: NORMAL
P AXIS - MUSE: NORMAL DEGREES
PR INTERVAL - MUSE: 144 MS
QRS DURATION - MUSE: 96 MS
QT - MUSE: 408 MS
QTC - MUSE: 424 MS
R AXIS - MUSE: -43 DEGREES
SYSTOLIC BLOOD PRESSURE - MUSE: NORMAL MMHG
T AXIS - MUSE: 21 DEGREES
VENTRICULAR RATE- MUSE: 65 BPM

## 2024-10-30 LAB — BACTERIA UR CULT: NO GROWTH

## 2024-11-04 ENCOUNTER — ANESTHESIA EVENT (OUTPATIENT)
Dept: SURGERY | Facility: OTHER | Age: 64
End: 2024-11-04
Payer: MEDICARE

## 2024-11-05 ENCOUNTER — ANESTHESIA (OUTPATIENT)
Dept: SURGERY | Facility: OTHER | Age: 64
End: 2024-11-05
Payer: MEDICARE

## 2024-11-05 ENCOUNTER — HOSPITAL ENCOUNTER (OUTPATIENT)
Facility: OTHER | Age: 64
Discharge: HOME | End: 2024-11-06
Attending: UROLOGY | Admitting: UROLOGY
Payer: MEDICARE

## 2024-11-05 DIAGNOSIS — N40.1 BENIGN PROSTATIC HYPERPLASIA WITH URINARY HESITANCY: Primary | ICD-10-CM

## 2024-11-05 DIAGNOSIS — R39.11 BENIGN PROSTATIC HYPERPLASIA WITH URINARY HESITANCY: Primary | ICD-10-CM

## 2024-11-05 PROBLEM — N13.8 BPH WITH OBSTRUCTION/LOWER URINARY TRACT SYMPTOMS: Status: ACTIVE | Noted: 2024-11-05

## 2024-11-05 LAB
ALBUMIN UR-MCNC: NEGATIVE MG/DL
APPEARANCE UR: CLEAR
BILIRUB UR QL STRIP: NEGATIVE
COLOR UR AUTO: NORMAL
GLUCOSE BLDC GLUCOMTR-MCNC: 89 MG/DL (ref 70–99)
GLUCOSE UR STRIP-MCNC: NEGATIVE MG/DL
HGB UR QL STRIP: NEGATIVE
KETONES UR STRIP-MCNC: NEGATIVE MG/DL
LEUKOCYTE ESTERASE UR QL STRIP: NEGATIVE
NITRATE UR QL: NEGATIVE
PH UR STRIP: 6 [PH] (ref 5–9)
RBC URINE: 1 /HPF
SP GR UR STRIP: 1.02 (ref 1–1.03)
UROBILINOGEN UR STRIP-MCNC: NORMAL MG/DL
WBC URINE: 0 /HPF

## 2024-11-05 PROCEDURE — 88344 IMHCHEM/IMCYTCHM EA MLT ANTB: CPT

## 2024-11-05 PROCEDURE — 272N000001 HC OR GENERAL SUPPLY STERILE: Performed by: UROLOGY

## 2024-11-05 PROCEDURE — 710N000010 HC RECOVERY PHASE 1, LEVEL 2, PER MIN: Performed by: UROLOGY

## 2024-11-05 PROCEDURE — 52601 PROSTATECTOMY (TURP): CPT | Performed by: UROLOGY

## 2024-11-05 PROCEDURE — 250N000011 HC RX IP 250 OP 636

## 2024-11-05 PROCEDURE — 250N000009 HC RX 250

## 2024-11-05 PROCEDURE — 999N000141 HC STATISTIC PRE-PROCEDURE NURSING ASSESSMENT: Performed by: UROLOGY

## 2024-11-05 PROCEDURE — 82962 GLUCOSE BLOOD TEST: CPT

## 2024-11-05 PROCEDURE — 258N000003 HC RX IP 258 OP 636

## 2024-11-05 PROCEDURE — 250N000009 HC RX 250: Performed by: UROLOGY

## 2024-11-05 PROCEDURE — 370N000017 HC ANESTHESIA TECHNICAL FEE, PER MIN: Performed by: UROLOGY

## 2024-11-05 PROCEDURE — 81001 URINALYSIS AUTO W/SCOPE: CPT | Performed by: UROLOGY

## 2024-11-05 PROCEDURE — 250N000011 HC RX IP 250 OP 636: Performed by: UROLOGY

## 2024-11-05 PROCEDURE — 250N000013 HC RX MED GY IP 250 OP 250 PS 637: Performed by: UROLOGY

## 2024-11-05 PROCEDURE — 360N000076 HC SURGERY LEVEL 3, PER MIN: Performed by: UROLOGY

## 2024-11-05 PROCEDURE — 258N000003 HC RX IP 258 OP 636: Performed by: NURSE ANESTHETIST, CERTIFIED REGISTERED

## 2024-11-05 PROCEDURE — 258N000001 HC RX 258: Performed by: UROLOGY

## 2024-11-05 PROCEDURE — 250N000025 HC SEVOFLURANE, PER MIN: Performed by: UROLOGY

## 2024-11-05 PROCEDURE — 88305 TISSUE EXAM BY PATHOLOGIST: CPT

## 2024-11-05 PROCEDURE — 52601 PROSTATECTOMY (TURP): CPT

## 2024-11-05 PROCEDURE — 999N000157 HC STATISTIC RCP TIME EA 10 MIN

## 2024-11-05 RX ORDER — ONDANSETRON 4 MG/1
4 TABLET, ORALLY DISINTEGRATING ORAL EVERY 30 MIN PRN
Status: DISCONTINUED | OUTPATIENT
Start: 2024-11-05 | End: 2024-11-05 | Stop reason: HOSPADM

## 2024-11-05 RX ORDER — CEFAZOLIN SODIUM/WATER 2 G/20 ML
2 SYRINGE (ML) INTRAVENOUS SEE ADMIN INSTRUCTIONS
Status: DISCONTINUED | OUTPATIENT
Start: 2024-11-05 | End: 2024-11-05 | Stop reason: HOSPADM

## 2024-11-05 RX ORDER — ONDANSETRON 2 MG/ML
INJECTION INTRAMUSCULAR; INTRAVENOUS PRN
Status: DISCONTINUED | OUTPATIENT
Start: 2024-11-05 | End: 2024-11-05

## 2024-11-05 RX ORDER — OXYCODONE HYDROCHLORIDE 5 MG/1
10 TABLET ORAL
Status: DISCONTINUED | OUTPATIENT
Start: 2024-11-05 | End: 2024-11-06 | Stop reason: HOSPADM

## 2024-11-05 RX ORDER — FENTANYL CITRATE 50 UG/ML
INJECTION, SOLUTION INTRAMUSCULAR; INTRAVENOUS PRN
Status: DISCONTINUED | OUTPATIENT
Start: 2024-11-05 | End: 2024-11-05

## 2024-11-05 RX ORDER — ONDANSETRON 2 MG/ML
4 INJECTION INTRAMUSCULAR; INTRAVENOUS EVERY 30 MIN PRN
Status: DISCONTINUED | OUTPATIENT
Start: 2024-11-05 | End: 2024-11-05 | Stop reason: HOSPADM

## 2024-11-05 RX ORDER — ONDANSETRON 4 MG/1
4 TABLET, ORALLY DISINTEGRATING ORAL EVERY 6 HOURS PRN
Status: DISCONTINUED | OUTPATIENT
Start: 2024-11-05 | End: 2024-11-06 | Stop reason: HOSPADM

## 2024-11-05 RX ORDER — TOLTERODINE TARTRATE 2 MG/1
2 TABLET, EXTENDED RELEASE ORAL ONCE
Status: DISCONTINUED | OUTPATIENT
Start: 2024-11-05 | End: 2024-11-05

## 2024-11-05 RX ORDER — PANTOPRAZOLE SODIUM 40 MG/1
40 TABLET, DELAYED RELEASE ORAL DAILY
Status: DISCONTINUED | OUTPATIENT
Start: 2024-11-06 | End: 2024-11-06 | Stop reason: HOSPADM

## 2024-11-05 RX ORDER — SODIUM CHLORIDE, SODIUM LACTATE, POTASSIUM CHLORIDE, CALCIUM CHLORIDE 600; 310; 30; 20 MG/100ML; MG/100ML; MG/100ML; MG/100ML
INJECTION, SOLUTION INTRAVENOUS CONTINUOUS
Status: DISCONTINUED | OUTPATIENT
Start: 2024-11-05 | End: 2024-11-05

## 2024-11-05 RX ORDER — NEOMYCIN/BACITRACIN/POLYMYXINB 3.5-400-5K
OINTMENT (GRAM) TOPICAL 4 TIMES DAILY PRN
Status: DISCONTINUED | OUTPATIENT
Start: 2024-11-05 | End: 2024-11-06 | Stop reason: HOSPADM

## 2024-11-05 RX ORDER — HYDROMORPHONE HCL IN WATER/PF 6 MG/30 ML
0.4 PATIENT CONTROLLED ANALGESIA SYRINGE INTRAVENOUS EVERY 5 MIN PRN
Status: DISCONTINUED | OUTPATIENT
Start: 2024-11-05 | End: 2024-11-05 | Stop reason: HOSPADM

## 2024-11-05 RX ORDER — FENTANYL CITRATE 50 UG/ML
25 INJECTION, SOLUTION INTRAMUSCULAR; INTRAVENOUS EVERY 5 MIN PRN
Status: DISCONTINUED | OUTPATIENT
Start: 2024-11-05 | End: 2024-11-05 | Stop reason: HOSPADM

## 2024-11-05 RX ORDER — ATORVASTATIN CALCIUM 40 MG/1
40 TABLET, FILM COATED ORAL AT BEDTIME
Status: DISCONTINUED | OUTPATIENT
Start: 2024-11-05 | End: 2024-11-06 | Stop reason: HOSPADM

## 2024-11-05 RX ORDER — OXYCODONE HYDROCHLORIDE 5 MG/1
5 TABLET ORAL
Status: DISCONTINUED | OUTPATIENT
Start: 2024-11-05 | End: 2024-11-06 | Stop reason: HOSPADM

## 2024-11-05 RX ORDER — LORAZEPAM 1 MG/1
1 TABLET ORAL AT BEDTIME
Status: DISCONTINUED | OUTPATIENT
Start: 2024-11-05 | End: 2024-11-06 | Stop reason: HOSPADM

## 2024-11-05 RX ORDER — LIDOCAINE HYDROCHLORIDE 20 MG/ML
JELLY TOPICAL PRN
Status: DISCONTINUED | OUTPATIENT
Start: 2024-11-05 | End: 2024-11-05 | Stop reason: HOSPADM

## 2024-11-05 RX ORDER — ONDANSETRON 2 MG/ML
4 INJECTION INTRAMUSCULAR; INTRAVENOUS EVERY 30 MIN PRN
Status: DISCONTINUED | OUTPATIENT
Start: 2024-11-05 | End: 2024-11-06 | Stop reason: HOSPADM

## 2024-11-05 RX ORDER — SODIUM CHLORIDE, SODIUM LACTATE, POTASSIUM CHLORIDE, CALCIUM CHLORIDE 600; 310; 30; 20 MG/100ML; MG/100ML; MG/100ML; MG/100ML
INJECTION, SOLUTION INTRAVENOUS CONTINUOUS
Status: DISCONTINUED | OUTPATIENT
Start: 2024-11-05 | End: 2024-11-05 | Stop reason: HOSPADM

## 2024-11-05 RX ORDER — NALOXONE HYDROCHLORIDE 0.4 MG/ML
0.1 INJECTION, SOLUTION INTRAMUSCULAR; INTRAVENOUS; SUBCUTANEOUS
Status: DISCONTINUED | OUTPATIENT
Start: 2024-11-05 | End: 2024-11-06 | Stop reason: HOSPADM

## 2024-11-05 RX ORDER — LIDOCAINE 40 MG/G
CREAM TOPICAL
Status: DISCONTINUED | OUTPATIENT
Start: 2024-11-05 | End: 2024-11-05 | Stop reason: HOSPADM

## 2024-11-05 RX ORDER — DEXAMETHASONE SODIUM PHOSPHATE 10 MG/ML
4 INJECTION, SOLUTION INTRAMUSCULAR; INTRAVENOUS
Status: DISCONTINUED | OUTPATIENT
Start: 2024-11-05 | End: 2024-11-05 | Stop reason: HOSPADM

## 2024-11-05 RX ORDER — DEXAMETHASONE SODIUM PHOSPHATE 4 MG/ML
INJECTION, SOLUTION INTRA-ARTICULAR; INTRALESIONAL; INTRAMUSCULAR; INTRAVENOUS; SOFT TISSUE PRN
Status: DISCONTINUED | OUTPATIENT
Start: 2024-11-05 | End: 2024-11-05

## 2024-11-05 RX ORDER — OXYCODONE HYDROCHLORIDE 5 MG/1
5 TABLET ORAL ONCE
Status: COMPLETED | OUTPATIENT
Start: 2024-11-05 | End: 2024-11-05

## 2024-11-05 RX ORDER — FENTANYL CITRATE 50 UG/ML
50 INJECTION, SOLUTION INTRAMUSCULAR; INTRAVENOUS EVERY 5 MIN PRN
Status: DISCONTINUED | OUTPATIENT
Start: 2024-11-05 | End: 2024-11-05 | Stop reason: HOSPADM

## 2024-11-05 RX ORDER — ACETAMINOPHEN 325 MG/1
975 TABLET ORAL ONCE
Status: COMPLETED | OUTPATIENT
Start: 2024-11-05 | End: 2024-11-05

## 2024-11-05 RX ORDER — CEFAZOLIN SODIUM/WATER 2 G/20 ML
2 SYRINGE (ML) INTRAVENOUS
Status: COMPLETED | OUTPATIENT
Start: 2024-11-05 | End: 2024-11-05

## 2024-11-05 RX ORDER — ACETAMINOPHEN 325 MG/1
650 TABLET ORAL EVERY 6 HOURS PRN
Status: DISCONTINUED | OUTPATIENT
Start: 2024-11-05 | End: 2024-11-06 | Stop reason: HOSPADM

## 2024-11-05 RX ORDER — HYDROMORPHONE HCL IN WATER/PF 6 MG/30 ML
0.2 PATIENT CONTROLLED ANALGESIA SYRINGE INTRAVENOUS EVERY 5 MIN PRN
Status: DISCONTINUED | OUTPATIENT
Start: 2024-11-05 | End: 2024-11-05 | Stop reason: HOSPADM

## 2024-11-05 RX ORDER — TOLTERODINE TARTRATE 2 MG/1
2 TABLET, EXTENDED RELEASE ORAL ONCE
Status: COMPLETED | OUTPATIENT
Start: 2024-11-05 | End: 2024-11-05

## 2024-11-05 RX ORDER — LISINOPRIL 2.5 MG/1
2.5 TABLET ORAL DAILY
Status: DISCONTINUED | OUTPATIENT
Start: 2024-11-05 | End: 2024-11-06 | Stop reason: HOSPADM

## 2024-11-05 RX ORDER — ACETAMINOPHEN 650 MG/1
650 SUPPOSITORY RECTAL ONCE
Status: COMPLETED | OUTPATIENT
Start: 2024-11-05 | End: 2024-11-05

## 2024-11-05 RX ORDER — FENTANYL CITRATE 50 UG/ML
25 INJECTION, SOLUTION INTRAMUSCULAR; INTRAVENOUS
Status: DISCONTINUED | OUTPATIENT
Start: 2024-11-05 | End: 2024-11-06 | Stop reason: HOSPADM

## 2024-11-05 RX ORDER — NALOXONE HYDROCHLORIDE 0.4 MG/ML
0.1 INJECTION, SOLUTION INTRAMUSCULAR; INTRAVENOUS; SUBCUTANEOUS
Status: DISCONTINUED | OUTPATIENT
Start: 2024-11-05 | End: 2024-11-05 | Stop reason: HOSPADM

## 2024-11-05 RX ORDER — POLYETHYLENE GLYCOL 3350 17 G/17G
17 POWDER, FOR SOLUTION ORAL DAILY
Status: DISCONTINUED | OUTPATIENT
Start: 2024-11-05 | End: 2024-11-06 | Stop reason: HOSPADM

## 2024-11-05 RX ORDER — LIDOCAINE 40 MG/G
CREAM TOPICAL
Status: DISCONTINUED | OUTPATIENT
Start: 2024-11-05 | End: 2024-11-06 | Stop reason: HOSPADM

## 2024-11-05 RX ORDER — ONDANSETRON 4 MG/1
4 TABLET, ORALLY DISINTEGRATING ORAL EVERY 30 MIN PRN
Status: DISCONTINUED | OUTPATIENT
Start: 2024-11-05 | End: 2024-11-06 | Stop reason: HOSPADM

## 2024-11-05 RX ORDER — LIDOCAINE 50 MG/G
OINTMENT TOPICAL 4 TIMES DAILY PRN
Status: DISCONTINUED | OUTPATIENT
Start: 2024-11-05 | End: 2024-11-05

## 2024-11-05 RX ORDER — LIDOCAINE 40 MG/G
CREAM TOPICAL 4 TIMES DAILY PRN
Status: COMPLETED | OUTPATIENT
Start: 2024-11-05 | End: 2024-11-06

## 2024-11-05 RX ORDER — PROPOFOL 10 MG/ML
INJECTION, EMULSION INTRAVENOUS PRN
Status: DISCONTINUED | OUTPATIENT
Start: 2024-11-05 | End: 2024-11-05

## 2024-11-05 RX ORDER — ONDANSETRON 2 MG/ML
4 INJECTION INTRAMUSCULAR; INTRAVENOUS EVERY 6 HOURS PRN
Status: DISCONTINUED | OUTPATIENT
Start: 2024-11-05 | End: 2024-11-06 | Stop reason: HOSPADM

## 2024-11-05 RX ORDER — AMOXICILLIN 250 MG
1-2 CAPSULE ORAL 2 TIMES DAILY
Qty: 30 TABLET | Refills: 0 | Status: SHIPPED | OUTPATIENT
Start: 2024-11-05

## 2024-11-05 RX ORDER — DEXAMETHASONE SODIUM PHOSPHATE 4 MG/ML
4 INJECTION, SOLUTION INTRA-ARTICULAR; INTRALESIONAL; INTRAMUSCULAR; INTRAVENOUS; SOFT TISSUE
Status: DISCONTINUED | OUTPATIENT
Start: 2024-11-05 | End: 2024-11-06 | Stop reason: HOSPADM

## 2024-11-05 RX ORDER — LIDOCAINE HYDROCHLORIDE 20 MG/ML
INJECTION, SOLUTION INFILTRATION; PERINEURAL PRN
Status: DISCONTINUED | OUTPATIENT
Start: 2024-11-05 | End: 2024-11-05

## 2024-11-05 RX ORDER — CALCIUM CARBONATE 500 MG/1
500 TABLET, CHEWABLE ORAL 4 TIMES DAILY PRN
Status: DISCONTINUED | OUTPATIENT
Start: 2024-11-05 | End: 2024-11-06 | Stop reason: HOSPADM

## 2024-11-05 RX ADMIN — LORAZEPAM 1 MG: 1 TABLET ORAL at 21:02

## 2024-11-05 RX ADMIN — ONDANSETRON HYDROCHLORIDE 4 MG: 2 SOLUTION INTRAMUSCULAR; INTRAVENOUS at 10:40

## 2024-11-05 RX ADMIN — PHENYLEPHRINE HYDROCHLORIDE 50 MCG: 10 INJECTION INTRAVENOUS at 10:56

## 2024-11-05 RX ADMIN — POLYETHYLENE GLYCOL 3350 17 G: 17 POWDER, FOR SOLUTION ORAL at 15:34

## 2024-11-05 RX ADMIN — ACETAMINOPHEN 975 MG: 325 TABLET, FILM COATED ORAL at 08:23

## 2024-11-05 RX ADMIN — TOLTERODINE TARTRATE 2 MG: 2 TABLET, FILM COATED ORAL at 13:01

## 2024-11-05 RX ADMIN — MIDAZOLAM HYDROCHLORIDE 2 MG: 1 INJECTION, SOLUTION INTRAMUSCULAR; INTRAVENOUS at 10:37

## 2024-11-05 RX ADMIN — FENTANYL CITRATE 25 MCG: 50 INJECTION INTRAMUSCULAR; INTRAVENOUS at 11:03

## 2024-11-05 RX ADMIN — SUGAMMADEX 200 MG: 100 INJECTION, SOLUTION INTRAVENOUS at 12:13

## 2024-11-05 RX ADMIN — ROCURONIUM BROMIDE 5 MG: 50 INJECTION, SOLUTION INTRAVENOUS at 10:42

## 2024-11-05 RX ADMIN — LIDOCAINE HYDROCHLORIDE 70 MG: 20 INJECTION, SOLUTION INFILTRATION; PERINEURAL at 10:43

## 2024-11-05 RX ADMIN — ATORVASTATIN CALCIUM 40 MG: 40 TABLET, FILM COATED ORAL at 21:02

## 2024-11-05 RX ADMIN — ROCURONIUM BROMIDE 10 MG: 50 INJECTION, SOLUTION INTRAVENOUS at 11:10

## 2024-11-05 RX ADMIN — OXYCODONE HYDROCHLORIDE 5 MG: 5 TABLET ORAL at 12:52

## 2024-11-05 RX ADMIN — PROPOFOL 160 MG: 10 INJECTION, EMULSION INTRAVENOUS at 10:43

## 2024-11-05 RX ADMIN — ROCURONIUM BROMIDE 45 MG: 50 INJECTION, SOLUTION INTRAVENOUS at 10:43

## 2024-11-05 RX ADMIN — ACETAMINOPHEN 650 MG: 325 TABLET, FILM COATED ORAL at 17:08

## 2024-11-05 RX ADMIN — SODIUM CHLORIDE, POTASSIUM CHLORIDE, SODIUM LACTATE AND CALCIUM CHLORIDE 100 ML/HR: 600; 310; 30; 20 INJECTION, SOLUTION INTRAVENOUS at 08:43

## 2024-11-05 RX ADMIN — PHENYLEPHRINE HYDROCHLORIDE 50 MCG: 10 INJECTION INTRAVENOUS at 11:19

## 2024-11-05 RX ADMIN — DEXAMETHASONE SODIUM PHOSPHATE 4 MG: 4 INJECTION, SOLUTION INTRA-ARTICULAR; INTRALESIONAL; INTRAMUSCULAR; INTRAVENOUS; SOFT TISSUE at 10:42

## 2024-11-05 RX ADMIN — FENTANYL CITRATE 25 MCG: 50 INJECTION INTRAMUSCULAR; INTRAVENOUS at 11:37

## 2024-11-05 RX ADMIN — PROPOFOL 20 MG: 10 INJECTION, EMULSION INTRAVENOUS at 11:40

## 2024-11-05 RX ADMIN — FENTANYL CITRATE 50 MCG: 50 INJECTION INTRAMUSCULAR; INTRAVENOUS at 10:42

## 2024-11-05 RX ADMIN — PROPOFOL 20 MG: 10 INJECTION, EMULSION INTRAVENOUS at 11:05

## 2024-11-05 RX ADMIN — Medication 2 G: at 10:32

## 2024-11-05 RX ADMIN — LISINOPRIL 2.5 MG: 2.5 TABLET ORAL at 15:34

## 2024-11-05 ASSESSMENT — ACTIVITIES OF DAILY LIVING (ADL)
ADLS_ACUITY_SCORE: 0

## 2024-11-05 NOTE — PHARMACY-ADMISSION MEDICATION HISTORY
Pharmacist Admission Medication History    Admission medication history is complete. The information provided in this note is only as accurate as the sources available at the time of the update.    Information Source(s): Patient and CareEverywhere/SureScripts via in-person    Pertinent Information: Patient held OTCs as directed prior to surgery.    Changes made to PTA medication list:  Added: None  Deleted: None  Changed: Omeprazole from capsule -> tablet (OTC formulation)    Allergies reviewed with patient and updates made in EHR: yes    Medication History Completed By: Paula Carmona RPH 11/5/2024 2:13 PM    PTA Med List   Medication Sig Last Dose/Taking    aspirin 81 MG EC tablet Take 1 tablet (81 mg) by mouth daily 10/22/2024 Morning    atorvastatin (LIPITOR) 40 MG tablet Take 1 tablet (40 mg) by mouth daily 11/4/2024 Bedtime    fish oil-omega-3 fatty acids 1000 MG capsule Take 1 capsule by mouth daily 10/22/2024    lisinopril (ZESTRIL) 2.5 MG tablet Take 1 tablet (2.5 mg) by mouth daily 11/3/2024 Morning    LORazepam (ATIVAN) 1 MG tablet Take 1 tablet (1 mg) by mouth At Bedtime 11/4/2024 Bedtime    Multiple Vitamins-Minerals (MULTIVITAMIN & MINERAL PO) Take 1 tablet by mouth every morning 10/22/2024 Morning    Omeprazole 20 MG TBDD Take 20 mg by mouth daily. 11/5/2024 at  6:00 AM    sertraline (ZOLOFT) 100 MG tablet Take 1 tablet (100 mg) by mouth daily 11/5/2024 at  6:00 AM    tamsulosin (FLOMAX) 0.4 MG capsule Take 1 capsule (0.4 mg) by mouth daily Watch for dizziness upon standing. 11/4/2024 Bedtime    traMADol (ULTRAM) 50 MG tablet Take 50 mg by mouth every 6 hours as needed for severe pain. 11/4/2024 Bedtime

## 2024-11-05 NOTE — ANESTHESIA POSTPROCEDURE EVALUATION
Patient: Daniel Boyd    Procedure: Procedure(s):  CYSTOSCOPY, WITH TRANSURETHRAL RESECTION PROSTATE       Anesthesia Type:  General    Note:  Disposition: Inpatient   Postop Pain Control: Uneventful            Sign Out: Well controlled pain   PONV: No   Neuro/Psych: Uneventful            Sign Out: Acceptable/Baseline neuro status   Airway/Respiratory: Uneventful            Sign Out: Acceptable/Baseline resp. status   CV/Hemodynamics: Uneventful            Sign Out: Acceptable CV status; No obvious hypovolemia; No obvious fluid overload   Other NRE: NONE   DID A NON-ROUTINE EVENT OCCUR?            Last vitals:  Vitals Value Taken Time   /69 11/05/24 1245   Temp 96.7  F (35.9  C) 11/05/24 1227   Pulse 48 11/05/24 1250   Resp 11 11/05/24 1250   SpO2 99 % 11/05/24 1250   Vitals shown include unfiled device data.    Electronically Signed By: WILLIE Rubin CRNA  November 5, 2024  12:52 PM

## 2024-11-05 NOTE — PROGRESS NOTES
Incentive Spirometry education completed.  Pt goal 3550 mls.  Pt achieved 3250 mls.  Pt instructed to perform 10/hr while awake with at least one deep breath and cough per hour until able to perform baseline activity.  How to use an Incentive Spirometer written instructions provided to patient. RT will follow and re-assess as need.      Pat Zamora, RT on 11/5/2024 at 4:49 PM

## 2024-11-05 NOTE — H&P
Chief Complaint: BPH with refractory Luts failing medical therapy  .     HPI: Mr. Daniel Boyd is a 64 year old year old male florian 11, seen initially July 2024 in follow up for evaluation of BPH with irritative symptoms including frequency and nocturia.     Prostate is felt to be up around 60 g based on CT imaging but normal on DELMY.     PSA has been normal.     He was tried on tamsulosin without improvement.  Cystoscopy with obstruction consistent with BPH.    Here today 11/5/24 for TURP.      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  No polyps seen.  Next due 2020.    COLONOSCOPY N/A 01/24/2022     F/U 2027 tubular adenoma, sigmoid diverticulosis    CYSTOSCOPY         No Comments Provided    ESOPHAGOSCOPY, GASTROSCOPY, DUODENOSCOPY (EGD), COMBINED         5/27/16,EGD    FOOT SURGERY Left 2003     SUBTALAR ATHROEREISIS, left side, Dr. Schwartz.    HERNIA REPAIR Bilateral 2002     Surgipro mesh    LYMPH NODE BIOPSY   2015            Current Outpatient Prescriptions          Current Outpatient Medications   Medication Sig Dispense Refill    aspirin 81 MG EC tablet Take 1 tablet (81 mg) by mouth daily        atorvastatin (LIPITOR) 40 MG tablet Take 1 tablet (40 mg) by mouth daily 90 tablet 3    fish oil-omega-3 fatty acids 1000 MG capsule Take 1 capsule by mouth daily        lisinopril (ZESTRIL) 2.5 MG tablet Take 1 tablet (2.5 mg) by mouth daily 90  "tablet 3    LORazepam (ATIVAN) 1 MG tablet Take 1 tablet (1 mg) by mouth At Bedtime 90 tablet 1    Multiple Vitamins-Minerals (MULTIVITAMIN & MINERAL PO) Take 1 tablet by mouth every morning        omeprazole (PRILOSEC) 20 MG CR capsule Take 1 capsule (20 mg) by mouth daily 30 capsule      sertraline (ZOLOFT) 100 MG tablet Take 1 tablet (100 mg) by mouth daily 90 tablet 3    tamsulosin (FLOMAX) 0.4 MG capsule Take 1 capsule (0.4 mg) by mouth daily Watch for dizziness upon standing. 30 capsule 11    traMADol (ULTRAM) 50 MG tablet Take 1 tablet (50 mg) by mouth every 6 hours as needed for severe pain 25 tablet 0            ALLERGIES: Patient has no known allergies.      GENERAL PHYSICAL EXAM:   Vitals: Pulse 70   Resp 18   Ht 1.93 m (6' 4\")   Wt 74.4 kg (164 lb)   SpO2 98%   BMI 19.96 kg/m    Body mass index is 19.96 kg/m .     GENERAL: Well groomed, well developed, well nourished male in NAD.  ENT:  Normal  CV:  RRR  RESPIRATORY: CTA B  GI:  Soft, NT, ND  MS: Moving all four  NEURO: Alert and oriented x 3.  PSYCH: Normal mood and affect, pleasant and agreeable during interview and exam.     :  Prostate 50 to 55 g, smooth benign     RADIOLOGY: The following tests were reviewed: None     LABS: The last test results for Mr. Daniel Boyd were reviewed:        Results for orders placed or performed in visit on 07/11/24 (from the past 24 hour(s))   Urinalysis Macroscopic   Result Value Ref Range     Color Urine Yellow Colorless, Straw, Light Yellow, Yellow     Appearance Urine Clear Clear     Glucose Urine Negative Negative mg/dL     Bilirubin Urine Negative Negative     Ketones Urine Negative Negative mg/dL     Specific Gravity Urine 1.034 (H) 1.000 - 1.030     Blood Urine Trace (A) Negative     pH Urine 5.5 5.0 - 9.0     Protein Albumin Urine 20 (A) Negative mg/dL     Urobilinogen Urine 2.0 Normal, 2.0 mg/dL     Nitrite Urine Negative Negative     Leukocyte Esterase Urine Negative Negative         PSA -       "   Lab Results   Component Value Date     PSA 3.87 01/08/2024     PSA 2.88 07/31/2023     PSA 3.61 01/13/2023     PSA 3.77 08/02/2021      BMP -         Recent Labs   Lab Test 01/08/24  0926 01/13/23  0844 07/07/22  1051    140 136   POTASSIUM 4.8 4.5 4.0   CHLORIDE 103 105 101   CO2 27 27 27   BUN 24.3* 16.7 20   CR 1.27* 1.03 1.07   * 130* 108*   CHELSEA 10.2 9.5 9.5         CBC -         Recent Labs   Lab Test 01/08/24  0926 01/13/23  0844 07/07/22  1051   WBC 6.1 5.6 7.1   HGB 15.0 14.6 15.1    188 225      Male Cystoscopy Procedure Note      PRE-PROCEDURE DIAGNOSIS: BPH failing medical therapy   POST-PROCEDURE DIAGNOSIS: Same  PROCEDURE: cystoscopy     RISKS DISCUSSED:  Bleeding, infection, urethral stricture, irritative voiding symptoms, retention of urine.       DESCRIPTION OF PROCEDURE:  After informed consent was obtained, the patient was brought to the procedure room where he was placed in the supine position with all pressure points well padded.  The penis and scrotum were prepped and draped in a sterile fashion. A flexible cystoscope was introduced through a well-lubricated urethra.       FINDINGS:     Meatus: normal  Urethra: Normal  Prostate: Lateral lobe hypertrophy with kissing lateral lobes, mild median lobe with 1.5 cm protrusion into the bladder posteriorly  Bladder Neck: Moderate obstruction  Bladder: No visible tumor, stones or lesions, moderate trabeculations, several cellules  Trigone:  Normal ureters, normal efflux     The flexible cystoscope was removed and the findings were described to the patient.      FINDINGS: Findings consistent with BPH and obstruction but amenable to TURP        ASSESSMENT:   BPH with refractory lower urinary tract symptoms failing medical therapy       PLAN:   Patient failing medical therapy.  Not a candidate for UroLift given his median lobe.  In addition prostate size would warrant either TURP.      Risks of TURP explained including retrograde  ejaculation of 100%, stricture disease of 10 to 12%, vesical neck contracture of 10 to 12%, ED of 5 to 7% and stress incontinence of 5 to 7%.     Explained that we can find cancer in 10% of men after final analysis and some may need treatment with radiation.  Others may just be watched.     Plan TURP.       Devin Fabian MD  Urology

## 2024-11-05 NOTE — BRIEF OP NOTE
[facility]    Brief Operative Note    Pre-operative diagnosis: BPH with obstruction/lower urinary tract symptoms [N40.1, N13.8]  Post-operative diagnosis BPH with refractory lower urinary tract symptoms    Procedure: CYSTOSCOPY, WITH TRANSURETHRAL RESECTION PROSTATE, N/A - Urethra    Surgeon: Surgeons and Role:     * Devin Fabian MD - Primary  Anesthesia: General   Estimated Blood Loss: 200 ml    Drains: 20 Peruvian three-way Mitchell, 50 mL in balloon  Specimens:   ID Type Source Tests Collected by Time Destination   1 : PROSTATE CHIPS Tissue Prostate SURGICAL PATHOLOGY EXAM Devin Fabian MD 11/5/2024 12:06 PM      Findings:   Trilobar hypertrophy with moderate median lobe component .  Complications: None.  Implants: * No implants in log *

## 2024-11-05 NOTE — OR NURSING
PACU Transfer Note    Daniel Boyd was transferred to [place] [id number] via bed.  Equipment used for transport:  CBI.  Accompanied by:  RN x 2  Prescriptions were: n/a    PACU Respiratory Event Documentation     1) Episodes of Apnea greater than or equal to 10 seconds: no    2) Bradypnea - less than 8 breaths per minute: no    3) Pain score on 0 to 10 scale: irritation/urgency from CBI    4) Pain-sedation mismatch (yes or no): no    5) Repeated 02 desaturation less than 90% (yes or no): no    Anesthesia notified? (yes or no): no    Any of the above events occuring repeatedly in separate 30 minute intervals may be considered recurrent PACU respiratory events.    Patient stable and meets phase 1 discharge criteria for transport from PACU.     Anisha Stevens RN

## 2024-11-05 NOTE — ANESTHESIA POSTPROCEDURE EVALUATION
Patient: Daniel Boyd    Procedure: Procedure(s):  CYSTOSCOPY, WITH TRANSURETHRAL RESECTION PROSTATE       Anesthesia Type:  General    Note:  Disposition: Inpatient   Postop Pain Control: Uneventful            Sign Out: Well controlled pain   PONV: No   Neuro/Psych: Uneventful            Sign Out: Acceptable/Baseline neuro status   Airway/Respiratory: Uneventful            Sign Out: Acceptable/Baseline resp. status   CV/Hemodynamics: Uneventful            Sign Out: Acceptable CV status; No obvious hypovolemia; No obvious fluid overload   Other NRE: NONE   DID A NON-ROUTINE EVENT OCCUR?            Last vitals:  Vitals Value Taken Time   /75 11/05/24 1320   Temp 97.3  F (36.3  C) 11/05/24 1300   Pulse 52 11/05/24 1321   Resp 12 11/05/24 1322   SpO2 99 % 11/05/24 1322   Vitals shown include unfiled device data.    Electronically Signed By: WILLIE Rubin CRNA  November 5, 2024  2:31 PM

## 2024-11-05 NOTE — PROVIDER NOTIFICATION
11/05/24 1407   Initial Information   Patient Belongings remains with patient   Patient Belongings Remaining with Patient vision aids;purse/wallet;cell phone/electronics;clothing   Did you bring any home meds/supplements to the hospital?  No     [facility] will make every effort per our policy to help keep your items safe while in the hospital.  If you choose to keep any items at the bedside, we cannot be held responsible for any items that are lost or broken.        I have reviewed my belongings list on admission and verify that it is correct.     Patient signature_______________________________  Date/Time_____________________    2nd Staff person if patient unable to sign __________________________  Date/Time ______________________      I have received all my belongings noted above at discharge.    Patient signature________________________________  Date/Time  __________________________

## 2024-11-05 NOTE — PLAN OF CARE
"Goal Outcome Evaluation:  VSS, afebrile, alert and oriented. Patient was on CBI with clear light yellow output. Stopped per provider about 3:30pm. Patient reports discomfort from lim. PRN tylenol given, with relief. Balloon was deflated in PACU from 50mL now contans 30mL.       Plan of Care Reviewed With: patient    Overall Patient Progress: improvingOverall Patient Progress: improving    Outcome Evaluation: VSS    Blood pressure 119/59, pulse 53, temperature 97.2  F (36.2  C), temperature source Tympanic, resp. rate 16, height 1.93 m (6' 4\"), weight 75.8 kg (167 lb), SpO2 99%.     Tressa Johnson RN on 11/5/2024 at 2:28 PM        "

## 2024-11-05 NOTE — PROGRESS NOTES
"[facility] [facility] [place]    Patient admitted to room 302 at approximately 1:40pm via bed from surgery. Patient was accompanied by OR staff.     Verbal SBAR report received from OR nurse prior to patient arrival.     Patient alert and oriented X 3. Pain is controlled with current analgesics.  Medication(s) being used: narcotic analgesics including Fentanyl.  . Admission vital signs: Blood pressure 131/67, pulse 52, temperature 96.9  F (36.1  C), temperature source Tympanic, resp. rate 14, height 1.93 m (6' 4\"), weight 75.8 kg (167 lb), SpO2 98%. Patient was oriented to plan of care, call light, bed controls, tv, telephone, bathroom, and visiting hours.     Risk Assessment    The following safety risks were identified during admission: fall. Yellow risk band applied: YES.     Skin Initial Assessment    This writer admitted this patient and completed a full skin assessment and Krystian score in the Adult PCS flowsheet.   Photo documentation of skin problem and/or wound competed via [employer] application (located under Media):  N/A    Appropriate interventions initiated as needed.            Education    Patient has a [place] to Observation order: No  Observation education completed and documented: N/A      Tressa Johnson RN      "

## 2024-11-05 NOTE — ANESTHESIA PROCEDURE NOTES
Airway         Procedure Start/Stop Times: 11/5/2024 10:44 AM  Staff -        CRNA: Millie Rogel APRN CRNA       Performed By: CRNAIndications and Patient Condition       Indications for airway management: luca-procedural       Induction type:intravenous       Mask difficulty assessment: 1 - vent by mask    Final Airway Details       Final airway type: endotracheal airway       Successful airway: ETT - single  Endotracheal Airway Details        ETT size (mm): 7.0       Cuffed: yes       Cuff volume (mL): 9       Successful intubation technique: direct laryngoscopy       DL Blade Type: MAC 4       Grade View of Cords: 1       Adjucts: stylet       Position: Center       Measured from: gums/teeth       Secured at (cm): 22       Bite block used: None    Post intubation assessment        Placement verified by: capnometry, equal breath sounds and chest rise        Number of attempts at approach: 1       Secured with: tape       Ease of procedure: easy       Dentition: Intact    Medication(s) Administered   Medication Administration Time: 11/5/2024 10:44 AM

## 2024-11-05 NOTE — ANESTHESIA CARE TRANSFER NOTE
Patient: Daniel Boyd    Procedure: Procedure(s):  CYSTOSCOPY, WITH TRANSURETHRAL RESECTION PROSTATE       Diagnosis: BPH with obstruction/lower urinary tract symptoms [N40.1, N13.8]  Diagnosis Additional Information: No value filed.    Anesthesia Type:   General     Note:    Oropharynx: oropharynx clear of all foreign objects and spontaneously breathing  Level of Consciousness: awake  Oxygen Supplementation: face mask  Level of Supplemental Oxygen (L/min / FiO2): 6  Independent Airway: airway patency satisfactory and stable  Dentition: dentition unchanged  Vital Signs Stable: post-procedure vital signs reviewed and stable  Report to RN Given: handoff report given  Patient transferred to: PACU    Handoff Report: Identifed the Patient, Identified the Reponsible Provider, Reviewed the pertinent medical history, Discussed the surgical course, Reviewed Intra-OP anesthesia mangement and issues during anesthesia, Set expectations for post-procedure period and Allowed opportunity for questions and acknowledgement of understanding      Vitals:  Vitals Value Taken Time   BP     Temp     Pulse     Resp     SpO2         Electronically Signed By: WILLIE Garces CRNA  November 5, 2024  12:27 PM

## 2024-11-05 NOTE — ANESTHESIA PREPROCEDURE EVALUATION
Anesthesia Pre-Procedure Evaluation    Patient: Daniel Boyd   MRN: [id number] : 1960        Procedure : Procedure(s):  CYSTOSCOPY, WITH TRANSURETHRAL RESECTION PROSTATE          Past Medical History:   Diagnosis Date    Bleomycin lung toxicity     Colonic adenoma     Depression     Family history of prostate cancer     Hodgkin lymphoma (H)     2015,Stage 4, s/p 6 cycles ABVD    Hyperlipidemia     No Comments Provided    Insomnia     Peripheral neuropathy due to chemotherapy (H)     No Comments Provided    Schatzki's ring     Tremor     No Comments Provided      Past Surgical History:   Procedure Laterality Date    APPENDECTOMY OPEN      No Comments Provided    COLONOSCOPY  2010    12/30/10,Normal.  No polyps seen.  Next due .    COLONOSCOPY N/A 2022    F/U  tubular adenoma, sigmoid diverticulosis    CYSTOSCOPY      No Comments Provided    ESOPHAGOSCOPY, GASTROSCOPY, DUODENOSCOPY (EGD), COMBINED      16,EGD    FOOT SURGERY Left     SUBTALAR ATHROEREISIS, left side, Dr. Schwartz.    HERNIA REPAIR Bilateral     Surgipro mesh    LYMPH NODE BIOPSY        No Known Allergies   Social History     Tobacco Use    Smoking status: Never     Passive exposure: Past    Smokeless tobacco: Never    Tobacco comments:     Passive exposure in childhood home.    Substance Use Topics    Alcohol use: Yes     Alcohol/week: 7.0 standard drinks of alcohol     Types: 7 Cans of beer per week     Comment: 1 beer per day      Wt Readings from Last 1 Encounters:   24 72.6 kg (160 lb)        Anesthesia Evaluation   Pt has had prior anesthetic.     No history of anesthetic complications       ROS/MED HX  ENT/Pulmonary: Comment: Hx Bleomycin lung toxicity      Neurologic: Comment: Tremor    (+)    peripheral neuropathy,                            Cardiovascular:     (+) Dyslipidemia - -  CAD -  - -                                      METS/Exercise Tolerance: >4 METS    Hematologic:  - neg  "hematologic  ROS     Musculoskeletal: Comment: Chronic lower back pain      GI/Hepatic: Comment: Schatzki's ring      Renal/Genitourinary:     (+)        BPH,      Endo:  - neg endo ROS     Psychiatric/Substance Use:     (+) psychiatric history depression       Infectious Disease:  - neg infectious disease ROS     Malignancy:   (+) Malignancy, History of Lymphoma/Leukemia.Lymph CA Remission status post.      Other:  - neg other ROS          Physical Exam    Airway        Mallampati: II   TM distance: > 3 FB   Neck ROM: full   Mouth opening: > 3 cm    Respiratory Devices and Support         Dental       (+) Completely normal teeth      Cardiovascular   cardiovascular exam normal       Rhythm and rate: regular and normal     Pulmonary   pulmonary exam normal        breath sounds clear to auscultation           OUTSIDE LABS:  CBC:   Lab Results   Component Value Date    WBC 6.1 01/08/2024    WBC 5.6 01/13/2023    HGB 15.0 01/08/2024    HGB 14.6 01/13/2023    HCT 43.7 01/08/2024    HCT 44.3 01/13/2023     01/08/2024     01/13/2023     BMP:   Lab Results   Component Value Date     10/28/2024     01/08/2024    POTASSIUM 3.9 10/28/2024    POTASSIUM 4.8 01/08/2024    CHLORIDE 106 10/28/2024    CHLORIDE 103 01/08/2024    CO2 26 10/28/2024    CO2 27 01/08/2024    BUN 13.3 10/28/2024    BUN 24.3 (H) 01/08/2024    CR 1.07 10/28/2024    CR 1.27 (H) 01/08/2024     (H) 10/28/2024     (H) 01/08/2024     COAGS:   Lab Results   Component Value Date    PTT 32 08/19/2015    INR 1.2 08/19/2015     POC: No results found for: \"BGM\", \"HCG\", \"HCGS\"  HEPATIC:   Lab Results   Component Value Date    ALBUMIN 4.6 01/08/2024    PROTTOTAL 7.7 01/08/2024    ALT 37 01/08/2024    AST 29 01/08/2024    ALKPHOS 81 01/08/2024    BILITOTAL 0.8 01/08/2024     OTHER:   Lab Results   Component Value Date    CHELSEA 9.0 10/28/2024    MAG 1.8 (L) 01/09/2016    T4 0.49 (L) 02/15/2017    SED 2 01/08/2024       Anesthesia " Plan    ASA Status:  2    NPO Status:  NPO Appropriate    Anesthesia Type: General.     - Airway: LMA   Induction: Intravenous, Propofol.   Maintenance: Balanced.        Consents    Anesthesia Plan(s) and associated risks, benefits, and realistic alternatives discussed. Questions answered and patient/representative(s) expressed understanding.     - Discussed: Risks, Benefits and Alternatives for BOTH SEDATION and the PROCEDURE were discussed     - Discussed with:  Patient      - Extended Intubation/Ventilatory Support Discussed: No.      - Patient is DNR/DNI Status: No     Use of blood products discussed: No .     Postoperative Care    Pain management: IV analgesics.   PONV prophylaxis: Ondansetron (or other 5HT-3)     Comments:               WILLIE Rubin CRNA    I have reviewed the pertinent notes and labs in the chart from the past 30 days and (re)examined the patient.  Any updates or changes from those notes are reflected in this note.             # Drug Induced Platelet Defect: home medication list includes an antiplatelet medication   # Hypertension: Home medication list includes antihypertensive(s)

## 2024-11-05 NOTE — OP NOTE
Operative report    Preoperative diagnosis: BPH with refractory lower urinary tract symptoms failing medical therapy    Postoperative diagnosis: Same    Procedure: Cystoscopy, TURP    Anesthesia type: General    Anesthesiologist: Steffanie Rogel CRNA    Surgeon:  Devin Fabian MD    Procedure description: Informed consent was obtained.   Risks were explained including bleeding, blood transfusion, infection.  I explained the 10 to 12% risk of stricture disease or contracture.  I explained the 100% risk of retrograde ejaculation.  I gave him a 5 to 7% risk of stress incontinence and erectile dysfunction.  I explained the risk of bladder injury as well as failure of the procedure to relieve his symptoms.  Anesthetic complications were explained as well as cardiac and pulmonary complications.    I explained the aftercare of the procedure.  He understands that he will have a catheter overnight possibly for an additional week.  He was explained that he may not lift weights or exercise for 4 weeks.  No lifting greater than 10 pounds    He was taken to the procedure room.  General anesthesia was given.  Antibiotics were given.  Timeout was done.  All were in agreement.    He was placed lithotomy.  Prepped and draped in a sterile fashion.  Cystoscopy was done with a 26 Australian visual obturator with the resectoscope sheath.  This was done after gently dilating his anterior urethra with sounds to a size 30.  The scope went in easily.    The landmarks were identified including the bladder neck proximally and the veru distally and the capsule was the circumferential landmark.    The bladder was surveyed and no visible tumors were seen.  There was moderate trabeculation and evidence of obstruction.    Trilobar hypertrophy visualized with moderate median lobe component.     We did a standard 3 stage resection beginning with the median lobe.  Following that the lateral lobe tissue was resected from the 11:00 and 1 o'clock position  respectively down to the 6 o'clock position.  Anterior tissue was removed.  The resection was done in an concave type fashion removing all adenoma that was visible.    Following that all chips were evacuated.  Hemostasis was obtained.  We identified the bladder neck and both ureters which were uninjured.  The veru was uninjured as was the sphincter.    We checked several times for chips and for bleeding.  Following that the scope was removed.  A 22 Divehi three-way catheter was placed with 50 cc in the balloon.  This was then hand irrigated and remain clear.  Continuous irrigation was then started.      EBL:200 ml     Drains:20 fr 3 way lim, 50  ml in balloon    Complications:none    Specimens: prostate chips     Disposition:to RR in good condition.

## 2024-11-06 VITALS
HEART RATE: 53 BPM | BODY MASS INDEX: 20.34 KG/M2 | HEIGHT: 76 IN | WEIGHT: 167 LBS | DIASTOLIC BLOOD PRESSURE: 61 MMHG | SYSTOLIC BLOOD PRESSURE: 131 MMHG | TEMPERATURE: 98.1 F | RESPIRATION RATE: 15 BRPM | OXYGEN SATURATION: 98 %

## 2024-11-06 PROCEDURE — 250N000013 HC RX MED GY IP 250 OP 250 PS 637: Performed by: UROLOGY

## 2024-11-06 PROCEDURE — 250N000009 HC RX 250: Performed by: UROLOGY

## 2024-11-06 RX ADMIN — POLYETHYLENE GLYCOL 3350 17 G: 17 POWDER, FOR SOLUTION ORAL at 09:40

## 2024-11-06 RX ADMIN — SERTRALINE HYDROCHLORIDE 100 MG: 50 TABLET ORAL at 09:40

## 2024-11-06 RX ADMIN — LIDOCAINE: 40 CREAM TOPICAL at 05:10

## 2024-11-06 RX ADMIN — ACETAMINOPHEN 650 MG: 325 TABLET, FILM COATED ORAL at 03:05

## 2024-11-06 RX ADMIN — PANTOPRAZOLE SODIUM 40 MG: 40 TABLET, DELAYED RELEASE ORAL at 09:40

## 2024-11-06 RX ADMIN — LISINOPRIL 2.5 MG: 2.5 TABLET ORAL at 09:37

## 2024-11-06 RX ADMIN — BACITRACIN ZINC, NEOMYCIN, POLYMYXIN B SULFAT: 5000; 3.5; 4 OINTMENT TOPICAL at 03:08

## 2024-11-06 ASSESSMENT — ACTIVITIES OF DAILY LIVING (ADL)
ADLS_ACUITY_SCORE: 0

## 2024-11-06 NOTE — DISCHARGE SUMMARY
[facility] Discharge Summary    Daniel Boyd MRN# [id number]   Age: 64 year old YOB: 1960     Date of Admission:  11/5/2024  Date of Discharge::  11/6/2024  Admitting Physician:  Devin Fabian MD  Discharge Physician:  Devin Fabian MD           Admission Diagnoses:   Benign prostatic hyperplasia with urinary hesitancy          Discharge Diagnosis:     Same          Procedures:   TURP          Medications Prior to Admission:     Medications Prior to Admission   Medication Sig Dispense Refill Last Dose/Taking    aspirin 81 MG EC tablet Take 1 tablet (81 mg) by mouth daily   10/22/2024 Morning    atorvastatin (LIPITOR) 40 MG tablet Take 1 tablet (40 mg) by mouth daily 90 tablet 2 11/4/2024 Bedtime    fish oil-omega-3 fatty acids 1000 MG capsule Take 1 capsule by mouth daily   10/22/2024    lisinopril (ZESTRIL) 2.5 MG tablet Take 1 tablet (2.5 mg) by mouth daily 90 tablet 3 11/3/2024 Morning    LORazepam (ATIVAN) 1 MG tablet Take 1 tablet (1 mg) by mouth At Bedtime 90 tablet 1 11/4/2024 Bedtime    Multiple Vitamins-Minerals (MULTIVITAMIN & MINERAL PO) Take 1 tablet by mouth every morning   10/22/2024 Morning    Omeprazole 20 MG TBDD Take 20 mg by mouth daily. 30 capsule  11/5/2024 at  6:00 AM    sertraline (ZOLOFT) 100 MG tablet Take 1 tablet (100 mg) by mouth daily 90 tablet 3 11/5/2024 at  6:00 AM    tamsulosin (FLOMAX) 0.4 MG capsule Take 1 capsule (0.4 mg) by mouth daily Watch for dizziness upon standing. 30 capsule 11 11/4/2024 Bedtime    traMADol (ULTRAM) 50 MG tablet Take 50 mg by mouth every 6 hours as needed for severe pain.   11/4/2024 Bedtime             Discharge Medications:     Current Discharge Medication List        START taking these medications    Details   senna-docusate (SENOKOT-S/PERICOLACE) 8.6-50 MG tablet Take 1-2 tablets by mouth 2 times daily. Take while on oral narcotics to prevent or treat constipation.  Qty: 30 tablet, Refills: 0    Comments: While  taking narcotics  Associated Diagnoses: Benign prostatic hyperplasia with urinary hesitancy           CONTINUE these medications which have NOT CHANGED    Details          atorvastatin (LIPITOR) 40 MG tablet Take 1 tablet (40 mg) by mouth daily  Qty: 90 tablet, Refills: 2    Associated Diagnoses: Coronary artery disease involving native coronary artery of native heart without angina pectoris             lisinopril (ZESTRIL) 2.5 MG tablet Take 1 tablet (2.5 mg) by mouth daily  Qty: 90 tablet, Refills: 3    Associated Diagnoses: Coronary artery disease involving native coronary artery of native heart without angina pectoris      LORazepam (ATIVAN) 1 MG tablet Take 1 tablet (1 mg) by mouth At Bedtime  Qty: 90 tablet, Refills: 1    Associated Diagnoses: Chronic insomnia      Multiple Vitamins-Minerals (MULTIVITAMIN & MINERAL PO) Take 1 tablet by mouth every morning      Omeprazole 20 MG TBDD Take 20 mg by mouth daily.  Qty: 30 capsule      sertraline (ZOLOFT) 100 MG tablet Take 1 tablet (100 mg) by mouth daily  Qty: 90 tablet, Refills: 3    Associated Diagnoses: Major depressive disorder, recurrent episode, mild (H)                 traMADol (ULTRAM) 50 MG tablet Take 50 mg by mouth every 6 hours as needed for severe pain.      STOP TAKING THESE MEDICATIONS:   STOP ASPIRIN FOR 4 WEEKS  STOP TAMSULOSIN  STOP FISH OIL               Consultations:     None          Hospital Course:     The patient underwent the above procedure and tolerated this well. Uncomplicated post operative course. Had adequate pain control, ambulating and tolerating regular diet at the time of discharge.    Voiding trial initiated morning of POD1.    Patient remained comfortable with stable VS.    Aftercare discussed with patient and restrictions discussed.  May stop his tamsulosin and aspirin.  No fish oil until further notice.            Discharge Instructions and Follow-Up:     Discharge diet: Regular   Discharge activity: No lifting >10lbs or  strenuous exercise for 4 week(s)   Discharge follow-up: Dr. Fabian in 4 weeks    Wound care: None           Discharge Disposition:     Discharged to home        Devin Fabian MD  [facility] Urology

## 2024-11-06 NOTE — PROGRESS NOTES
Spoke with Jeremy today and discussed Dr. Ruiz's advice to reduce his rosuvastatin 10mg every other day to 5mg every other day.  Jeremy relayed understanding and will cut his pill in half until reording.  He is still in [place] but will be back for our 3.21.19 drug resupply study visit.    Santi Adrian RN  Clinical Trials Nurse   [facility] DISCHARGE NOTE    Patient discharged to home at 11:26 AM via wheel chair. Accompanied by spouse and staff. Discharge instructions reviewed with patient, opportunity offered to ask questions. Prescriptions sent to patients preferred pharmacy. All belongings sent with patient.    Helena Alcantar RN

## 2024-11-06 NOTE — PHARMACY - DISCHARGE MEDICATION RECONCILIATION AND EDUCATION
Pharmacy:  Discharge Counseling and Medication Reconciliation    Daniel Boyd  [address] [place] [address]  [phone] (home)   64 year old male  PCP: Avelino Luque    Allergies: Patient has no known allergies.    Discharge Counseling:    Pharmacist met with patient today to review the medication portion of the After Visit Summary (with an emphasis on NEW medications) and to address patient's questions/concerns.    Summary of Education: Counseled patient on new medication of Senna-docusate including indication and use post-surgery.     Pt had question regarding Tramadol- reports he takes prior to bedtime for herniated discs in back. Per Dr. Fabian, it is preferable for pt to temporarily discontinue but will discuss with patient. Advised use of PRN Acetaminophen in the interim.    Also discussed holding Aspirin x4 weeks, then restarting; pt also to hold Fish oil and discontinue Tamsulosin    Materials Provided:  MedCounselor sheets printed from Clinical Pharmacology on: Senna-docusate    Discharge Medication Reconciliation:    It has been determined that the patient has an adequate supply of medications available or which can be obtained from the patient's preferred pharmacy, which he has confirmed as: [facility] [An updated medication list will be faxed to the patient's pharmacy.]    Thank you for the consult.    Drew Reed [facility]........November 6, 2024 8:42 AM

## 2024-11-06 NOTE — PROGRESS NOTES
[facility] Urology Progress Note    Daniel Boyd MRN# [id number]   Age: 64 year old YOB: 1960     Date of Admission:  11/5/2024    Reason for admission: BPH s/p TURP       Admitting MD: Dr. Devin Fabian       Procedure: TURP, POD1           Assessment and Plan:   Assessment:   BPH with refractory Luts s/p TURP      Plan:   Patient doing well.    Plan to initiate voiding trial today.  If patient able to void then follow up in 4 weeks.  If unable to void then 20 fr coude tip lim will be placed with follow up in 7 days with nurse for voiding trial/removal.    Must stop aspirin, fish oil.  May stop tamsulosin.    Continue all home medications otherwise.    No heavy lifting > 10 lbs or exercise or pickleball for 4 weeks.  No driving for 1 week.    Dismissal with follow up in 4 weeks.                       Chief Complaint:        History is obtained from the patient and EMR.         History of Present Illness:   This patient is a 64 year old male admitted with underlying BPH refractory to medical control.  POD1 TURP.               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  No polyps seen.  Next due 2020.    COLONOSCOPY N/A 01/24/2022    F/U 2027 tubular adenoma, sigmoid diverticulosis    CYSTOSCOPY      No Comments Provided    CYSTOSCOPY, TRANSURETHRAL RESECTION (TUR) PROSTATE, COMBINED N/A 11/5/2024    Procedure: CYSTOSCOPY, WITH  TRANSURETHRAL RESECTION PROSTATE;  Surgeon: Devin Fabian MD;  Location: [facility] OR    ESOPHAGOSCOPY, GASTROSCOPY, DUODENOSCOPY (EGD), COMBINED      16,EGD    FOOT SURGERY Left     SUBTALAR ATHROEREISIS, left side, Dr. Schwartz.    HERNIA REPAIR Bilateral     Surgipro mesh    LYMPH NODE BIOPSY               Social History:     Social History     Tobacco Use    Smoking status: Never     Passive exposure: Past    Smokeless tobacco: Never    Tobacco comments:     Passive exposure in childhood home.    Substance Use Topics    Alcohol use: Yes     Alcohol/week: 7.0 standard drinks of alcohol     Types: 7 Cans of beer per week     Comment: 1 beer per day             Family History:     Family History   Problem Relation Age of Onset    Prostate Cancer Father         Also had a CABG and  during open heart surgery, .    Breast Cancer Mother     Family History Negative Sister         Good Health    Family History Negative Sister         Good Health    Prostate Cancer Brother         Cancer-prostate,Age 47.    Heart Disease Brother      Family history reviewed.             Allergies:   No Known Allergies          Medications:     Current Facility-Administered Medications   Medication Dose Route Frequency Provider Last Rate Last Admin    acetaminophen (TYLENOL) tablet 650 mg  650 mg Oral Q6H PRN Devin Fabian MD   650 mg at 24 0305    atorvastatin (LIPITOR) tablet 40 mg  40 mg Oral At Bedtime Devin Fabian MD   40 mg at 24 210    calcium carbonate (TUMS) chewable tablet 500 mg  500 mg Oral 4x Daily PRN Devin Fabian MD        dexAMETHasone (DECADRON) injection 4 mg  4 mg Intravenous Once PRN Elyssa Bates APRN CRNA        fentaNYL (PF) (SUBLIMAZE) injection 25 mcg  25 mcg Intravenous Q15 Min PRN Elyssa Bates APRN CRNA        lidocaine (LMX4) cream   Topical Q1H PRN Devin Fabian MD        lidocaine 1 % 0.1-1 mL  0.1-1 mL Other Q1H PRN Devin Fabian MD        lisinopril  (ZESTRIL) tablet 2.5 mg  2.5 mg Oral Daily Devin Fabian MD   2.5 mg at 11/05/24 1534    LORazepam (ATIVAN) tablet 1 mg  1 mg Oral At Bedtime Devin Fabian MD   1 mg at 11/05/24 2102    naloxone (NARCAN) injection 0.1 mg  0.1 mg Intravenous Q2 Min PRN Elyssa Bates APRN CRNA        neomycin-bacitracin-polymyxin (NEOSPORIN) ointment   Topical 4x Daily PRN Devin Fabian MD   Given at 11/06/24 0308    ondansetron (ZOFRAN ODT) ODT tab 4 mg  4 mg Oral Q30 Min PRN Elyssa Bates APRN CRNA        Or    ondansetron (ZOFRAN) injection 4 mg  4 mg Intravenous Q30 Min PRN Elyssa Bates APRN CRNA        ondansetron (ZOFRAN ODT) ODT tab 4 mg  4 mg Oral Q6H PRN Devin Fabian MD        Or    ondansetron (ZOFRAN) injection 4 mg  4 mg Intravenous Q6H PRN Devin Fabian MD        oxyCODONE (ROXICODONE) tablet 10 mg  10 mg Oral Once PRN Elyssa Bates APRN CRNA        oxyCODONE (ROXICODONE) tablet 5 mg  5 mg Oral Once PRN Elyssa Bates APRN CRNA        pantoprazole (PROTONIX) EC tablet 40 mg  40 mg Oral Daily Devin Fabian MD        polyethylene glycol (MIRALAX) Packet 17 g  17 g Oral Daily Devin Fabian MD   17 g at 11/05/24 1534    prochlorperazine (COMPAZINE) injection 5 mg  5 mg Intravenous Q6H PRN Elyssa Bates APRN CRNA        sertraline (ZOLOFT) tablet 100 mg  100 mg Oral Daily Devin Fabian MD        sodium chloride (PF) 0.9% PF flush 3 mL  3 mL Intracatheter Q8H Devin Fabian MD   3 mL at 11/05/24 2102    sodium chloride (PF) 0.9% PF flush 3 mL  3 mL Intracatheter q1 min prn Devin Fabian MD        sodium chloride 0.9% irrigation (bag)   Irrigation Continuous Devin Fabian MD   Stopped at 11/05/24 1600             Review of Systems:   A comprehensive 10-point review of systems was performed and found to be negative except as described in the HPI.     /67 (BP Location: Right arm, Patient Position: Supine, Cuff Size: Adult Regular)   Pulse 51   Temp 98.4  F (36.9  C)  "(Tympanic)   Resp 16   Ht 1.93 m (6' 4\")   Wt 75.8 kg (167 lb)   SpO2 100%   BMI 20.33 kg/m    General: Alert, no apparent distress  HEENT: No jaundice, mucous membranes moist  CV: Warm upper and lower extremities, no edema, no calf tenderness  Respiratory: Normal respiratory effort, and no wheezing  Abdomen: Soft, nondistended, appropriately tender suprapubically, bladder not palpable  : Urine clear in the bag.  Penis circumcised, some blood around the tip of the meatus  Extremities: Moving all 4  Neuro: Alert and oriented x 3  Psych: Normal affect pleasant              Data:     Lab Results   Component Value Date    WBC 6.1 01/08/2024    HGB 15.0 01/08/2024    HCT 43.7 01/08/2024    MCV 95 01/08/2024     01/08/2024     Lab Results   Component Value Date    CR 1.07 10/28/2024                  "

## 2024-11-06 NOTE — PLAN OF CARE
"Goal Outcome Evaluation:      Plan of Care Reviewed With: patient    Overall Patient Progress: improvingOverall Patient Progress: improving         D/I/A: Pt resting between cares and treatments.  Lim patent and clear, yellow urine noted.  VSS, afebrile.  A+O x4 and making needs known.  Mild \"pressure\" ache at perineum, tolerable.  Medicated for back ache effectively with Tylenol.  Meatal discomfort r/t lim cath treated with Neosporin application. Pleasant and cooperative.  Encouraged to call for assistance if needing to get OOB or up to bathroom; using call light for assistance.  Good urine output over night.  P: Continue to monitor status.   "

## 2024-11-06 NOTE — PHARMACY
[facility]  Part of [facility]  [address] [place] [id number]    November 6, 2024    Dear Pharmacist,    Your customer, Daniel Boyd, born on 1960, was recently discharged from [facility].  We have updated his medication list and want to alert you to the following:       Review of your medicines        START taking        Dose / Directions   senna-docusate 8.6-50 MG tablet  Commonly known as: SENOKOT-S/PERICOLACE  Used for: Benign prostatic hyperplasia with urinary hesitancy      Dose: 1-2 tablet  Take 1-2 tablets by mouth 2 times daily. Take while on oral narcotics to prevent or treat constipation.  Quantity: 30 tablet  Refills: 0            CONTINUE these medicines which have NOT CHANGED        Dose / Directions   atorvastatin 40 MG tablet  Commonly known as: LIPITOR  Used for: Coronary artery disease involving native coronary artery of native heart without angina pectoris      Dose: 40 mg  Take 1 tablet (40 mg) by mouth daily  Quantity: 90 tablet  Refills: 2     fish oil-omega-3 fatty acids 1000 MG capsule      Dose: 1 capsule  Take 1 capsule by mouth daily  Refills: 0     lisinopril 2.5 MG tablet  Commonly known as: ZESTRIL  Used for: Coronary artery disease involving native coronary artery of native heart without angina pectoris      Dose: 2.5 mg  Take 1 tablet (2.5 mg) by mouth daily  Quantity: 90 tablet  Refills: 3     LORazepam 1 MG tablet  Commonly known as: ATIVAN  Used for: Chronic insomnia      Dose: 1 mg  Take 1 tablet (1 mg) by mouth At Bedtime  Quantity: 90 tablet  Refills: 1     MULTIVITAMIN & MINERAL PO      Dose: 1 tablet  Take 1 tablet by mouth every morning  Refills: 0     Omeprazole 20 MG Tbdd      Dose: 20 mg  Take 20 mg by mouth daily.  Quantity: 30 capsule  Refills: 0     sertraline 100 MG tablet  Commonly known as: ZOLOFT  Used for: Major depressive disorder, recurrent episode, mild (H)      Dose: 100 mg  Take 1 tablet (100  mg) by mouth daily  Quantity: 90 tablet  Refills: 3            STOP taking      aspirin 81 MG EC tablet        tamsulosin 0.4 MG capsule  Commonly known as: FLOMAX        traMADol 50 MG tablet  Commonly known as: ULTRAM                  Where to get your medicines        These medications were sent to [facility] [id number] - [place], [place] - [address]  [address] [facility] [address]      Phone: [phone]   senna-docusate 8.6-50 MG tablet         We also reviewed Daniel Boyd's allergy list and updated it as needed:  Allergies: Patient has no known allergies.    Thank you for continuing to care for Daniel Boyd.  We look forward to working together with you in the future.    Sincerely,  Drew Reed, [facility]

## 2024-11-07 ENCOUNTER — PATIENT OUTREACH (OUTPATIENT)
Dept: INTERNAL MEDICINE | Facility: OTHER | Age: 64
End: 2024-11-07
Payer: COMMERCIAL

## 2024-11-07 NOTE — TELEPHONE ENCOUNTER
"  Transitions of Care Outreach  Chief Complaint   Patient presents with    Hospital F/U       Most Recent Admission Date: 11/5/2024   Most Recent Admission Diagnosis: BPH with obstruction/lower urinary tract symptoms - N40.1, N13.8     Most Recent Discharge Date: 11/6/2024   Most Recent Discharge Diagnosis: Benign prostatic hyperplasia with urinary hesitancy - N40.1, R39.11     Transitions of Care Assessment    Discharge Assessment  How are you doing now that you are home?: Patient states, \"I had trouble getting to sleep last night but midway through the night I improved quite a bit.\"  How are your symptoms? (Red Flag symptoms escalate to triage hotline per guidelines): Improved  Do you know how to contact your clinic care team if you have future questions or changes to your health status? : Yes  Does the patient have their discharge instructions? : Yes  Does the patient have questions regarding their discharge instructions? : No  Were you started on any new medications or were there changes to any of your previous medications? : Yes  Does the patient have all of their medications?: Yes  Do you have questions regarding any of your medications? : No  Do you have all of your needed medical supplies or equipment (DME)?  (i.e. oxygen tank, CPAP, cane, etc.): Yes    Follow up Plan     Discharge Follow-Up  Discharge follow up appointment scheduled in alignment with recommended follow up timeframe or Transitions of Risk Category? (Low = within 30 days; Moderate= within 14 days; High= within 7 days): Yes  Discharge Follow Up Appointment Date: 12/04/24  Discharge Follow Up Appointment Scheduled with?: Specialty Care Provider (Rui)    Future Appointments   Date Time Provider Department Center   12/4/2024  9:20 AM [facility] [place]   12/4/2024  9:30 AM Devin Fabian MD [facility] [place]   1/15/2025  9:00 AM Kia Leung MD ON [place]   2/5/2025  7:40 AM [facility] [place]   2/5/2025  8:00 " Devin Hatfield MD [facility] [place]       Outpatient Plan as outlined on AVS reviewed with patient.    For any urgent concerns, please contact our 24 hour nurse triage line: [phone] ([phone])       Pamela Dsouza RN

## 2024-11-12 DIAGNOSIS — N13.8 BPH WITH OBSTRUCTION/LOWER URINARY TRACT SYMPTOMS: Primary | ICD-10-CM

## 2024-11-12 DIAGNOSIS — N40.1 BPH WITH OBSTRUCTION/LOWER URINARY TRACT SYMPTOMS: Primary | ICD-10-CM

## 2024-12-04 ENCOUNTER — LAB (OUTPATIENT)
Dept: LAB | Facility: OTHER | Age: 64
End: 2024-12-04
Attending: UROLOGY
Payer: MEDICARE

## 2024-12-04 ENCOUNTER — TELEPHONE (OUTPATIENT)
Dept: UROLOGY | Facility: OTHER | Age: 64
End: 2024-12-04

## 2024-12-04 ENCOUNTER — OFFICE VISIT (OUTPATIENT)
Dept: UROLOGY | Facility: OTHER | Age: 64
End: 2024-12-04
Attending: UROLOGY
Payer: MEDICARE

## 2024-12-04 VITALS
DIASTOLIC BLOOD PRESSURE: 80 MMHG | SYSTOLIC BLOOD PRESSURE: 118 MMHG | OXYGEN SATURATION: 98 % | BODY MASS INDEX: 19.97 KG/M2 | WEIGHT: 164 LBS | RESPIRATION RATE: 16 BRPM | HEART RATE: 80 BPM | HEIGHT: 76 IN

## 2024-12-04 DIAGNOSIS — N13.8 BPH WITH OBSTRUCTION/LOWER URINARY TRACT SYMPTOMS: ICD-10-CM

## 2024-12-04 DIAGNOSIS — N40.1 BPH WITH OBSTRUCTION/LOWER URINARY TRACT SYMPTOMS: ICD-10-CM

## 2024-12-04 DIAGNOSIS — N40.1 BPH WITH OBSTRUCTION/LOWER URINARY TRACT SYMPTOMS: Primary | ICD-10-CM

## 2024-12-04 DIAGNOSIS — N13.8 BPH WITH OBSTRUCTION/LOWER URINARY TRACT SYMPTOMS: Primary | ICD-10-CM

## 2024-12-04 DIAGNOSIS — Z80.42 FAMILY HISTORY OF PROSTATE CANCER IN FATHER: ICD-10-CM

## 2024-12-04 LAB
ALBUMIN UR-MCNC: 20 MG/DL
APPEARANCE UR: ABNORMAL
BILIRUB UR QL STRIP: NEGATIVE
COLOR UR AUTO: ABNORMAL
GLUCOSE UR STRIP-MCNC: NEGATIVE MG/DL
HGB UR QL STRIP: ABNORMAL
KETONES UR STRIP-MCNC: NEGATIVE MG/DL
LEUKOCYTE ESTERASE UR QL STRIP: ABNORMAL
NITRATE UR QL: NEGATIVE
PH UR STRIP: 5.5 [PH] (ref 5–9)
SP GR UR STRIP: 1.02 (ref 1–1.03)
UROBILINOGEN UR STRIP-MCNC: NORMAL MG/DL

## 2024-12-04 PROCEDURE — 51798 US URINE CAPACITY MEASURE: CPT | Performed by: UROLOGY

## 2024-12-04 PROCEDURE — 81003 URINALYSIS AUTO W/O SCOPE: CPT | Mod: ZL

## 2024-12-04 PROCEDURE — G0463 HOSPITAL OUTPT CLINIC VISIT: HCPCS | Mod: 25

## 2024-12-04 ASSESSMENT — PAIN SCALES - GENERAL: PAINLEVEL_OUTOF10: MILD PAIN (2)

## 2024-12-04 NOTE — NURSING NOTE
"Chief Complaint   Patient presents with    Follow Up     1 month post TURP       Initial /80 (BP Location: Right arm, Patient Position: Sitting, Cuff Size: Adult Regular)   Pulse 80   Resp 16   Ht 1.93 m (6' 4\")   Wt 74.4 kg (164 lb)   SpO2 98%   BMI 19.96 kg/m   Estimated body mass index is 19.96 kg/m  as calculated from the following:    Height as of this encounter: 1.93 m (6' 4\").    Weight as of this encounter: 74.4 kg (164 lb).  Medication Review: complete    AUA-14  post void residual-0      Mirna Hartley LPN      "

## 2024-12-04 NOTE — TELEPHONE ENCOUNTER
Patient just saw Dr Fabian and he states he forgot to ask if he can resume his normal activities.     Please contact patient.    Debra Tenorio on 12/4/2024 at 10:22 AM

## 2024-12-04 NOTE — PROGRESS NOTES
Chief Complaint: Follow Up (1 month post TURP)  .    HPI: Mr. Daniel Boyd is a 64 year old year old male presenting today December 4, 2024 in follow up for evaluation of BPH for which he underwent TURP 1 month ago.    Doing well with stronger stream.  Less frequency although still with more than he expected.  No bleeding.    Final pathology negative for cancer but strong family history of prostate cancer in Dad and brothers.      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  No polyps seen.  Next due 2020.    COLONOSCOPY N/A 01/24/2022    F/U 2027 tubular adenoma, sigmoid diverticulosis    CYSTOSCOPY      No Comments Provided    CYSTOSCOPY, TRANSURETHRAL RESECTION (TUR) PROSTATE, COMBINED N/A 11/5/2024    Procedure: CYSTOSCOPY, WITH TRANSURETHRAL RESECTION PROSTATE;  Surgeon: Devin Fabian MD;  Location:  OR    ESOPHAGOSCOPY, GASTROSCOPY, DUODENOSCOPY (EGD), COMBINED      5/27/16,EGD    FOOT SURGERY Left 2003    SUBTALAR ATHROEREISIS, left side, Dr. Schwartz.    HERNIA REPAIR Bilateral 2002    Surgipro mesh    LYMPH NODE BIOPSY  2015       Current Outpatient Medications   Medication Sig Dispense Refill    atorvastatin (LIPITOR) 40 MG tablet Take 1 tablet (40 mg) by mouth daily 90 tablet 2    fish oil-omega-3 fatty acids 1000 MG capsule Take 1 capsule by mouth daily      lisinopril (ZESTRIL) 2.5 MG tablet Take 1 tablet (2.5 mg) by mouth daily 90 tablet 3    LORazepam (ATIVAN) 1 MG tablet Take 1 tablet (1 mg) by mouth At Bedtime  "90 tablet 1    Multiple Vitamins-Minerals (MULTIVITAMIN & MINERAL PO) Take 1 tablet by mouth every morning      Omeprazole 20 MG TBDD Take 20 mg by mouth daily. 30 capsule     senna-docusate (SENOKOT-S/PERICOLACE) 8.6-50 MG tablet Take 1-2 tablets by mouth 2 times daily. Take while on oral narcotics to prevent or treat constipation. 30 tablet 0    sertraline (ZOLOFT) 100 MG tablet Take 1 tablet (100 mg) by mouth daily 90 tablet 3       ALLERGIES: Patient has no known allergies.     GENERAL PHYSICAL EXAM:   Vitals: /80 (BP Location: Right arm, Patient Position: Sitting, Cuff Size: Adult Regular)   Pulse 80   Resp 16   Ht 1.93 m (6' 4\")   Wt 74.4 kg (164 lb)   SpO2 98%   BMI 19.96 kg/m    Body mass index is 19.96 kg/m .    GENERAL: Well groomed, well developed, well nourished male in NAD.  ENT:  ENT exam normal  CV:  Warm extremities   RESPIRATORY: Normal respiratory effort.   GI:  Soft, NT, ND  MS: Moving all four  NEURO: Alert and oriented x 3.  PSYCH: Normal mood and affect, pleasant and agreeable during interview and exam.      PVR: Residual urine by ultrasound was 0 ml.      AUASS- 14    LABS: The last test results for Mr. Daniel Boyd were reviewed:  Results for orders placed or performed in visit on 12/04/24 (from the past 24 hours)   Urinalysis Macroscopic   Result Value Ref Range    Color Urine Light Yellow Colorless, Straw, Light Yellow, Yellow    Appearance Urine Slightly Cloudy (A) Clear    Glucose Urine Negative Negative mg/dL    Bilirubin Urine Negative Negative    Ketones Urine Negative Negative mg/dL    Specific Gravity Urine 1.019 1.000 - 1.030    Blood Urine Moderate (A) Negative    pH Urine 5.5 5.0 - 9.0    Protein Albumin Urine 20 (A) Negative mg/dL    Urobilinogen Urine Normal Normal, 2.0 mg/dL    Nitrite Urine Negative Negative    Leukocyte Esterase Urine Large (A) Negative       PSA -   Lab Results   Component Value Date    PSA 3.87 01/08/2024    PSA 2.88 07/31/2023    PSA 3.61 " 01/13/2023    PSA 3.77 08/02/2021     BMP -   Recent Labs   Lab Test 11/05/24  0852 10/28/24  1140 01/08/24  0926 01/13/23  0844   NA  --  139 140 140   POTASSIUM  --  3.9 4.8 4.5   CHLORIDE  --  106 103 105   CO2  --  26 27 27   BUN  --  13.3 24.3* 16.7   CR  --  1.07 1.27* 1.03   GLC 89 101* 119* 130*   CHELSEA  --  9.0 10.2 9.5       CBC -   Recent Labs   Lab Test 01/08/24  0926 01/13/23  0844 07/07/22  1051   WBC 6.1 5.6 7.1   HGB 15.0 14.6 15.1    188 225       ASSESSMENT:   BPH with Luts s/p TURP  Family history of prostate cancer.     PLAN:   Overall improved and happy with the results.  Stream better however still with some frequency.    Very happy.  Plan visit in 3 months with PSA and reassessment.  No evidence of contracture.      Discussed negative malignancy.  However will continue to need yearly DELMY and PSA given family history of prostate cancer.    Patient reassured.     10 minutes spent on the date of this encounter doing chart review, history and exam, documentation and further activities as noted above.      Devin Fabian MD  [facility]

## 2024-12-05 NOTE — TELEPHONE ENCOUNTER
Called patient , verified date of birth . He is aware he can resume normal activities. He then asked if he can take baby asa  daily and tramadol PRN . Mimi Merino LPN ....................12/5/2024  11:18 AM

## 2024-12-10 DIAGNOSIS — G89.29 CHRONIC PAIN OF LEFT ANKLE: Primary | ICD-10-CM

## 2024-12-10 DIAGNOSIS — M25.572 CHRONIC PAIN OF LEFT ANKLE: Primary | ICD-10-CM

## 2024-12-11 PROBLEM — M47.816 SPONDYLOSIS OF LUMBAR REGION WITHOUT MYELOPATHY OR RADICULOPATHY: Status: RESOLVED | Noted: 2024-06-04 | Resolved: 2024-12-11

## 2024-12-11 PROBLEM — G89.29 CHRONIC MIDLINE LOW BACK PAIN WITHOUT SCIATICA: Status: RESOLVED | Noted: 2024-07-25 | Resolved: 2024-12-11

## 2024-12-11 PROBLEM — M54.50 CHRONIC MIDLINE LOW BACK PAIN WITHOUT SCIATICA: Status: RESOLVED | Noted: 2024-07-25 | Resolved: 2024-12-11

## 2024-12-12 ENCOUNTER — HOSPITAL ENCOUNTER (OUTPATIENT)
Dept: GENERAL RADIOLOGY | Facility: OTHER | Age: 64
Discharge: HOME | End: 2024-12-12
Attending: PODIATRIST
Payer: MEDICARE

## 2024-12-12 ENCOUNTER — OFFICE VISIT (OUTPATIENT)
Dept: ORTHOPEDICS | Facility: OTHER | Age: 64
End: 2024-12-12
Attending: PODIATRIST
Payer: MEDICARE

## 2024-12-12 VITALS — OXYGEN SATURATION: 96 % | HEART RATE: 88 BPM

## 2024-12-12 DIAGNOSIS — Q66.72 CAVUS DEFORMITY OF LEFT FOOT: ICD-10-CM

## 2024-12-12 DIAGNOSIS — M67.88 PERONEAL TENDINOSIS, LEFT: ICD-10-CM

## 2024-12-12 DIAGNOSIS — M25.572 CHRONIC PAIN OF LEFT ANKLE: ICD-10-CM

## 2024-12-12 DIAGNOSIS — M79.672 LEFT FOOT PAIN: ICD-10-CM

## 2024-12-12 DIAGNOSIS — G89.29 CHRONIC PAIN OF LEFT ANKLE: ICD-10-CM

## 2024-12-12 DIAGNOSIS — M79.672 LEFT FOOT PAIN: Primary | ICD-10-CM

## 2024-12-12 PROCEDURE — 73610 X-RAY EXAM OF ANKLE: CPT | Mod: LT

## 2024-12-12 PROCEDURE — 73630 X-RAY EXAM OF FOOT: CPT | Mod: LT

## 2024-12-12 PROCEDURE — G0463 HOSPITAL OUTPT CLINIC VISIT: HCPCS

## 2024-12-12 ASSESSMENT — PAIN SCALES - GENERAL: PAINLEVEL_OUTOF10: MODERATE PAIN (5)

## 2024-12-12 NOTE — PROGRESS NOTES
SUBJECTIVE:  Daniel is a new patient see me today.  He has seen Dr. Alvares has had peroneal tendon injections over the last few years he has very high arch persistent pain he has a history of a hammertoe surgery he has a contracted hallux and was told he needs this addressed 2.  He is here to discuss all this.    ROS: Musculoskeletal and general review of systems are negative, per review of previous clinic questionnaire.  Denies SOB and calf pain.    EXAM:   PHYSICAL EXAMINATION:   CONSTITUTIONAL:  The patient is alert and oriented x 3, well appearing and in no apparent distress.  Affect is pleasant and answers questions appropriately.  VASCULAR:  Circulation is intact with palpable pedal pulses and adequate capillary fill time to all digits.  Hair growth is present and appropriate to mid foot and digits. Calf nontender.  NEUROLOGIC:  Light touch sensation is intact to digits.  There is a negative Tinel sign.    INTEGUMENT:  No abnormal dermatologic lesions are noted.  Skin has normal texture and turgor.    MUSCULOSKELETAL: Cavovarus foot structure is evident mild weakness peroneal tendons.  He denies history of neuromuscular disorder states he is always had abnormal feet.  Extensor substitution type contracture of hallux surgical changes to lesser toes appreciated and are fairly rectus.  A lot of his deformity appears to be forefoot driven peroneus longus to brevis transfer may be enough to correct this forefoot.  His heel is slight varus I would do a Damon osteotomy he has significant tendon pain along the lateral ankle as well.    IMAGING: Previous x-rays reviewed cavovarus foot structure is evident contracted toes are evident.    ASSESSMENT: Cavovarus foot, peroneal tendinosis, extensor substitution deformity of the toes.    PLAN OF CARE: Discussed condition and treatment with patient today.  We did discuss options he has failed conservative cares at this point.  Surgery would consist of peroneal tendon  debridement repair, peroneal tendon transfer, Steindler stripping, likely Damon calcaneal osteotomy.  I would also address the hallux with an IPJ fusion.  This was discussed in detail including surgery recovery associated risks and potential complications.  He will schedule this at his convenience and follow-up accordingly postoperatively.  30 minutes time spent.    Collin Montez DPM

## 2024-12-17 ENCOUNTER — TELEPHONE (OUTPATIENT)
Dept: FAMILY MEDICINE | Facility: OTHER | Age: 64
End: 2024-12-17
Payer: COMMERCIAL

## 2024-12-19 ENCOUNTER — OFFICE VISIT (OUTPATIENT)
Dept: FAMILY MEDICINE | Facility: OTHER | Age: 64
End: 2024-12-19
Attending: FAMILY MEDICINE
Payer: MEDICARE

## 2024-12-19 VITALS
DIASTOLIC BLOOD PRESSURE: 70 MMHG | SYSTOLIC BLOOD PRESSURE: 118 MMHG | BODY MASS INDEX: 19.97 KG/M2 | TEMPERATURE: 97.7 F | HEIGHT: 76 IN | WEIGHT: 164 LBS | OXYGEN SATURATION: 100 % | HEART RATE: 66 BPM | RESPIRATION RATE: 20 BRPM

## 2024-12-19 DIAGNOSIS — Q66.10 CAVOVARUS DEFORMITY OF FOOT: ICD-10-CM

## 2024-12-19 DIAGNOSIS — I25.10 CORONARY ARTERY DISEASE INVOLVING NATIVE CORONARY ARTERY OF NATIVE HEART WITHOUT ANGINA PECTORIS: ICD-10-CM

## 2024-12-19 DIAGNOSIS — Z01.818 PREOP GENERAL PHYSICAL EXAM: Primary | ICD-10-CM

## 2024-12-19 DIAGNOSIS — M76.71 PERONEAL TENDONITIS, RIGHT: ICD-10-CM

## 2024-12-19 LAB
ATRIAL RATE - MUSE: 64 BPM
DIASTOLIC BLOOD PRESSURE - MUSE: NORMAL MMHG
INTERPRETATION ECG - MUSE: NORMAL
P AXIS - MUSE: NORMAL DEGREES
PR INTERVAL - MUSE: 148 MS
QRS DURATION - MUSE: 94 MS
QT - MUSE: 418 MS
QTC - MUSE: 431 MS
R AXIS - MUSE: -51 DEGREES
SYSTOLIC BLOOD PRESSURE - MUSE: NORMAL MMHG
T AXIS - MUSE: 4 DEGREES
VENTRICULAR RATE- MUSE: 64 BPM

## 2024-12-19 PROCEDURE — G0463 HOSPITAL OUTPT CLINIC VISIT: HCPCS | Mod: 25

## 2024-12-19 RX ORDER — ASPIRIN 81 MG/1
81 TABLET ORAL DAILY
COMMUNITY

## 2024-12-19 ASSESSMENT — PAIN SCALES - GENERAL: PAINLEVEL_OUTOF10: NO PAIN (0)

## 2024-12-19 NOTE — PROGRESS NOTES
Preoperative Evaluation  [facility]  [address]  [place] [address]  Phone: [phone]  Fax: [phone]  Primary Provider: Avelino Luque MD  Pre-op Performing Provider: Avelino Luque MD  Dec 19, 2024             12/18/2024   Surgical Information   What procedure is being done? Pre-op visit   Facility or Hospital where procedure/surgery will be performed: [facility]   Who is doing the procedure / surgery? Dr. Montez   Date of surgery / procedure: 01/02/2025   Time of surgery / procedure: Na/Left foot surgery   Where do you plan to recover after surgery? at home with family     Fax number for surgical facility: Note does not need to be faxed, will be available electronically in Epic.    Assessment & Plan     The proposed surgical procedure is considered INTERMEDIATE risk.    Preop general physical exam    - EKG 12-lead, tracing only (Same Day)    Cavovarus deformity of foot      Peroneal tendonitis, right      Coronary artery disease involving native coronary artery of native heart without angina pectoris  Patient advised to hold his aspirin ibuprofen for 1 week.  Hold lisinopril for 24 hours.            - No identified additional risk factors other than previously addressed         Recommendation  Approval given to proceed with proposed procedure, without further diagnostic evaluation.    Marlon Sanchez is a 64 year old, presenting for the following:  Pre-Op Exam (Left foot surgery 01/02/2025 [facility] )        HPI related to upcoming procedure: Patient arrives here for preop.  He will be undergoing surgery to his left ankle foot.  Surgery scheduled for January 2.  He has had a long history of pain involving the foot and ankle.        12/18/2024   Pre-Op Questionnaire   Have you ever had a heart attack or stroke? No   Have you ever had surgery on your heart or blood vessels, such as a stent placement, a coronary artery bypass, or surgery on an  artery in your head, neck, heart, or legs? No   Do you have chest pain with activity? No   Do you have a history of heart failure? No   Do you currently have a cold, bronchitis or symptoms of other infection? No   Do you have a cough, shortness of breath, or wheezing? No   Do you or anyone in your family have previous history of blood clots? No   Do you or does anyone in your family have a serious bleeding problem such as prolonged bleeding following surgeries or cuts? No   Have you ever had problems with anemia or been told to take iron pills? No   Have you had any abnormal blood loss such as black, tarry or bloody stools? No   Have you ever had a blood transfusion? No   Are you willing to have a blood transfusion if it is medically needed before, during, or after your surgery? Yes   Have you or any of your relatives ever had problems with anesthesia? No   Do you have sleep apnea, excessive snoring or daytime drowsiness? No   Do you have any artifical heart valves or other implanted medical devices like a pacemaker, defibrillator, or continuous glucose monitor? No   Do you have artificial joints? No   Are you allergic to latex? No     Health Care Directive  Patient does not have a Health Care Directive: Discussed advance care planning with patient; however, patient declined at this time.    Preoperative Review of    reviewed - controlled substances reflected in medication list.          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) 12/15/2020     Priority: Medium    Coronary artery disease involving native coronary artery of native heart without angina pectoris  05/10/2018     Priority: Medium     CTA 2017 showing non obstructive disease      Bleomycin lung toxicity 02/15/2017     Priority: Medium    Hyperlipidemia 02/15/2017     Priority: Medium    Gastritis 05/27/2016     Priority: Medium    Schatzki's ring 05/27/2016     Priority: Medium    Peripheral neuropathy 05/03/2016     Priority: Medium    Tremor 05/03/2016     Priority: Medium    Hodgkin lymphoma (H) 08/25/2015     Priority: Medium    FHx: prostate cancer 09/30/2014     Priority: Medium     Overview:   Brother at 40   Father at 60      Chronic insomnia 12/29/2011     Priority: Medium    Major depressive disorder, recurrent episode, mild (H) 12/06/2010     Priority: Medium      Past Medical History:   Diagnosis Date    Bleomycin lung toxicity     Colonic adenoma     Depression     Family history of prostate cancer     Hodgkin lymphoma (H)     8/25/2015,Stage 4, s/p 6 cycles ABVD    Hyperlipidemia     No Comments Provided    Insomnia     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  No polyps seen.  Next due 2020.    COLONOSCOPY N/A 01/24/2022    F/U 2027 tubular adenoma, sigmoid diverticulosis    CYSTOSCOPY      No Comments Provided    CYSTOSCOPY, TRANSURETHRAL RESECTION (TUR) PROSTATE, COMBINED N/A 11/5/2024    Procedure: CYSTOSCOPY, WITH TRANSURETHRAL RESECTION PROSTATE;  Surgeon: Devin Fabian MD;  Location:  OR    ESOPHAGOSCOPY, GASTROSCOPY, DUODENOSCOPY (EGD), COMBINED      5/27/16,EGD    FOOT SURGERY Left 2003    SUBTALAR ATHROEREISIS, left side, Dr. Schwartz.    HERNIA REPAIR Bilateral 2002    Surgipro mesh    LYMPH NODE BIOPSY  2015     Current Outpatient Medications   Medication Sig Dispense Refill    aspirin 81 MG EC tablet Take 81 mg by mouth daily.      atorvastatin (LIPITOR)  40 MG tablet Take 1 tablet (40 mg) by mouth daily 90 tablet 2    fish oil-omega-3 fatty acids 1000 MG capsule Take 1 capsule by mouth daily      lisinopril (ZESTRIL) 2.5 MG tablet Take 1 tablet (2.5 mg) by mouth daily 90 tablet 3    LORazepam (ATIVAN) 1 MG tablet Take 1 tablet (1 mg) by mouth At Bedtime 90 tablet 1    Multiple Vitamins-Minerals (MULTIVITAMIN & MINERAL PO) Take 1 tablet by mouth every morning      Omeprazole 20 MG TBDD Take 20 mg by mouth daily. 30 capsule     sertraline (ZOLOFT) 100 MG tablet Take 1 tablet (100 mg) by mouth daily 90 tablet 3    senna-docusate (SENOKOT-S/PERICOLACE) 8.6-50 MG tablet Take 1-2 tablets by mouth 2 times daily. Take while on oral narcotics to prevent or treat constipation. (Patient not taking: Reported on 2024) 30 tablet 0       No Known Allergies     Social History     Tobacco Use    Smoking status: Never     Passive exposure: Past    Smokeless tobacco: Never    Tobacco comments:     Passive exposure in childhood home.    Substance Use Topics    Alcohol use: Yes     Alcohol/week: 7.0 standard drinks of alcohol     Types: 7 Cans of beer per week     Comment: 1 beer per day     Family History   Problem Relation Age of Onset    Prostate Cancer Father         Also had a CABG and  during open heart surgery, .    Breast Cancer Mother     Family History Negative Sister         Good Health    Family History Negative Sister         Good Health    Prostate Cancer Brother         Cancer-prostate,Age 47.    Heart Disease Brother      History   Drug Use No             Review of Systems  CONSTITUTIONAL: NEGATIVE for fever, chills, change in weight  INTEGUMENTARY/SKIN: NEGATIVE for worrisome rashes, moles or lesions  EYES: NEGATIVE for vision changes or irritation  ENT/MOUTH: NEGATIVE for ear, mouth and throat problems  RESP: NEGATIVE for significant cough or SOB  BREAST: NEGATIVE for masses, tenderness or discharge  CV: NEGATIVE for chest pain, palpitations or peripheral  "edema  GI: NEGATIVE for nausea, abdominal pain, heartburn, or change in bowel habits  : NEGATIVE for frequency, dysuria, or hematuria  MUSCULOSKELETAL: NEGATIVE for significant arthralgias or myalgia  NEURO: NEGATIVE for weakness, dizziness or paresthesias  ENDOCRINE: NEGATIVE for temperature intolerance, skin/hair changes  HEME: NEGATIVE for bleeding problems  PSYCHIATRIC: NEGATIVE for changes in mood or affect    Objective    /70   Pulse 66   Temp 97.7  F (36.5  C)   Resp 20   Ht 1.93 m (6' 4\")   Wt 74.4 kg (164 lb)   SpO2 100%   BMI 19.96 kg/m     Estimated body mass index is 19.96 kg/m  as calculated from the following:    Height as of this encounter: 1.93 m (6' 4\").    Weight as of this encounter: 74.4 kg (164 lb).  Physical Exam  GENERAL: alert and no distress  NECK: no adenopathy, no asymmetry, masses, or scars  RESP: lungs clear to auscultation - no rales, rhonchi or wheezes  CV: regular rate and rhythm, normal S1 S2, no S3 or S4, no murmur, click or rub, no peripheral edema  ABDOMEN: soft, nontender, no hepatosplenomegaly, no masses and bowel sounds normal  MS: no gross musculoskeletal defects noted, no edema    Recent Labs   Lab Test 10/28/24  1140 01/08/24  0926   HGB  --  15.0   PLT  --  212    140   POTASSIUM 3.9 4.8   CR 1.07 1.27*        Diagnostics  No labs were ordered during this visit.   EKG: appears normal, NSR, normal axis, normal intervals, no acute ST/T changes c/w ischemia, no LVH by voltage criteria, Premature Atrial Contractions (PAC) noted    Revised Cardiac Risk Index (RCRI)  The patient has the following serious cardiovascular risks for perioperative complications:   - No serious cardiac risks = 0 points     RCRI Interpretation: 0 points: Class I (very low risk - 0.4% complication rate)         Signed Electronically by: Avelino Luque MD  A copy of this evaluation report is provided to the requesting physician.         Answers submitted by the patient for this " visit:  General Questionnaire (Submitted on 12/18/2024)  Chief Complaint: Chronic problems general questions HPI Form  What is the reason for your visit today? : Pre-op visit  How many days per week do you miss taking your medication?: 0  Questionnaire about: Chronic problems general questions HPI Form (Submitted on 12/18/2024)  Chief Complaint: Chronic problems general questions HPI Form

## 2024-12-19 NOTE — H&P (VIEW-ONLY)
Preoperative Evaluation  [facility]  [address]  [place] [address]  Phone: [phone]  Fax: [phone]  Primary Provider: Avelino Luque MD  Pre-op Performing Provider: Avelino Luque MD  Dec 19, 2024             12/18/2024   Surgical Information   What procedure is being done? Pre-op visit   Facility or Hospital where procedure/surgery will be performed: [facility]   Who is doing the procedure / surgery? Dr. Montez   Date of surgery / procedure: 01/02/2025   Time of surgery / procedure: Na/Left foot surgery   Where do you plan to recover after surgery? at home with family     Fax number for surgical facility: Note does not need to be faxed, will be available electronically in Epic.    Assessment & Plan     The proposed surgical procedure is considered INTERMEDIATE risk.    Preop general physical exam    - EKG 12-lead, tracing only (Same Day)    Cavovarus deformity of foot      Peroneal tendonitis, right      Coronary artery disease involving native coronary artery of native heart without angina pectoris  Patient advised to hold his aspirin ibuprofen for 1 week.  Hold lisinopril for 24 hours.            - No identified additional risk factors other than previously addressed         Recommendation  Approval given to proceed with proposed procedure, without further diagnostic evaluation.    Marlon Sanchez is a 64 year old, presenting for the following:  Pre-Op Exam (Left foot surgery 01/02/2025 [facility] )        HPI related to upcoming procedure: Patient arrives here for preop.  He will be undergoing surgery to his left ankle foot.  Surgery scheduled for January 2.  He has had a long history of pain involving the foot and ankle.        12/18/2024   Pre-Op Questionnaire   Have you ever had a heart attack or stroke? No   Have you ever had surgery on your heart or blood vessels, such as a stent placement, a coronary artery bypass, or surgery on an  artery in your head, neck, heart, or legs? No   Do you have chest pain with activity? No   Do you have a history of heart failure? No   Do you currently have a cold, bronchitis or symptoms of other infection? No   Do you have a cough, shortness of breath, or wheezing? No   Do you or anyone in your family have previous history of blood clots? No   Do you or does anyone in your family have a serious bleeding problem such as prolonged bleeding following surgeries or cuts? No   Have you ever had problems with anemia or been told to take iron pills? No   Have you had any abnormal blood loss such as black, tarry or bloody stools? No   Have you ever had a blood transfusion? No   Are you willing to have a blood transfusion if it is medically needed before, during, or after your surgery? Yes   Have you or any of your relatives ever had problems with anesthesia? No   Do you have sleep apnea, excessive snoring or daytime drowsiness? No   Do you have any artifical heart valves or other implanted medical devices like a pacemaker, defibrillator, or continuous glucose monitor? No   Do you have artificial joints? No   Are you allergic to latex? No     Health Care Directive  Patient does not have a Health Care Directive: Discussed advance care planning with patient; however, patient declined at this time.    Preoperative Review of    reviewed - controlled substances reflected in medication list.          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) 12/15/2020     Priority: Medium    Coronary artery disease involving native coronary artery of native heart without angina pectoris  05/10/2018     Priority: Medium     CTA 2017 showing non obstructive disease      Bleomycin lung toxicity 02/15/2017     Priority: Medium    Hyperlipidemia 02/15/2017     Priority: Medium    Gastritis 05/27/2016     Priority: Medium    Schatzki's ring 05/27/2016     Priority: Medium    Peripheral neuropathy 05/03/2016     Priority: Medium    Tremor 05/03/2016     Priority: Medium    Hodgkin lymphoma (H) 08/25/2015     Priority: Medium    FHx: prostate cancer 09/30/2014     Priority: Medium     Overview:   Brother at 40   Father at 60      Chronic insomnia 12/29/2011     Priority: Medium    Major depressive disorder, recurrent episode, mild (H) 12/06/2010     Priority: Medium      Past Medical History:   Diagnosis Date    Bleomycin lung toxicity     Colonic adenoma     Depression     Family history of prostate cancer     Hodgkin lymphoma (H)     8/25/2015,Stage 4, s/p 6 cycles ABVD    Hyperlipidemia     No Comments Provided    Insomnia     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  No polyps seen.  Next due 2020.    COLONOSCOPY N/A 01/24/2022    F/U 2027 tubular adenoma, sigmoid diverticulosis    CYSTOSCOPY      No Comments Provided    CYSTOSCOPY, TRANSURETHRAL RESECTION (TUR) PROSTATE, COMBINED N/A 11/5/2024    Procedure: CYSTOSCOPY, WITH TRANSURETHRAL RESECTION PROSTATE;  Surgeon: Devin Fabian MD;  Location:  OR    ESOPHAGOSCOPY, GASTROSCOPY, DUODENOSCOPY (EGD), COMBINED      5/27/16,EGD    FOOT SURGERY Left 2003    SUBTALAR ATHROEREISIS, left side, Dr. Schwartz.    HERNIA REPAIR Bilateral 2002    Surgipro mesh    LYMPH NODE BIOPSY  2015     Current Outpatient Medications   Medication Sig Dispense Refill    aspirin 81 MG EC tablet Take 81 mg by mouth daily.      atorvastatin (LIPITOR)  40 MG tablet Take 1 tablet (40 mg) by mouth daily 90 tablet 2    fish oil-omega-3 fatty acids 1000 MG capsule Take 1 capsule by mouth daily      lisinopril (ZESTRIL) 2.5 MG tablet Take 1 tablet (2.5 mg) by mouth daily 90 tablet 3    LORazepam (ATIVAN) 1 MG tablet Take 1 tablet (1 mg) by mouth At Bedtime 90 tablet 1    Multiple Vitamins-Minerals (MULTIVITAMIN & MINERAL PO) Take 1 tablet by mouth every morning      Omeprazole 20 MG TBDD Take 20 mg by mouth daily. 30 capsule     sertraline (ZOLOFT) 100 MG tablet Take 1 tablet (100 mg) by mouth daily 90 tablet 3    senna-docusate (SENOKOT-S/PERICOLACE) 8.6-50 MG tablet Take 1-2 tablets by mouth 2 times daily. Take while on oral narcotics to prevent or treat constipation. (Patient not taking: Reported on 2024) 30 tablet 0       No Known Allergies     Social History     Tobacco Use    Smoking status: Never     Passive exposure: Past    Smokeless tobacco: Never    Tobacco comments:     Passive exposure in childhood home.    Substance Use Topics    Alcohol use: Yes     Alcohol/week: 7.0 standard drinks of alcohol     Types: 7 Cans of beer per week     Comment: 1 beer per day     Family History   Problem Relation Age of Onset    Prostate Cancer Father         Also had a CABG and  during open heart surgery, .    Breast Cancer Mother     Family History Negative Sister         Good Health    Family History Negative Sister         Good Health    Prostate Cancer Brother         Cancer-prostate,Age 47.    Heart Disease Brother      History   Drug Use No             Review of Systems  CONSTITUTIONAL: NEGATIVE for fever, chills, change in weight  INTEGUMENTARY/SKIN: NEGATIVE for worrisome rashes, moles or lesions  EYES: NEGATIVE for vision changes or irritation  ENT/MOUTH: NEGATIVE for ear, mouth and throat problems  RESP: NEGATIVE for significant cough or SOB  BREAST: NEGATIVE for masses, tenderness or discharge  CV: NEGATIVE for chest pain, palpitations or peripheral  "edema  GI: NEGATIVE for nausea, abdominal pain, heartburn, or change in bowel habits  : NEGATIVE for frequency, dysuria, or hematuria  MUSCULOSKELETAL: NEGATIVE for significant arthralgias or myalgia  NEURO: NEGATIVE for weakness, dizziness or paresthesias  ENDOCRINE: NEGATIVE for temperature intolerance, skin/hair changes  HEME: NEGATIVE for bleeding problems  PSYCHIATRIC: NEGATIVE for changes in mood or affect    Objective    /70   Pulse 66   Temp 97.7  F (36.5  C)   Resp 20   Ht 1.93 m (6' 4\")   Wt 74.4 kg (164 lb)   SpO2 100%   BMI 19.96 kg/m     Estimated body mass index is 19.96 kg/m  as calculated from the following:    Height as of this encounter: 1.93 m (6' 4\").    Weight as of this encounter: 74.4 kg (164 lb).  Physical Exam  GENERAL: alert and no distress  NECK: no adenopathy, no asymmetry, masses, or scars  RESP: lungs clear to auscultation - no rales, rhonchi or wheezes  CV: regular rate and rhythm, normal S1 S2, no S3 or S4, no murmur, click or rub, no peripheral edema  ABDOMEN: soft, nontender, no hepatosplenomegaly, no masses and bowel sounds normal  MS: no gross musculoskeletal defects noted, no edema    Recent Labs   Lab Test 10/28/24  1140 01/08/24  0926   HGB  --  15.0   PLT  --  212    140   POTASSIUM 3.9 4.8   CR 1.07 1.27*        Diagnostics  No labs were ordered during this visit.   EKG: appears normal, NSR, normal axis, normal intervals, no acute ST/T changes c/w ischemia, no LVH by voltage criteria, Premature Atrial Contractions (PAC) noted    Revised Cardiac Risk Index (RCRI)  The patient has the following serious cardiovascular risks for perioperative complications:   - No serious cardiac risks = 0 points     RCRI Interpretation: 0 points: Class I (very low risk - 0.4% complication rate)         Signed Electronically by: Avelino Luque MD  A copy of this evaluation report is provided to the requesting physician.         Answers submitted by the patient for this " visit:  General Questionnaire (Submitted on 12/18/2024)  Chief Complaint: Chronic problems general questions HPI Form  What is the reason for your visit today? : Pre-op visit  How many days per week do you miss taking your medication?: 0  Questionnaire about: Chronic problems general questions HPI Form (Submitted on 12/18/2024)  Chief Complaint: Chronic problems general questions HPI Form

## 2024-12-19 NOTE — NURSING NOTE
Patient here for preop for left foot surgery at [facility] with  on 01/02/2025. Medication Reconciliation: complete.    Dedra Liu LPN  12/19/2024 8:57 AM

## 2024-12-21 ENCOUNTER — HEALTH MAINTENANCE LETTER (OUTPATIENT)
Age: 64
End: 2024-12-21

## 2024-12-31 ENCOUNTER — ANESTHESIA EVENT (OUTPATIENT)
Dept: SURGERY | Facility: OTHER | Age: 64
End: 2024-12-31
Payer: MEDICARE

## 2025-01-02 ENCOUNTER — ANESTHESIA (OUTPATIENT)
Dept: SURGERY | Facility: OTHER | Age: 65
End: 2025-01-02
Payer: MEDICARE

## 2025-01-02 ENCOUNTER — HOSPITAL ENCOUNTER (OUTPATIENT)
Facility: OTHER | Age: 65
Discharge: HOME | End: 2025-01-02
Attending: PODIATRIST | Admitting: PODIATRIST
Payer: MEDICARE

## 2025-01-02 ENCOUNTER — HOSPITAL ENCOUNTER (OUTPATIENT)
Dept: GENERAL RADIOLOGY | Facility: OTHER | Age: 65
Discharge: HOME | End: 2025-01-02
Attending: PODIATRIST | Admitting: PODIATRIST
Payer: MEDICARE

## 2025-01-02 VITALS
HEART RATE: 54 BPM | TEMPERATURE: 97.8 F | SYSTOLIC BLOOD PRESSURE: 123 MMHG | RESPIRATION RATE: 12 BRPM | WEIGHT: 164 LBS | OXYGEN SATURATION: 100 % | DIASTOLIC BLOOD PRESSURE: 83 MMHG | BODY MASS INDEX: 19.96 KG/M2

## 2025-01-02 DIAGNOSIS — M79.672 LEFT FOOT PAIN: ICD-10-CM

## 2025-01-02 DIAGNOSIS — G89.18 POST-OP PAIN: Primary | ICD-10-CM

## 2025-01-02 PROCEDURE — 370N000017 HC ANESTHESIA TECHNICAL FEE, PER MIN: Performed by: PODIATRIST

## 2025-01-02 PROCEDURE — 250N000011 HC RX IP 250 OP 636: Performed by: NURSE ANESTHETIST, CERTIFIED REGISTERED

## 2025-01-02 PROCEDURE — 250N000009 HC RX 250: Performed by: PODIATRIST

## 2025-01-02 PROCEDURE — 710N000012 HC RECOVERY PHASE 2, PER MINUTE: Performed by: PODIATRIST

## 2025-01-02 PROCEDURE — 272N000002 HC OR SUPPLY OTHER OPNP: Performed by: PODIATRIST

## 2025-01-02 PROCEDURE — 250N000011 HC RX IP 250 OP 636: Performed by: PODIATRIST

## 2025-01-02 PROCEDURE — 258N000003 HC RX IP 258 OP 636: Performed by: NURSE ANESTHETIST, CERTIFIED REGISTERED

## 2025-01-02 PROCEDURE — 28300 INCISION OF HEEL BONE: CPT | Mod: LT | Performed by: PODIATRIST

## 2025-01-02 PROCEDURE — 272N000001 HC OR GENERAL SUPPLY STERILE: Performed by: PODIATRIST

## 2025-01-02 PROCEDURE — C1713 ANCHOR/SCREW BN/BN,TIS/BN: HCPCS | Performed by: PODIATRIST

## 2025-01-02 PROCEDURE — 28250 REVISION OF FOOT FASCIA: CPT | Mod: LT | Performed by: PODIATRIST

## 2025-01-02 PROCEDURE — 360N000084 HC SURGERY LEVEL 4 W/ FLUORO, PER MIN: Performed by: PODIATRIST

## 2025-01-02 PROCEDURE — C1776 JOINT DEVICE (IMPLANTABLE): HCPCS | Performed by: PODIATRIST

## 2025-01-02 PROCEDURE — 999N000141 HC STATISTIC PRE-PROCEDURE NURSING ASSESSMENT: Performed by: PODIATRIST

## 2025-01-02 PROCEDURE — 27690 REVISE LOWER LEG TENDON: CPT | Mod: LT | Performed by: PODIATRIST

## 2025-01-02 PROCEDURE — 999N000180 XR SURGERY CARM FLUORO LESS THAN 5 MIN

## 2025-01-02 PROCEDURE — 250N000011 HC RX IP 250 OP 636: Mod: JW | Performed by: NURSE ANESTHETIST, CERTIFIED REGISTERED

## 2025-01-02 PROCEDURE — 250N000011 HC RX IP 250 OP 636

## 2025-01-02 PROCEDURE — C1769 GUIDE WIRE: HCPCS | Performed by: PODIATRIST

## 2025-01-02 PROCEDURE — 28306 INCISION OF METATARSAL: CPT | Mod: LT | Performed by: PODIATRIST

## 2025-01-02 PROCEDURE — 28755 FUSION OF BIG TOE JOINT: CPT | Mod: LT | Performed by: PODIATRIST

## 2025-01-02 DEVICE — IMPLANTABLE DEVICE: Type: IMPLANTABLE DEVICE | Site: FOOT | Status: FUNCTIONAL

## 2025-01-02 DEVICE — SCREW BONE 2.7 X 22MM: Type: IMPLANTABLE DEVICE | Site: FOOT | Status: FUNCTIONAL

## 2025-01-02 RX ORDER — GLYCOPYRROLATE 0.2 MG/ML
INJECTION, SOLUTION INTRAMUSCULAR; INTRAVENOUS PRN
Status: DISCONTINUED | OUTPATIENT
Start: 2025-01-02 | End: 2025-01-02

## 2025-01-02 RX ORDER — ACETAMINOPHEN 500 MG
1000 TABLET ORAL EVERY 8 HOURS
Qty: 30 TABLET | Refills: 0 | OUTPATIENT
Start: 2025-01-02 | End: 2025-01-07

## 2025-01-02 RX ORDER — CEFAZOLIN SODIUM/WATER 2 G/20 ML
2 SYRINGE (ML) INTRAVENOUS SEE ADMIN INSTRUCTIONS
Status: DISCONTINUED | OUTPATIENT
Start: 2025-01-02 | End: 2025-01-02 | Stop reason: HOSPADM

## 2025-01-02 RX ORDER — MAGNESIUM HYDROXIDE 1200 MG/15ML
LIQUID ORAL PRN
Status: DISCONTINUED | OUTPATIENT
Start: 2025-01-02 | End: 2025-01-02 | Stop reason: HOSPADM

## 2025-01-02 RX ORDER — LIDOCAINE 40 MG/G
CREAM TOPICAL
Status: DISCONTINUED | OUTPATIENT
Start: 2025-01-02 | End: 2025-01-02 | Stop reason: HOSPADM

## 2025-01-02 RX ORDER — CEFAZOLIN SODIUM/WATER 2 G/20 ML
2 SYRINGE (ML) INTRAVENOUS SEE ADMIN INSTRUCTIONS
Status: DISCONTINUED | OUTPATIENT
Start: 2025-01-02 | End: 2025-01-02

## 2025-01-02 RX ORDER — ONDANSETRON 2 MG/ML
INJECTION INTRAMUSCULAR; INTRAVENOUS PRN
Status: DISCONTINUED | OUTPATIENT
Start: 2025-01-02 | End: 2025-01-02

## 2025-01-02 RX ORDER — ASPIRIN 81 MG/1
81 TABLET ORAL 2 TIMES DAILY
Qty: 60 TABLET | Refills: 0 | OUTPATIENT
Start: 2025-01-02

## 2025-01-02 RX ORDER — BUPIVACAINE HYDROCHLORIDE 2.5 MG/ML
INJECTION, SOLUTION EPIDURAL; INFILTRATION; INTRACAUDAL PRN
Status: DISCONTINUED | OUTPATIENT
Start: 2025-01-02 | End: 2025-01-02

## 2025-01-02 RX ORDER — OXYCODONE HYDROCHLORIDE 5 MG/1
5 TABLET ORAL EVERY 6 HOURS PRN
Qty: 16 TABLET | Refills: 0 | Status: SHIPPED | OUTPATIENT
Start: 2025-01-02

## 2025-01-02 RX ORDER — OXYCODONE HYDROCHLORIDE 5 MG/1
5 TABLET ORAL
Status: DISCONTINUED | OUTPATIENT
Start: 2025-01-02 | End: 2025-01-02 | Stop reason: HOSPADM

## 2025-01-02 RX ORDER — CEFAZOLIN SODIUM/WATER 2 G/20 ML
2 SYRINGE (ML) INTRAVENOUS
Status: DISCONTINUED | OUTPATIENT
Start: 2025-01-02 | End: 2025-01-02

## 2025-01-02 RX ORDER — HYDROXYZINE HYDROCHLORIDE 10 MG/1
10 TABLET, FILM COATED ORAL
Status: CANCELLED | OUTPATIENT
Start: 2025-01-02

## 2025-01-02 RX ORDER — CEFAZOLIN SODIUM/WATER 2 G/20 ML
2 SYRINGE (ML) INTRAVENOUS
Status: COMPLETED | OUTPATIENT
Start: 2025-01-02 | End: 2025-01-02

## 2025-01-02 RX ORDER — PROPOFOL 10 MG/ML
INJECTION, EMULSION INTRAVENOUS PRN
Status: DISCONTINUED | OUTPATIENT
Start: 2025-01-02 | End: 2025-01-02

## 2025-01-02 RX ORDER — OXYCODONE HYDROCHLORIDE 5 MG/1
5 TABLET ORAL EVERY 4 HOURS PRN
Qty: 16 TABLET | Refills: 0 | OUTPATIENT
Start: 2025-01-02

## 2025-01-02 RX ORDER — ONDANSETRON 4 MG/1
4 TABLET, ORALLY DISINTEGRATING ORAL
Status: DISCONTINUED | OUTPATIENT
Start: 2025-01-02 | End: 2025-01-02 | Stop reason: HOSPADM

## 2025-01-02 RX ORDER — OXYCODONE HYDROCHLORIDE 5 MG/1
5 TABLET ORAL
Status: CANCELLED | OUTPATIENT
Start: 2025-01-02

## 2025-01-02 RX ORDER — ASPIRIN 81 MG/1
81 TABLET ORAL 2 TIMES DAILY
Qty: 60 TABLET | Refills: 0 | Status: SHIPPED | OUTPATIENT
Start: 2025-01-02

## 2025-01-02 RX ORDER — HYDROXYZINE HYDROCHLORIDE 10 MG/1
10 TABLET, FILM COATED ORAL
Status: DISCONTINUED | OUTPATIENT
Start: 2025-01-02 | End: 2025-01-02 | Stop reason: HOSPADM

## 2025-01-02 RX ORDER — SODIUM CHLORIDE, SODIUM LACTATE, POTASSIUM CHLORIDE, CALCIUM CHLORIDE 600; 310; 30; 20 MG/100ML; MG/100ML; MG/100ML; MG/100ML
INJECTION, SOLUTION INTRAVENOUS CONTINUOUS
Status: DISCONTINUED | OUTPATIENT
Start: 2025-01-02 | End: 2025-01-02 | Stop reason: HOSPADM

## 2025-01-02 RX ORDER — IBUPROFEN 600 MG/1
600 TABLET, FILM COATED ORAL EVERY 6 HOURS
Qty: 12 TABLET | Refills: 0 | Status: SHIPPED | OUTPATIENT
Start: 2025-01-02 | End: 2025-01-05

## 2025-01-02 RX ORDER — ACETAMINOPHEN 500 MG
1000 TABLET ORAL EVERY 8 HOURS
Qty: 30 TABLET | Refills: 0 | Status: SHIPPED | OUTPATIENT
Start: 2025-01-02 | End: 2025-01-07

## 2025-01-02 RX ORDER — PROPOFOL 10 MG/ML
INJECTION, EMULSION INTRAVENOUS CONTINUOUS PRN
Status: DISCONTINUED | OUTPATIENT
Start: 2025-01-02 | End: 2025-01-02

## 2025-01-02 RX ADMIN — MIDAZOLAM HYDROCHLORIDE 2 MG: 1 INJECTION, SOLUTION INTRAMUSCULAR; INTRAVENOUS at 11:35

## 2025-01-02 RX ADMIN — BUPIVACAINE HYDROCHLORIDE 25 ML: 2.5 INJECTION, SOLUTION EPIDURAL; INFILTRATION; INTRACAUDAL; PERINEURAL at 11:13

## 2025-01-02 RX ADMIN — PROPOFOL 140 MCG/KG/MIN: 10 INJECTION, EMULSION INTRAVENOUS at 11:37

## 2025-01-02 RX ADMIN — PROPOFOL 140 MCG/KG/MIN: 10 INJECTION, EMULSION INTRAVENOUS at 12:25

## 2025-01-02 RX ADMIN — ONDANSETRON HYDROCHLORIDE 4 MG: 2 SOLUTION INTRAMUSCULAR; INTRAVENOUS at 11:42

## 2025-01-02 RX ADMIN — PROPOFOL 140 MCG/KG/MIN: 10 INJECTION, EMULSION INTRAVENOUS at 12:46

## 2025-01-02 RX ADMIN — BUPIVACAINE HYDROCHLORIDE 25 ML: 2.5 INJECTION, SOLUTION EPIDURAL; INFILTRATION; INTRACAUDAL; PERINEURAL at 11:09

## 2025-01-02 RX ADMIN — Medication 2 G: at 11:29

## 2025-01-02 RX ADMIN — PROPOFOL 50 MG: 10 INJECTION, EMULSION INTRAVENOUS at 11:37

## 2025-01-02 RX ADMIN — MIDAZOLAM 2 MG: 1 INJECTION INTRAMUSCULAR; INTRAVENOUS at 11:05

## 2025-01-02 RX ADMIN — SODIUM CHLORIDE, POTASSIUM CHLORIDE, SODIUM LACTATE AND CALCIUM CHLORIDE: 600; 310; 30; 20 INJECTION, SOLUTION INTRAVENOUS at 13:32

## 2025-01-02 RX ADMIN — GLYCOPYRROLATE 0.1 MG: 0.2 INJECTION, SOLUTION INTRAMUSCULAR; INTRAVENOUS at 11:44

## 2025-01-02 RX ADMIN — SODIUM CHLORIDE, POTASSIUM CHLORIDE, SODIUM LACTATE AND CALCIUM CHLORIDE: 600; 310; 30; 20 INJECTION, SOLUTION INTRAVENOUS at 10:46

## 2025-01-02 ASSESSMENT — ACTIVITIES OF DAILY LIVING (ADL)
ADLS_ACUITY_SCORE: 39
ADLS_ACUITY_SCORE: 40
ADLS_ACUITY_SCORE: 39
ADLS_ACUITY_SCORE: 39

## 2025-01-02 NOTE — ANESTHESIA CARE TRANSFER NOTE
Patient: Daniel Boyd    Procedure: Procedure(s):  Damon calcaneal osteotomy,  steindler stripping, peroneal repair, peroneal transfer, Hallux interphalangeal joint fusion, 1st metatarsal osteotomy       Diagnosis: Left foot pain [M79.672]  Diagnosis Additional Information: No value filed.    Anesthesia Type:   MAC     Note:    Oropharynx: oropharynx clear of all foreign objects and spontaneously breathing  Level of Consciousness: drowsy  Oxygen Supplementation: room air    Independent Airway: airway patency satisfactory and stable  Dentition: dentition unchanged  Vital Signs Stable: post-procedure vital signs reviewed and stable  Report to RN Given: handoff report given  Patient transferred to: Phase II    Handoff Report: Identifed the Patient, Identified the Reponsible Provider, Reviewed the pertinent medical history, Discussed the surgical course, Reviewed Intra-OP anesthesia mangement and issues during anesthesia, Set expectations for post-procedure period and Allowed opportunity for questions and acknowledgement of understanding      Vitals:  Vitals Value Taken Time   BP     Temp     Pulse     Resp     SpO2 96 % 01/02/25 1431   Vitals shown include unfiled device data.    Electronically Signed By: WILLIE Rubin CRNA  January 2, 2025  2:32 PM

## 2025-01-02 NOTE — ANESTHESIA PROCEDURE NOTES
Adductor canal Procedure Note    Pre-Procedure   Staff -        CRNA: Ray Ruvalcaba APRN CRNA       Performed By: CRNA       Location: pre-op       Procedure Start/Stop Times: 1/2/2025 11:05 AM and 1/2/2025 11:09 AM       Pre-Anesthestic Checklist: patient identified, IV checked, site marked, risks and benefits discussed, informed consent, monitors and equipment checked, pre-op evaluation, at physician/surgeon's request and post-op pain management  Timeout:       Correct Patient: Yes        Correct Procedure: Yes        Correct Site: Yes        Correct Position: Yes        Correct Laterality: Yes        Site Marked: Yes  Procedure Documentation  Procedure: Adductor canal         Diagnosis: POST OPERATIVE PAIN CONTROL       Laterality: right       Patient Position: supine       Patient Prep/Sterile Barriers: sterile gloves, mask       Skin prep: Chloraprep       Local skin infiltrated with 0.5 mL of. Local skin infiltration: bupivacaine 0.25%       Needle Type: insulated and short bevel       Needle Gauge: 21.        Needle Length (Inches): 4        Ultrasound guided       1. Ultrasound was used to identify targeted nerve, plexus, vascular marker, or fascial plane and place a needle adjacent to it in real-time.       2. Ultrasound was used to visualize the spread of anesthetic in close proximity to the above referenced structure.       3. A permanent image is entered into the patient's record.       4. The visualized anatomic structures appeared normal.       5. There were no apparent abnormal pathologic findings.    Assessment/Narrative         The placement was negative for: blood aspirated, painful injection and site bleeding       Paresthesias: No.       Bolus given via needle..        Secured via.        Insertion/Infusion Method: Single Shot       Complications: none       Injection made incrementally with aspirations every 5 mL.    Medication(s) Administered   Medication Administration Time: 1/2/2025  "11:05 AM      FOR [facility] ([facility]) ONLY:   Pain Team Contact information: please page the Pain Team Via [employer]. Search \"Pain\". During daytime hours, please page the attending first. At night please page the resident first.      "

## 2025-01-02 NOTE — ANESTHESIA POSTPROCEDURE EVALUATION
Patient: Daniel Boyd    Procedure: Procedure(s):  Damon calcaneal osteotomy,  steindler stripping, peroneal repair, peroneal transfer, Hallux interphalangeal joint fusion, 1st metatarsal osteotomy       Anesthesia Type:  MAC    Note:  Disposition: Outpatient   Postop Pain Control: Uneventful            Sign Out: Well controlled pain   PONV: No   Neuro/Psych: Uneventful            Sign Out: Acceptable/Baseline neuro status   Airway/Respiratory: Uneventful            Sign Out: Acceptable/Baseline resp. status   CV/Hemodynamics: Uneventful            Sign Out: Acceptable CV status; No obvious hypovolemia; No obvious fluid overload   Other NRE: NONE   DID A NON-ROUTINE EVENT OCCUR?            Last vitals:  Vitals:    01/02/25 1006   BP: 122/89   Pulse: 62   Resp: 18   Temp: 97.8  F (36.6  C)   SpO2: 99%       Electronically Signed By: WILLIE Rubin CRNA  January 2, 2025  2:32 PM

## 2025-01-02 NOTE — ANESTHESIA PREPROCEDURE EVALUATION
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  No polyps seen.  Next due .     COLONOSCOPY N/A 2022    F/U  tubular adenoma, sigmoid diverticulosis     CYSTOSCOPY      No Comments Provided     CYSTOSCOPY, TRANSURETHRAL RESECTION (TUR) PROSTATE, COMBINED N/A 2024    Procedure: CYSTOSCOPY, WITH TRANSURETHRAL RESECTION PROSTATE;  Surgeon: Devin Fabian MD;  Location:  OR     ESOPHAGOSCOPY, GASTROSCOPY, DUODENOSCOPY (EGD), COMBINED      16,EGD     FOOT SURGERY Left     SUBTALAR ATHROEREISIS, left side, Dr. Schwartz.     HERNIA REPAIR Bilateral     Surgipro mesh     LYMPH NODE BIOPSY        No Known Allergies   Social History     Tobacco Use     Smoking status: Never     Passive exposure: Past     Smokeless tobacco: Never     Tobacco comments:     Passive exposure in childhood home.    Substance Use Topics     Alcohol use: Yes     Alcohol/week: 7.0 standard drinks of alcohol     Types: 7 Cans of beer per week     Comment: 1 beer per day      Wt Readings from Last 1 Encounters:   24 74.4 kg (164 lb)        Anesthesia Evaluation   Pt has had prior anesthetic.     No history of anesthetic complications       ROS/MED  "HX  ENT/Pulmonary: Comment: Bleomycin lung toxicity hx      Neurologic: Comment: Tremor    (+)    peripheral neuropathy,                            Cardiovascular:     (+) Dyslipidemia - -  CAD -  - -                                      METS/Exercise Tolerance: >4 METS    Hematologic:  - neg hematologic  ROS     Musculoskeletal:  - neg musculoskeletal ROS     GI/Hepatic:  - neg GI/hepatic ROS     Renal/Genitourinary:     (+)        BPH,      Endo:  - neg endo ROS     Psychiatric/Substance Use:     (+) psychiatric history depression       Infectious Disease:  - neg infectious disease ROS     Malignancy:   (+) Malignancy, History of Lymphoma/Leukemia.Lymph CA Remission status post.      Other:  - neg other ROS          Physical Exam    Airway        Mallampati: II   TM distance: > 3 FB   Neck ROM: full   Mouth opening: > 3 cm    Respiratory Devices and Support         Dental       (+) Completely normal teeth      Cardiovascular   cardiovascular exam normal       Rhythm and rate: regular and normal     Pulmonary   pulmonary exam normal        breath sounds clear to auscultation       OUTSIDE LABS:  CBC:   Lab Results   Component Value Date    WBC 6.1 01/08/2024    WBC 5.6 01/13/2023    HGB 15.0 01/08/2024    HGB 14.6 01/13/2023    HCT 43.7 01/08/2024    HCT 44.3 01/13/2023     01/08/2024     01/13/2023     BMP:   Lab Results   Component Value Date     10/28/2024     01/08/2024    POTASSIUM 3.9 10/28/2024    POTASSIUM 4.8 01/08/2024    CHLORIDE 106 10/28/2024    CHLORIDE 103 01/08/2024    CO2 26 10/28/2024    CO2 27 01/08/2024    BUN 13.3 10/28/2024    BUN 24.3 (H) 01/08/2024    CR 1.07 10/28/2024    CR 1.27 (H) 01/08/2024    GLC 89 11/05/2024     (H) 10/28/2024     COAGS:   Lab Results   Component Value Date    PTT 32 08/19/2015    INR 1.2 08/19/2015     POC: No results found for: \"BGM\", \"HCG\", \"HCGS\"  HEPATIC:   Lab Results   Component Value Date    ALBUMIN 4.6 01/08/2024    PROTTOTAL " 7.7 01/08/2024    ALT 37 01/08/2024    AST 29 01/08/2024    ALKPHOS 81 01/08/2024    BILITOTAL 0.8 01/08/2024     OTHER:   Lab Results   Component Value Date    CHELSEA 9.0 10/28/2024    MAG 1.8 (L) 01/09/2016    T4 0.49 (L) 02/15/2017    SED 2 01/08/2024       Anesthesia Plan    ASA Status:  2    NPO Status:  NPO Appropriate    Anesthesia Type: MAC.     - Reason for MAC: chronic cardiopulmonary disease              Consents    Anesthesia Plan(s) and associated risks, benefits, and realistic alternatives discussed. Questions answered and patient/representative(s) expressed understanding.     - Discussed: Risks, Benefits and Alternatives for BOTH SEDATION and the PROCEDURE were discussed     - Discussed with:  Patient      - Extended Intubation/Ventilatory Support Discussed: No.      - Patient is DNR/DNI Status: No     Use of blood products discussed: No .     Postoperative Care    Pain management: Peripheral nerve block (Single Shot).        Comments:             WILILE Rubin CRNA    I have reviewed the pertinent notes and labs in the chart from the past 30 days and (re)examined the patient.  Any updates or changes from those notes are reflected in this note.             # Drug Induced Platelet Defect: home medication list includes an antiplatelet medication   # Hypertension: Home medication list includes antihypertensive(s)

## 2025-01-02 NOTE — DISCHARGE INSTRUCTIONS
[facility]  Adult Discharge Orders & Instructions      For 24 hours after surgery:  Get plenty of rest.  A responsible adult must stay with you for at least 24 hours after you leave the hospital.   You may feel lightheaded.  IF so, sit for a few minutes before standing.  Have someone help you get up.   You may have a slight fever. Call the doctor if your fever is over 101 F (38.3 C) (taken under the tongue) or lasts longer than 24 hours.  You may have a dry mouth, a sore throat, muscle aches or trouble sleeping.  These should go away after 24 hours.  Do not make important or legal decisions.  6.   Do not drive or use heavy equipment.  If you have weakness or tingling, don't drive or use heavy equipment until this feeling goes away.                                                                                                                                                                         To contact a doctor, call    [phone]______________

## 2025-01-02 NOTE — BRIEF OP NOTE
[facility]    Brief Operative Note    Pre-operative diagnosis: Left foot pain [M79.672]  Post-operative diagnosis Same as pre-operative diagnosis    Procedure: Damon calcaneal osteotomy,  steindler stripping, peroneal repair, peroneal transfer, Hallux interphalangeal joint fusion, 1st metatarsal osteotomy, Left - Foot    Surgeon: Surgeons and Role:     * Collin Montez DPM - Primary     * Rakel Newman PA-C - Assisting  Anesthesia: MAC with Block   Estimated Blood Loss: Minimal    Drains: None  Specimens: * No specimens in log *  Findings:   None.  Complications: None.  Implants:   Implant Name Type Inv. Item Serial No.  Lot No. LRB No. Used Action   COMPR FT SCR 5.0 LG 44MM - [id number] Metallic Hardware/[place] COMPR FT SCR 5.0 LG 44MM  [employer]  Left 1 Implanted   COMPR FT SCR 5.0 LG 60MM - [id number] Metallic Hardware/[place] COMPR FT SCR 5.0 LG 60MM  [employer]  Left 1 Implanted   COMPR FT SCR 5.0 LG 42MM - [id number] Metallic Hardware/[place] COMPR FT SCR 5.0 LG 42MM  [employer]  Left 1 Implanted   STRAIGHT PLATE 6 HOLES Metallic Hardware/[place]   [employer]  Left 1 Implanted   2.0 NON LOCKING SCREWS 16MM Metallic Hardware/[place]   [employer]  Left 1 Implanted   2.0 LOCKING SCREWS 16MM Metallic Hardware/[place]   [employer]  Left 1 Implanted   2.0 LOCKING SCREW 18MM Metallic Hardware/[place]   [employer]LENE  Left 1 Implanted   BONE SCR T8 FT 2.4MM / L20MM - [id number] Metallic Hardware/[place] BONE SCR T8 FT 2.4MM / L20MM  [employer]  Left 1 Implanted and Explanted   SCREW BONE 2.7 X 22MM - [id number] Metallic Hardware/[place] SCREW BONE 2.7 X 22MM  [employer]  Left 1 Implanted   2.0MM LOCKING SCREW 22MM Metallic Hardware/[place]   [employer]  Left 1 Implanted

## 2025-01-02 NOTE — ANESTHESIA PROCEDURE NOTES
Sciatic Procedure Note    Pre-Procedure   Staff -        CRNA: Ray Ruvalcaba APRN CRNA       Performed By: CRNA       Location: pre-op       Procedure Start/Stop Times: 1/2/2025 11:10 AM and 1/2/2025 11:13 AM       Pre-Anesthestic Checklist: patient identified, IV checked, site marked, risks and benefits discussed, informed consent, monitors and equipment checked, pre-op evaluation, at physician/surgeon's request and post-op pain management  Timeout:       Correct Patient: Yes        Correct Procedure: Yes        Correct Site: Yes        Correct Position: Yes        Correct Laterality: Yes        Site Marked: Yes  Procedure Documentation  Procedure: Sciatic         Diagnosis: POST OPERATIVE PAIN CONTROL       Laterality: right       Patient Prep/Sterile Barriers: sterile gloves, mask       Skin prep: Chloraprep       Local skin infiltrated with 0.5 mL of. Local skin infiltration: bupivacaine 0.25% (popliteal approach).       Needle Type: insulated and short bevel       Needle Gauge: 21.        Needle Length (Inches): 4        Ultrasound guided       1. Ultrasound was used to identify targeted nerve, plexus, vascular marker, or fascial plane and place a needle adjacent to it in real-time.       2. Ultrasound was used to visualize the spread of anesthetic in close proximity to the above referenced structure.       3. A permanent image is entered into the patient's record.       4. The visualized anatomic structures appeared normal.       5. There were no apparent abnormal pathologic findings.    Assessment/Narrative         The placement was negative for: blood aspirated, painful injection and site bleeding       Paresthesias: No.       Bolus given via needle..        Secured via.        Insertion/Infusion Method: Single Shot       Complications: none       Injection made incrementally with aspirations every 5 mL.    Medication(s) Administered   Medication Administration Time: 1/2/2025 11:10 AM      FOR [facility]  "([facility]) ONLY:   Pain Team Contact information: please page the Pain Team Via [employer]. Search \"Pain\". During daytime hours, please page the attending first. At night please page the resident first.      "

## 2025-01-02 NOTE — OR NURSING
Patient has been discharged to home at 1545 via w/c accompanied by his spouse     Written discharge instructions were provided to patient and spouse.  Prescriptions were e-script. Patient states pain is 0/10 and denies any nausea or dizziness upon discharge.      Patient and adult caring for them verbalize understanding of discharge instructions including no driving until tomorrow and no longer taking narcotic pain medications - no operating mechanical equipment and no making any important decisions.They understand reason for discharge, and necessary follow-up appointments.       Janie Guthrie RN

## 2025-01-07 DIAGNOSIS — F51.04 CHRONIC INSOMNIA: ICD-10-CM

## 2025-01-07 DIAGNOSIS — K29.50 CHRONIC GASTRITIS WITHOUT BLEEDING, UNSPECIFIED GASTRITIS TYPE: Primary | ICD-10-CM

## 2025-01-07 DIAGNOSIS — M79.672 LEFT FOOT PAIN: Primary | ICD-10-CM

## 2025-01-09 ENCOUNTER — OFFICE VISIT (OUTPATIENT)
Dept: ORTHOPEDICS | Facility: OTHER | Age: 65
End: 2025-01-09
Attending: PODIATRIST
Payer: MEDICARE

## 2025-01-09 ENCOUNTER — HOSPITAL ENCOUNTER (OUTPATIENT)
Dept: GENERAL RADIOLOGY | Facility: OTHER | Age: 65
Discharge: HOME | End: 2025-01-09
Attending: PODIATRIST
Payer: MEDICARE

## 2025-01-09 DIAGNOSIS — M79.672 LEFT FOOT PAIN: ICD-10-CM

## 2025-01-09 DIAGNOSIS — Q66.72 CAVUS DEFORMITY OF LEFT FOOT: Primary | ICD-10-CM

## 2025-01-09 DIAGNOSIS — Z09 POSTOPERATIVE FOLLOW-UP: ICD-10-CM

## 2025-01-09 PROCEDURE — 73630 X-RAY EXAM OF FOOT: CPT | Mod: LT

## 2025-01-09 PROCEDURE — G0463 HOSPITAL OUTPT CLINIC VISIT: HCPCS

## 2025-01-09 RX ORDER — CEFADROXIL 500 MG/1
500 CAPSULE ORAL 2 TIMES DAILY
Qty: 20 CAPSULE | Refills: 0 | Status: SHIPPED | OUTPATIENT
Start: 2025-01-09

## 2025-01-09 RX ORDER — LORAZEPAM 1 MG/1
1 TABLET ORAL AT BEDTIME
Qty: 90 TABLET | Refills: 1 | Status: SHIPPED | OUTPATIENT
Start: 2025-01-09

## 2025-01-09 NOTE — TELEPHONE ENCOUNTER
[facility] sent Rx request for the following:      Requested Prescriptions   Pending Prescriptions Disp Refills    Omeprazole 20 MG TBDD       Sig: Take 20 mg by mouth daily.        PPI Protocol Failed - 1/9/2025  9:26 AM        Failed - Medication indicated for associated diagnosis     The medication is prescribed for one or more of the following conditions:     Erosive esophagitis    Gastritis   Gastric hypersecretion   Gastric ulcer   Gastroesophageal reflux disease   Helicobacter pylori gastrointestinal tract infection   Ulcer of duodenum   Drug-induced peptic ulcer   Esophageal stricture   Gastrointestinal hemorrhage   Indigestion   Stress ulcer   Zollinger-Avendaño syndrome   Samano s esophagus   Laryngopharyngeal reflux   Epigastric Pain   Morbid Obesity   Cough   History of Peptic Ulcer   Esophageal Atresia, Stenosis and Fistula   Cystic Fibrosis  Bronchiectasis   Historical      LORazepam (ATIVAN) 1 MG tablet 90 tablet 1     Sig: Take 1 tablet (1 mg) by mouth at bedtime.       There is no refill protocol information for this order  No longer on active medication list.        Last Office Visit:              12/19/24 (preop)   Future Office visit:           None    Unable to complete prescription refill per RN Medication Refill Policy. Delilah Mckeon RN .............. 1/9/2025  9:29 AM

## 2025-01-09 NOTE — PROGRESS NOTES
SUBJECTIVE:  Daniel returns for 1 week follow-up of left foot reconstruction.  Overall doing well.  He has remained nonweightbearing.  He uses crutches and a scooter to help ambulate.  He is primarily taking over-the-counter NSAIDs and some tramadol that he had from a prior surgery.  He occasionally takes oxycodone in the evenings.  He has been elevating much of the day.  No acute concerns today.     ROS: Musculoskeletal and general review of systems are negative, per review of previous clinic questionnaire.  Denies SOB and calf pain.    EXAM:   PHYSICAL EXAMINATION:   CONSTITUTIONAL:  The patient is alert and oriented x 3, well appearing and in no apparent distress.  Affect is pleasant and answers questions appropriately.  VASCULAR:  Circulation is intact with palpable pedal pulses and adequate capillary fill time to all digits.  Hair growth is present and appropriate to mid foot and digits. Calf nontender.  NEUROLOGIC:  Light touch sensation is intact to digits.  There is a negative Tinel sign.    INTEGUMENT:  No abnormal dermatologic lesions are noted.  Skin has normal texture and turgor.  Incisions are clean dry and intact.  No drainage.  The lateral incisions to have a little bit of redness likely more reactive or irritation from the dressing.  MUSCULOSKELETAL:  Swelling: Minimal.  Range of motion appropriate for this recovery time.     IMAGING: 3 views left foot nonweightbearing demonstrate multiple surgical sites including calcaneal osteotomy great toe IPJ fusion and first metatarsal osteotomy.  Hardware is all intact with no signs of loosening.    ASSESSMENT: s/p left Damon calcaneal osteotomy, peroneus brevis repair and shortening, longest brevis transfer, Steindler stripping, hallux IPJ fusion, first metatarsal dorsiflexor he osteotomy on 1/2/2025 with Dr. Montez    PLAN OF CARE: Discussed progression of treatment.  Dressing was taken down today.  Sutures will remain in place.  I did put him on antibiotic  given the slight redness and the fact that he will be in a cast.  A well-padded cast was applied with an attempt to plantarflex the great toe.  Cast care instructions, signs and symptoms of DVT and infection were reviewed.  He will remain strictly nonweightbearing.  He should continue elevating and icing for pain and swelling.  Will see him back in 2 weeks for cast removal and suture removal and likely placement into a boot.  No x-rays at that time unless there is an acute event.  Plan to be nonweightbearing for approximately 8 weeks.      Rakel Newman PA-C

## 2025-01-17 ENCOUNTER — HOSPITAL ENCOUNTER (OUTPATIENT)
Dept: CT IMAGING | Facility: OTHER | Age: 65
Discharge: HOME | End: 2025-01-17
Attending: INTERNAL MEDICINE
Payer: MEDICARE

## 2025-01-17 ENCOUNTER — LAB (OUTPATIENT)
Dept: LAB | Facility: OTHER | Age: 65
End: 2025-01-17
Payer: MEDICARE

## 2025-01-17 ENCOUNTER — HOSPITAL ENCOUNTER (EMERGENCY)
Facility: OTHER | Age: 65
Discharge: HOME | End: 2025-01-17
Attending: FAMILY MEDICINE
Payer: MEDICARE

## 2025-01-17 ENCOUNTER — APPOINTMENT (OUTPATIENT)
Dept: CARDIOLOGY | Facility: OTHER | Age: 65
End: 2025-01-17
Attending: FAMILY MEDICINE
Payer: MEDICARE

## 2025-01-17 VITALS
SYSTOLIC BLOOD PRESSURE: 113 MMHG | HEIGHT: 76 IN | DIASTOLIC BLOOD PRESSURE: 83 MMHG | WEIGHT: 165 LBS | OXYGEN SATURATION: 92 % | TEMPERATURE: 97.7 F | HEART RATE: 76 BPM | BODY MASS INDEX: 20.09 KG/M2 | RESPIRATION RATE: 11 BRPM

## 2025-01-17 DIAGNOSIS — C81.90 HODGKIN LYMPHOMA, UNSPECIFIED HODGKIN LYMPHOMA TYPE, UNSPECIFIED BODY REGION (H): ICD-10-CM

## 2025-01-17 DIAGNOSIS — I26.99 BILATERAL PULMONARY EMBOLISM (H): ICD-10-CM

## 2025-01-17 DIAGNOSIS — R39.15 URINARY URGENCY: ICD-10-CM

## 2025-01-17 DIAGNOSIS — N40.0 ENLARGED PROSTATE: ICD-10-CM

## 2025-01-17 DIAGNOSIS — R35.0 URINARY FREQUENCY: ICD-10-CM

## 2025-01-17 LAB
ALBUMIN SERPL BCG-MCNC: 3.9 G/DL (ref 3.5–5.2)
ALBUMIN SERPL BCG-MCNC: 4.2 G/DL (ref 3.5–5.2)
ALP SERPL-CCNC: 88 U/L (ref 40–150)
ALP SERPL-CCNC: 99 U/L (ref 40–150)
ALT SERPL W P-5'-P-CCNC: 34 U/L (ref 0–70)
ALT SERPL W P-5'-P-CCNC: 35 U/L (ref 0–70)
ANION GAP SERPL CALCULATED.3IONS-SCNC: 10 MMOL/L (ref 7–15)
ANION GAP SERPL CALCULATED.3IONS-SCNC: 11 MMOL/L (ref 7–15)
AST SERPL W P-5'-P-CCNC: 30 U/L (ref 0–45)
AST SERPL W P-5'-P-CCNC: 35 U/L (ref 0–45)
ATRIAL RATE - MUSE: 74 BPM
BASOPHILS # BLD AUTO: 0 10E3/UL (ref 0–0.2)
BASOPHILS # BLD AUTO: 0 10E3/UL (ref 0–0.2)
BASOPHILS NFR BLD AUTO: 1 %
BASOPHILS NFR BLD AUTO: 1 %
BILIRUB SERPL-MCNC: 0.4 MG/DL
BILIRUB SERPL-MCNC: 0.5 MG/DL
BUN SERPL-MCNC: 19 MG/DL (ref 8–23)
BUN SERPL-MCNC: 21.4 MG/DL (ref 8–23)
CALCIUM SERPL-MCNC: 9 MG/DL (ref 8.8–10.4)
CALCIUM SERPL-MCNC: 9.6 MG/DL (ref 8.8–10.4)
CHLORIDE SERPL-SCNC: 101 MMOL/L (ref 98–107)
CHLORIDE SERPL-SCNC: 102 MMOL/L (ref 98–107)
CREAT SERPL-MCNC: 0.94 MG/DL (ref 0.67–1.17)
CREAT SERPL-MCNC: 0.97 MG/DL (ref 0.67–1.17)
DIASTOLIC BLOOD PRESSURE - MUSE: NORMAL MMHG
EGFRCR SERPLBLD CKD-EPI 2021: 87 ML/MIN/1.73M2
EGFRCR SERPLBLD CKD-EPI 2021: 90 ML/MIN/1.73M2
EOSINOPHIL # BLD AUTO: 0.2 10E3/UL (ref 0–0.7)
EOSINOPHIL # BLD AUTO: 0.2 10E3/UL (ref 0–0.7)
EOSINOPHIL NFR BLD AUTO: 3 %
EOSINOPHIL NFR BLD AUTO: 3 %
ERYTHROCYTE [DISTWIDTH] IN BLOOD BY AUTOMATED COUNT: 12.9 % (ref 10–15)
ERYTHROCYTE [DISTWIDTH] IN BLOOD BY AUTOMATED COUNT: 13 % (ref 10–15)
ERYTHROCYTE [SEDIMENTATION RATE] IN BLOOD BY WESTERGREN METHOD: 2 MM/HR (ref 0–20)
GLUCOSE SERPL-MCNC: 117 MG/DL (ref 70–99)
GLUCOSE SERPL-MCNC: 93 MG/DL (ref 70–99)
HCO3 SERPL-SCNC: 23 MMOL/L (ref 22–29)
HCO3 SERPL-SCNC: 26 MMOL/L (ref 22–29)
HCT VFR BLD AUTO: 42.5 % (ref 40–53)
HCT VFR BLD AUTO: 43 % (ref 40–53)
HGB BLD-MCNC: 14.4 G/DL (ref 13.3–17.7)
HGB BLD-MCNC: 14.5 G/DL (ref 13.3–17.7)
HOLD SPECIMEN: NORMAL
IMM GRANULOCYTES # BLD: 0 10E3/UL
IMM GRANULOCYTES # BLD: 0 10E3/UL
IMM GRANULOCYTES NFR BLD: 0 %
IMM GRANULOCYTES NFR BLD: 0 %
INTERPRETATION ECG - MUSE: NORMAL
LVEF ECHO: NORMAL
LYMPHOCYTES # BLD AUTO: 1.1 10E3/UL (ref 0.8–5.3)
LYMPHOCYTES # BLD AUTO: 1.1 10E3/UL (ref 0.8–5.3)
LYMPHOCYTES NFR BLD AUTO: 14 %
LYMPHOCYTES NFR BLD AUTO: 15 %
MCH RBC QN AUTO: 31.1 PG (ref 26.5–33)
MCH RBC QN AUTO: 31.7 PG (ref 26.5–33)
MCHC RBC AUTO-ENTMCNC: 33.7 G/DL (ref 31.5–36.5)
MCHC RBC AUTO-ENTMCNC: 33.9 G/DL (ref 31.5–36.5)
MCV RBC AUTO: 92 FL (ref 78–100)
MCV RBC AUTO: 94 FL (ref 78–100)
MONOCYTES # BLD AUTO: 0.5 10E3/UL (ref 0–1.3)
MONOCYTES # BLD AUTO: 0.5 10E3/UL (ref 0–1.3)
MONOCYTES NFR BLD AUTO: 6 %
MONOCYTES NFR BLD AUTO: 7 %
NEUTROPHILS # BLD AUTO: 5.7 10E3/UL (ref 1.6–8.3)
NEUTROPHILS # BLD AUTO: 5.9 10E3/UL (ref 1.6–8.3)
NEUTROPHILS NFR BLD AUTO: 75 %
NEUTROPHILS NFR BLD AUTO: 76 %
NRBC # BLD AUTO: 0 10E3/UL
NRBC # BLD AUTO: 0 10E3/UL
NRBC BLD AUTO-RTO: 0 /100
NRBC BLD AUTO-RTO: 0 /100
P AXIS - MUSE: NORMAL DEGREES
PLATELET # BLD AUTO: 206 10E3/UL (ref 150–450)
PLATELET # BLD AUTO: 219 10E3/UL (ref 150–450)
POTASSIUM SERPL-SCNC: 4.2 MMOL/L (ref 3.4–5.3)
POTASSIUM SERPL-SCNC: 4.3 MMOL/L (ref 3.4–5.3)
PR INTERVAL - MUSE: 142 MS
PROT SERPL-MCNC: 6.6 G/DL (ref 6.4–8.3)
PROT SERPL-MCNC: 7.2 G/DL (ref 6.4–8.3)
QRS DURATION - MUSE: 106 MS
QT - MUSE: 398 MS
QTC - MUSE: 441 MS
R AXIS - MUSE: -56 DEGREES
RBC # BLD AUTO: 4.57 10E6/UL (ref 4.4–5.9)
RBC # BLD AUTO: 4.63 10E6/UL (ref 4.4–5.9)
SODIUM SERPL-SCNC: 134 MMOL/L (ref 135–145)
SODIUM SERPL-SCNC: 139 MMOL/L (ref 135–145)
SYSTOLIC BLOOD PRESSURE - MUSE: NORMAL MMHG
T AXIS - MUSE: 50 DEGREES
VENTRICULAR RATE- MUSE: 74 BPM
WBC # BLD AUTO: 7.6 10E3/UL (ref 4–11)
WBC # BLD AUTO: 7.7 10E3/UL (ref 4–11)

## 2025-01-17 PROCEDURE — 93306 TTE W/DOPPLER COMPLETE: CPT

## 2025-01-17 PROCEDURE — 96372 THER/PROPH/DIAG INJ SC/IM: CPT | Performed by: FAMILY MEDICINE

## 2025-01-17 PROCEDURE — 85004 AUTOMATED DIFF WBC COUNT: CPT | Mod: ZL

## 2025-01-17 PROCEDURE — 93005 ELECTROCARDIOGRAM TRACING: CPT | Performed by: FAMILY MEDICINE

## 2025-01-17 PROCEDURE — 74177 CT ABD & PELVIS W/CONTRAST: CPT

## 2025-01-17 PROCEDURE — 250N000011 HC RX IP 250 OP 636: Performed by: INTERNAL MEDICINE

## 2025-01-17 PROCEDURE — 70491 CT SOFT TISSUE NECK W/DYE: CPT

## 2025-01-17 PROCEDURE — 99285 EMERGENCY DEPT VISIT HI MDM: CPT | Performed by: FAMILY MEDICINE

## 2025-01-17 PROCEDURE — 36415 COLL VENOUS BLD VENIPUNCTURE: CPT | Performed by: FAMILY MEDICINE

## 2025-01-17 PROCEDURE — 85004 AUTOMATED DIFF WBC COUNT: CPT | Performed by: FAMILY MEDICINE

## 2025-01-17 PROCEDURE — 93306 TTE W/DOPPLER COMPLETE: CPT | Mod: 26 | Performed by: INTERNAL MEDICINE

## 2025-01-17 PROCEDURE — 36415 COLL VENOUS BLD VENIPUNCTURE: CPT | Mod: ZL

## 2025-01-17 PROCEDURE — 71260 CT THORAX DX C+: CPT

## 2025-01-17 PROCEDURE — 250N000011 HC RX IP 250 OP 636: Performed by: FAMILY MEDICINE

## 2025-01-17 PROCEDURE — 99284 EMERGENCY DEPT VISIT MOD MDM: CPT | Performed by: FAMILY MEDICINE

## 2025-01-17 PROCEDURE — 85041 AUTOMATED RBC COUNT: CPT | Performed by: FAMILY MEDICINE

## 2025-01-17 PROCEDURE — 250N000009 HC RX 250: Performed by: INTERNAL MEDICINE

## 2025-01-17 PROCEDURE — 99285 EMERGENCY DEPT VISIT HI MDM: CPT | Mod: 25 | Performed by: FAMILY MEDICINE

## 2025-01-17 PROCEDURE — 84155 ASSAY OF PROTEIN SERUM: CPT | Performed by: FAMILY MEDICINE

## 2025-01-17 PROCEDURE — 82565 ASSAY OF CREATININE: CPT | Performed by: FAMILY MEDICINE

## 2025-01-17 PROCEDURE — 80053 COMPREHEN METABOLIC PANEL: CPT | Mod: ZL

## 2025-01-17 PROCEDURE — 85041 AUTOMATED RBC COUNT: CPT | Mod: ZL

## 2025-01-17 PROCEDURE — 85652 RBC SED RATE AUTOMATED: CPT | Mod: ZL

## 2025-01-17 PROCEDURE — 93010 ELECTROCARDIOGRAM REPORT: CPT | Performed by: INTERNAL MEDICINE

## 2025-01-17 RX ORDER — APIXABAN 5 MG (74)
KIT ORAL
Qty: 74 EACH | Refills: 0 | Status: SHIPPED | OUTPATIENT
Start: 2025-01-17 | End: 2025-02-16

## 2025-01-17 RX ORDER — IOPAMIDOL 755 MG/ML
94 INJECTION, SOLUTION INTRAVASCULAR ONCE
Status: COMPLETED | OUTPATIENT
Start: 2025-01-17 | End: 2025-01-17

## 2025-01-17 RX ORDER — ENOXAPARIN SODIUM 100 MG/ML
1 INJECTION SUBCUTANEOUS EVERY 12 HOURS
Status: DISCONTINUED | OUTPATIENT
Start: 2025-01-17 | End: 2025-01-17 | Stop reason: HOSPADM

## 2025-01-17 RX ADMIN — ENOXAPARIN SODIUM 70 MG: 80 INJECTION SUBCUTANEOUS at 16:17

## 2025-01-17 RX ADMIN — IOPAMIDOL 94 ML: 755 INJECTION, SOLUTION INTRAVENOUS at 09:00

## 2025-01-17 RX ADMIN — SODIUM CHLORIDE 60 ML: 9 INJECTION, SOLUTION INTRAVENOUS at 09:00

## 2025-01-17 ASSESSMENT — COLUMBIA-SUICIDE SEVERITY RATING SCALE - C-SSRS
1. IN THE PAST MONTH, HAVE YOU WISHED YOU WERE DEAD OR WISHED YOU COULD GO TO SLEEP AND NOT WAKE UP?: NO
2. HAVE YOU ACTUALLY HAD ANY THOUGHTS OF KILLING YOURSELF IN THE PAST MONTH?: NO
6. HAVE YOU EVER DONE ANYTHING, STARTED TO DO ANYTHING, OR PREPARED TO DO ANYTHING TO END YOUR LIFE?: NO

## 2025-01-17 ASSESSMENT — ACTIVITIES OF DAILY LIVING (ADL): ADLS_ACUITY_SCORE: 45

## 2025-01-17 ASSESSMENT — ENCOUNTER SYMPTOMS
FEVER: 0
SHORTNESS OF BREATH: 1

## 2025-01-17 NOTE — ED PROVIDER NOTES
History     Chief Complaint   Patient presents with    Shortness of Breath     The history is provided by the patient, the spouse and medical records.     Daniel Boyd is a 64 year old male here from clinic with bilateral PE. He was seeing Dr Leung for his annual follow up of Hodgkin's Lymphoma and the Chest CT found these.  He admits to shortness of breath just today after they got to clinic.     He had left foot surgery (Dr Montez, January 2) and has a cast on the LLE now.  He has been less active as a result.  He has Bleomycin lung toxicity, Hodgkin lymphoma (remission, sees Dr Leung annually). He is not on blood thinners.     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     Priority: Medium    Coronary artery disease involving native coronary artery of native heart without angina pectoris 05/10/2018     Priority: Medium     CTA 2017 showing non obstructive disease      Bleomycin lung toxicity 02/15/2017     Priority: Medium    Hyperlipidemia 02/15/2017     Priority: Medium    Gastritis 05/27/2016     Priority: Medium    Schatzki's ring 05/27/2016     Priority: Medium    Peripheral neuropathy 05/03/2016     Priority: Medium    Tremor 05/03/2016     Priority: Medium    Hodgkin lymphoma (H) 08/25/2015     Priority: Medium    FHx: prostate cancer 09/30/2014     Priority: Medium     Overview:   Brother at 40   Father at 60      Chronic insomnia 12/29/2011     Priority: Medium     Major depressive disorder, recurrent episode, mild 2010     Priority: Medium        Past Medical History:    Past Medical History:   Diagnosis Date    Bleomycin lung toxicity     Colonic adenoma     Depression     Family history of prostate cancer     Hodgkin lymphoma (H)     Hyperlipidemia     Insomnia     Peripheral neuropathy due to chemotherapy     Schatzki's ring     Tremor        Past Surgical History:    Past Surgical History:   Procedure Laterality Date    APPENDECTOMY OPEN      No Comments Provided    COLONOSCOPY  2010    12/30/10,Normal.  No polyps seen.  Next due .    COLONOSCOPY N/A 2022    F/U  tubular adenoma, sigmoid diverticulosis    CYSTOSCOPY      No Comments Provided    CYSTOSCOPY, TRANSURETHRAL RESECTION (TUR) PROSTATE, COMBINED N/A 2024    Procedure: CYSTOSCOPY, WITH TRANSURETHRAL RESECTION PROSTATE;  Surgeon: Devin Fabian MD;  Location:  OR    ESOPHAGOSCOPY, GASTROSCOPY, DUODENOSCOPY (EGD), COMBINED      16,EGD    FOOT SURGERY Left     SUBTALAR ATHROEREISIS, left side, Dr. Schwartz.    HERNIA REPAIR Bilateral     Surgipro mesh    LYMPH NODE BIOPSY      OSTEOTOMY FOOT Left 2025    Procedure: Damon calcaneal osteotomy,  steindler stripping, peroneal repair, peroneal transfer, Hallux interphalangeal joint fusion, 1st metatarsal osteotomy;  Surgeon: Collin Montez DPM;  Location:  OR       Family History:    Family History   Problem Relation Age of Onset    Prostate Cancer Father         Also had a CABG and  during open heart surgery, .    Breast Cancer Mother     Family History Negative Sister         Good Health    Family History Negative Sister         Good Health    Prostate Cancer Brother         Cancer-prostate,Age 47.    Heart Disease Brother        Social History:  Marital Status:   [2]  Social History     Tobacco Use    Smoking status: Never     Passive exposure: Past    Smokeless tobacco: Never    Tobacco comments:      "Passive exposure in childhood home.    Vaping Use    Vaping status: Never Used   Substance Use Topics    Alcohol use: Yes     Alcohol/week: 7.0 standard drinks of alcohol     Types: 7 Cans of beer per week     Comment: 1 beer per day    Drug use: No        Medications:    Apixaban Starter Pack (ELIQUIS DVT/PE STARTER PACK) 5 MG TBPK  atorvastatin (LIPITOR) 40 MG tablet  cefadroxil (DURICEF) 500 MG capsule  fish oil-omega-3 fatty acids 1000 MG capsule  lisinopril (ZESTRIL) 2.5 MG tablet  LORazepam (ATIVAN) 1 MG tablet  Multiple Vitamins-Minerals (MULTIVITAMIN & MINERAL PO)  Omeprazole 20 MG TBDD  oxyCODONE (ROXICODONE) 5 MG tablet  sertraline (ZOLOFT) 100 MG tablet        Review of Systems   Constitutional:  Negative for fever.   Respiratory:  Positive for shortness of breath.    All other systems reviewed and are negative.      Physical Exam   BP: 112/69  Pulse: 74  Temp: 97.7  F (36.5  C)  Resp: 20  Height: 193 cm (6' 4\")  Weight: 74.8 kg (165 lb)  SpO2: 97 %      Physical Exam  Vitals and nursing note reviewed.   Constitutional:       General: He is not in acute distress.     Appearance: He is well-developed. He is not ill-appearing.   Cardiovascular:      Rate and Rhythm: Normal rate and regular rhythm.      Pulses: Normal pulses.      Heart sounds: Normal heart sounds.   Pulmonary:      Effort: Pulmonary effort is normal.      Breath sounds: Normal breath sounds. No decreased breath sounds, wheezing, rhonchi or rales.   Skin:     General: Skin is warm and dry.   Neurological:      General: No focal deficit present.      Mental Status: He is alert and oriented to person, place, and time.       EKG: sinus rhythm with PAC's, rate 74, left axis deviation    Results for orders placed or performed during the hospital encounter of 01/17/25 (from the past 24 hours)   EKG 12 lead   Result Value Ref Range    Systolic Blood Pressure  mmHg    Diastolic Blood Pressure  mmHg    Ventricular Rate 74 BPM    Atrial Rate 74 BPM    " AR Interval 142 ms    QRS Duration 106 ms     ms    QTc 441 ms    P Axis  degrees    R AXIS -56 degrees    T Axis 50 degrees    Interpretation ECG       Sinus rhythm with Premature atrial complexes in a pattern of bigeminy  Left anterior fascicular block  Abnormal ECG  When compared with ECG of 19-Dec-2024 09:10,  Nonspecific T wave abnormality no longer evident in Inferior leads  Confirmed by MD LALA, KENYATTA ([id number]) on 1/17/2025 4:41:12 PM     CBC with platelets differential    Narrative    The following orders were created for panel order CBC with platelets differential.  Procedure                               Abnormality         Status                     ---------                               -----------         ------                     CBC with platelets and d...[[id number]]                      Final result                 Please view results for these tests on the individual orders.   CBC with platelets and differential   Result Value Ref Range    WBC Count 7.7 4.0 - 11.0 10e3/uL    RBC Count 4.63 4.40 - 5.90 10e6/uL    Hemoglobin 14.4 13.3 - 17.7 g/dL    Hematocrit 42.5 40.0 - 53.0 %    MCV 92 78 - 100 fL    MCH 31.1 26.5 - 33.0 pg    MCHC 33.9 31.5 - 36.5 g/dL    RDW 12.9 10.0 - 15.0 %    Platelet Count 219 150 - 450 10e3/uL    % Neutrophils 76 %    % Lymphocytes 15 %    % Monocytes 6 %    % Eosinophils 3 %    % Basophils 1 %    % Immature Granulocytes 0 %    NRBCs per 100 WBC 0 <1 /100    Absolute Neutrophils 5.9 1.6 - 8.3 10e3/uL    Absolute Lymphocytes 1.1 0.8 - 5.3 10e3/uL    Absolute Monocytes 0.5 0.0 - 1.3 10e3/uL    Absolute Eosinophils 0.2 0.0 - 0.7 10e3/uL    Absolute Basophils 0.0 0.0 - 0.2 10e3/uL    Absolute Immature Granulocytes 0.0 <=0.4 10e3/uL    Absolute NRBCs 0.0 10e3/uL   [place] Draw    Narrative    The following orders were created for panel order [place] Draw.  Procedure                               Abnormality         Status                     ---------                                -----------         ------                     Extra Red Top Tube[[id number]]                               Final result                 Please view results for these tests on the individual orders.   Extra Red Top Tube   Result Value Ref Range    Hold Specimen hold    Echocardiogram Complete   Result Value Ref Range    LVEF  35-40% (moderately reduced)     [facility]    [id number]  [id number]  [id number]  [id number]^LEO^DARIN^TAHIR     [facility]  [address] [place] [address]     Name: SAM AGUIRRE  MRN: [id number]  : 1960  Study Date: 2025 03:38 PM  Age: 64 yrs  Gender: Male  Patient Location: [facility]  Reason For Study: Pulmonary Emboli  Ordering Physician: DARIN BAILEY  Performed By: KIM Diego, RDCS, RVT     BSA: 2.0 m2  Height: 76 in  Weight: 165 lb  HR: 71  BP: 143/109 mmHg  ______________________________________________________________________________  Procedure  Echocardiogram with two-dimensional, color and spectral Doppler.  ______________________________________________________________________________  Interpretation Summary  Left ventricular size is normal.  Left ventricular function is decreased. The ejection fraction is 35-40%  (moderately reduced).  Mild right ventricular dilation is present.  Global right ventricular function is mildly reduced.  The right ventricular systolic pressure is 32mmHg above the right atrial  pressure.  IVC diameter <2.1 cm collapsing >50% with sniff suggests a normal RA pressure  of 3 mmHg.  No pericardial effusion is present.  There is no prior study for direct comparison.  ______________________________________________________________________________  Left Ventricle  Left ventricular size is normal. Mild concentric wall thickening consistent  with left ventricular hypertrophy is present. Left ventricular diastolic  function is indeterminate. Left ventricular function is decreased. The  ejection fraction  is 35-40% (moderately reduced). Paradoxical septal motion  consistent with right ventricular volume overload is present.     Right Ventricle  Mild right ventricular dilation is present. Global right ventricular function  is mildly reduced.     Atria  The left atrium appears normal. Mild right atrial enlargement is present. The  atrial septum is intact as assessed by color Doppler .     Mitral Valve  The mitral valve is normal.     Aortic Valve  The aortic valve is tricuspid. Trace aortic insufficiency is present.     Tricuspid Valve  The tricuspid valve is normal. Mild tricuspid insufficiency is present. The  right ventricular systolic pressure is 32mmHg above the right atrial pressure.     Pulmonic Valve  The pulmonic valve is normal.     Vessels  The thoracic aorta is normal. The pulmonary artery and bifurcation cannot be  assessed. IVC diameter <2.1 cm collapsing >50% with sniff suggests a normal RA  pressure of 3 mmHg.     Pericardium  No pericardial effusion is present.     Compared to Previous Study  There is no prior study for direct comparison.  ______________________________________________________________________________  MMode/2D Measurements & Calculations     IVSd: 1.1 cm  LVIDd: 4.4 cm  LVIDs: 4.0 cm  LVPWd: 1.2 cm  FS: 9.0 %  LV mass(C)d: 177.6 grams  LV mass(C)dI: 87.0 grams/m2  Ao root diam: 3.6 cm  asc Aorta Diam: 3.2 cm  Ao root diam index Ht(cm/m): 1.9  Ao root diam index BSA (cm/m2): 1.8  Asc Ao diam index BSA (cm/m2): 1.5  Asc Ao diam index Ht(cm/m): 1.6  EF Biplane: 36.9 %  LA Volume (BP): 47.5 ml     LA Volume Index (BP): 23.3 ml/m2  RWT: 0.54  TAPSE: 1.5 cm     Doppler Measurements & Calculations  MV E max juice: 45.7 cm/sec  MV A max juice: 73.5 cm/sec  MV E/A: 0.62  MV dec time: 0.26 sec  TR max juice: 285.0 cm/sec  TR max P.5 mmHg  E/E' av.7  Lateral E/e': 4.6  Medial E/e': 6.9  RV S Juice: 8.6 cm/sec    "  ______________________________________________________________________________  Report approved by: BELEM Armijo MD on 01/17/2025 04:15 PM             Medications   enoxaparin ANTICOAGULANT (LOVENOX) injection 70 mg (70 mg Subcutaneous $Given 1/17/25 1617)       Assessments & Plan (with Medical Decision Making)  Daniel Boyd is a 64 year old male here from clinic with bilateral PE. He was seeing Dr Leung for his annual follow up of Hodgkin's Lymphoma and the Chest CT found these.  He admits to shortness of breath just today after they got to clinic.   He had left foot surgery (Dr Montez, January 2) and has a cast on the LLE now.  He has been less active as a result.  He has Bleomycin lung toxicity, Hodgkin lymphoma (remission, sees Dr Leung annually). He is not on blood thinners.   VS in the ED Patient Vitals for the past 24 hrs:   BP Temp Temp src Pulse Resp SpO2 Height Weight   01/17/25 1619 101/66 -- -- 66 20 97 % -- --   01/17/25 1521 (!) 143/109 -- -- 68 -- 91 % -- --   01/17/25 1511 112/69 97.7  F (36.5  C) Oral 74 20 97 % 1.93 m (6' 4\") 74.8 kg (165 lb)     Exam shows normal breath sounds.   We gave Lovenox 1 mg/kg dose  Labs show CBC normal, CMP normal.  ECHO shows left ventricular size is normal.  Left ventricular function is decreased. The ejection fraction is 35-40% (moderately reduced).  Mild right ventricular dilation is present.  Global right ventricular function is mildly reduced.  The right ventricular systolic pressure is 32mmHg above the right atrial pressure.  IVC diameter <2.1 cm collapsing >50% with sniff suggests a normal RA pressure of 3 mmHg.  No pericardial effusion is present.     I have reviewed the nursing notes.    I have reviewed the findings, diagnosis, plan and need for follow up with the patient.  Medical Decision Making  The patient's presentation was of high complexity (an acute health issue posing potential threat to life or bodily function).    The patient's evaluation " involved:  an assessment requiring an independent historian (see separate area of note for details)  review of external note(s) from 1 sources (see separate area of note for details)  ordering and/or review of 3+ test(s) in this encounter (see separate area of note for details)    The patient's management necessitated moderate risk (prescription drug management including medications given in the ED).        New Prescriptions    APIXABAN STARTER PACK (ELIQUIS DVT/PE STARTER PACK) 5 MG TBPK    Take 10 mg by mouth 2 times daily for 7 days, THEN 5 mg 2 times daily for 23 days.       Final diagnoses:   Bilateral pulmonary embolism (H)       1/17/2025   [facility], Henry Vivar MD  01/17/25 1805

## 2025-01-17 NOTE — ED TRIAGE NOTES
Patient arrived POV with wife, states that he was seen in clinic today for a routine CT scan and found bilateral blood clots in lungs. Patient was advised to come to ED. Denies pain. Does have SOB with exertion. VSS in triage. Patient alert, utilizing wheelchair as left foot is casted. Takes baby ASA daily.      Triage Assessment (Adult)       Row Name 01/17/25 1512          Triage Assessment    Airway WDL WDL     Additional Documentation Breath Sounds (Group)        Respiratory WDL    Respiratory WDL WDL        Breath Sounds    Breath Sounds All Fields     All Lung Fields Breath Sounds Anterior:;equal bilaterally        Skin Circulation/Temperature WDL    Skin Circulation/Temperature WDL WDL        Cardiac WDL    Cardiac WDL WDL     Cardiac Rhythm NSR        Peripheral/Neurovascular WDL    Peripheral Neurovascular WDL WDL        Cognitive/Neuro/Behavioral WDL    Cognitive/Neuro/Behavioral WDL WDL

## 2025-01-17 NOTE — DISCHARGE INSTRUCTIONS
Daniel    I recommend you stop the aspirin.    I recommend you start Eliquis and I did send a prescription to [facility].    Thank you for choosing our Emergency Department for your care.     You may receive a phone call or letter for a survey about your care in our ED.  Please complete this as this is how we improve care for our patients.     If you have any questions after leaving the ED you can call or text me on my cell phone at [phone].  I am not on call so if I do not answer my phone please leave a message- I will get back to you.  If you are not doing well please return to the ED.     Sincerely,    Dr Mode Saleem M.D.  Medical Director  [facility] Emergency Department

## 2025-01-22 DIAGNOSIS — N40.1 BPH WITH OBSTRUCTION/LOWER URINARY TRACT SYMPTOMS: Primary | ICD-10-CM

## 2025-01-22 DIAGNOSIS — N13.8 BPH WITH OBSTRUCTION/LOWER URINARY TRACT SYMPTOMS: Primary | ICD-10-CM

## 2025-01-23 ENCOUNTER — OFFICE VISIT (OUTPATIENT)
Dept: ORTHOPEDICS | Facility: OTHER | Age: 65
End: 2025-01-23
Attending: PODIATRIST
Payer: MEDICARE

## 2025-01-23 DIAGNOSIS — Z09 POSTOPERATIVE FOLLOW-UP: Primary | ICD-10-CM

## 2025-01-23 PROCEDURE — G0463 HOSPITAL OUTPT CLINIC VISIT: HCPCS

## 2025-01-23 NOTE — PROGRESS NOTES
SUBJECTIVE:  Daniel returns for 3 week follow-up of left foot reconstruction.  Overall he is doing well in terms of his foot.  He was placed into a short leg cast at his last visit.  He has remained nonweightbearing and uses a scooter to ambulate.  Unfortunately last week when he was undergoing a yearly scan it was discovered he had bilateral pulmonary embolism.  He is currently on Eliquis for this.     ROS: Musculoskeletal and general review of systems are negative, per review of previous clinic questionnaire.  Denies SOB and calf pain.    EXAM:   PHYSICAL EXAMINATION:   CONSTITUTIONAL:  The patient is alert and oriented x 3, well appearing and in no apparent distress.  Affect is pleasant and answers questions appropriately.  VASCULAR:  Circulation is intact with palpable pedal pulses and adequate capillary fill time to all digits.  Hair growth is present and appropriate to mid foot and digits. Calf nontender.  NEUROLOGIC:  Light touch sensation is intact to digits.  There is a negative Tinel sign.    INTEGUMENT:  No abnormal dermatologic lesions are noted.  Skin has normal texture and turgor.  Incisions are clean dry and intact.  No signs of infection.  Sutures are in place.  MUSCULOSKELETAL:  Swelling: Minimal  Range of motion appropriate for this recovery time.     IMAGING: None    ASSESSMENT: s/p left Damon calcaneal osteotomy, peroneus brevis repair and shortening, longus to brevis transfer, Steindler stripping, hallux IPJ fusion, first metatarsal dorsiflexor osteotomy on 1/2/2025 with Dr. Montez    PLAN OF CARE: Discussed progression of treatment. Sutures removed, steri strips applied. Can get foot wet tomorrow, no soaking for a couple weeks. Steri strips can come off in one week.  He was fitted with a boot and given some compression sleeves today.  He will remain strictly nonweightbearing.  I like him to come out of his boot while elevating to do gentle range of motion exercises.  He should continue  conservative cares such as elevating and icing for pain and swelling.   Follow up 3 to 4 weeks. With xray of left foot and heel axial nonweightbearing    Rakel Newman PA-C  \

## 2025-01-27 ENCOUNTER — ONCOLOGY VISIT (OUTPATIENT)
Dept: ONCOLOGY | Facility: OTHER | Age: 65
End: 2025-01-27
Attending: NURSE PRACTITIONER
Payer: MEDICARE

## 2025-01-27 VITALS
TEMPERATURE: 97 F | HEART RATE: 87 BPM | RESPIRATION RATE: 14 BRPM | OXYGEN SATURATION: 93 % | SYSTOLIC BLOOD PRESSURE: 116 MMHG | DIASTOLIC BLOOD PRESSURE: 63 MMHG

## 2025-01-27 DIAGNOSIS — I26.99 ACUTE PULMONARY EMBOLISM WITHOUT ACUTE COR PULMONALE, UNSPECIFIED PULMONARY EMBOLISM TYPE (H): Primary | ICD-10-CM

## 2025-01-27 DIAGNOSIS — C81.90 HODGKIN LYMPHOMA, UNSPECIFIED HODGKIN LYMPHOMA TYPE, UNSPECIFIED BODY REGION (H): ICD-10-CM

## 2025-01-27 LAB
D DIMER PPP FEU-MCNC: 1.51 UG/ML FEU (ref 0–0.5)
ERYTHROCYTE [SEDIMENTATION RATE] IN BLOOD BY WESTERGREN METHOD: 3 MM/HR (ref 0–20)
FACTOR 2 INTERPRETATION: NORMAL
FACTOR V INTERPRETATION: NORMAL
HCYS SERPL-SCNC: 8.4 UMOL/L (ref 0–15)
LAB DIRECTOR COMMENTS: NORMAL
LAB DIRECTOR DISCLAIMER: NORMAL
LAB DIRECTOR INTERPRETATION: NORMAL
LAB DIRECTOR METHODOLOGY: NORMAL
LAB DIRECTOR RESULTS: NORMAL
RHEUMATOID FACT SERPL-ACNC: <10 IU/ML
SPECIMEN TYPE: NORMAL

## 2025-01-27 PROCEDURE — 86038 ANTINUCLEAR ANTIBODIES: CPT | Mod: ZL | Performed by: NURSE PRACTITIONER

## 2025-01-27 PROCEDURE — 85385 FIBRINOGEN ANTIGEN: CPT | Mod: ZL | Performed by: NURSE PRACTITIONER

## 2025-01-27 PROCEDURE — 85670 THROMBIN TIME PLASMA: CPT | Mod: ZL | Performed by: NURSE PRACTITIONER

## 2025-01-27 PROCEDURE — 81291 MTHFR GENE: CPT | Mod: ZL | Performed by: NURSE PRACTITIONER

## 2025-01-27 PROCEDURE — G0463 HOSPITAL OUTPT CLINIC VISIT: HCPCS

## 2025-01-27 PROCEDURE — 85303 CLOT INHIBIT PROT C ACTIVITY: CPT | Mod: ZL | Performed by: NURSE PRACTITIONER

## 2025-01-27 PROCEDURE — 85379 FIBRIN DEGRADATION QUANT: CPT | Mod: ZL | Performed by: NURSE PRACTITIONER

## 2025-01-27 PROCEDURE — 86147 CARDIOLIPIN ANTIBODY EA IG: CPT | Mod: ZL | Performed by: NURSE PRACTITIONER

## 2025-01-27 PROCEDURE — 85652 RBC SED RATE AUTOMATED: CPT | Mod: ZL | Performed by: NURSE PRACTITIONER

## 2025-01-27 PROCEDURE — 81240 F2 GENE: CPT | Mod: ZL | Performed by: NURSE PRACTITIONER

## 2025-01-27 PROCEDURE — 85306 CLOT INHIBIT PROT S FREE: CPT | Mod: ZL | Performed by: NURSE PRACTITIONER

## 2025-01-27 PROCEDURE — 86146 BETA-2 GLYCOPROTEIN ANTIBODY: CPT | Mod: ZL | Performed by: NURSE PRACTITIONER

## 2025-01-27 PROCEDURE — 85300 ANTITHROMBIN III ACTIVITY: CPT | Mod: ZL | Performed by: NURSE PRACTITIONER

## 2025-01-27 PROCEDURE — 36415 COLL VENOUS BLD VENIPUNCTURE: CPT | Mod: ZL | Performed by: NURSE PRACTITIONER

## 2025-01-27 PROCEDURE — G0452 MOLECULAR PATHOLOGY INTERPR: HCPCS | Mod: 26 | Performed by: STUDENT IN AN ORGANIZED HEALTH CARE EDUCATION/TRAINING PROGRAM

## 2025-01-27 PROCEDURE — 86431 RHEUMATOID FACTOR QUANT: CPT | Mod: ZL | Performed by: NURSE PRACTITIONER

## 2025-01-27 PROCEDURE — 83090 ASSAY OF HOMOCYSTEINE: CPT | Mod: ZL | Performed by: NURSE PRACTITIONER

## 2025-01-27 ASSESSMENT — PAIN SCALES - GENERAL: PAINLEVEL_OUTOF10: MILD PAIN (3)

## 2025-01-27 NOTE — PROGRESS NOTES
Oncology Follow-up Visit:  January 27, 2025  Diagnosis:Lymphoma, PE    History Of Present Illness:  Patient presents for followup of stage IV Hodgkin lymphoma, classical type.  He originally presented with right neck mass over a period of 2 months.  He was subsequently seen by Dr. Radha Kim of General Surgery who noted the patient had right-sided supraclavicular adenopathy.  The biopsy of the lymph node came back consistent with classical Hodgkin lymphoma.  He was subsequently seen by Dr. Jarad Mosqueda on 08/05/2015, who recommended a PET scan for accurate staging.  This was performed on 08/13/2015.  The findings were that there was a hypermetabolic lymphadenopathy both above and below the diaphragm, suspicious for lymphoma.  There was hypermetabolic activity in the spleen suspicious for metastatic disease.  There were multiple foci with abnormal uptake in the spine suspicious for metastatic disease.  There was also a right middle lobe lung nodule that was nonspecific.  Dr. Mosqueda felt the patient had stage IV Hodgkin lymphoma and recommended ABVD x 6 cycles.  An echocardiogram was performed to assess his cardiac function on 08/12/2015.  The findings were that the patient had normal left ventricular size and uptake, and left ventricular ejection fraction was 65%.  The patient was started on ABVD and after 3 cycles he had a repeat PET scan on 11/28/2015.  The findings were that there was marked improvement in the patient's lymphadenopathy over the lower neck, axilla, chest, abdomen, pelvis and groin area.  Focal areas of activity were markedly improved over the cervical, lumbar spine and pelvic area.  The patient subsequently was seen by Dr. Mosqueda on 02/10/2016 and assessment was the patient had completed 6 cycles of ABVD.  Repeat PET scan was performed on 02/24/2016.  The findings were that there was no abnormal uptake to suggest metastatic disease and no evidence of metastatic disease.  Previously  seen lymphadenopathy and bone lesions had all resolved. When patient was seen on 06/08/2016, and he had no evidence of disease clinically. We obtained an echocardiogram as well as pulmonary function tests.  Echocardiogram revealed no change.  Ejection fraction was 56%.  Pulmonary functions did reveal decreased DLCO of 50% predicted being reported.  The patient did have some shortness of breath when we saw him on 07/14/2016, and we elected to restage him with a PET scan.  This was done on 10/05/2016, which revealed no suspicious hypermetabolic osseous lesions identified, no evidence of recurrent lymphoma.     Patient was seen by Dr. 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  He felt his symptoms had been stable, but not progressive.  He did have a prechemotherapy pulmonary function test for comparison and he felt the patient certainly could have pulmonary fibrosis due to bleomycin as well as other forms of bleomycin toxicity or hypersensitivity pneumonitis. The patient had a high-resolution CT chest performed 10/03/2016, and findings were there was new mild subpleural fibrotic changes within the anterior right upper lobe which may be related to chemotherapy. The plan was to follow him with serial pulmonary function tests to ensure stability. He follow with pulmonology on a yearly basis. 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 Patient has been stable. He is being seen yearly. Most recently, patient had left foot reconstruction. Labs from 1/17/25 are stable. CT scans done on 1/17/25 show no suspicious mass or lymphadenopathy, however, there was bilateral pulmonary emboli noted on scans. Patient was sent to the ER. Patient had a cast on the LLE and had been less active after his left foot surgery on 1/2/25. Patient did note shortness of breath when he was seen in the ER. He was placed on apixaban starter  pack. He is currently taking apixaban 5mg bid. Patient denies any shortness of breath. He does have ongoing left foot pain after surgery and is seeing ortho for follow up. Patient has no other new concerns.    Review Of Systems:  Review Of Systems  Respiratory: No shortness of breath, dyspnea on exertion, cough  Cardiovascular: denies chest pain  Gastrointestinal:denies abdominal pain, no bowel changes, no rectal bleeding  Genitourinary: reports occasional hematuria, seeing urology and will have UA for urology done today  Musculoskeletal: reports ongoing left foot pain after surgery, no new bone pain  Neurologic: denies headaches, no dizziness  Hematologic/Lymphatic/Immunologic: denies fevers, no night sweats, no easy brusing      There are no exam notes on file for this visit.    Past medical, social, surgical, and family histories reviewed.    Allergies:  Allergies as of 01/27/2025    (No Known Allergies)       Current Medications:  Current Outpatient Medications   Medication Sig Dispense Refill    Apixaban Starter Pack (ELIQUIS DVT/PE STARTER PACK) 5 MG TBPK Take 10 mg by mouth 2 times daily for 7 days, THEN 5 mg 2 times daily for 23 days. 74 each 0    atorvastatin (LIPITOR) 40 MG tablet Take 1 tablet (40 mg) by mouth daily 90 tablet 2    cefadroxil (DURICEF) 500 MG capsule Take 1 capsule (500 mg) by mouth 2 times daily. 20 capsule 0    fish oil-omega-3 fatty acids 1000 MG capsule Take 1 capsule by mouth daily      lisinopril (ZESTRIL) 2.5 MG tablet Take 1 tablet (2.5 mg) by mouth daily 90 tablet 3    LORazepam (ATIVAN) 1 MG tablet Take 1 tablet (1 mg) by mouth at bedtime. 90 tablet 1    Multiple Vitamins-Minerals (MULTIVITAMIN & MINERAL PO) Take 1 tablet by mouth every morning      Omeprazole 20 MG TBDD Take 20 mg by mouth daily. 90 tablet 3    sertraline (ZOLOFT) 100 MG tablet Take 1 tablet (100 mg) by mouth daily. 90 tablet 3    traMADol (ULTRAM) 50 MG tablet Take 1 tablet (50 mg) by mouth every 6 hours as  needed for severe pain. 25 tablet 0        Physical Exam:  There were no vitals taken for this visit.    GENERAL APPEARANCE: 64 year old male, alert and no distress     NECK: no adenopathy, no asymmetry or masses     LYMPHATICS: No cervical, supraclavicular, axillary lymphadenopathy     RESP: lungs clear to auscultation - no rales, rhonchi or wheezes     CARDIOVASCULAR: regular rates and rhythm, normal S1 S2     ABDOMEN:  soft, nontender, bowel sounds normal     MUSCULOSKELETAL: left foot in boot following left foot surgery      SKIN: no suspicious lesions or rashes on exposed skin     PSYCHIATRIC: mentation appears normal and affect normal    Laboratory/Imaging Studies  Lab on 01/17/2025   Component Date Value Ref Range Status    Sodium 01/17/2025 134 (L)  135 - 145 mmol/L Final    Potassium 01/17/2025 4.3  3.4 - 5.3 mmol/L Final    Carbon Dioxide (CO2) 01/17/2025 23  22 - 29 mmol/L Final    Anion Gap 01/17/2025 10  7 - 15 mmol/L Final    Urea Nitrogen 01/17/2025 21.4  8.0 - 23.0 mg/dL Final    Creatinine 01/17/2025 0.94  0.67 - 1.17 mg/dL Final    GFR Estimate 01/17/2025 90  >60 mL/min/1.73m2 Final    eGFR calculated using 2021 CKD-EPI equation.    Calcium 01/17/2025 9.0  8.8 - 10.4 mg/dL Final    Reference intervals for this test were updated on 7/16/2024 to reflect our healthy population more accurately. There may be differences in the flagging of prior results with similar values performed with this method. Those prior results can be interpreted in the context of the updated reference intervals.    Chloride 01/17/2025 101  98 - 107 mmol/L Final    Glucose 01/17/2025 93  70 - 99 mg/dL Final    Alkaline Phosphatase 01/17/2025 88  40 - 150 U/L Final    AST 01/17/2025 35  0 - 45 U/L Final    Specimen is hemolyzed which can falsely elevate AST. Analysis of a non-hemolyzed specimen may result in a lower value.    ALT 01/17/2025 34  0 - 70 U/L Final    Protein Total 01/17/2025 6.6  6.4 - 8.3 g/dL Final    Albumin  01/17/2025 3.9  3.5 - 5.2 g/dL Final    Bilirubin Total 01/17/2025 0.4  <=1.2 mg/dL Final    Erythrocyte Sedimentation Rate 01/17/2025 2  0 - 20 mm/hr Final    WBC Count 01/17/2025 7.6  4.0 - 11.0 10e3/uL Final    RBC Count 01/17/2025 4.57  4.40 - 5.90 10e6/uL Final    Hemoglobin 01/17/2025 14.5  13.3 - 17.7 g/dL Final    Hematocrit 01/17/2025 43.0  40.0 - 53.0 % Final    MCV 01/17/2025 94  78 - 100 fL Final    MCH 01/17/2025 31.7  26.5 - 33.0 pg Final    MCHC 01/17/2025 33.7  31.5 - 36.5 g/dL Final    RDW 01/17/2025 13.0  10.0 - 15.0 % Final    Platelet Count 01/17/2025 206  150 - 450 10e3/uL Final    % Neutrophils 01/17/2025 75  % Final    % Lymphocytes 01/17/2025 14  % Final    % Monocytes 01/17/2025 7  % Final    % Eosinophils 01/17/2025 3  % Final    % Basophils 01/17/2025 1  % Final    % Immature Granulocytes 01/17/2025 0  % Final    NRBCs per 100 WBC 01/17/2025 0  <1 /100 Final    Absolute Neutrophils 01/17/2025 5.7  1.6 - 8.3 10e3/uL Final    Absolute Lymphocytes 01/17/2025 1.1  0.8 - 5.3 10e3/uL Final    Absolute Monocytes 01/17/2025 0.5  0.0 - 1.3 10e3/uL Final    Absolute Eosinophils 01/17/2025 0.2  0.0 - 0.7 10e3/uL Final    Absolute Basophils 01/17/2025 0.0  0.0 - 0.2 10e3/uL Final    Absolute Immature Granulocytes 01/17/2025 0.0  <=0.4 10e3/uL Final    Absolute NRBCs 01/17/2025 0.0  10e3/uL Final        ASSESSMENT/PLAN:  1.Stage IV classical Hodgkin lymphoma diagnosed in 2015 status post 6 cycles of ABVD with complete response course complicated by bleomycin lung toxicity transient cardiomyopathy. Recent imaging continues to show complete response. Sed rate is normal. CBC is normal. CT scans done on 1/17/25 show no suspicious mass or lymphadenopathy, however, there was bilateral pulmonary emboli noted on scans. The plan is to continue surveillance we will see the patient in 1 year obtain a CBC CMP LDH and sed rate and repeat CT neck chest and pelvis.     2. Bilateral PE. CT scans done on 1/17/25 show  no suspicious mass or lymphadenopathy, however, there was bilateral pulmonary emboli noted on scans. Patient was sent to the ER. Patient had a cast on the LLE and had been less active after his left foot surgery on 1/2/25. Patient did note shortness of breath when he was seen in the ER. He was placed on apixaban starter pack. He is currently taking apixaban 5mg bid. Patient denies any shortness of breath. He does have occasional hematuria otherwise no signs of bleeding or easy bruising. Will have patient proceed with anticoagulation work up. Will have patient see Dr Leung in the next 1-2 weeks to go over anticoagulation work up and discuss treatment plan for anticoagulation. We did discuss that patient should present to the ER if he has any signs of bleeding while on the Eliquis.     3. Hematuria. Patient reports he has noticed occasional hematuria. He did have a TURP about 2 months ago. He states the hematuria is not present every time he urinates. We discussed that this could be related to the Eliquis use. He is to have a UA done today for Dr Fabian as he will be following up with urology this week. Not sent to urology care coordinator to watch for the UA results. Patient was advised to go in to the ER if he notices increased hematuria or any signs of bleeding.     Thirty eight minutes spent with this encounter with time spent reviewing patient records, counseling patient regarding disease process, interpretation and review of labs and recent CT with patient, discussing the need for further work up of PE, obtaining a review of systems, performing a physical exam, discussing plan for follow up, documentation in EHR and coordination of care

## 2025-01-27 NOTE — NURSING NOTE
"Oncology Rooming Note    January 27, 2025 8:59 AM   Daniel Boyd is a 64 year old male who presents for:    Chief Complaint   Patient presents with    Oncology Clinic Visit     CLL     Initial Vitals: /63   Pulse 87   Temp 97  F (36.1  C) (Tympanic)   Resp 14   SpO2 93%  Estimated body mass index is 20.08 kg/m  as calculated from the following:    Height as of 1/17/25: 1.93 m (6' 4\").    Weight as of 1/17/25: 74.8 kg (165 lb). There is no height or weight on file to calculate BSA.  Mild Pain (3) Comment: Data Unavailable   No LMP for male patient.  Allergies reviewed: Yes  Medications reviewed: Yes    Medications: Medication refills not needed today.  Pharmacy name entered into [employer]: [id number] - [facility] [address]    Frailty Screening:   Is the patient here for a new oncology consult visit in cancer care? 2. No      Clinical concerns:  bilateral PE      Kerri Michelle RN              "

## 2025-01-28 LAB
ANA SER QL IF: NEGATIVE
AT III ACT/NOR PPP CHRO: 99 % (ref 85–135)
PROT C ACT/NOR PPP CHRO: 147 % (ref 70–170)
PROT S FREE AG ACT/NOR PPP IA: 105 % (ref 70–148)
THROMBIN TIME: 18.1 SECONDS (ref 13–19)

## 2025-01-29 LAB
B2 GLYCOPROT1 IGG SERPL IA-ACNC: 1.5 U/ML
B2 GLYCOPROT1 IGM SERPL IA-ACNC: <2.4 U/ML
CARDIOLIPIN IGG SER IA-ACNC: 4.6 GPL-U/ML
CARDIOLIPIN IGG SER IA-ACNC: NEGATIVE
CARDIOLIPIN IGM SER IA-ACNC: 7.3 MPL-U/ML
CARDIOLIPIN IGM SER IA-ACNC: NEGATIVE

## 2025-01-30 ENCOUNTER — LAB (OUTPATIENT)
Dept: LAB | Facility: OTHER | Age: 65
End: 2025-01-30
Attending: UROLOGY
Payer: MEDICARE

## 2025-01-30 DIAGNOSIS — N13.8 BPH WITH OBSTRUCTION/LOWER URINARY TRACT SYMPTOMS: ICD-10-CM

## 2025-01-30 DIAGNOSIS — N40.1 BPH WITH OBSTRUCTION/LOWER URINARY TRACT SYMPTOMS: ICD-10-CM

## 2025-01-30 LAB
ALBUMIN UR-MCNC: 30 MG/DL
APPEARANCE UR: CLEAR
BILIRUB UR QL STRIP: NEGATIVE
COLOR UR AUTO: YELLOW
GLUCOSE UR STRIP-MCNC: 100 MG/DL
HGB UR QL STRIP: ABNORMAL
KETONES UR STRIP-MCNC: NEGATIVE MG/DL
LEUKOCYTE ESTERASE UR QL STRIP: NEGATIVE
NITRATE UR QL: NEGATIVE
PH UR STRIP: 5.5 [PH] (ref 5–9)
SP GR UR STRIP: >=1.03 (ref 1–1.03)
UROBILINOGEN UR STRIP-MCNC: NORMAL MG/DL

## 2025-01-30 PROCEDURE — 81003 URINALYSIS AUTO W/O SCOPE: CPT | Mod: ZL

## 2025-02-10 DIAGNOSIS — I25.10 CORONARY ARTERY DISEASE INVOLVING NATIVE CORONARY ARTERY OF NATIVE HEART WITHOUT ANGINA PECTORIS: ICD-10-CM

## 2025-02-10 NOTE — TELEPHONE ENCOUNTER
"\"Patient is requesting refills of Lisinopril 2.5 mg tablets. Please send new RX if appropriate.\"    Rishabh Cadet RN on 2/10/2025 at 3:05 PM    "

## 2025-02-11 RX ORDER — LISINOPRIL 2.5 MG/1
2.5 TABLET ORAL DAILY
Qty: 90 TABLET | Refills: 2 | Status: SHIPPED | OUTPATIENT
Start: 2025-02-11

## 2025-02-11 NOTE — TELEPHONE ENCOUNTER
[facility] sent Rx request for the following:      Requested Prescriptions   Pending Prescriptions Disp Refills    lisinopril (ZESTRIL) 2.5 MG tablet 90 tablet 3     Sig: Take 1 tablet (2.5 mg) by mouth daily.   Last Prescription Date:   1/10/24  Last Fill Qty/Refills:         90, R-3    Last Office Visit:              12/19/24 (Preop)  Future Office visit:           None    Prescription refilled per RN Medication Refill Policy.................... Delilah Mckeon RN ....................  2/11/2025   2:14 PM

## 2025-02-12 DIAGNOSIS — Q66.72 CAVUS DEFORMITY OF LEFT FOOT: Primary | ICD-10-CM

## 2025-02-13 ENCOUNTER — HOSPITAL ENCOUNTER (OUTPATIENT)
Dept: GENERAL RADIOLOGY | Facility: OTHER | Age: 65
Discharge: HOME | End: 2025-02-13
Attending: PODIATRIST
Payer: MEDICARE

## 2025-02-13 ENCOUNTER — OFFICE VISIT (OUTPATIENT)
Dept: ORTHOPEDICS | Facility: OTHER | Age: 65
End: 2025-02-13
Attending: PODIATRIST
Payer: MEDICARE

## 2025-02-13 DIAGNOSIS — Q66.72 CAVUS DEFORMITY OF LEFT FOOT: ICD-10-CM

## 2025-02-13 DIAGNOSIS — Z09 POSTOPERATIVE FOLLOW-UP: Primary | ICD-10-CM

## 2025-02-13 PROCEDURE — 73630 X-RAY EXAM OF FOOT: CPT | Mod: LT

## 2025-02-13 PROCEDURE — 73650 X-RAY EXAM OF HEEL: CPT | Mod: LT

## 2025-02-13 PROCEDURE — G0463 HOSPITAL OUTPT CLINIC VISIT: HCPCS

## 2025-02-13 NOTE — PROGRESS NOTES
SUBJECTIVE:  Daniel returns for 6 week follow-up of left foot reconstruction.  Overall doing very well.  He has been primarily nonweightbearing and uses a scooter and crutches to help ambulate.  There have been a few times where he has had a send his foot down for balance.  He is not having any pain for the most part.  He does occasionally have some discomfort in the lateral ankle near his incisions.  No acute concerns today.     ROS: Musculoskeletal and general review of systems are negative, per review of previous clinic questionnaire.  Denies SOB and calf pain.    EXAM:   PHYSICAL EXAMINATION:   CONSTITUTIONAL:  The patient is alert and oriented x 3, well appearing and in no apparent distress.  Affect is pleasant and answers questions appropriately.  VASCULAR:  Circulation is intact with palpable pedal pulses and adequate capillary fill time to all digits.  Hair growth is present and appropriate to mid foot and digits. Calf nontender.  NEUROLOGIC:  Light touch sensation is intact to digits.  There is a negative Tinel sign.    INTEGUMENT:  No abnormal dermatologic lesions are noted.  Skin has normal texture and turgor.  Incisions are well-healed.  No signs of infection.  MUSCULOSKELETAL:  Swelling: Minimal  Range of motion appropriate for this recovery time.     IMAGING: Nonweightbearing left foot and heel axial demonstrate interval healing at fusion sites and osteotomy site.  Hardware is intact with no signs of loosening.  No acute injury noted.    ASSESSMENT: s/p left Damon calcaneal osteotomy, peroneus brevis repair and shortening, longus to brevis transfer, Steindler stripping, hallux IPJ fusion, first metatarsal dorsiflexor osteotomy on 1/2/2025 with Dr. Montez    PLAN OF CARE: Discussed progression of treatment.  At this time I would like him to continue to be nonweightbearing for at least the next 2 weeks.  At that time he can start to do some weightbearing with his boot on only.  We discussed transitioning  with use of crutches to help offload.  He should continue conservative cares.  Follow up 4 weeks. With xray of left foot and heel axial and likely begin physical therapy at that time    Rakel Newman PA-C

## 2025-02-20 ENCOUNTER — TELEPHONE (OUTPATIENT)
Dept: ONCOLOGY | Facility: OTHER | Age: 65
End: 2025-02-20

## 2025-02-20 ENCOUNTER — HOSPITAL ENCOUNTER (OUTPATIENT)
Dept: CT IMAGING | Facility: OTHER | Age: 65
Discharge: HOME | End: 2025-02-20
Attending: INTERNAL MEDICINE
Payer: MEDICARE

## 2025-02-20 ENCOUNTER — LAB (OUTPATIENT)
Dept: LAB | Facility: OTHER | Age: 65
End: 2025-02-20
Attending: INTERNAL MEDICINE
Payer: MEDICARE

## 2025-02-20 DIAGNOSIS — C81.91: ICD-10-CM

## 2025-02-20 DIAGNOSIS — I26.99 ACUTE PULMONARY EMBOLISM WITHOUT ACUTE COR PULMONALE, UNSPECIFIED PULMONARY EMBOLISM TYPE (H): ICD-10-CM

## 2025-02-20 DIAGNOSIS — R06.02 SHORTNESS OF BREATH: ICD-10-CM

## 2025-02-20 DIAGNOSIS — I26.99 ACUTE PULMONARY EMBOLISM WITHOUT ACUTE COR PULMONALE, UNSPECIFIED PULMONARY EMBOLISM TYPE (H): Primary | ICD-10-CM

## 2025-02-20 DIAGNOSIS — C81.90 HODGKIN LYMPHOMA, UNSPECIFIED HODGKIN LYMPHOMA TYPE, UNSPECIFIED BODY REGION (H): ICD-10-CM

## 2025-02-20 LAB
ALBUMIN SERPL BCG-MCNC: 4.3 G/DL (ref 3.5–5.2)
ALP SERPL-CCNC: 76 U/L (ref 40–150)
ALT SERPL W P-5'-P-CCNC: 19 U/L (ref 0–70)
ANION GAP SERPL CALCULATED.3IONS-SCNC: 10 MMOL/L (ref 7–15)
AST SERPL W P-5'-P-CCNC: 24 U/L (ref 0–45)
BASOPHILS # BLD AUTO: 0.1 10E3/UL (ref 0–0.2)
BASOPHILS NFR BLD AUTO: 1 %
BILIRUB SERPL-MCNC: 0.9 MG/DL
BUN SERPL-MCNC: 26 MG/DL (ref 8–23)
CALCIUM SERPL-MCNC: 9.8 MG/DL (ref 8.8–10.4)
CHLORIDE SERPL-SCNC: 103 MMOL/L (ref 98–107)
CREAT SERPL-MCNC: 1.32 MG/DL (ref 0.67–1.17)
D DIMER PPP FEU-MCNC: <=0.27 UG/ML FEU (ref 0–0.5)
EGFRCR SERPLBLD CKD-EPI 2021: 60 ML/MIN/1.73M2
EOSINOPHIL # BLD AUTO: 0.1 10E3/UL (ref 0–0.7)
EOSINOPHIL NFR BLD AUTO: 2 %
ERYTHROCYTE [DISTWIDTH] IN BLOOD BY AUTOMATED COUNT: 13 % (ref 10–15)
GLUCOSE SERPL-MCNC: 122 MG/DL (ref 70–99)
HCO3 SERPL-SCNC: 26 MMOL/L (ref 22–29)
HCT VFR BLD AUTO: 43.5 % (ref 40–53)
HGB BLD-MCNC: 14.7 G/DL (ref 13.3–17.7)
IMM GRANULOCYTES # BLD: 0 10E3/UL
IMM GRANULOCYTES NFR BLD: 0 %
LDH SERPL L TO P-CCNC: 152 U/L (ref 0–250)
LYMPHOCYTES # BLD AUTO: 1.4 10E3/UL (ref 0.8–5.3)
LYMPHOCYTES NFR BLD AUTO: 23 %
MCH RBC QN AUTO: 31.7 PG (ref 26.5–33)
MCHC RBC AUTO-ENTMCNC: 33.8 G/DL (ref 31.5–36.5)
MCV RBC AUTO: 94 FL (ref 78–100)
MONOCYTES # BLD AUTO: 0.4 10E3/UL (ref 0–1.3)
MONOCYTES NFR BLD AUTO: 6 %
NEUTROPHILS # BLD AUTO: 4 10E3/UL (ref 1.6–8.3)
NEUTROPHILS NFR BLD AUTO: 68 %
NRBC # BLD AUTO: 0 10E3/UL
NRBC BLD AUTO-RTO: 0 /100
PLATELET # BLD AUTO: 224 10E3/UL (ref 150–450)
POTASSIUM SERPL-SCNC: 4.2 MMOL/L (ref 3.4–5.3)
PROT SERPL-MCNC: 7.5 G/DL (ref 6.4–8.3)
RBC # BLD AUTO: 4.64 10E6/UL (ref 4.4–5.9)
SODIUM SERPL-SCNC: 139 MMOL/L (ref 135–145)
WBC # BLD AUTO: 6 10E3/UL (ref 4–11)

## 2025-02-20 PROCEDURE — 84460 ALANINE AMINO (ALT) (SGPT): CPT | Mod: ZL

## 2025-02-20 PROCEDURE — 250N000009 HC RX 250: Performed by: INTERNAL MEDICINE

## 2025-02-20 PROCEDURE — 83615 LACTATE (LD) (LDH) ENZYME: CPT | Mod: ZL

## 2025-02-20 PROCEDURE — 71275 CT ANGIOGRAPHY CHEST: CPT

## 2025-02-20 PROCEDURE — 82947 ASSAY GLUCOSE BLOOD QUANT: CPT | Mod: ZL

## 2025-02-20 PROCEDURE — 85025 COMPLETE CBC W/AUTO DIFF WBC: CPT | Mod: ZL

## 2025-02-20 PROCEDURE — 250N000011 HC RX IP 250 OP 636: Performed by: INTERNAL MEDICINE

## 2025-02-20 PROCEDURE — 85379 FIBRIN DEGRADATION QUANT: CPT | Mod: ZL

## 2025-02-20 PROCEDURE — 36415 COLL VENOUS BLD VENIPUNCTURE: CPT | Mod: ZL

## 2025-02-20 RX ORDER — IOPAMIDOL 755 MG/ML
74 INJECTION, SOLUTION INTRAVASCULAR ONCE
Status: COMPLETED | OUTPATIENT
Start: 2025-02-20 | End: 2025-02-20

## 2025-02-20 RX ADMIN — IOPAMIDOL 74 ML: 755 INJECTION, SOLUTION INTRAVENOUS at 12:12

## 2025-02-20 RX ADMIN — SODIUM CHLORIDE 80 ML: 9 INJECTION, SOLUTION INTRAVENOUS at 12:13

## 2025-02-20 NOTE — TELEPHONE ENCOUNTER
Patient will need some more testing before Dr Leung know whether or not he will need to continue Eliquis indefinitely.     It has come off his medication list because it was put in as a starter pack.     Per ACP, needs labs and CTA stat first.     Dr will call him to explain this.  Maria Eugenia Moyer CMA([facility])..................2/20/2025   8:20 AM    96

## 2025-02-20 NOTE — TELEPHONE ENCOUNTER
Kia Leung MD spoke with [facility] and stat CTA chest ordered along with PET scan and labs in 1 month. Will also need labs today.  Kerri Michelle RN...........2/20/2025 8:47 AM

## 2025-03-09 DIAGNOSIS — M47.816 SPONDYLOSIS OF LUMBAR REGION WITHOUT MYELOPATHY OR RADICULOPATHY: ICD-10-CM

## 2025-03-10 RX ORDER — TRAMADOL HYDROCHLORIDE 50 MG/1
50 TABLET ORAL EVERY 6 HOURS PRN
Qty: 25 TABLET | Refills: 0 | Status: SHIPPED | OUTPATIENT
Start: 2025-03-10

## 2025-03-10 NOTE — TELEPHONE ENCOUNTER
[facility] sent Rx request for the following:      Requested Prescriptions   Pending Prescriptions Disp Refills    traMADol (ULTRAM) 50 MG tablet [Pharmacy Med Name: TRAMADOL 50MG TABLETS] 25 tablet      Sig: TAKE 1 TABLET(50 MG) BY MOUTH EVERY 6 HOURS AS NEEDED FOR SEVERE PAIN       There is no refill protocol information for this order          Last Prescription Date:   1/24/24  Last Fill Qty/Refills:         25, R-0    Last Office Visit:              1/24/24   Future Office visit:             Next 5 appointments (look out 90 days)      Mar 13, 2025 9:15 AM  (Arrive by 9:00 AM)  Return Visit with Collin Montez DPM  [facility] ([facility]) [address]   [place] [address]  [phone]     Apr 11, 2025 10:30 AM  Return Visit with Janine Hemphill MD  [facility]) [address]  [place] [address]  [phone]     May 01, 2025 10:30 AM  (Arrive by 10:15 AM)  Return Visit with Devin Fabian MD  [facility] ([facility]) [address]  [place] [place] [address]  [phone]     Unable to complete prescription refill per RN Medication Refill Policy.   Routing to PCP/provider for refill consideration/determination for appointment.  Jennifer Carney RN on 3/10/2025 at 2:11 PM

## 2025-03-11 DIAGNOSIS — Q66.72 CAVUS DEFORMITY OF LEFT FOOT: Primary | ICD-10-CM

## 2025-03-12 ENCOUNTER — HOSPITAL ENCOUNTER (OUTPATIENT)
Dept: PET IMAGING | Facility: OTHER | Age: 65
Discharge: HOME | End: 2025-03-12
Attending: INTERNAL MEDICINE
Payer: MEDICARE

## 2025-03-12 DIAGNOSIS — I26.99 ACUTE PULMONARY EMBOLISM WITHOUT ACUTE COR PULMONALE, UNSPECIFIED PULMONARY EMBOLISM TYPE (H): ICD-10-CM

## 2025-03-12 DIAGNOSIS — C81.91: ICD-10-CM

## 2025-03-12 DIAGNOSIS — C81.90 HODGKIN LYMPHOMA, UNSPECIFIED HODGKIN LYMPHOMA TYPE, UNSPECIFIED BODY REGION (H): ICD-10-CM

## 2025-03-12 PROCEDURE — 343N000001 HC RX 343 MED OP 636: Performed by: INTERNAL MEDICINE

## 2025-03-12 PROCEDURE — 78815 PET IMAGE W/CT SKULL-THIGH: CPT | Mod: PS

## 2025-03-12 PROCEDURE — A9552 F18 FDG: HCPCS | Performed by: INTERNAL MEDICINE

## 2025-03-12 RX ORDER — FLUDEOXYGLUCOSE F 18 200 MCI/ML
11.5 INJECTION, SOLUTION INTRAVENOUS ONCE
Status: COMPLETED | OUTPATIENT
Start: 2025-03-12 | End: 2025-03-12

## 2025-03-12 RX ADMIN — FLUDEOXYGLUCOSE F 18 11.5 MILLICURIE: 200 INJECTION, SOLUTION INTRAVENOUS at 15:58

## 2025-03-13 ENCOUNTER — HOSPITAL ENCOUNTER (OUTPATIENT)
Dept: GENERAL RADIOLOGY | Facility: OTHER | Age: 65
Discharge: HOME | End: 2025-03-13
Attending: PODIATRIST
Payer: MEDICARE

## 2025-03-13 ENCOUNTER — OFFICE VISIT (OUTPATIENT)
Dept: ORTHOPEDICS | Facility: OTHER | Age: 65
End: 2025-03-13
Attending: PODIATRIST
Payer: MEDICARE

## 2025-03-13 DIAGNOSIS — Q66.72 CAVUS DEFORMITY OF LEFT FOOT: ICD-10-CM

## 2025-03-13 DIAGNOSIS — Z09 POSTOPERATIVE FOLLOW-UP: Primary | ICD-10-CM

## 2025-03-13 PROCEDURE — 73630 X-RAY EXAM OF FOOT: CPT | Mod: LT

## 2025-03-13 PROCEDURE — 73650 X-RAY EXAM OF HEEL: CPT | Mod: LT

## 2025-03-13 PROCEDURE — G0463 HOSPITAL OUTPT CLINIC VISIT: HCPCS

## 2025-03-13 NOTE — PROGRESS NOTES
SUBJECTIVE:  Daniel is 10 weeks out from left Damon calcaneal osteotomy, peroneus brevis repair, longus to brevis transfer, Steindler stripping, hallux IPJ fusion, first metatarsal dorsiflexor he osteotomy.  He is actually doing well for 10 weeks he is walking in normal shoe without his assistance.  Has not started therapy yet.    ROS: Musculoskeletal and general review of systems are negative, per review of previous clinic questionnaire.  Denies SOB and calf pain.    EXAM: Surgery site well-healed no concerns for infection he has minimal swelling.  CMS is intact.      IMAGING: X-rays demonstrate intact well-positioned hardware good correction of the deformity.  Interval healing to osteotomies.    ASSESSMENT: Status post left foot surgery as described    PLAN: Discussed progression of treatment.  Patient will slowly progress weightbearing in shoe.  We put a therapy order in for him.  He still walks with a little bit of adduction in his forefoot perhaps some lateral strengthening and gait training will improve this.  Will see him back in 6 to 8 weeks with x-rays.    Collin Montez DPM

## 2025-03-24 ENCOUNTER — TELEPHONE (OUTPATIENT)
Dept: FAMILY MEDICINE | Facility: OTHER | Age: 65
End: 2025-03-24
Payer: COMMERCIAL

## 2025-03-24 DIAGNOSIS — M47.816 SPONDYLOSIS OF LUMBAR REGION WITHOUT MYELOPATHY OR RADICULOPATHY: Primary | ICD-10-CM

## 2025-03-24 DIAGNOSIS — M47.26 OTHER SPONDYLOSIS WITH RADICULOPATHY, LUMBAR REGION: ICD-10-CM

## 2025-03-24 NOTE — TELEPHONE ENCOUNTER
After proper verification, patient confirmed that he would like the injection in his Lumbar region. Last injection was 09/05/24. Order teed up.  Spring Watts LPN on 3/24/2025 at 2:19 PM  EXT. [id number]

## 2025-03-24 NOTE — TELEPHONE ENCOUNTER
MBL-patient is looking for a referral for an injection       Please call and advise    Thank You    Kristy Romero on 3/24/2025 at 8:45 AM

## 2025-03-28 PROBLEM — Z09 POSTOPERATIVE FOLLOW-UP: Status: ACTIVE | Noted: 2025-03-28

## 2025-03-28 PROBLEM — Q66.72 CAVUS DEFORMITY OF LEFT FOOT: Status: ACTIVE | Noted: 2025-03-28

## 2025-03-31 ENCOUNTER — THERAPY VISIT (OUTPATIENT)
Dept: PHYSICAL THERAPY | Facility: OTHER | Age: 65
End: 2025-03-31
Attending: PODIATRIST
Payer: MEDICARE

## 2025-03-31 ENCOUNTER — ONCOLOGY VISIT (OUTPATIENT)
Dept: ONCOLOGY | Facility: OTHER | Age: 65
End: 2025-03-31
Attending: INTERNAL MEDICINE
Payer: MEDICARE

## 2025-03-31 VITALS
HEART RATE: 66 BPM | WEIGHT: 164 LBS | RESPIRATION RATE: 16 BRPM | OXYGEN SATURATION: 98 % | TEMPERATURE: 98.7 F | BODY MASS INDEX: 19.96 KG/M2 | DIASTOLIC BLOOD PRESSURE: 72 MMHG | SYSTOLIC BLOOD PRESSURE: 102 MMHG

## 2025-03-31 DIAGNOSIS — Q66.72 CAVUS DEFORMITY OF LEFT FOOT: Primary | ICD-10-CM

## 2025-03-31 DIAGNOSIS — Z09 POSTOPERATIVE FOLLOW-UP: ICD-10-CM

## 2025-03-31 DIAGNOSIS — I26.99 ACUTE PULMONARY EMBOLISM WITHOUT ACUTE COR PULMONALE, UNSPECIFIED PULMONARY EMBOLISM TYPE (H): Primary | ICD-10-CM

## 2025-03-31 DIAGNOSIS — R06.02 SHORTNESS OF BREATH: ICD-10-CM

## 2025-03-31 DIAGNOSIS — C81.90 HODGKIN LYMPHOMA, UNSPECIFIED HODGKIN LYMPHOMA TYPE, UNSPECIFIED BODY REGION (H): ICD-10-CM

## 2025-03-31 DIAGNOSIS — Z15.89 MTHFR MUTATION: ICD-10-CM

## 2025-03-31 LAB
ALBUMIN SERPL BCG-MCNC: 4.4 G/DL (ref 3.5–5.2)
ALP SERPL-CCNC: 78 U/L (ref 40–150)
ALT SERPL W P-5'-P-CCNC: 26 U/L (ref 0–70)
ANION GAP SERPL CALCULATED.3IONS-SCNC: 11 MMOL/L (ref 7–15)
AST SERPL W P-5'-P-CCNC: 25 U/L (ref 0–45)
BASOPHILS # BLD AUTO: 0 10E3/UL (ref 0–0.2)
BASOPHILS NFR BLD AUTO: 1 %
BILIRUB SERPL-MCNC: 0.7 MG/DL
BUN SERPL-MCNC: 16.3 MG/DL (ref 8–23)
CALCIUM SERPL-MCNC: 9.8 MG/DL (ref 8.8–10.4)
CHLORIDE SERPL-SCNC: 103 MMOL/L (ref 98–107)
CREAT SERPL-MCNC: 1.12 MG/DL (ref 0.67–1.17)
D DIMER PPP FEU-MCNC: <=0.27 UG/ML FEU (ref 0–0.5)
EGFRCR SERPLBLD CKD-EPI 2021: 73 ML/MIN/1.73M2
EOSINOPHIL # BLD AUTO: 0.1 10E3/UL (ref 0–0.7)
EOSINOPHIL NFR BLD AUTO: 1 %
ERYTHROCYTE [DISTWIDTH] IN BLOOD BY AUTOMATED COUNT: 12.7 % (ref 10–15)
ERYTHROCYTE [SEDIMENTATION RATE] IN BLOOD BY WESTERGREN METHOD: 2 MM/HR (ref 0–20)
GLUCOSE SERPL-MCNC: 94 MG/DL (ref 70–99)
HCO3 SERPL-SCNC: 26 MMOL/L (ref 22–29)
HCT VFR BLD AUTO: 44 % (ref 40–53)
HGB BLD-MCNC: 15.1 G/DL (ref 13.3–17.7)
HOLD SPECIMEN: NORMAL
IMM GRANULOCYTES # BLD: 0 10E3/UL
IMM GRANULOCYTES NFR BLD: 0 %
LDH SERPL L TO P-CCNC: 154 U/L (ref 0–250)
LYMPHOCYTES # BLD AUTO: 1.1 10E3/UL (ref 0.8–5.3)
LYMPHOCYTES NFR BLD AUTO: 15 %
MCH RBC QN AUTO: 31.5 PG (ref 26.5–33)
MCHC RBC AUTO-ENTMCNC: 34.3 G/DL (ref 31.5–36.5)
MCV RBC AUTO: 92 FL (ref 78–100)
MONOCYTES # BLD AUTO: 0.4 10E3/UL (ref 0–1.3)
MONOCYTES NFR BLD AUTO: 5 %
NEUTROPHILS # BLD AUTO: 5.8 10E3/UL (ref 1.6–8.3)
NEUTROPHILS NFR BLD AUTO: 79 %
NRBC # BLD AUTO: 0 10E3/UL
NRBC BLD AUTO-RTO: 0 /100
PLATELET # BLD AUTO: 206 10E3/UL (ref 150–450)
POTASSIUM SERPL-SCNC: 4.1 MMOL/L (ref 3.4–5.3)
PROT SERPL-MCNC: 7.4 G/DL (ref 6.4–8.3)
RBC # BLD AUTO: 4.79 10E6/UL (ref 4.4–5.9)
SODIUM SERPL-SCNC: 140 MMOL/L (ref 135–145)
WBC # BLD AUTO: 7.4 10E3/UL (ref 4–11)

## 2025-03-31 PROCEDURE — 84155 ASSAY OF PROTEIN SERUM: CPT | Mod: ZL | Performed by: INTERNAL MEDICINE

## 2025-03-31 PROCEDURE — 36415 COLL VENOUS BLD VENIPUNCTURE: CPT | Mod: ZL | Performed by: INTERNAL MEDICINE

## 2025-03-31 PROCEDURE — 99215 OFFICE O/P EST HI 40 MIN: CPT | Performed by: INTERNAL MEDICINE

## 2025-03-31 PROCEDURE — 83615 LACTATE (LD) (LDH) ENZYME: CPT | Mod: ZL | Performed by: INTERNAL MEDICINE

## 2025-03-31 PROCEDURE — 85652 RBC SED RATE AUTOMATED: CPT | Mod: ZL | Performed by: INTERNAL MEDICINE

## 2025-03-31 PROCEDURE — G0463 HOSPITAL OUTPT CLINIC VISIT: HCPCS

## 2025-03-31 PROCEDURE — 82310 ASSAY OF CALCIUM: CPT | Mod: ZL | Performed by: INTERNAL MEDICINE

## 2025-03-31 PROCEDURE — 99417 PROLNG OP E/M EACH 15 MIN: CPT | Performed by: INTERNAL MEDICINE

## 2025-03-31 PROCEDURE — 97110 THERAPEUTIC EXERCISES: CPT | Mod: GP

## 2025-03-31 PROCEDURE — 85025 COMPLETE CBC W/AUTO DIFF WBC: CPT | Mod: ZL | Performed by: INTERNAL MEDICINE

## 2025-03-31 PROCEDURE — G2211 COMPLEX E/M VISIT ADD ON: HCPCS | Performed by: INTERNAL MEDICINE

## 2025-03-31 PROCEDURE — 97112 NEUROMUSCULAR REEDUCATION: CPT | Mod: GP

## 2025-03-31 PROCEDURE — 85379 FIBRIN DEGRADATION QUANT: CPT | Mod: ZL | Performed by: INTERNAL MEDICINE

## 2025-03-31 RX ORDER — LANOLIN ALCOHOL/MO/W.PET/CERES
1000 CREAM (GRAM) TOPICAL DAILY
Qty: 90 TABLET | Refills: 3 | Status: SHIPPED | OUTPATIENT
Start: 2025-03-31

## 2025-03-31 RX ORDER — MULTIVITAMIN WITH IRON
100 TABLET ORAL DAILY
Qty: 90 TABLET | Refills: 3 | Status: SHIPPED | OUTPATIENT
Start: 2025-03-31

## 2025-03-31 RX ORDER — FOLIC ACID 1 MG/1
1 TABLET ORAL DAILY
Qty: 90 TABLET | Refills: 3 | Status: SHIPPED | OUTPATIENT
Start: 2025-03-31

## 2025-03-31 ASSESSMENT — PAIN SCALES - GENERAL: PAINLEVEL_OUTOF10: MILD PAIN (3)

## 2025-03-31 NOTE — NURSING NOTE
"Oncology Rooming Note    March 31, 2025 11:19 AM   Daniel Boyd is a 64 year old male who presents for:    Chief Complaint   Patient presents with    Oncology Clinic Visit     2 month follow up Hodgkin lymphoma     Initial Vitals: /72 (BP Location: Right arm, Patient Position: Sitting, Cuff Size: Adult Regular)   Pulse 66   Temp 98.7  F (37.1  C) (Tympanic)   Resp 16   Wt 74.4 kg (164 lb)   SpO2 98%   BMI 19.96 kg/m   Estimated body mass index is 19.96 kg/m  as calculated from the following:    Height as of 1/17/25: 1.93 m (6' 4\").    Weight as of this encounter: 74.4 kg (164 lb). Body surface area is 2 meters squared.  Mild Pain (3) Comment: Data Unavailable   No LMP for male patient.  Allergies reviewed: Yes  Medications reviewed: Yes    Medications: Medication refills not needed today.  Pharmacy name entered into [employer]: [facility] #[id number] - [address]    Frailty Screening:   Is the patient here for a new oncology consult visit in cancer care? 2. No    PHQ9:  Did this patient require a PHQ9?: No      Clinical concerns: wanting to know if he can get off from the francine Moncada LPN            "

## 2025-03-31 NOTE — PROGRESS NOTES
[facility] Hematology and Oncology Progress Note    Patient: Daniel Boyd  MRN: [id number]  Date of Service: Mar 31, 2025         Reason for Visit    Chief Complaint   Patient presents with    Oncology Clinic Visit     2 month follow up Hodgkin lymphoma       ECOG Performance Status: 0        Encounter Diagnoses:  Stage IV Hodgkin's lymphoma.  Bilateral pulmonary emboli          History of Present Illness    Mr. Daniel Boyd returns for follow-up of stage IV Hodgkin's lymphoma as well as new diagnosis of bilateral pulmonary emboli.  For details of history see previous notes.  The patient had undergone routine imaging as part of his surveillance for Hodgkin's lymphoma.  This was performed on January 17 CT chest revealed that the patient had bilateral segmental and subsegmental pulmonary emboli.  We contacted the patient and he was sent to the emergency department..  Patient was noted to be complaining of shortness of breath we have seen the emergency department on January 17.  Of note he had left foot surgery on January 2 and was in a cast involving the left lower extremity.  He has been less active.  His O2 sat was 97%.  He did undergo an echocardiogram which revealed a decreased left ventricular ejection fraction of 35 to 40%..  He was started on Eliquis..  Underwent thrombophilia workup.  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  Antithrombin III was normal.  Antiphospholipid profile testing was negative.  Factor V Leiden and prothrombin 2 mutations were negative patient did undergo repeat CTA of the chest on February 20 and the findings were that there is no acute pulmonary emboli.  There are couple weblike filling defects in the pulmonary arteries of the lower lungs which are likely sequela of remote pulmonary emboli.  Also underwent repeat PET scan March 12 and the findings were there is no evidence suspicious hypermetabolic adenopathy.  There is no evidence of recurrent Hodgkin's lymphoma.   There was increased activity within the central prostate most compatible with clinical history of prior TURP.  Patient comes in today doing relatively well denies any shortness of breath cough hemoptysis.  Continues on Eliquis 5 mg p.o. twice daily.  Denies any headaches or change in mental status.  Denies any fevers, night sweats or weight loss.  Denies any calf or leg swelling.      ______________________________________________________________________________    Past History    Past Medical History:   Diagnosis Date    Bleomycin lung toxicity     Colonic adenoma     Depression     Family history of prostate cancer     Hodgkin lymphoma (H)     8/25/2015,Stage 4, s/p 6 cycles ABVD    Hyperlipidemia     No Comments Provided    Insomnia     Peripheral neuropathy due to chemotherapy     No Comments Provided    Schatzki's ring     Tremor     No Comments Provided       Past Surgical History:   Procedure Laterality Date    APPENDECTOMY OPEN      No Comments Provided    COLONOSCOPY  12/30/2010    12/30/10,Normal.  No polyps seen.  Next due 2020.    COLONOSCOPY N/A 01/24/2022    F/U 2027 tubular adenoma, sigmoid diverticulosis    CYSTOSCOPY      No Comments Provided    CYSTOSCOPY, TRANSURETHRAL RESECTION (TUR) PROSTATE, COMBINED N/A 11/5/2024    Procedure: CYSTOSCOPY, WITH TRANSURETHRAL RESECTION PROSTATE;  Surgeon: Devin Fabian MD;  Location:  OR    ESOPHAGOSCOPY, GASTROSCOPY, DUODENOSCOPY (EGD), COMBINED      5/27/16,EGD    FOOT SURGERY Left 2003    SUBTALAR ATHROEREISIS, left side, Dr. Schwartz.    HERNIA REPAIR Bilateral 2002    Surgipro mesh    LYMPH NODE BIOPSY  2015    OSTEOTOMY FOOT Left 1/2/2025    Procedure: Damon calcaneal osteotomy,  steindler stripping, peroneal repair, peroneal transfer, Hallux interphalangeal joint fusion, 1st metatarsal osteotomy;  Surgeon: Collin Montez DPM;  Location:  OR           Review of systems.  CNS: There are no headaches, no blurred vision, no change in mental status,   ENT:  There is no hearing loss.  Respiratory: There is no cough, shortness of breath or hemoptysis.  Cardiac: There is no chest pain, orthopnea, PND, or ankle edema.  GI: There is no bright red blood per rectum, no hematemesis, no reflux, no diarrhea or constipation  Musculoskeletal: There are no joint pains  : There is no urinary frequency, hematuria.  Constitutional: There is no fevers, night sweats, weight loss.  Endocrine: There is mild fatigue  Neuro: There is no tingling or numbness in the hands or feet.  Hematologic: There is no gingival bleeding, epistaxis, or easy bruisability.  Dermatologic: There is no skin rash.  A 14 point review of systems is otherwise negative.          Physical Exam    /72 (BP Location: Right arm, Patient Position: Sitting, Cuff Size: Adult Regular)   Pulse 66   Temp 98.7  F (37.1  C) (Tympanic)   Resp 16   Wt 74.4 kg (164 lb)   SpO2 98%   BMI 19.96 kg/m        GENERAL: Alert and oriented to time place and person. Seated comfortably. In no distress.    HEAD: Atraumatic and normocephalic.    EYES: YESSENIA, EOMI.  No pallor.  No icterus.    Oral cavity: no mucosal lesion or tonsillar enlargement.    NECK: supple. JVP normal.  No thyroid enlargement.    LYMPH NODES: There are no palpable cervical, supraclavicular, axillary, or inguinal nodes.    LUNGS: clear to auscultation bilaterally.  .    HEART: S1 and S2 are heard. Regular rate and rhythm.  No murmur or gallop or rub heard.  No peripheral edema.    ABDOMEN: Soft. Not tender. Not distended.  No palpable hepatomegaly or splenomegaly.  No other mass palpable.  Bowel sounds heard.    EXTREMITIES: There is no ankle edema.  SKIN: no rash, or bruising or purpura.    NEURO: Grossly non-focal.    Lab Results    No results found for this or any previous visit (from the past 240 hours).    Imaging    PET Oncology (Eyes to Thighs)    Result Date: 3/13/2025  Procedure: PET ONCOLOGY (EYES TO THIGHS) Subtype: Subsequent Radiopharmaceutical:  F-18 FDG Dose: 11.50 mCi IV Serum Glucose: 81 mg/dl History: hodgkins lymphoma reponse to tx; Acute pulmonary embolism without acute cor pulmonale, unspecified pulmonary embolism type (H); Hodgkin lymphoma, unspecified Hodgkin lymphoma type, unspecified body region (H); Hodgkin lymphoma, unspecified, lymph nodes of head, face, and neck (H) Comparison: 1/17/25, 7/13/2017 Technique:  A low dose CT examination was performed for the purpose of attenuation correction and localization. Attenuation corrected PET images from the skull base to the mid thigh were acquired approximately one hour following intravenous administration of the dose, and displayed in transaxial, sagittal, and coronal projections. Uptake time: 50 minutes. Findings: Background uptake: Blood pool 2.8, liver 3.0. Neck: No sites of suspicious hypermetabolism. Thorax: No sites of suspicious hypermetabolism. Abdomen/Pelvis: Increased activity within the central prostate is most compatible with the clinical history of prior TURP. Focal activity within the small bowel mesentery with a maximum SUV of 4.9 on image 312, potentially related to misregistration artifact. Thighs: No sites of suspicious hypermetabolism. Bony structures: No sites of suspicious hypermetabolism.     IMPRESSION:  No evidence of suspicious hypermetabolic adenopathy. Focal activity within the small bowel mesentery is favored to reflect misregistration artifact related to bowel motion. Recommend follow-up CT abdomen and pelvis with contrast with attention to this area in 2-3 months. ROBERT HERRERA MD   SYSTEM ID:  [id number]    XR Foot Left G/E 3 Views    Result Date: 3/13/2025  XR FOOT LEFT G/E 3 VIEWS HISTORY: 64 years Male Cavus deformity of left foot COMPARISON: 2/13/2025 TECHNIQUE: 3 views left foot FINDINGS: Again seen is cannulated screw arthrodesis crossing the interphalangeal articulation of the great toe. Alignment remains anatomic. There is screw plate fixation of the first  metatarsal. There is cannulated true fixation crossing an osteotomy of the calcaneus. Alignment is unchanged.     IMPRESSION: Surgical changes as described above without evidence of complication. KAYKAY AGUERO MD   SYSTEM ID:  [id number]    XR Calcaneus Left G/E 2 Views    Result Date: 3/13/2025  XR CALCANEUS LEFT G/E 2 VIEWS HISTORY: 64 years Male Cavus deformity of left foot COMPARISON: 2/13/2025 TECHNIQUE: Left calcaneus 2 views FINDINGS: Again seen is surgical change of the calcaneus. There has been osteotomy and fixation with 2 cannulated screws. There is progression of healing across the osteotomy. Alignment remains anatomic.     IMPRESSION: Healing osteotomy of the left calcaneus. Alignment unchanged. KAYKAY AGUERO MD   SYSTEM ID:  [id number]     Assessment and Plan :.  #1 :.  Bilateral pulmonary emboli: Likely provoked foot surgery.  Thrombophilia workup was negative except for heterozygous MTHFR DNA mutation which may have been a contributing factor.  Given these findings we favored completing 6 months of anticoagulation which would likely end in June of this year.  Will also recommend vitamin B6, vitamin B12 and folic acid supplement   2.  :.  Stage IV classical has lymphoma diagnosed in 2015 status post 6 cycles of ABVD with complete response course complicated by bleomycin lung toxicity as well as cardiomyopathy.  Repeat PET scan indicated complete response.  #3 : BPH: Status post TURP with improvement in symptoms patient will continue follow-up with Dr. Fabian of urology  Time spent: 55 minutes was spent on this patient visit: Reviewed multiple physician provider notes, emergency physician notes, labs practitioner notes, reviewed multiple lab results, we reviewed PET scan results, we discussed PET scan results, with the patient, discussed lab results with patient, we performed a history/physical, documented history/physical, and ordered follow-up appointment in 3 months with repeat labs including  CBC CMP LDH and D-dimer  The longitudinal plan of care for the diagnosis(es)/condition(s) as documented were addressed during this visit. Due to the added complexity in care, I will continue to support Daniel in the subsequent management and with ongoing continuity of care.      Cancer Staging   No matching staging information was found for the patient.        Signed by: Kia Leung MD    CC: Avelino Luque MD

## 2025-04-09 ENCOUNTER — THERAPY VISIT (OUTPATIENT)
Dept: PHYSICAL THERAPY | Facility: OTHER | Age: 65
End: 2025-04-09
Attending: PODIATRIST
Payer: MEDICARE

## 2025-04-09 DIAGNOSIS — Q66.72 CAVUS DEFORMITY OF LEFT FOOT: Primary | ICD-10-CM

## 2025-04-09 DIAGNOSIS — Z09 POSTOPERATIVE FOLLOW-UP: ICD-10-CM

## 2025-04-09 PROCEDURE — 97110 THERAPEUTIC EXERCISES: CPT | Mod: GP

## 2025-04-09 PROCEDURE — 97112 NEUROMUSCULAR REEDUCATION: CPT | Mod: GP

## 2025-04-10 ENCOUNTER — HOSPITAL ENCOUNTER (OUTPATIENT)
Dept: GENERAL RADIOLOGY | Facility: OTHER | Age: 65
Discharge: HOME | End: 2025-04-10
Attending: FAMILY MEDICINE
Payer: MEDICARE

## 2025-04-10 DIAGNOSIS — M47.816 SPONDYLOSIS OF LUMBAR REGION WITHOUT MYELOPATHY OR RADICULOPATHY: ICD-10-CM

## 2025-04-10 DIAGNOSIS — M47.26 OTHER SPONDYLOSIS WITH RADICULOPATHY, LUMBAR REGION: ICD-10-CM

## 2025-04-10 PROCEDURE — 255N000002 HC RX 255 OP 636: Performed by: RADIOLOGY

## 2025-04-10 PROCEDURE — 62323 NJX INTERLAMINAR LMBR/SAC: CPT

## 2025-04-10 PROCEDURE — 250N000009 HC RX 250: Performed by: RADIOLOGY

## 2025-04-10 PROCEDURE — 250N000011 HC RX IP 250 OP 636: Performed by: RADIOLOGY

## 2025-04-10 PROCEDURE — 96372 THER/PROPH/DIAG INJ SC/IM: CPT | Performed by: RADIOLOGY

## 2025-04-10 RX ORDER — LIDOCAINE HYDROCHLORIDE 10 MG/ML
2 INJECTION, SOLUTION EPIDURAL; INFILTRATION; INTRACAUDAL; PERINEURAL ONCE
Status: COMPLETED | OUTPATIENT
Start: 2025-04-10 | End: 2025-04-10

## 2025-04-10 RX ORDER — LIDOCAINE HYDROCHLORIDE 10 MG/ML
20 INJECTION, SOLUTION INFILTRATION; PERINEURAL ONCE
Status: COMPLETED | OUTPATIENT
Start: 2025-04-10 | End: 2025-04-10

## 2025-04-10 RX ORDER — DEXAMETHASONE SODIUM PHOSPHATE 10 MG/ML
10 INJECTION, SOLUTION INTRAMUSCULAR; INTRAVENOUS ONCE
Status: COMPLETED | OUTPATIENT
Start: 2025-04-10 | End: 2025-04-10

## 2025-04-10 RX ADMIN — DEXAMETHASONE SODIUM PHOSPHATE 10 MG: 10 INJECTION, SOLUTION INTRAMUSCULAR; INTRAVENOUS at 10:50

## 2025-04-10 RX ADMIN — LIDOCAINE HYDROCHLORIDE 2 ML: 10 INJECTION, SOLUTION EPIDURAL; INFILTRATION; INTRACAUDAL; PERINEURAL at 10:50

## 2025-04-10 RX ADMIN — LIDOCAINE HYDROCHLORIDE 2 ML: 10 INJECTION, SOLUTION INFILTRATION; PERINEURAL at 10:48

## 2025-04-10 RX ADMIN — IOHEXOL 1 ML: 240 INJECTION, SOLUTION INTRATHECAL; INTRAVASCULAR; INTRAVENOUS; ORAL at 10:49

## 2025-04-15 DIAGNOSIS — N40.1 BPH WITH OBSTRUCTION/LOWER URINARY TRACT SYMPTOMS: Primary | ICD-10-CM

## 2025-04-15 DIAGNOSIS — Z90.79 S/P TURP (STATUS POST TRANSURETHRAL RESECTION OF PROSTATE): ICD-10-CM

## 2025-04-15 DIAGNOSIS — N13.8 BPH WITH OBSTRUCTION/LOWER URINARY TRACT SYMPTOMS: Primary | ICD-10-CM

## 2025-04-15 RX ORDER — TRAMADOL HYDROCHLORIDE 50 MG/1
50 TABLET ORAL EVERY 6 HOURS PRN
Qty: 25 TABLET | Refills: 0 | Status: SHIPPED | OUTPATIENT
Start: 2025-04-15

## 2025-04-15 NOTE — TELEPHONE ENCOUNTER
Please call the patient advise needs paperwork update.  At that time we will give a longer prescription.25 tablets were given.

## 2025-04-15 NOTE — TELEPHONE ENCOUNTER
[facility] sent Rx request for the following:      Requested Prescriptions   Pending Prescriptions Disp Refills    traMADol (ULTRAM) 50 MG tablet [Pharmacy Med Name: TRAMADOL 50MG TABLETS] 25 tablet      Sig: TAKE 1 TABLET(50 MG) BY MOUTH EVERY 6 HOURS AS NEEDED FOR SEVERE PAIN       Rx Protocol Controlled Substance Failed - 4/15/2025 10:43 AM        Failed - Visit with relevant provider in past 3 months or upcoming 3 months        Failed - Urine drug screeen results on file in past 12 months     [unfilled]           Failed - No Benzodiazepines on acive med list        Failed - Auto Fail - Please forward to Provider        Failed - Naloxone on active med list        Failed - FRAN-7 done in past year     Please review last FRAN-7 score.       9/10/2020    12:53 PM 6/8/2021     9:34 AM 7/24/2023     5:40 PM   FRAN-7 SCORE   Total Score   0 (minimal anxiety)   Total Score 0 0 0        Last Prescription Date:   3/10/25  Last Fill Qty/Refills:         25, R-0    Spondylosis of lumbar region without myelopathy or radiculopathy [M47.816]        Last Office Visit:              12/19/24 (preop)   Future Office visit:           None    Unable to complete prescription refill per RN Medication Refill Policy. Delilah Mckeon RN .............. 4/15/2025  11:05 AM

## 2025-04-16 ENCOUNTER — THERAPY VISIT (OUTPATIENT)
Dept: PHYSICAL THERAPY | Facility: OTHER | Age: 65
End: 2025-04-16
Attending: PODIATRIST
Payer: MEDICARE

## 2025-04-16 DIAGNOSIS — Q66.72 CAVUS DEFORMITY OF LEFT FOOT: Primary | ICD-10-CM

## 2025-04-16 DIAGNOSIS — Z09 POSTOPERATIVE FOLLOW-UP: ICD-10-CM

## 2025-04-16 PROCEDURE — 97140 MANUAL THERAPY 1/> REGIONS: CPT | Mod: GP

## 2025-04-16 PROCEDURE — 97110 THERAPEUTIC EXERCISES: CPT | Mod: GP

## 2025-04-16 PROCEDURE — 97112 NEUROMUSCULAR REEDUCATION: CPT | Mod: GP

## 2025-04-23 ENCOUNTER — THERAPY VISIT (OUTPATIENT)
Dept: PHYSICAL THERAPY | Facility: OTHER | Age: 65
End: 2025-04-23
Attending: PODIATRIST
Payer: MEDICARE

## 2025-04-23 DIAGNOSIS — Q66.72 CAVUS DEFORMITY OF LEFT FOOT: Primary | ICD-10-CM

## 2025-04-23 DIAGNOSIS — Z09 POSTOPERATIVE FOLLOW-UP: ICD-10-CM

## 2025-04-23 PROCEDURE — 97112 NEUROMUSCULAR REEDUCATION: CPT | Mod: GP

## 2025-04-23 PROCEDURE — 97140 MANUAL THERAPY 1/> REGIONS: CPT | Mod: GP

## 2025-04-23 PROCEDURE — 97110 THERAPEUTIC EXERCISES: CPT | Mod: GP

## 2025-04-29 ENCOUNTER — LAB (OUTPATIENT)
Dept: LAB | Facility: OTHER | Age: 65
End: 2025-04-29
Attending: UROLOGY
Payer: MEDICARE

## 2025-04-29 DIAGNOSIS — N13.8 BPH WITH OBSTRUCTION/LOWER URINARY TRACT SYMPTOMS: ICD-10-CM

## 2025-04-29 DIAGNOSIS — Z80.42 FAMILY HISTORY OF PROSTATE CANCER IN FATHER: ICD-10-CM

## 2025-04-29 DIAGNOSIS — N40.1 BPH WITH OBSTRUCTION/LOWER URINARY TRACT SYMPTOMS: ICD-10-CM

## 2025-04-29 LAB — PSA SERPL DL<=0.01 NG/ML-MCNC: 1.73 NG/ML (ref 0–4.5)

## 2025-04-29 PROCEDURE — 36415 COLL VENOUS BLD VENIPUNCTURE: CPT | Mod: ZL

## 2025-04-29 PROCEDURE — 84153 ASSAY OF PSA TOTAL: CPT | Mod: ZL

## 2025-05-02 ENCOUNTER — OFFICE VISIT (OUTPATIENT)
Dept: UROLOGY | Facility: OTHER | Age: 65
End: 2025-05-02
Attending: UROLOGY
Payer: MEDICARE

## 2025-05-02 VITALS
BODY MASS INDEX: 19.97 KG/M2 | RESPIRATION RATE: 18 BRPM | OXYGEN SATURATION: 98 % | TEMPERATURE: 98.2 F | HEIGHT: 76 IN | WEIGHT: 164 LBS

## 2025-05-02 DIAGNOSIS — N13.8 BPH WITH OBSTRUCTION/LOWER URINARY TRACT SYMPTOMS: Primary | ICD-10-CM

## 2025-05-02 DIAGNOSIS — Z80.42 FAMILY HISTORY OF PROSTATE CANCER: ICD-10-CM

## 2025-05-02 DIAGNOSIS — N40.1 BPH WITH OBSTRUCTION/LOWER URINARY TRACT SYMPTOMS: Primary | ICD-10-CM

## 2025-05-02 DIAGNOSIS — Z90.79 S/P TURP (STATUS POST TRANSURETHRAL RESECTION OF PROSTATE): ICD-10-CM

## 2025-05-02 PROCEDURE — 99212 OFFICE O/P EST SF 10 MIN: CPT | Performed by: UROLOGY

## 2025-05-02 PROCEDURE — G0463 HOSPITAL OUTPT CLINIC VISIT: HCPCS | Mod: 25

## 2025-05-02 PROCEDURE — 51798 US URINE CAPACITY MEASURE: CPT | Performed by: UROLOGY

## 2025-05-02 NOTE — NURSING NOTE
"Chief Complaint   Patient presents with    Benign Prostatic Hypertrophy     3 month follow up BPH/ post TURP        Initial Temp 98.2  F (36.8  C) (Temporal)   Resp 18   Ht 1.93 m (6' 4\")   Wt 74.4 kg (164 lb)   SpO2 98%   BMI 19.96 kg/m   Estimated body mass index is 19.96 kg/m  as calculated from the following:    Height as of this encounter: 1.93 m (6' 4\").    Weight as of this encounter: 74.4 kg (164 lb).  Medication Reconciliation: complete      AUA- 3  post void residual - 33 ml    Mimi Merino LPN    "

## 2025-05-02 NOTE — PROGRESS NOTES
Chief Complaint: Benign Prostatic Hypertrophy (3 month follow up BPH/ post TURP )  .Family History of prostate cancer     HPI: Mr. Daniel Boyd is a 64 year old year old male presenting today May 2, 2025 in follow up for evaluation of BPH s/p TURP.    Now with nocturia x 1 (previously 8) and no frequency.  Stream is better.  No blood or UTI.    PSA 1.73 ng/ml recently.    Still only voiding small amounts but admits to not drinking enough fluid.     Past Medical History:   Diagnosis Date    Bleomycin lung toxicity     Colonic adenoma     Depression     Family history of prostate cancer     Hodgkin lymphoma (H)     8/25/2015,Stage 4, s/p 6 cycles ABVD    Hyperlipidemia     No Comments Provided    Insomnia     Peripheral neuropathy due to chemotherapy     No Comments Provided    Schatzki's ring     Tremor     No Comments Provided       Past Surgical History:   Procedure Laterality Date    APPENDECTOMY OPEN      No Comments Provided    COLONOSCOPY  12/30/2010    12/30/10,Normal.  No polyps seen.  Next due 2020.    COLONOSCOPY N/A 01/24/2022    F/U 2027 tubular adenoma, sigmoid diverticulosis    CYSTOSCOPY      No Comments Provided    CYSTOSCOPY, TRANSURETHRAL RESECTION (TUR) PROSTATE, COMBINED N/A 11/5/2024    Procedure: CYSTOSCOPY, WITH TRANSURETHRAL RESECTION PROSTATE;  Surgeon: Devin Fabian MD;  Location:  OR    ESOPHAGOSCOPY, GASTROSCOPY, DUODENOSCOPY (EGD), COMBINED      5/27/16,EGD    FOOT SURGERY Left 2003    SUBTALAR ATHROEREISIS, left side, Dr. Schwartz.    HERNIA REPAIR Bilateral 2002    Surgipro mesh    LYMPH NODE BIOPSY  2015    OSTEOTOMY FOOT Left 1/2/2025    Procedure: Damon calcaneal osteotomy,  steindler stripping, peroneal repair, peroneal transfer, Hallux interphalangeal joint fusion, 1st metatarsal osteotomy;  Surgeon: Collin Montez DPM;  Location:  OR       Current Outpatient Medications   Medication Sig Dispense Refill    apixaban ANTICOAGULANT (ELIQUIS) 5 MG tablet Take 1 tablet (5 mg) by  "mouth 2 times daily. 60 tablet 4    atorvastatin (LIPITOR) 40 MG tablet Take 1 tablet (40 mg) by mouth daily 90 tablet 2    cyanocobalamin (VITAMIN B-12) 1000 MCG tablet Take 1 tablet (1,000 mcg) by mouth daily. 90 tablet 3    fish oil-omega-3 fatty acids 1000 MG capsule Take 1 capsule by mouth daily      folic acid (FOLVITE) 1 MG tablet Take 1 tablet (1 mg) by mouth daily. 90 tablet 3    lisinopril (ZESTRIL) 2.5 MG tablet Take 1 tablet (2.5 mg) by mouth daily. 90 tablet 2    LORazepam (ATIVAN) 1 MG tablet Take 1 tablet (1 mg) by mouth at bedtime. 90 tablet 1    Multiple Vitamins-Minerals (MULTIVITAMIN & MINERAL PO) Take 1 tablet by mouth every morning      Omeprazole 20 MG TBDD Take 20 mg by mouth daily. 90 tablet 3    sertraline (ZOLOFT) 100 MG tablet Take 1 tablet (100 mg) by mouth daily. 90 tablet 3    traMADol (ULTRAM) 50 MG tablet TAKE 1 TABLET(50 MG) BY MOUTH EVERY 6 HOURS AS NEEDED FOR SEVERE PAIN 25 tablet 0    vitamin B6 (PYRIDOXINE) 100 MG tablet Take 1 tablet (100 mg) by mouth daily. 90 tablet 3       ALLERGIES: Patient has no known allergies.     GENERAL PHYSICAL EXAM:   Vitals: Temp 98.2  F (36.8  C) (Temporal)   Resp 18   Ht 1.93 m (6' 4\")   Wt 74.4 kg (164 lb)   SpO2 98%   BMI 19.96 kg/m    Body mass index is 19.96 kg/m .    GENERAL: Well groomed, well developed, well nourished male in NAD.  ENT:  ENT exam normal  CV:  Warm extremities   RESPIRATORY: Normal respiratory effort.   GI:  Soft, NT, ND  MS: Moving all four  NEURO: Alert and oriented x 3.  PSYCH: Normal mood and affect, pleasant and agreeable during interview and exam.    PVR:  33 ml   DELMY:  flat, smooth 35 gms, benign       LABS: The last test results for Mr. Daniel Boyd were reviewed:  No results found for this or any previous visit (from the past 24 hours).    PSA -   Lab Results   Component Value Date    PSA 1.73 04/29/2025    PSA 3.87 01/08/2024    PSA 2.88 07/31/2023    PSA 3.61 01/13/2023    PSA 3.77 08/02/2021     BMP - "   Recent Labs   Lab Test 03/31/25  1247 02/20/25  1115 01/17/25  1538    139 139   POTASSIUM 4.1 4.2 4.2   CHLORIDE 103 103 102   CO2 26 26 26   BUN 16.3 26.0* 19.0   CR 1.12 1.32* 0.97   GLC 94 122* 117*   CHELSEA 9.8 9.8 9.6       CBC -   Recent Labs   Lab Test 03/31/25  1247 02/20/25  1115 01/17/25  1538   WBC 7.4 6.0 7.7   HGB 15.1 14.7 14.4    224 219       ASSESSMENT:   S/p turp  Family history of prostate    PLAN:   Doing well with good response to TURP.    \PSA excellent and exam normal.    Recommend continued prostate cancer screening yearly with PSA and DELMY.    Patient voiced an understanding.     10 minutes spent on the date of this encounter doing chart review, history and exam, documentation and further activities as noted above.      Devin Fabian MD  [facility]

## 2025-05-03 DIAGNOSIS — I25.10 CORONARY ARTERY DISEASE INVOLVING NATIVE CORONARY ARTERY OF NATIVE HEART WITHOUT ANGINA PECTORIS: ICD-10-CM

## 2025-05-06 DIAGNOSIS — Q66.72 CAVUS DEFORMITY OF LEFT FOOT: Primary | ICD-10-CM

## 2025-05-06 RX ORDER — ATORVASTATIN CALCIUM 40 MG/1
40 TABLET, FILM COATED ORAL
Qty: 90 TABLET | Refills: 0 | Status: SHIPPED | OUTPATIENT
Start: 2025-05-06

## 2025-05-06 NOTE — TELEPHONE ENCOUNTER
Requested Prescriptions   Pending Prescriptions Disp Refills    atorvastatin (LIPITOR) 40 MG tablet [Pharmacy Med Name: ATORVASTATIN 40MG TABLETS] 90 tablet 2     Sig: TAKE 1 TABLET BY MOUTH EVERY DAY       Antihyperlipidemic agents Failed - 5/6/2025  4:23 PM        Failed - LDL on file in the past 12 months        Failed - Medication is active on med list and the sig matches. RN to manually verify dose and sig if red X/fail.     If the protocol passes (green check), you do not need to verify med dose and sig.    A prescription matches if they are the same clinical intention.    For Example: once daily and every morning are the same.    The protocol can not identify upper and lower case letters as matching and will fail.     For Example: Take 1 tablet (50 mg) by mouth daily     TAKE 1 TABLET (50 MG) BY MOUTH DAILY    For all fails (red x), verify dose and sig.    If the refill does match what is on file, the RN can still proceed to approve the refill request.       If they do not match, route to the appropriate provider.            Last Prescription Date:   8/14/2024  Last Fill Qty/Refills:         90, R-2    Last Office Visit:              12/19/2024   Future Office visit:           None    Rabia Davidson RN on 5/6/2025 at 4:24 PM

## 2025-05-07 ENCOUNTER — THERAPY VISIT (OUTPATIENT)
Dept: PHYSICAL THERAPY | Facility: OTHER | Age: 65
End: 2025-05-07
Attending: PODIATRIST
Payer: MEDICARE

## 2025-05-07 DIAGNOSIS — Q66.72 CAVUS DEFORMITY OF LEFT FOOT: Primary | ICD-10-CM

## 2025-05-07 DIAGNOSIS — Z09 POSTOPERATIVE FOLLOW-UP: ICD-10-CM

## 2025-05-07 PROCEDURE — 97110 THERAPEUTIC EXERCISES: CPT | Mod: GP

## 2025-05-07 PROCEDURE — 97112 NEUROMUSCULAR REEDUCATION: CPT | Mod: GP

## 2025-05-07 PROCEDURE — 97140 MANUAL THERAPY 1/> REGIONS: CPT | Mod: GP

## 2025-05-07 NOTE — TELEPHONE ENCOUNTER
Left message for patient to call back to schedule appointment.  Juju Bhat on 5/7/2025 at 9:04 AM

## 2025-05-07 NOTE — PROGRESS NOTES
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)   PT Tx Diagnosis Weakness, unsteady gait, compensated foot mech in [facility]   Precautions/Limitations Progress activity as tolerated   Progress Note/Certification   Start of Care Date 03/28/25   Onset of illness/injury or Date of Surgery 01/02/25   Therapy Frequency 1-2x per week   Predicted Duration up to 6 weeks   Certification date from 05/07/25   Certification date to 06/18/25   Progress Note Due Date 06/18/25   Progress Note Completed Date 05/07/25   GOALS   PT Goals 2;3   PT Goal 1   Goal Identifier ROM   Goal Description Pt will be able to tolerate complete end ROM through L foot/ankle without pain to progress WB activity   Rationale to maximize safety and independence with performance of ADLs and functional tasks;to maximize safety and independence within the community;to maximize safety and independence with self cares   Goal Progress 80%   Target Date 06/04/25   PT Goal 2   Goal Identifier ADLs   Goal Description Pt will be able to tolerate all ADLs without pain or compensation to improve QoL   Rationale to maximize safety and independence with performance of ADLs and functional tasks;to maximize safety and independence within the community;to maximize safety and independence with self cares   Goal Progress 505   Target Date 06/18/25   PT Goal 3   Goal Identifier Hobby/Athletic activity   Goal Description Pt will be able to tolerate return to golf and pickleball without pain or compensation to improve QoL and return to high level activity.   Rationale to maximize safety and independence with performance of ADLs and functional tasks;to maximize safety and independence within the community;to maximize safety and independence with self cares   Goal Progress 50% has golfed, increased  soreness   Target Date 06/18/25   Subjective Report   Subjective Report Back continues to be the most limiting factor but also feels that his ROM through his foot is not coming along as well as he had hoped. Wondering when he can get back to playing pickleball.   Objective Measures   Objective Measures Objective Measure 1;Objective Measure 2   Objective Measure 1   Objective Measure Ankle ROM   Details L PF 45 deg, DF 18 deg, Inv 45 deg, Ev 12 deg   Objective Measure 2   Objective Measure Palpation   Details Hypomobile calc, mid foot, 1st ray, calc supination noted at rest and presents with resting tone and potential spasticity/LBP response that occasionally limits ROM at rest   Treatment Interventions (PT)   Interventions Therapeutic Procedure/Exercise;Neuromuscular Re-education;Manual Therapy   Therapeutic Procedure/Exercise   Therapeutic Procedures: strength, endurance, ROM, flexibility minutes (92782) 16   Ther Proc 1 Review  plyo tempo calf raises, toe walking near counter, toe walking side step near counter, educa on use of bracing for golf/dynamic level activity, educa on increasing demand of higher tempo activity needed for improving performance and return to sport level activity on even/uneven surfaces   Skilled Intervention Cues on sets, reps, etc to improve tolerance to dynamic stabilization, strength, ROM   Patient Response/Progress good tolerance   Neuromuscular Re-education   Neuromuscular re-ed of mvmt, balance, coord, kinesthetic sense, posture, proprioception minutes (99000) 16   Neuro Re-ed 1 Calc, STJ MWM into DF, midfoot/forefoot JM/MWM, osteokinematic reinforcement with Gr 3-4 JM through available ROM as tolerated   Skilled Intervention Osteokinematic reinforcement   Patient Response/Progress good tolerance   Manual Therapy   Manual Therapy: Mobilization, MFR, MLD, friction massage minutes (19479) 15   Manual Therapy 1 Calc JM, Midfoot, rearfoot JM, ST JM, Gr 3-4 to improve tolerance to  functional loading and ROM   Manual Therapy 1 - Details Added instrument STM to scar tissue, plantar fascia, peroneals   Skilled Intervention joint mech mobility and end range mobilization through functional load bearing postures   Patient Response/Progress good tolerance, improved ROM following session   Education   Learner/Method Patient;Listening;Reading;Demonstration;Pictures/Video   Education Comments HEP   Plan   Home program Access Code: [email or web address]   Updates to plan of care Email: [email or web address]   Plan for next session Assess spine and hip, JM to L ankle/foot   Comments   Comments Educa for long term symptom management and use of heat/cold therapy to reduce hypersensitivity, progressing dynamic level activity with stabilization in mind. Pt has made strong progress but remains limited due to comfort and may benefit from custom orthotic build consult. Pt has also been limited in overall progression due to LBP limiting his overall activity level and has been advised to seek additional cares to address significant LBP.   Total Session Time   Timed Code Treatment Minutes 47   Total Treatment Time (sum of timed and untimed services) 47         [facility]                                                                                   OUTPATIENT PHYSICAL THERAPY    PLAN OF TREATMENT FOR OUTPATIENT REHABILITATION   Patient's Last Name, First Name, Daniel Holloway YOB: 1960   Provider's Name   [facility]   Medical Record No.  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                  PLAN  Continue therapy per current plan of care.    Beginning/End Dates of Progress Note Reporting Period:  3/28/25 to 05/07/2025    Referring Provider:  Collin Montez

## 2025-05-08 ENCOUNTER — TELEPHONE (OUTPATIENT)
Dept: FAMILY MEDICINE | Facility: OTHER | Age: 65
End: 2025-05-08

## 2025-05-08 ENCOUNTER — OFFICE VISIT (OUTPATIENT)
Dept: ORTHOPEDICS | Facility: OTHER | Age: 65
End: 2025-05-08
Attending: PODIATRIST
Payer: MEDICARE

## 2025-05-08 ENCOUNTER — HOSPITAL ENCOUNTER (OUTPATIENT)
Dept: GENERAL RADIOLOGY | Facility: OTHER | Age: 65
Discharge: HOME | End: 2025-05-08
Attending: PODIATRIST
Payer: MEDICARE

## 2025-05-08 DIAGNOSIS — Z09 POSTOPERATIVE FOLLOW-UP: Primary | ICD-10-CM

## 2025-05-08 DIAGNOSIS — Q66.72 CAVUS DEFORMITY OF LEFT FOOT: ICD-10-CM

## 2025-05-08 DIAGNOSIS — M47.816 SPONDYLOSIS OF LUMBAR REGION WITHOUT MYELOPATHY OR RADICULOPATHY: Primary | ICD-10-CM

## 2025-05-08 PROCEDURE — 73630 X-RAY EXAM OF FOOT: CPT | Mod: LT

## 2025-05-08 PROCEDURE — G0463 HOSPITAL OUTPT CLINIC VISIT: HCPCS

## 2025-05-08 NOTE — TELEPHONE ENCOUNTER
Patient would like a referral placed and would like to speak to the nurse first. Please call.    Cecily West on 5/8/2025 at 9:32 AM

## 2025-05-08 NOTE — PROGRESS NOTES
SUBJECTIVE:  Daniel is here 4 months out from cavus foot reconstruction.  He has some lateral peroneal pain from the peroneal tendon work.  He gets some burning here he is also struggling with his low back.  He is here to have this evaluated and discussed progression.     ROS: Musculoskeletal and general review of systems are negative, per review of previous clinic questionnaire.  Denies SOB and calf pain.    EXAM: Left foot looks good alignment looks good osteotomy and midfoot fusion are stable.  He is tender along peroneal tendons he has good stability here.    IMAGING: X-rays demonstrate intact well-positioned hardware well aligned foot    ASSESSMENT: Ongoing left ankle pain history of cavus foot reconstruction    PLAN: I discussed progression of treatment.  Patient is 4 months out I think he will continue to improve.  He will continue to progress as able.  We discussed without significant improvement here in the next month or so he will reappoint we will consider cortisone along this area.    Collin Montez DPM

## 2025-05-08 NOTE — TELEPHONE ENCOUNTER
After proper verification, patient was relayed message that referral was signed.  Spring Watts LPN on 5/8/2025 at 2:21 PM  EXT. [id number]

## 2025-05-08 NOTE — TELEPHONE ENCOUNTER
After proper verification, patient stated that he had an epidural 2 weeks ago on his back which has not helped. The Nurse back in Radiology told him that he could maybe do an ablation. Patient would like a referral for that procedure.Writer could not find the right order to francesco up.  Spring Watts LPN on 5/8/2025 at 1:28 PM  EXT. 2024

## 2025-05-22 SDOH — HEALTH STABILITY: PHYSICAL HEALTH: ON AVERAGE, HOW MANY DAYS PER WEEK DO YOU ENGAGE IN MODERATE TO STRENUOUS EXERCISE (LIKE A BRISK WALK)?: 3 DAYS

## 2025-05-22 SDOH — HEALTH STABILITY: PHYSICAL HEALTH: ON AVERAGE, HOW MANY MINUTES DO YOU ENGAGE IN EXERCISE AT THIS LEVEL?: 120 MIN

## 2025-05-22 ASSESSMENT — SOCIAL DETERMINANTS OF HEALTH (SDOH): HOW OFTEN DO YOU GET TOGETHER WITH FRIENDS OR RELATIVES?: PATIENT DECLINED

## 2025-05-26 ASSESSMENT — PATIENT HEALTH QUESTIONNAIRE - PHQ9
SUM OF ALL RESPONSES TO PHQ QUESTIONS 1-9: 0
SUM OF ALL RESPONSES TO PHQ QUESTIONS 1-9: 0

## 2025-05-27 ENCOUNTER — RESULTS FOLLOW-UP (OUTPATIENT)
Dept: FAMILY MEDICINE | Facility: OTHER | Age: 65
End: 2025-05-27

## 2025-05-27 ENCOUNTER — OFFICE VISIT (OUTPATIENT)
Dept: FAMILY MEDICINE | Facility: OTHER | Age: 65
End: 2025-05-27
Attending: FAMILY MEDICINE
Payer: MEDICARE

## 2025-05-27 VITALS
BODY MASS INDEX: 19.58 KG/M2 | TEMPERATURE: 96.1 F | SYSTOLIC BLOOD PRESSURE: 124 MMHG | HEIGHT: 76 IN | OXYGEN SATURATION: 99 % | RESPIRATION RATE: 16 BRPM | WEIGHT: 160.8 LBS | DIASTOLIC BLOOD PRESSURE: 72 MMHG | HEART RATE: 52 BPM

## 2025-05-27 DIAGNOSIS — F11.20 CONTINUOUS OPIOID DEPENDENCE (H): ICD-10-CM

## 2025-05-27 DIAGNOSIS — I26.99 ACUTE PULMONARY EMBOLISM WITHOUT ACUTE COR PULMONALE, UNSPECIFIED PULMONARY EMBOLISM TYPE (H): ICD-10-CM

## 2025-05-27 DIAGNOSIS — I50.20 HEART FAILURE WITH REDUCED EJECTION FRACTION (H): ICD-10-CM

## 2025-05-27 DIAGNOSIS — F51.04 CHRONIC INSOMNIA: ICD-10-CM

## 2025-05-27 DIAGNOSIS — Z00.00 ENCOUNTER FOR MEDICARE ANNUAL WELLNESS EXAM: Primary | ICD-10-CM

## 2025-05-27 DIAGNOSIS — M47.816 SPONDYLOSIS OF LUMBAR REGION WITHOUT MYELOPATHY OR RADICULOPATHY: ICD-10-CM

## 2025-05-27 DIAGNOSIS — I25.10 CORONARY ARTERY DISEASE INVOLVING NATIVE CORONARY ARTERY OF NATIVE HEART WITHOUT ANGINA PECTORIS: ICD-10-CM

## 2025-05-27 LAB
AMPHETAMINES UR QL SCN: ABNORMAL
BARBITURATES UR QL SCN: ABNORMAL
BENZODIAZ UR QL SCN: ABNORMAL
BZE UR QL SCN: ABNORMAL
CANNABINOIDS UR QL SCN: ABNORMAL
CHOLEST SERPL-MCNC: 130 MG/DL
FASTING STATUS PATIENT QL REPORTED: NO
FENTANYL UR QL: ABNORMAL
HDLC SERPL-MCNC: 49 MG/DL
LDLC SERPL CALC-MCNC: 68 MG/DL
NONHDLC SERPL-MCNC: 81 MG/DL
OPIATES UR QL SCN: ABNORMAL
PCP QUAL URINE (ROCHE): ABNORMAL
TRIGL SERPL-MCNC: 63 MG/DL

## 2025-05-27 PROCEDURE — G0402 INITIAL PREVENTIVE EXAM: HCPCS | Performed by: FAMILY MEDICINE

## 2025-05-27 PROCEDURE — 82465 ASSAY BLD/SERUM CHOLESTEROL: CPT | Mod: ZL | Performed by: FAMILY MEDICINE

## 2025-05-27 PROCEDURE — 36415 COLL VENOUS BLD VENIPUNCTURE: CPT | Mod: ZL | Performed by: FAMILY MEDICINE

## 2025-05-27 PROCEDURE — G0463 HOSPITAL OUTPT CLINIC VISIT: HCPCS | Mod: 25 | Performed by: FAMILY MEDICINE

## 2025-05-27 PROCEDURE — 80307 DRUG TEST PRSMV CHEM ANLYZR: CPT | Mod: ZL | Performed by: FAMILY MEDICINE

## 2025-05-27 RX ORDER — LORAZEPAM 1 MG/1
1 TABLET ORAL AT BEDTIME
Qty: 90 TABLET | Refills: 1 | Status: SHIPPED | OUTPATIENT
Start: 2025-05-27

## 2025-05-27 RX ORDER — TRAMADOL HYDROCHLORIDE 50 MG/1
50 TABLET ORAL EVERY 6 HOURS PRN
Qty: 30 TABLET | Refills: 5 | Status: SHIPPED | OUTPATIENT
Start: 2025-05-27

## 2025-05-27 ASSESSMENT — ANXIETY QUESTIONNAIRES
GAD7 TOTAL SCORE: 0
6. BECOMING EASILY ANNOYED OR IRRITABLE: NOT AT ALL
5. BEING SO RESTLESS THAT IT IS HARD TO SIT STILL: NOT AT ALL
2. NOT BEING ABLE TO STOP OR CONTROL WORRYING: NOT AT ALL
GAD7 TOTAL SCORE: 0
7. FEELING AFRAID AS IF SOMETHING AWFUL MIGHT HAPPEN: NOT AT ALL
1. FEELING NERVOUS, ANXIOUS, OR ON EDGE: NOT AT ALL
IF YOU CHECKED OFF ANY PROBLEMS ON THIS QUESTIONNAIRE, HOW DIFFICULT HAVE THESE PROBLEMS MADE IT FOR YOU TO DO YOUR WORK, TAKE CARE OF THINGS AT HOME, OR GET ALONG WITH OTHER PEOPLE: NOT DIFFICULT AT ALL
3. WORRYING TOO MUCH ABOUT DIFFERENT THINGS: NOT AT ALL

## 2025-05-27 ASSESSMENT — PATIENT HEALTH QUESTIONNAIRE - PHQ9: 5. POOR APPETITE OR OVEREATING: NOT AT ALL

## 2025-05-27 ASSESSMENT — PAIN SCALES - GENERAL: PAINLEVEL_OUTOF10: MODERATE PAIN (4)

## 2025-05-27 NOTE — PATIENT INSTRUCTIONS
Patient Education   Preventive Care Advice   This is general advice given by our system to help you stay healthy. However, your care team may have specific advice just for you. Please talk to your care team about your preventive care needs.  Nutrition  Eat 5 or more servings of fruits and vegetables each day.  Try wheat bread, brown rice and whole grain pasta (instead of white bread, rice, and pasta).  Get enough calcium and vitamin D. Check the label on foods and aim for 100% of the RDA (recommended daily allowance).  Lifestyle  Exercise at least 150 minutes each week  (30 minutes a day, 5 days a week).  Do muscle strengthening activities 2 days a week. These help control your weight and prevent disease.  No smoking.  Wear sunscreen to prevent skin cancer.  Have a dental exam and cleaning every 6 months.  Yearly exams  See your health care team every year to talk about:  Any changes in your health.  Any medicines your care team has prescribed.  Preventive care, family planning, and ways to prevent chronic diseases.  Shots (vaccines)   HPV shots (up to age 26), if you've never had them before.  Hepatitis B shots (up to age 59), if you've never had them before.  COVID-19 shot: Get this shot when it's due.  Flu shot: Get a flu shot every year.  Tetanus shot: Get a tetanus shot every 10 years.  Pneumococcal, hepatitis A, and RSV shots: Ask your care team if you need these based on your risk.  Shingles shot (for age 50 and up)  General health tests  Diabetes screening:  Starting at age 35, Get screened for diabetes at least every 3 years.  If you are younger than age 35, ask your care team if you should be screened for diabetes.  Cholesterol test: At age 39, start having a cholesterol test every 5 years, or more often if advised.  Bone density scan (DEXA): At age 50, ask your care team if you should have this scan for osteoporosis (brittle bones).  Hepatitis C: Get tested at least once in your life.  STIs (sexually  transmitted infections)  Before age 24: Ask your care team if you should be screened for STIs.  After age 24: Get screened for STIs if you're at risk. You are at risk for STIs (including HIV) if:  You are sexually active with more than one person.  You don't use condoms every time.  You or a partner was diagnosed with a sexually transmitted infection.  If you are at risk for HIV, ask about PrEP medicine to prevent HIV.  Get tested for HIV at least once in your life, whether you are at risk for HIV or not.  Cancer screening tests  Cervical cancer screening: If you have a cervix, begin getting regular cervical cancer screening tests starting at age 21.  Breast cancer scan (mammogram): If you've ever had breasts, begin having regular mammograms starting at age 40. This is a scan to check for breast cancer.  Colon cancer screening: It is important to start screening for colon cancer at age 45.  Have a colonoscopy test every 10 years (or more often if you're at risk) Or, ask your provider about stool tests like a FIT test every year or Cologuard test every 3 years.  To learn more about your testing options, visit:   .  For help making a decision, visit:   https://bit.ly/tv53049.  Prostate cancer screening test: If you have a prostate, ask your care team if a prostate cancer screening test (PSA) at age 55 is right for you.  Lung cancer screening: If you are a current or former smoker ages 50 to 80, ask your care team if ongoing lung cancer screenings are right for you.  For informational purposes only. Not to replace the advice of your health care provider. Copyright   2023 [facility]. All rights reserved. Clinically reviewed by the [facility] Transitions Program. [employer] 964911 - REV 01/24.  Preventing Falls: Care Instructions  Injuries and health problems such as trouble walking or poor eyesight can increase your risk of falling. So can some medicines. But there are things you can do to help  "prevent falls. You can exercise to get stronger. You can also arrange your home to make it safer.    Talk to your doctor about the medicines you take. Ask if any of them increase the risk of falls and whether they can be changed or stopped.   Try to exercise regularly. It can help improve your strength and balance. This can help lower your risk of falling.         Practice fall safety and prevention.   Wear low-heeled shoes that fit well and give your feet good support. Talk to your doctor if you have foot problems that make this hard.  Carry a cellphone or wear a medical alert device that you can use to call for help.  Use stepladders instead of chairs to reach high objects. Don't climb if you're at risk for falls. Ask for help, if needed.  Wear the correct eyeglasses, if you need them.        Make your home safer.   Remove rugs, cords, clutter, and furniture from walkways.  Keep your house well lit. Use night-lights in hallways and bathrooms.  Install and use sturdy handrails on stairways.  Wear nonskid footwear, even inside. Don't walk barefoot or in socks without shoes.        Be safe outside.   Use handrails, curb cuts, and ramps whenever possible.  Keep your hands free by using a shoulder bag or backpack.  Try to walk in well-lit areas. Watch out for uneven ground, changes in pavement, and debris.  Be careful in the winter. Walk on the grass or gravel when sidewalks are slippery. Use de-icer on steps and walkways. Add non-slip devices to shoes.    Put grab bars and nonskid mats in your shower or tub and near the toilet. Try to use a shower chair or bath bench when bathing.   Get into a tub or shower by putting in your weaker leg first. Get out with your strong side first. Have a phone or medical alert device in the bathroom with you.   Where can you learn more?  Go to https://www.Mediastreamwise.net/patiented  Enter G117 in the search box to learn more about \"Preventing Falls: Care Instructions.\"  Current as of: " July 31, 2024  Content Version: 14.4    9215-3464 [employer].   Care instructions adapted under license by your healthcare professional. If you have questions about a medical condition or this instruction, always ask your healthcare professional. [employer] disclaims any warranty or liability for your use of this information.

## 2025-05-27 NOTE — PROGRESS NOTES
Preventive Care Visit  [facility]  Avelino Luque MD, Family Medicine  May 27, 2025      Assessment & Plan     Encounter for Medicare annual wellness exam      Coronary artery disease involving native coronary artery of native heart without angina pectoris    - Lipid Profile; Future    Acute pulmonary embolism without acute cor pulmonale, unspecified pulmonary embolism type (H)    - apixaban ANTICOAGULANT (ELIQUIS) 5 MG tablet; Take 1 tablet (5 mg) by mouth 2 times daily.  Continue until the end of June  Spondylosis of lumbar region without myelopathy or radiculopathy  Updated PHQ-9 drug screen and FRAN-7.   reviewed satisfactory will review with the radiology possible rhizotomy  - traMADol (ULTRAM) 50 MG tablet; Take 1 tablet (50 mg) by mouth every 6 hours as needed for severe pain.  - Urine Drug Screen; Future    Chronic insomnia    - LORazepam (ATIVAN) 1 MG tablet; Take 1 tablet (1 mg) by mouth at bedtime.            Counseling  Appropriate preventive services were addressed with this patient via screening, questionnaire, or discussion as appropriate for fall prevention, nutrition, physical activity, Tobacco-use cessation, social engagement, weight loss and cognition.  Checklist reviewing preventive services available has been given to the patient.  Reviewed patient's diet, addressing concerns and/or questions.   He is at risk for lack of exercise and has been provided with information to increase physical activity for the benefit of his well-being.   The patient was instructed to see the dentist every 6 months.       Patient arrives here for a Medicare wellness.    Marlon Sanchez is a 64 year old, presenting for the following:  Wellness Visit ([facility]/Steven Visit )        5/27/2025     7:50 AM   Additional Questions   Roomed by Lorri MCNAMARA LPN   Accompanied by N/A          History of Present Illness       Reason for visit:  Yearly check up.   He is taking medications  regularly.      Patient arrives here for Medicare wellness.  Patient has questions about how long he needs to take his Eliquis.  This was started by hematology for PE.  Note indicates he should be on until the end of June.  He also is his back is slowly worsening.  His epidurals are no longer helping.  He has been advised by radiology that may be        Advance Care Planning    Discussed advance care planning with patient; however, patient declined at this time.        5/22/2025   General Health   How would you rate your overall physical health? Good   Feel stress (tense, anxious, or unable to sleep) Not at all         5/22/2025   Nutrition   Diet: Regular (no restrictions)         5/22/2025   Exercise   Days per week of moderate/strenous exercise 3 days   Average minutes spent exercising at this level 120 min         5/22/2025   Social Factors   Frequency of gathering with friends or relatives Patient declined   Worry food won't last until get money to buy more No   Food not last or not have enough money for food? No   Do you have housing? (Housing is defined as stable permanent housing and does not include staying outside in a car, in a tent, in an abandoned building, in an overnight shelter, or couch-surfing.) Yes   Are you worried about losing your housing? No   Lack of transportation? No   Unable to get utilities (heat,electricity)? No         5/27/2025   Fall Risk   Gait Speed Test (Document in seconds) 3.4   Gait Speed Test Interpretation Less than or equal to 5.00 seconds - PASS          5/22/2025   Activities of Daily Living- Home Safety   Needs help with the following daily activites None of the above   Safety concerns in the home None of the above         5/22/2025   Dental   Dentist two times every year? (!) NO         5/22/2025   Hearing Screening   Hearing concerns? None of the above         5/22/2025   Driving Risk Screening   Patient/family members have concerns about driving No         5/22/2025    General Alertness/Fatigue Screening   Have you been more tired than usual lately? No         5/22/2025   Urinary Incontinence Screening   Bothered by leaking urine in past 6 months No       Today's PHQ-9 Score:       5/26/2025     3:35 PM   PHQ-9 SCORE   PHQ-9 Total Score MyChart 0   PHQ-9 Total Score 0        Patient-reported         5/22/2025   Substance Use   Alcohol more than 3/day or more than 7/wk No   Do you have a current opioid prescription? No   How severe/bad is pain from 1 to 10? 4/10   Do you use any other substances recreationally? No     Social History     Tobacco Use    Smoking status: Never     Passive exposure: Past    Smokeless tobacco: Never    Tobacco comments:     Passive exposure in childhood home.    Vaping Use    Vaping status: Never Used   Substance Use Topics    Alcohol use: Yes     Alcohol/week: 7.0 standard drinks of alcohol     Types: 7 Cans of beer per week     Comment: 1 beer per day    Drug use: No           5/22/2025   AAA Screening   Family history of Abdominal Aortic Aneurysm (AAA)? Unsure   Last PSA:   PSA Tumor Marker   Date Value Ref Range Status   04/29/2025 1.73 0.00 - 4.50 ng/mL Final   08/02/2021 3.77 0.00 - 4.00 ug/L Final     ASCVD Risk   The ASCVD Risk score (Desirae DK, et al., 2019) failed to calculate for the following reasons:    Cannot find a previous HDL lab    Cannot find a previous total cholesterol lab            Reviewed and updated as needed this visit by Provider                      Current providers sharing in care for this patient include:  Patient Care Team:  Avelino Luque MD as PCP - General (Family Medicine)  Avelino Luque MD (Family Practice)  Avelino Luque MD as Assigned PCP  Avelino Luque MD as Assigned Pain Medication Provider  Collin Montez DPM as Assigned Musculoskeletal Provider  Kia Leung MD as Assigned Cancer Care Provider    The following health maintenance items are reviewed in Epic and correct as of  "today:  Health Maintenance   Topic Date Due    LIPID  06/15/2019    RSV VACCINE (1 - Risk 60-74 years 1-dose series) Never done    MEDICARE ANNUAL WELLNESS VISIT  09/27/2024    PHQ-9  11/27/2025    FALL RISK ASSESSMENT  05/27/2026    COLORECTAL CANCER SCREENING  01/24/2027    DTAP/TDAP/TD IMMUNIZATION (2 - Td or Tdap) 02/15/2027    DIABETES SCREENING  03/31/2028    ADVANCE CARE PLANNING  12/19/2029    HEPATITIS C SCREENING  Completed    DEPRESSION ACTION PLAN  Completed    INFLUENZA VACCINE  Completed    Pneumococcal Vaccine: 50+ Years  Completed    ZOSTER IMMUNIZATION  Completed    HPV IMMUNIZATION  Aged Out    MENINGITIS IMMUNIZATION  Aged Out    HIV SCREENING  Discontinued    COVID-19 Vaccine  Discontinued            Objective    Exam  /72 (BP Location: Right arm, Patient Position: Chair, Cuff Size: Adult Regular)   Pulse 52   Temp (!) 96.1  F (35.6  C) (Tympanic)   Resp 16   Ht 1.93 m (6' 4\")   Wt 72.9 kg (160 lb 12.8 oz)   SpO2 99%   BMI 19.57 kg/m     Estimated body mass index is 19.57 kg/m  as calculated from the following:    Height as of this encounter: 1.93 m (6' 4\").    Weight as of this encounter: 72.9 kg (160 lb 12.8 oz).    Physical Exam  GENERAL: alert and no distress  NECK: no adenopathy, no asymmetry, masses, or scars  RESP: lungs clear to auscultation - no rales, rhonchi or wheezes  CV: regular rate and rhythm, normal S1 S2, no S3 or S4, no murmur, click or rub, no peripheral edema  ABDOMEN: soft, nontender, no hepatosplenomegaly, no masses and bowel sounds normal  MS: no gross musculoskeletal defects noted, no edema  Gait and balance assessed per Gait Speed Test.  Result as above.  Vision Screen         Signed Electronically by: Avelino Luque MD    "

## 2025-05-27 NOTE — LETTER

## 2025-05-27 NOTE — NURSING NOTE
"Chief Complaint   Patient presents with    Wellness Visit     Welcome To Medicare/[facility] Visit        Initial /72 (BP Location: Right arm, Patient Position: Chair, Cuff Size: Adult Regular)   Pulse 52   Temp (!) 96.1  F (35.6  C) (Tympanic)   Resp 16   Ht 1.93 m (6' 4\")   Wt 72.9 kg (160 lb 12.8 oz)   SpO2 99%   BMI 19.57 kg/m   Estimated body mass index is 19.57 kg/m  as calculated from the following:    Height as of this encounter: 1.93 m (6' 4\").    Weight as of this encounter: 72.9 kg (160 lb 12.8 oz).      Medication Reconciliation: Complete.       Lorri Desir LPN on 5/27/2025 at 8:00 AM     "

## 2025-06-26 PROBLEM — Z09 POSTOPERATIVE FOLLOW-UP: Status: RESOLVED | Noted: 2025-03-28 | Resolved: 2025-06-26

## 2025-06-26 PROBLEM — Q66.72 CAVUS DEFORMITY OF LEFT FOOT: Status: RESOLVED | Noted: 2025-03-28 | Resolved: 2025-06-26

## 2025-07-02 ENCOUNTER — LAB (OUTPATIENT)
Dept: LAB | Facility: OTHER | Age: 65
End: 2025-07-02
Attending: NURSE PRACTITIONER
Payer: MEDICARE

## 2025-07-02 ENCOUNTER — RESULTS FOLLOW-UP (OUTPATIENT)
Dept: ONCOLOGY | Facility: OTHER | Age: 65
End: 2025-07-02

## 2025-07-02 DIAGNOSIS — Z15.89 MTHFR MUTATION: ICD-10-CM

## 2025-07-02 DIAGNOSIS — I26.99 ACUTE PULMONARY EMBOLISM WITHOUT ACUTE COR PULMONALE, UNSPECIFIED PULMONARY EMBOLISM TYPE (H): ICD-10-CM

## 2025-07-02 DIAGNOSIS — C81.91: ICD-10-CM

## 2025-07-02 LAB
ALBUMIN SERPL BCG-MCNC: 4 G/DL (ref 3.5–5.2)
ALP SERPL-CCNC: 67 U/L (ref 40–150)
ALT SERPL W P-5'-P-CCNC: 25 U/L (ref 0–70)
ANION GAP SERPL CALCULATED.3IONS-SCNC: 8 MMOL/L (ref 7–15)
AST SERPL W P-5'-P-CCNC: 26 U/L (ref 0–45)
BASOPHILS # BLD AUTO: 0 10E3/UL (ref 0–0.2)
BASOPHILS NFR BLD AUTO: 1 %
BILIRUB SERPL-MCNC: 0.8 MG/DL
BUN SERPL-MCNC: 19.8 MG/DL (ref 8–23)
CALCIUM SERPL-MCNC: 9.1 MG/DL (ref 8.8–10.4)
CHLORIDE SERPL-SCNC: 104 MMOL/L (ref 98–107)
CREAT SERPL-MCNC: 1.1 MG/DL (ref 0.67–1.17)
D DIMER PPP FEU-MCNC: <0.27 UG/ML FEU (ref 0–0.5)
EGFRCR SERPLBLD CKD-EPI 2021: 74 ML/MIN/1.73M2
EOSINOPHIL # BLD AUTO: 0.1 10E3/UL (ref 0–0.7)
EOSINOPHIL NFR BLD AUTO: 2 %
ERYTHROCYTE [DISTWIDTH] IN BLOOD BY AUTOMATED COUNT: 12.7 % (ref 10–15)
ERYTHROCYTE [SEDIMENTATION RATE] IN BLOOD BY WESTERGREN METHOD: 5 MM/HR (ref 0–20)
GLUCOSE SERPL-MCNC: 118 MG/DL (ref 70–99)
HCO3 SERPL-SCNC: 25 MMOL/L (ref 22–29)
HCT VFR BLD AUTO: 42.1 % (ref 40–53)
HGB BLD-MCNC: 14.2 G/DL (ref 13.3–17.7)
IMM GRANULOCYTES # BLD: 0 10E3/UL
IMM GRANULOCYTES NFR BLD: 0 %
LDH SERPL L TO P-CCNC: 150 U/L (ref 0–250)
LYMPHOCYTES # BLD AUTO: 1 10E3/UL (ref 0.8–5.3)
LYMPHOCYTES NFR BLD AUTO: 20 %
MCH RBC QN AUTO: 31.5 PG (ref 26.5–33)
MCHC RBC AUTO-ENTMCNC: 33.7 G/DL (ref 31.5–36.5)
MCV RBC AUTO: 93 FL (ref 78–100)
MONOCYTES # BLD AUTO: 0.4 10E3/UL (ref 0–1.3)
MONOCYTES NFR BLD AUTO: 8 %
NEUTROPHILS # BLD AUTO: 3.5 10E3/UL (ref 1.6–8.3)
NEUTROPHILS NFR BLD AUTO: 69 %
NRBC # BLD AUTO: 0 10E3/UL
NRBC BLD AUTO-RTO: 0 /100
PLATELET # BLD AUTO: 203 10E3/UL (ref 150–450)
POTASSIUM SERPL-SCNC: 4.7 MMOL/L (ref 3.4–5.3)
PROT SERPL-MCNC: 6.9 G/DL (ref 6.4–8.3)
RBC # BLD AUTO: 4.51 10E6/UL (ref 4.4–5.9)
SODIUM SERPL-SCNC: 137 MMOL/L (ref 135–145)
WBC # BLD AUTO: 5.1 10E3/UL (ref 4–11)

## 2025-07-02 PROCEDURE — 85652 RBC SED RATE AUTOMATED: CPT | Mod: ZL

## 2025-07-02 PROCEDURE — 85379 FIBRIN DEGRADATION QUANT: CPT | Mod: ZL

## 2025-07-02 PROCEDURE — 36415 COLL VENOUS BLD VENIPUNCTURE: CPT | Mod: ZL

## 2025-07-02 PROCEDURE — 85041 AUTOMATED RBC COUNT: CPT | Mod: ZL

## 2025-07-02 PROCEDURE — 85004 AUTOMATED DIFF WBC COUNT: CPT | Mod: ZL

## 2025-07-02 PROCEDURE — 83615 LACTATE (LD) (LDH) ENZYME: CPT | Mod: ZL

## 2025-07-02 PROCEDURE — 80053 COMPREHEN METABOLIC PANEL: CPT | Mod: ZL

## 2025-07-09 ENCOUNTER — ONCOLOGY VISIT (OUTPATIENT)
Dept: ONCOLOGY | Facility: OTHER | Age: 65
End: 2025-07-09
Attending: NURSE PRACTITIONER
Payer: MEDICARE

## 2025-07-09 VITALS
RESPIRATION RATE: 12 BRPM | HEART RATE: 67 BPM | BODY MASS INDEX: 19.11 KG/M2 | TEMPERATURE: 97.6 F | OXYGEN SATURATION: 99 % | WEIGHT: 157 LBS | SYSTOLIC BLOOD PRESSURE: 102 MMHG | DIASTOLIC BLOOD PRESSURE: 74 MMHG

## 2025-07-09 DIAGNOSIS — R49.0 HOARSE VOICE QUALITY: ICD-10-CM

## 2025-07-09 DIAGNOSIS — N40.0 ENLARGED PROSTATE: ICD-10-CM

## 2025-07-09 DIAGNOSIS — C81.90 HODGKIN LYMPHOMA, UNSPECIFIED HODGKIN LYMPHOMA TYPE, UNSPECIFIED BODY REGION (H): Primary | ICD-10-CM

## 2025-07-09 DIAGNOSIS — I26.99 ACUTE PULMONARY EMBOLISM WITHOUT ACUTE COR PULMONALE, UNSPECIFIED PULMONARY EMBOLISM TYPE (H): ICD-10-CM

## 2025-07-09 DIAGNOSIS — Z15.89 MTHFR MUTATION: ICD-10-CM

## 2025-07-09 PROCEDURE — G0463 HOSPITAL OUTPT CLINIC VISIT: HCPCS

## 2025-07-09 ASSESSMENT — PAIN SCALES - GENERAL: PAINLEVEL_OUTOF10: SEVERE PAIN (7)

## 2025-07-09 NOTE — PROGRESS NOTES
Oncology Follow-up Visit    Reason for Visit:  Daniel is a 61 year old getleman with a history of stage IV Hodgkin's lymphoma, who presents to the clinic today for routine surveillance.    Interval History: Daniel reports he notes he has been doing well. No recent signs of infection. No fever, chills, chest pain, or cough. Notes that he feels he is breathing well. Really denies any SOB. No bleeding concerns. Denies nausea or vomiting. Denies abdominal pain or discomfort. Appetite is good, weight stable. No bowel changes. Energy is good. No rashes. Trying to stay active. Had foot surgery earlier in year and finally able to play things like [employer].     Notes that he stopped Eliquis after 6 months of therapy for a PE likely provoked by previously mentioned surgery.     He does endorse having a raspy, weak voice in the last 6 months or so, since surgery. Questions allergies but denies post nasal drip. Taking PPI. No pain. Feels it comes and goes. No swallowing concerns.     Oncologic History:   For detailed history, please see previous notes of Dr. Leung. In short, Daniel has followed with Dr. Leung for his history of stage IV Hodgkin's lymphoma, classical type. He had originally presented with right neck mass over a period of 2 months. He was subsequently seen by Dr. Radha Kim of General Surgery, who felt the patient had right-sided supraclavicular adenopathy.  Biopsy of the lymph node came back consistent with classical Hodgkin's lymphoma.  He was subsequently seen by Dr. Jarad Mosqueda of Oncology on 08/05/2015, who recommended PET scan for accurate staging, which was performed on 08/13/2015.  The findings were there was hypermetabolic lymphadenopathy both above and below the diaphragm, suspicious for lymphoma.  There was hypermetabolic activity in the spleen, suspicious for metastatic disease.  There were multiple foci with abnormal uptake in the spine, suspicious for metastatic disease.  There was also a  right middle lobe lung nodule that was nonspecific.      Dr. Mosqueda felt the patient had stage IV Hodgkin's lymphoma and recommend ABVD x6 cycles. Patient went on to complete these 6 cycles. Imaging following showed a complete response. 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 Pulmonary function tests revealed depressed DLCO. He followed with Dr. Cm London in pulmonary for management of this. Post 5 years, felt that PFTs were stable, pulmonary felt no further follow-up was needed. Since treatment, he was also noted to have a decreased LVEF thought possible related to previous Adriamycin use. Per cardiology, he was started on Lisinopril 2.5 mg daily.     He has been followed by oncology for surveillance. Recent scans has shown no evidence of disease recurrence or progression.     More recently, a CT chest in 01/2025 revealed that the patient had bilateral segmental and subsegmental pulmonary emboli.  Patient was noted to be complaining of shortness of breath we have seen the emergency department on 01/17/2025.  Of note he had left foot surgery on 01/02/2025 and was in a cast involving the left lower extremity.  He has been less active.  His O2 sat was 97%.  He did undergo an echocardiogram which revealed a decreased left ventricular ejection fraction of 35 to 40%.He was started on Eliquis, which he ultiamtely remained on for 6 months.    In the meantimes, he underwent thrombophilia workup.  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  Antithrombin III was normal.  Antiphospholipid profile testing was negative.  Factor V Leiden and prothrombin 2 mutations were negative patient did undergo repeat CTA of the chest on February 20 and the findings were that there is no acute pulmonary emboli.  There are couple weblike filling defects in the pulmonary arteries of the lower lungs which are likely sequela of remote pulmonary emboli. This was felt to be a  provoked clot.     Current Chemo Regimen/TX:  None, Surveillance    Previous treatment: ABVD x 6 cycles    Past Medical History:   Diagnosis Date    Bleomycin lung toxicity     Colonic adenoma     Depression     Family history of prostate cancer     Hodgkin lymphoma (H)     2015,Stage 4, s/p 6 cycles ABVD    Hyperlipidemia     No Comments Provided    Insomnia     Peripheral neuropathy due to chemotherapy     No Comments Provided    Schatzki's ring     Tremor     No Comments Provided       Past Surgical History:   Procedure Laterality Date    APPENDECTOMY OPEN      No Comments Provided    COLONOSCOPY  2010    12/30/10,Normal.  No polyps seen.  Next due .    COLONOSCOPY N/A 2022    F/U  tubular adenoma, sigmoid diverticulosis    CYSTOSCOPY      No Comments Provided    CYSTOSCOPY, TRANSURETHRAL RESECTION (TUR) PROSTATE, COMBINED N/A 2024    Procedure: CYSTOSCOPY, WITH TRANSURETHRAL RESECTION PROSTATE;  Surgeon: Devin Fabian MD;  Location:  OR    ESOPHAGOSCOPY, GASTROSCOPY, DUODENOSCOPY (EGD), COMBINED      16,EGD    FOOT SURGERY Left     SUBTALAR ATHROEREISIS, left side, Dr. Schwartz.    HERNIA REPAIR Bilateral     Surgipro mesh    LYMPH NODE BIOPSY  2015    OSTEOTOMY FOOT Left 2025    Procedure: Damon calcaneal osteotomy,  steindler stripping, peroneal repair, peroneal transfer, Hallux interphalangeal joint fusion, 1st metatarsal osteotomy;  Surgeon: Collin Montez DPM;  Location:  OR       Family History   Problem Relation Age of Onset    Prostate Cancer Father         Also had a CABG and  during open heart surgery, .    Breast Cancer Mother     Family History Negative Sister         Good Health    Family History Negative Sister         Good Health    Prostate Cancer Brother         Cancer-prostate,Age 47.    Heart Disease Brother        Social History     Socioeconomic History    Marital status:      Spouse name: Not on file    Number of children: Not  on file    Years of education: Not on file    Highest education level: Not on file   Occupational History    Not on file   Tobacco Use    Smoking status: Never     Passive exposure: Past    Smokeless tobacco: Never    Tobacco comments:     Passive exposure in childhood home.    Vaping Use    Vaping status: Never Used   Substance and Sexual Activity    Alcohol use: Yes     Alcohol/week: 7.0 standard drinks of alcohol     Types: 7 Cans of beer per week     Comment: 1 beer per day    Drug use: No    Sexual activity: Yes     Partners: Female   Other Topics Concern    Parent/sibling w/ CABG, MI or angioplasty before 65F 55M? Not Asked   Social History Narrative    Daughter Velvet BD 8/25/89.  Son Nick BD 4/05/92  Spouse Candis BD 11/26/59   .  He is a golf pro, also drives school bus.  Wife is Candis, nurse at [employer].      Consent signed for Candis.     Social Drivers of Health     Financial Resource Strain: Low Risk  (5/22/2025)    Financial Resource Strain     Within the past 12 months, have you or your family members you live with been unable to get utilities (heat, electricity) when it was really needed?: No   Food Insecurity: Low Risk  (5/22/2025)    Food Insecurity     Within the past 12 months, did you worry that your food would run out before you got money to buy more?: No     Within the past 12 months, did the food you bought just not last and you didn t have money to get more?: No   Transportation Needs: Low Risk  (5/22/2025)    Transportation Needs     Within the past 12 months, has lack of transportation kept you from medical appointments, getting your medicines, non-medical meetings or appointments, work, or from getting things that you need?: No   Physical Activity: Sufficiently Active (5/22/2025)    Exercise Vital Sign     Days of Exercise per Week: 3 days     Minutes of Exercise per Session: 120 min   Stress: No Stress Concern Present (5/22/2025)    [place] [employer]  Occupational Health - Occupational Stress Questionnaire     Feeling of Stress : Not at all   Social Connections: Unknown (5/22/2025)    Social Connection and Isolation Panel [NHANES]     Frequency of Communication with Friends and Family: Not on file     Frequency of Social Gatherings with Friends and Family: Patient declined     Attends Jain Services: Not on file     Active Member of Clubs or Organizations: Not on file     Attends Club or Organization Meetings: Not on file     Marital Status: Not on file   Interpersonal Safety: Low Risk  (7/9/2025)    Interpersonal Safety     Do you feel physically and emotionally safe where you currently live?: Yes     Within the past 12 months, have you been hit, slapped, kicked or otherwise physically hurt by someone?: No     Within the past 12 months, have you been humiliated or emotionally abused in other ways by your partner or ex-partner?: No   Housing Stability: Low Risk  (5/22/2025)    Housing Stability     Do you have housing? : Yes     Are you worried about losing your housing?: No       Current Outpatient Medications   Medication Sig Dispense Refill    atorvastatin (LIPITOR) 40 MG tablet TAKE 1 TABLET BY MOUTH EVERY DAY 90 tablet 0    fish oil-omega-3 fatty acids 1000 MG capsule Take 1 capsule by mouth daily      lisinopril (ZESTRIL) 2.5 MG tablet Take 1 tablet (2.5 mg) by mouth daily. 90 tablet 2    LORazepam (ATIVAN) 1 MG tablet Take 1 tablet (1 mg) by mouth at bedtime. 90 tablet 1    Multiple Vitamins-Minerals (MULTIVITAMIN & MINERAL PO) Take 1 tablet by mouth every morning      Omeprazole 20 MG TBDD Take 20 mg by mouth daily. 90 tablet 3    sertraline (ZOLOFT) 100 MG tablet Take 1 tablet (100 mg) by mouth daily. 90 tablet 3    traMADol (ULTRAM) 50 MG tablet Take 1 tablet (50 mg) by mouth every 6 hours as needed for severe pain. 30 tablet 5     No current facility-administered medications for this visit.        No Known Allergies    Review Of Systems:  A  complete review of systems is negative except for the above mentioned items in the interval history.     ECOG Performance Status: 0    Physical Exam:  /74 (BP Location: Right arm, Patient Position: Sitting, Cuff Size: Adult Regular)   Pulse 67   Temp 97.6  F (36.4  C) (Tympanic)   Resp 12   Wt 71.2 kg (157 lb)   SpO2 99%   BMI 19.11 kg/m    GENERAL APPEARANCE: Healthy, alert and in no acute distress.  HEENT: Eyes appear normal without scleral icterus. Extraocular movements intact. Voice does sound more raspy.  NECK:   Supple with normal range of motion. No asymmetry or masses.  LYMPHATICS: No palpable cervical, supraclavicular, axillary nodes.  RESP: Lungs clear to auscultation bilaterally, respirations regular and easy.  CARDIOVASCULAR: Regular rate and rhythm. Normal S1, S2; no murmur, gallop, or rub.  ABDOMEN: Soft, non-tender, non-distended. Bowel sounds auscultated all 4 quadrants. No palpable organomegaly or masses.  MUSCULOSKELETAL: Extremities without gross deformities noted. No edema of bilateral lower extremities.  SKIN: No suspicious lesions or rashes.  NEURO: Alert and oriented x 3.  Gait steady.  PSYCHIATRIC: Mentation and affect appear normal.  Mood appropriate.    Laboratory:  Component      Latest Ref [facility] 7/2/2025  8:34 AM   WBC      4.0 - 11.0 10e3/uL 5.1    RBC Count      4.40 - 5.90 10e6/uL 4.51    Hemoglobin      13.3 - 17.7 g/dL 14.2    Hematocrit      40.0 - 53.0 % 42.1    MCV      78 - 100 fL 93    MCH      26.5 - 33.0 pg 31.5    MCHC      31.5 - 36.5 g/dL 33.7    RDW      10.0 - 15.0 % 12.7    Platelet Count      150 - 450 10e3/uL 203    % Neutrophils      % 69    % Lymphocytes      % 20    % Monocytes      % 8    % Eosinophils      % 2    % Basophils      % 1    % Immature Granulocytes      % 0    NRBC/W      <1 /100 0    Absolute Neutrophil      1.6 - 8.3 10e3/uL 3.5    Absolute Lymphocytes      0.8 - 5.3 10e3/uL 1.0    Absolute Monocytes      0.0 - 1.3 10e3/uL 0.4    Absolute  Eosinophils      0.0 - 0.7 10e3/uL 0.1    Absolute Basophils      0.0 - 0.2 10e3/uL 0.0    Absolute Immature Granulocytes      <=0.4 10e3/uL 0.0    Absolute NRBCs      10e3/uL 0.0    Sodium      135 - 145 mmol/L 137    Potassium      3.4 - 5.3 mmol/L 4.7    Carbon Dioxide (CO2)      22 - 29 mmol/L 25    Anion Gap      7 - 15 mmol/L 8    Urea Nitrogen      8.0 - 23.0 mg/dL 19.8    Creatinine      0.67 - 1.17 mg/dL 1.10    GFR Estimate      >60 mL/min/1.73m2 74    Calcium      8.8 - 10.4 mg/dL 9.1    Chloride      98 - 107 mmol/L 104    Glucose      70 - 99 mg/dL 118 (H)    Alkaline Phosphatase      40 - 150 U/L 67    AST      0 - 45 U/L 26    ALT      0 - 70 U/L 25    Protein Total      6.4 - 8.3 g/dL 6.9    Albumin      3.5 - 5.2 g/dL 4.0    Bilirubin Total      <=1.2 mg/dL 0.8    Sed Rate      0 - 20 mm/hr 5    Lactate Dehydrogenase      0 - 250 U/L 150    D-Dimer Quantitative      0.00 - 0.50 ug/mL FEU <0.27       Legend:  (H) High    Imaging Studies:    None this visit    ASSESSMENT/PLAN:    #1 Stage IV classical Hodgkin's lymphoma, presenting with right supraclavicular lymphadenopathy.  Biopsy consistent with classical Hodgkin's lymphoma involving spine and pelvis as well as lymphadenopathy above and below the diaphragm.  He responded to 6 cycles of ABVD.  Imaging showed a CR. He has been followed in surveillance without evidence of recurrence.     Today. Doing well. No evidence of recurrent or progressive disease. Reviewed NCCN guidelines. No need for scans unless concerns arise. Will follow-up in 1 year, sooner with concerns.     #2 Hoarse voice Feels this has been present for the last 6 months. On PPI, GERD is not an issue. Perhaps allergies, going to take Claritin. We did discuss his hx of Schatzki ring, no dysphagia. Discussed referral to ENT, this seems to have followed his surgery. Declining referral at this time but will let us know if symptoms continue or progress.     #3 Hx PE Found to be a  heterozygous carrier for MTHFR mutation. Felt that this was provoked by surgery. Remained on Eliquis for 6 months. D dimer normal. No evidence of clots. Stopped Eliquis, B12. B6, and folic acid. We did discuss that if having future procedures, let his care team know as perhaps they will use a prophylactic anticoagulation.     #4 Enlarged Prostate Recent TURP and improvement in PSA. Following with Dr. Fabian annually.     Patient in agreement with plan and verbalizes understanding. Agrees to call with any questions or concerns.    45 minutes spent in the patient's encounter today with time spent in review of patient's chart along with chart preparation and review of the treatment plan and signing of treatment plan.  Time was also spent with the patient in obtaining a review of systems and performing a physical exam along with detailed review of all test results. Time was also spent in discussing plan for future follow-up and relating instructions for follow-up and in placing future orders.    WILLIE Marino [facility]  Medical Oncology

## 2025-07-17 ENCOUNTER — TELEPHONE (OUTPATIENT)
Dept: INTERNAL MEDICINE | Facility: OTHER | Age: 65
End: 2025-07-17
Payer: COMMERCIAL

## 2025-07-17 NOTE — TELEPHONE ENCOUNTER
Patient medial branch block, ablation was denied by Medicare for the following reason.     The submitted documentation does not include a pain scale, the duration of the lower back pain, or support that the pain is facet related. Please be sure to include all initial and additional supporting documentation upon resubmission.    The denial letter was sent to [facility] for scanning to the chart.

## 2025-07-18 NOTE — TELEPHONE ENCOUNTER
Patient would like this note to be reviewed by Avelino Luque MD.      Mariam Wilder LPN 7/18/2025 1:48 PM

## 2025-07-18 NOTE — TELEPHONE ENCOUNTER
Please let patient know- he may need to schedule a clinic visit in order to have proper documentation to justify his injection. It may be most helpful to have him evaluated by an orthopedic provider for this.

## 2025-07-21 NOTE — TELEPHONE ENCOUNTER
Called patient to set up appointment with PCP.  No answer, left message to call back and set up appointment.    Kendra Gillette LPN on 7/21/2025 at 1:54 PM

## 2025-07-28 DIAGNOSIS — I25.10 CORONARY ARTERY DISEASE INVOLVING NATIVE CORONARY ARTERY OF NATIVE HEART WITHOUT ANGINA PECTORIS: ICD-10-CM

## 2025-07-31 RX ORDER — ATORVASTATIN CALCIUM 40 MG/1
40 TABLET, FILM COATED ORAL DAILY
Qty: 90 TABLET | Refills: 2 | Status: SHIPPED | OUTPATIENT
Start: 2025-07-31

## 2025-07-31 NOTE — TELEPHONE ENCOUNTER
[facility] sent Rx request for the following:      Requested Prescriptions   Pending Prescriptions Disp Refills    atorvastatin (LIPITOR) 40 MG tablet [Pharmacy Med Name: ATORVASTATIN 40MG TABLETS] 90 tablet 0     Sig: TAKE 1 TABLET BY MOUTH EVERY DAY       Antihyperlipidemic agents Passed - 7/31/2025  7:00 AM     Coronary artery disease involving native coronary artery of native heart without angina pectoris [I25.10]        Last Prescription Date:   5/6/25  Last Fill Qty/Refills:         90, R-0    Last Office Visit:              5/27/25 (px)    Future Office visit:             Next 5 appointments (look out 90 days)      Aug 06, 2025 9:30 AM  (Arrive by 9:15 AM)  Provider Visit with Avelino Luque MD  [facility] ([facility] [address]  [place] [address]  [phone]         Prescription approved per [facility] Refill Protocol.    Rishabh Cadet RN on 7/31/2025 at 7:02 AM

## 2025-08-06 ENCOUNTER — OFFICE VISIT (OUTPATIENT)
Dept: FAMILY MEDICINE | Facility: OTHER | Age: 65
End: 2025-08-06
Attending: FAMILY MEDICINE
Payer: MEDICARE

## 2025-08-06 VITALS
RESPIRATION RATE: 20 BRPM | BODY MASS INDEX: 18.99 KG/M2 | WEIGHT: 156 LBS | TEMPERATURE: 96.9 F | OXYGEN SATURATION: 99 % | DIASTOLIC BLOOD PRESSURE: 64 MMHG | HEART RATE: 66 BPM | SYSTOLIC BLOOD PRESSURE: 110 MMHG

## 2025-08-06 DIAGNOSIS — M54.50 CHRONIC BILATERAL LOW BACK PAIN WITHOUT SCIATICA: Primary | ICD-10-CM

## 2025-08-06 DIAGNOSIS — G89.29 CHRONIC BILATERAL LOW BACK PAIN WITHOUT SCIATICA: Primary | ICD-10-CM

## 2025-08-06 PROCEDURE — G0463 HOSPITAL OUTPT CLINIC VISIT: HCPCS

## 2025-08-06 ASSESSMENT — PAIN SCALES - GENERAL: PAINLEVEL_OUTOF10: MODERATE PAIN (6)

## 2025-08-07 ASSESSMENT — ENCOUNTER SYMPTOMS: BACK PAIN: 1

## (undated) DEVICE — SLEEVE COMPRESSION SCD KNEE MED 74022

## (undated) DEVICE — DRSG GAUZE 4X4" TRAY 6939

## (undated) DEVICE — PACK MAJOR EXTREMITY SOP15MEFCA

## (undated) DEVICE — BAG URINARY DRAIN 4000ML LF 153509

## (undated) DEVICE — ENDO BRUSH CHANNEL MASTER CLEANING 2-4.2MM BW-412T

## (undated) DEVICE — DRAPE C-ARMOR 5 SIDED 5523

## (undated) DEVICE — GLOVE PROTEXIS PI ORTHO PF 8.5 2D73HT76

## (undated) DEVICE — PACK CYSTO SBA15CSFCA

## (undated) DEVICE — TUBING SUCTION 10'X3/16" N510

## (undated) DEVICE — DRILL BIT CANNULATED 3.2MM

## (undated) DEVICE — Device

## (undated) DEVICE — TEASPOON METAL STERILE 17506/24

## (undated) DEVICE — DRAPE U SHEET SPLIT W/TAPE 89079

## (undated) DEVICE — DRSG GAUZE 4X4" 3033

## (undated) DEVICE — DRSG JUMPSTART ANTIMICROBIAL 1.5X8" ABS-4005

## (undated) DEVICE — PREP CHLORAPREP 26ML TINTED ORANGE  260815

## (undated) DEVICE — GLOVE BIOGEL PI INDICATOR 8.0 LF 41680

## (undated) DEVICE — SOL WATER 1500ML

## (undated) DEVICE — BNDG ELASTIC 4"X5YDS UNSTERILE 6611-40

## (undated) DEVICE — LEGGINGS 31X48" LF KM89408

## (undated) DEVICE — DRAPE STERI TOWEL LG 1010

## (undated) DEVICE — BLADE SAW SAGITTAL STRK MICRO 9.0X25X0.38MM 2296-003-511

## (undated) DEVICE — ENDO KIT COMPLIANCE DYKENDOCMPLY

## (undated) DEVICE — SUCTION MANIFOLD NEPTUNE 2 SYS 4 PORT 0702-020-000

## (undated) DEVICE — IMPLANTABLE DEVICE
Type: IMPLANTABLE DEVICE | Site: FOOT | Status: NON-FUNCTIONAL
Removed: 2025-01-02

## (undated) DEVICE — TOURNIQUET SGL BLADDER 18"X4" RED 5921-218-135

## (undated) DEVICE — SYR 50ML CATH TIP W/O NDL 309620

## (undated) DEVICE — GLOVE PROTEXIS PI ORTHO PF 7.5 2D73HT75

## (undated) DEVICE — GLOVE BIOGEL INDICATOR 7.5 LF 41675

## (undated) DEVICE — PAD CHUX UNDERPAD 30X36" P3036C

## (undated) DEVICE — FIBERWIRE-BRAIDED #2 W/TAPERED NEEDLE 26.5MM 1/2 CIRCLE

## (undated) DEVICE — SU PROLENE 3-0 FS-1 18" 8684G

## (undated) DEVICE — GEL ULTRASOUND AQUASONIC 20GM 01-01

## (undated) DEVICE — NDL 22GA 1.5"

## (undated) DEVICE — TUBING EXTENSION FOLEY CATH W/CONNECTOR 9346

## (undated) DEVICE — GUIDEWIRE W/TROCAR TIP .062"X9.25"

## (undated) DEVICE — ESU ELEC LOOP HF-RESECTION 24FR MED 30 W/CABLE WA22606S

## (undated) DEVICE — GLOVE PROTEXIS POWDER FREE SMT 7.5  2D72PT75X

## (undated) DEVICE — BLADE KNIFE SURG 15 371115

## (undated) DEVICE — DRAPE C-ARM PACK 9"

## (undated) DEVICE — ESU ENDO FORCEP BX HOT FD-210U

## (undated) DEVICE — BAG URINARY DRAIN LEG MEDIUM 19OZ LF 150719

## (undated) DEVICE — SU VICRYL 2-0 SH 27" UND J417H

## (undated) DEVICE — DRSG KERLIX 4 1/2"X4YDS ROLL 6715

## (undated) DEVICE — ESU GROUND PAD ADULT W/CORD E7507

## (undated) DEVICE — DEVICE CATH STABILIZATION STATLOCK FOLEY 3-WAY FOL0105

## (undated) DEVICE — DRILL BIT 2.0

## (undated) DEVICE — CATH FOLEY24FR 30ML HEMATURIA 3-WAY 2551H24

## (undated) RX ORDER — PROPOFOL 10 MG/ML
INJECTION, EMULSION INTRAVENOUS
Status: DISPENSED
Start: 2024-11-05

## (undated) RX ORDER — LIDOCAINE HYDROCHLORIDE 10 MG/ML
INJECTION, SOLUTION EPIDURAL; INFILTRATION; INTRACAUDAL; PERINEURAL
Status: DISPENSED
Start: 2021-03-23

## (undated) RX ORDER — DEXAMETHASONE SODIUM PHOSPHATE 10 MG/ML
INJECTION, SOLUTION INTRAMUSCULAR; INTRAVENOUS
Status: DISPENSED
Start: 2025-04-10

## (undated) RX ORDER — GLYCOPYRROLATE 0.2 MG/ML
INJECTION, SOLUTION INTRAMUSCULAR; INTRAVENOUS
Status: DISPENSED
Start: 2025-01-02

## (undated) RX ORDER — PROPOFOL 10 MG/ML
INJECTION, EMULSION INTRAVENOUS
Status: DISPENSED
Start: 2025-01-02

## (undated) RX ORDER — LIDOCAINE HYDROCHLORIDE 20 MG/ML
JELLY TOPICAL
Status: DISPENSED
Start: 2024-11-05

## (undated) RX ORDER — FENTANYL CITRATE 50 UG/ML
INJECTION, SOLUTION INTRAMUSCULAR; INTRAVENOUS
Status: DISPENSED
Start: 2024-11-05

## (undated) RX ORDER — TRIAMCINOLONE ACETONIDE 40 MG/ML
INJECTION, SUSPENSION INTRA-ARTICULAR; INTRAMUSCULAR
Status: DISPENSED
Start: 2024-01-17

## (undated) RX ORDER — LIDOCAINE HYDROCHLORIDE 20 MG/ML
INJECTION, SOLUTION EPIDURAL; INFILTRATION; INTRACAUDAL; PERINEURAL
Status: DISPENSED
Start: 2022-01-24

## (undated) RX ORDER — BUPIVACAINE HYDROCHLORIDE 2.5 MG/ML
INJECTION, SOLUTION EPIDURAL; INFILTRATION; INTRACAUDAL
Status: DISPENSED
Start: 2025-01-02

## (undated) RX ORDER — ACETAMINOPHEN 325 MG/1
TABLET ORAL
Status: DISPENSED
Start: 2024-11-05

## (undated) RX ORDER — LIDOCAINE HYDROCHLORIDE 10 MG/ML
INJECTION, SOLUTION EPIDURAL; INFILTRATION; INTRACAUDAL; PERINEURAL
Status: DISPENSED
Start: 2024-09-05

## (undated) RX ORDER — TOLTERODINE 2 MG/1
CAPSULE, EXTENDED RELEASE ORAL
Status: DISPENSED
Start: 2024-11-05

## (undated) RX ORDER — LIDOCAINE HYDROCHLORIDE 10 MG/ML
INJECTION, SOLUTION INFILTRATION; PERINEURAL
Status: DISPENSED
Start: 2024-09-05

## (undated) RX ORDER — LIDOCAINE HYDROCHLORIDE 10 MG/ML
INJECTION, SOLUTION INFILTRATION; PERINEURAL
Status: DISPENSED
Start: 2025-04-10

## (undated) RX ORDER — ENOXAPARIN SODIUM 100 MG/ML
INJECTION SUBCUTANEOUS
Status: DISPENSED
Start: 2025-01-17

## (undated) RX ORDER — TRIAMCINOLONE ACETONIDE 40 MG/ML
INJECTION, SUSPENSION INTRA-ARTICULAR; INTRAMUSCULAR
Status: DISPENSED
Start: 2021-03-23

## (undated) RX ORDER — KETOROLAC TROMETHAMINE 30 MG/ML
INJECTION, SOLUTION INTRAMUSCULAR; INTRAVENOUS
Status: DISPENSED
Start: 2024-11-05

## (undated) RX ORDER — ONDANSETRON 2 MG/ML
INJECTION INTRAMUSCULAR; INTRAVENOUS
Status: DISPENSED
Start: 2024-11-05

## (undated) RX ORDER — LIDOCAINE HYDROCHLORIDE 10 MG/ML
INJECTION, SOLUTION EPIDURAL; INFILTRATION; INTRACAUDAL; PERINEURAL
Status: DISPENSED
Start: 2024-01-17

## (undated) RX ORDER — DEXAMETHASONE SODIUM PHOSPHATE 4 MG/ML
INJECTION, SOLUTION INTRA-ARTICULAR; INTRALESIONAL; INTRAMUSCULAR; INTRAVENOUS; SOFT TISSUE
Status: DISPENSED
Start: 2024-11-05

## (undated) RX ORDER — CEFAZOLIN SODIUM/WATER 2 G/20 ML
SYRINGE (ML) INTRAVENOUS
Status: DISPENSED
Start: 2025-01-02

## (undated) RX ORDER — LIDOCAINE HYDROCHLORIDE 10 MG/ML
INJECTION, SOLUTION EPIDURAL; INFILTRATION; INTRACAUDAL; PERINEURAL
Status: DISPENSED
Start: 2025-04-10

## (undated) RX ORDER — FENTANYL CITRATE-0.9 % NACL/PF 10 MCG/ML
PLASTIC BAG, INJECTION (ML) INTRAVENOUS
Status: DISPENSED
Start: 2024-11-05

## (undated) RX ORDER — OXYCODONE HYDROCHLORIDE 5 MG/1
TABLET ORAL
Status: DISPENSED
Start: 2024-11-05

## (undated) RX ORDER — DEXAMETHASONE SODIUM PHOSPHATE 10 MG/ML
INJECTION, SOLUTION INTRAMUSCULAR; INTRAVENOUS
Status: DISPENSED
Start: 2024-09-05

## (undated) RX ORDER — CEFAZOLIN SODIUM/WATER 2 G/20 ML
SYRINGE (ML) INTRAVENOUS
Status: DISPENSED
Start: 2024-11-05

## (undated) RX ORDER — PROPOFOL 10 MG/ML
INJECTION, EMULSION INTRAVENOUS
Status: DISPENSED
Start: 2022-01-24

## (undated) RX ORDER — ONDANSETRON 2 MG/ML
INJECTION INTRAMUSCULAR; INTRAVENOUS
Status: DISPENSED
Start: 2025-01-02